# Patient Record
Sex: FEMALE | Race: WHITE | Employment: UNEMPLOYED | URBAN - NONMETROPOLITAN AREA
[De-identification: names, ages, dates, MRNs, and addresses within clinical notes are randomized per-mention and may not be internally consistent; named-entity substitution may affect disease eponyms.]

---

## 2017-03-02 RX ORDER — MULTIVIT-MIN/IRON/FOLIC ACID/K 18-600-40
1 CAPSULE ORAL DAILY
COMMUNITY
End: 2022-01-04

## 2017-03-02 RX ORDER — AMPICILLIN TRIHYDRATE 250 MG
2 CAPSULE ORAL DAILY
COMMUNITY
End: 2021-11-08

## 2017-03-02 RX ORDER — M-VIT,TX,IRON,MINS/CALC/FOLIC 27MG-0.4MG
1 TABLET ORAL DAILY
COMMUNITY
End: 2022-01-04

## 2017-03-02 RX ORDER — CHLORAL HYDRATE 500 MG
1000 CAPSULE ORAL DAILY
COMMUNITY
End: 2021-11-08

## 2017-03-02 RX ORDER — TRAZODONE HYDROCHLORIDE 100 MG/1
100 TABLET ORAL NIGHTLY
COMMUNITY

## 2017-03-02 RX ORDER — FLUTICASONE PROPIONATE 50 MCG
2 SPRAY, SUSPENSION (ML) NASAL DAILY
COMMUNITY

## 2017-03-02 RX ORDER — ASPIRIN 81 MG/1
81 TABLET ORAL DAILY
COMMUNITY

## 2017-03-02 RX ORDER — VENLAFAXINE 37.5 MG/1
37.5 TABLET ORAL 2 TIMES DAILY
COMMUNITY
End: 2021-11-08 | Stop reason: ALTCHOICE

## 2017-03-06 ENCOUNTER — INITIAL CONSULT (OUTPATIENT)
Dept: SURGERY | Age: 72
End: 2017-03-06

## 2017-03-06 VITALS
DIASTOLIC BLOOD PRESSURE: 82 MMHG | SYSTOLIC BLOOD PRESSURE: 138 MMHG | WEIGHT: 254 LBS | HEART RATE: 80 BPM | TEMPERATURE: 97.6 F | HEIGHT: 62 IN | BODY MASS INDEX: 46.74 KG/M2

## 2017-03-06 DIAGNOSIS — Z12.11 ENCOUNTER FOR COLONOSCOPY DUE TO HISTORY OF COLON CANCER: Primary | ICD-10-CM

## 2017-03-06 DIAGNOSIS — Z85.038 ENCOUNTER FOR COLONOSCOPY DUE TO HISTORY OF COLON CANCER: Primary | ICD-10-CM

## 2017-03-06 PROCEDURE — 99999 PR OFFICE/OUTPT VISIT,PROCEDURE ONLY: CPT | Performed by: SURGERY

## 2017-03-20 ENCOUNTER — OFFICE VISIT (OUTPATIENT)
Dept: SURGERY | Age: 72
End: 2017-03-20
Payer: MEDICARE

## 2017-03-20 VITALS
SYSTOLIC BLOOD PRESSURE: 130 MMHG | TEMPERATURE: 98.3 F | BODY MASS INDEX: 46.74 KG/M2 | DIASTOLIC BLOOD PRESSURE: 84 MMHG | HEIGHT: 62 IN | HEART RATE: 80 BPM | WEIGHT: 254 LBS

## 2017-03-20 DIAGNOSIS — K57.30 SIGMOID DIVERTICULOSIS: ICD-10-CM

## 2017-03-20 DIAGNOSIS — K63.5 COLON POLYPS: Primary | ICD-10-CM

## 2017-03-20 PROCEDURE — 99213 OFFICE O/P EST LOW 20 MIN: CPT | Performed by: SURGERY

## 2017-07-03 ENCOUNTER — OFFICE VISIT (OUTPATIENT)
Dept: SURGERY | Age: 72
End: 2017-07-03
Payer: MEDICARE

## 2017-07-03 VITALS
SYSTOLIC BLOOD PRESSURE: 138 MMHG | HEIGHT: 62 IN | TEMPERATURE: 97.6 F | DIASTOLIC BLOOD PRESSURE: 84 MMHG | RESPIRATION RATE: 12 BRPM | HEART RATE: 72 BPM | BODY MASS INDEX: 43.79 KG/M2 | WEIGHT: 238 LBS

## 2017-07-03 DIAGNOSIS — K56.60 INTESTINAL OBSTRUCTION, UNSPECIFIED TYPE: Primary | ICD-10-CM

## 2017-07-03 DIAGNOSIS — K59.01 SLOW TRANSIT CONSTIPATION: ICD-10-CM

## 2017-07-03 PROCEDURE — 99214 OFFICE O/P EST MOD 30 MIN: CPT | Performed by: SURGERY

## 2017-07-03 RX ORDER — LACTULOSE 10 G/15ML
SOLUTION ORAL
Qty: 946 BOTTLE | Refills: 6 | Status: ON HOLD | OUTPATIENT
Start: 2017-07-03 | End: 2019-07-16 | Stop reason: HOSPADM

## 2017-08-31 ENCOUNTER — APPOINTMENT (OUTPATIENT)
Dept: GENERAL RADIOLOGY | Age: 72
DRG: 389 | End: 2017-08-31
Attending: INTERNAL MEDICINE
Payer: MEDICARE

## 2017-08-31 ENCOUNTER — HOSPITAL ENCOUNTER (INPATIENT)
Age: 72
LOS: 2 days | Discharge: HOME | DRG: 389 | End: 2017-09-02
Attending: INTERNAL MEDICINE | Admitting: INTERNAL MEDICINE
Payer: MEDICARE

## 2017-08-31 LAB
ABSOLUTE EOS #: 0 K/UL (ref 0–0.4)
ABSOLUTE LYMPH #: 2.6 K/UL (ref 1–4.8)
ABSOLUTE MONO #: 0.6 K/UL (ref 0.1–1.2)
ALBUMIN SERPL-MCNC: 3.7 G/DL (ref 3.5–5.2)
ALBUMIN/GLOBULIN RATIO: 1.5 (ref 1–2.5)
ALP BLD-CCNC: 68 U/L (ref 35–104)
ALT SERPL-CCNC: 8 U/L (ref 5–33)
ANION GAP SERPL CALCULATED.3IONS-SCNC: 13 MMOL/L (ref 9–17)
AST SERPL-CCNC: 11 U/L
BASOPHILS # BLD: 1 %
BASOPHILS ABSOLUTE: 0.1 K/UL (ref 0–0.2)
BILIRUB SERPL-MCNC: 0.52 MG/DL (ref 0.3–1.2)
BUN BLDV-MCNC: 12 MG/DL (ref 8–23)
BUN/CREAT BLD: 16 (ref 9–20)
CALCIUM SERPL-MCNC: 8.7 MG/DL (ref 8.6–10.4)
CHLORIDE BLD-SCNC: 103 MMOL/L (ref 98–107)
CO2: 28 MMOL/L (ref 20–31)
CREAT SERPL-MCNC: 0.75 MG/DL (ref 0.5–0.9)
DIFFERENTIAL TYPE: NORMAL
EOSINOPHILS RELATIVE PERCENT: 1 %
GFR AFRICAN AMERICAN: >60 ML/MIN
GFR NON-AFRICAN AMERICAN: >60 ML/MIN
GFR SERPL CREATININE-BSD FRML MDRD: ABNORMAL ML/MIN/{1.73_M2}
GFR SERPL CREATININE-BSD FRML MDRD: ABNORMAL ML/MIN/{1.73_M2}
GLUCOSE BLD-MCNC: 114 MG/DL (ref 70–99)
HCT VFR BLD CALC: 41.7 % (ref 36–46)
HEMOGLOBIN: 13.4 G/DL (ref 12–16)
INR BLD: 1
LYMPHOCYTES # BLD: 24 %
MCH RBC QN AUTO: 28.5 PG (ref 26–34)
MCHC RBC AUTO-ENTMCNC: 32.1 G/DL (ref 31–37)
MCV RBC AUTO: 88.8 FL (ref 80–100)
MONOCYTES # BLD: 6 %
PARTIAL THROMBOPLASTIN TIME: 29.5 SEC (ref 27–35)
PDW BLD-RTO: 14.3 % (ref 11–14.5)
PLATELET # BLD: 249 K/UL (ref 140–450)
PLATELET ESTIMATE: NORMAL
PMV BLD AUTO: 7.6 FL (ref 6–12)
POTASSIUM SERPL-SCNC: 3.9 MMOL/L (ref 3.7–5.3)
PROTHROMBIN TIME: 10.8 SEC (ref 9.4–11.3)
RBC # BLD: 4.7 M/UL (ref 4–5.2)
RBC # BLD: NORMAL 10*6/UL
SEG NEUTROPHILS: 68 %
SEGMENTED NEUTROPHILS ABSOLUTE COUNT: 7.3 K/UL (ref 1.8–7.7)
SODIUM BLD-SCNC: 144 MMOL/L (ref 135–144)
THYROXINE, FREE: 0.97 NG/DL (ref 0.93–1.7)
TOTAL PROTEIN: 6.1 G/DL (ref 6.4–8.3)
TSH SERPL DL<=0.05 MIU/L-ACNC: 2.98 MIU/L (ref 0.3–5)
WBC # BLD: 10.6 K/UL (ref 3.5–11)
WBC # BLD: NORMAL 10*3/UL

## 2017-08-31 PROCEDURE — 2580000003 HC RX 258: Performed by: INTERNAL MEDICINE

## 2017-08-31 PROCEDURE — 71010 XR CHEST PORTABLE: CPT

## 2017-08-31 PROCEDURE — 6360000002 HC RX W HCPCS: Performed by: INTERNAL MEDICINE

## 2017-08-31 PROCEDURE — 85610 PROTHROMBIN TIME: CPT

## 2017-08-31 PROCEDURE — 84439 ASSAY OF FREE THYROXINE: CPT

## 2017-08-31 PROCEDURE — 80053 COMPREHEN METABOLIC PANEL: CPT

## 2017-08-31 PROCEDURE — 85730 THROMBOPLASTIN TIME PARTIAL: CPT

## 2017-08-31 PROCEDURE — 2060000000 HC ICU INTERMEDIATE R&B

## 2017-08-31 PROCEDURE — 99223 1ST HOSP IP/OBS HIGH 75: CPT | Performed by: INTERNAL MEDICINE

## 2017-08-31 PROCEDURE — 85025 COMPLETE CBC W/AUTO DIFF WBC: CPT

## 2017-08-31 PROCEDURE — 36415 COLL VENOUS BLD VENIPUNCTURE: CPT

## 2017-08-31 PROCEDURE — 2500000003 HC RX 250 WO HCPCS: Performed by: INTERNAL MEDICINE

## 2017-08-31 PROCEDURE — 84443 ASSAY THYROID STIM HORMONE: CPT

## 2017-08-31 RX ORDER — ONDANSETRON 2 MG/ML
8 INJECTION INTRAMUSCULAR; INTRAVENOUS EVERY 4 HOURS PRN
Status: DISCONTINUED | OUTPATIENT
Start: 2017-08-31 | End: 2017-09-02 | Stop reason: HOSPADM

## 2017-08-31 RX ORDER — FLUTICASONE PROPIONATE 50 MCG
2 SPRAY, SUSPENSION (ML) NASAL DAILY
Status: DISCONTINUED | OUTPATIENT
Start: 2017-08-31 | End: 2017-09-02 | Stop reason: HOSPADM

## 2017-08-31 RX ORDER — SODIUM CHLORIDE 9 MG/ML
INJECTION, SOLUTION INTRAVENOUS CONTINUOUS
Status: DISCONTINUED | OUTPATIENT
Start: 2017-08-31 | End: 2017-09-02 | Stop reason: HOSPADM

## 2017-08-31 RX ORDER — CIPROFLOXACIN 2 MG/ML
400 INJECTION, SOLUTION INTRAVENOUS EVERY 12 HOURS
Status: DISCONTINUED | OUTPATIENT
Start: 2017-08-31 | End: 2017-09-01

## 2017-08-31 RX ADMIN — HYDROMORPHONE HYDROCHLORIDE 1 MG: 1 INJECTION, SOLUTION INTRAMUSCULAR; INTRAVENOUS; SUBCUTANEOUS at 19:58

## 2017-08-31 RX ADMIN — ONDANSETRON 8 MG: 2 INJECTION INTRAMUSCULAR; INTRAVENOUS at 19:46

## 2017-08-31 RX ADMIN — METRONIDAZOLE 500 MG: 500 INJECTION, SOLUTION INTRAVENOUS at 23:42

## 2017-08-31 RX ADMIN — SODIUM CHLORIDE: 9 INJECTION, SOLUTION INTRAVENOUS at 23:40

## 2017-08-31 RX ADMIN — METRONIDAZOLE 500 MG: 500 INJECTION, SOLUTION INTRAVENOUS at 18:02

## 2017-08-31 RX ADMIN — SODIUM CHLORIDE: 9 INJECTION, SOLUTION INTRAVENOUS at 14:26

## 2017-08-31 RX ADMIN — CIPROFLOXACIN 400 MG: 2 INJECTION, SOLUTION INTRAVENOUS at 16:46

## 2017-08-31 ASSESSMENT — PAIN DESCRIPTION - DESCRIPTORS: DESCRIPTORS: CRAMPING

## 2017-08-31 ASSESSMENT — PAIN DESCRIPTION - LOCATION: LOCATION: ABDOMEN

## 2017-08-31 ASSESSMENT — PAIN DESCRIPTION - PAIN TYPE: TYPE: ACUTE PAIN

## 2017-08-31 ASSESSMENT — PAIN SCALES - GENERAL
PAINLEVEL_OUTOF10: 2
PAINLEVEL_OUTOF10: 6

## 2017-08-31 ASSESSMENT — PAIN DESCRIPTION - PROGRESSION: CLINICAL_PROGRESSION: NOT CHANGED

## 2017-08-31 ASSESSMENT — PAIN DESCRIPTION - FREQUENCY: FREQUENCY: CONTINUOUS

## 2017-08-31 ASSESSMENT — PAIN DESCRIPTION - ONSET: ONSET: ON-GOING

## 2017-09-01 ENCOUNTER — APPOINTMENT (OUTPATIENT)
Dept: ULTRASOUND IMAGING | Age: 72
DRG: 389 | End: 2017-09-01
Attending: INTERNAL MEDICINE
Payer: MEDICARE

## 2017-09-01 ENCOUNTER — APPOINTMENT (OUTPATIENT)
Dept: GENERAL RADIOLOGY | Age: 72
DRG: 389 | End: 2017-09-01
Attending: INTERNAL MEDICINE
Payer: MEDICARE

## 2017-09-01 LAB
ABSOLUTE EOS #: 0 K/UL (ref 0–0.4)
ABSOLUTE LYMPH #: 2.3 K/UL (ref 1–4.8)
ABSOLUTE MONO #: 0.7 K/UL (ref 0.1–1.2)
ANION GAP SERPL CALCULATED.3IONS-SCNC: 11 MMOL/L (ref 9–17)
BASOPHILS # BLD: 0 %
BASOPHILS ABSOLUTE: 0 K/UL (ref 0–0.2)
BUN BLDV-MCNC: 12 MG/DL (ref 8–23)
BUN/CREAT BLD: 17 (ref 9–20)
CALCIUM SERPL-MCNC: 8.3 MG/DL (ref 8.6–10.4)
CARCINOEMBRYONIC ANTIGEN: 1 NG/ML
CHLORIDE BLD-SCNC: 105 MMOL/L (ref 98–107)
CO2: 29 MMOL/L (ref 20–31)
CREAT SERPL-MCNC: 0.72 MG/DL (ref 0.5–0.9)
DIFFERENTIAL TYPE: NORMAL
EOSINOPHILS RELATIVE PERCENT: 0 %
GFR AFRICAN AMERICAN: >60 ML/MIN
GFR NON-AFRICAN AMERICAN: >60 ML/MIN
GFR SERPL CREATININE-BSD FRML MDRD: ABNORMAL ML/MIN/{1.73_M2}
GFR SERPL CREATININE-BSD FRML MDRD: ABNORMAL ML/MIN/{1.73_M2}
GLUCOSE BLD-MCNC: 119 MG/DL (ref 70–99)
HCT VFR BLD CALC: 40.5 % (ref 36–46)
HEMOGLOBIN: 13 G/DL (ref 12–16)
LYMPHOCYTES # BLD: 24 %
MCH RBC QN AUTO: 28.6 PG (ref 26–34)
MCHC RBC AUTO-ENTMCNC: 32.1 G/DL (ref 31–37)
MCV RBC AUTO: 89.2 FL (ref 80–100)
MONOCYTES # BLD: 7 %
PDW BLD-RTO: 14.5 % (ref 11–14.5)
PLATELET # BLD: 235 K/UL (ref 140–450)
PLATELET ESTIMATE: NORMAL
PMV BLD AUTO: 7.8 FL (ref 6–12)
POTASSIUM SERPL-SCNC: 3.7 MMOL/L (ref 3.7–5.3)
RBC # BLD: 4.54 M/UL (ref 4–5.2)
RBC # BLD: NORMAL 10*6/UL
SEG NEUTROPHILS: 69 %
SEGMENTED NEUTROPHILS ABSOLUTE COUNT: 6.8 K/UL (ref 1.8–7.7)
SODIUM BLD-SCNC: 145 MMOL/L (ref 135–144)
WBC # BLD: 9.9 K/UL (ref 3.5–11)
WBC # BLD: NORMAL 10*3/UL

## 2017-09-01 PROCEDURE — 6360000002 HC RX W HCPCS

## 2017-09-01 PROCEDURE — 36415 COLL VENOUS BLD VENIPUNCTURE: CPT

## 2017-09-01 PROCEDURE — 80048 BASIC METABOLIC PNL TOTAL CA: CPT

## 2017-09-01 PROCEDURE — 6360000002 HC RX W HCPCS: Performed by: INTERNAL MEDICINE

## 2017-09-01 PROCEDURE — 99233 SBSQ HOSP IP/OBS HIGH 50: CPT | Performed by: SURGERY

## 2017-09-01 PROCEDURE — 74022 RADEX COMPL AQT ABD SERIES: CPT

## 2017-09-01 PROCEDURE — 82378 CARCINOEMBRYONIC ANTIGEN: CPT

## 2017-09-01 PROCEDURE — 2060000000 HC ICU INTERMEDIATE R&B

## 2017-09-01 PROCEDURE — 6370000000 HC RX 637 (ALT 250 FOR IP): Performed by: SURGERY

## 2017-09-01 PROCEDURE — 6370000000 HC RX 637 (ALT 250 FOR IP): Performed by: INTERNAL MEDICINE

## 2017-09-01 PROCEDURE — 99232 SBSQ HOSP IP/OBS MODERATE 35: CPT | Performed by: INTERNAL MEDICINE

## 2017-09-01 PROCEDURE — 85025 COMPLETE CBC W/AUTO DIFF WBC: CPT

## 2017-09-01 PROCEDURE — 2580000003 HC RX 258: Performed by: INTERNAL MEDICINE

## 2017-09-01 PROCEDURE — 76705 ECHO EXAM OF ABDOMEN: CPT

## 2017-09-01 PROCEDURE — 2500000003 HC RX 250 WO HCPCS: Performed by: INTERNAL MEDICINE

## 2017-09-01 RX ORDER — SODIUM CHLORIDE 0.9 % (FLUSH) 0.9 %
10 SYRINGE (ML) INJECTION EVERY 12 HOURS SCHEDULED
Status: DISCONTINUED | OUTPATIENT
Start: 2017-09-01 | End: 2017-09-02 | Stop reason: HOSPADM

## 2017-09-01 RX ORDER — CIPROFLOXACIN 2 MG/ML
INJECTION, SOLUTION INTRAVENOUS
Status: COMPLETED
Start: 2017-09-01 | End: 2017-09-01

## 2017-09-01 RX ORDER — LACTULOSE 10 G/15ML
10 SOLUTION ORAL DAILY
Status: DISCONTINUED | OUTPATIENT
Start: 2017-09-01 | End: 2017-09-02 | Stop reason: HOSPADM

## 2017-09-01 RX ORDER — SODIUM CHLORIDE 0.9 % (FLUSH) 0.9 %
10 SYRINGE (ML) INJECTION PRN
Status: DISCONTINUED | OUTPATIENT
Start: 2017-09-01 | End: 2017-09-02 | Stop reason: HOSPADM

## 2017-09-01 RX ADMIN — SODIUM CHLORIDE: 9 INJECTION, SOLUTION INTRAVENOUS at 11:34

## 2017-09-01 RX ADMIN — CIPROFLOXACIN 400 MG: 2 INJECTION, SOLUTION INTRAVENOUS at 06:12

## 2017-09-01 RX ADMIN — Medication 10 ML: at 13:39

## 2017-09-01 RX ADMIN — LACTULOSE 10 G: 20 SOLUTION ORAL at 12:01

## 2017-09-01 RX ADMIN — ONDANSETRON 8 MG: 2 INJECTION INTRAMUSCULAR; INTRAVENOUS at 07:11

## 2017-09-01 RX ADMIN — ONDANSETRON 8 MG: 2 INJECTION INTRAMUSCULAR; INTRAVENOUS at 18:11

## 2017-09-01 RX ADMIN — SODIUM CHLORIDE, PRESERVATIVE FREE 10 ML: 5 INJECTION INTRAVENOUS at 21:37

## 2017-09-01 RX ADMIN — HYDROMORPHONE HYDROCHLORIDE 1 MG: 1 INJECTION, SOLUTION INTRAMUSCULAR; INTRAVENOUS; SUBCUTANEOUS at 18:37

## 2017-09-01 RX ADMIN — SODIUM CHLORIDE: 9 INJECTION, SOLUTION INTRAVENOUS at 19:59

## 2017-09-01 RX ADMIN — METRONIDAZOLE 500 MG: 500 INJECTION, SOLUTION INTRAVENOUS at 07:05

## 2017-09-01 ASSESSMENT — PAIN SCALES - GENERAL
PAINLEVEL_OUTOF10: 0
PAINLEVEL_OUTOF10: 3
PAINLEVEL_OUTOF10: 0
PAINLEVEL_OUTOF10: 8

## 2017-09-01 ASSESSMENT — PAIN DESCRIPTION - PAIN TYPE
TYPE: ACUTE PAIN
TYPE: ACUTE PAIN

## 2017-09-01 ASSESSMENT — PAIN DESCRIPTION - PROGRESSION: CLINICAL_PROGRESSION: GRADUALLY WORSENING

## 2017-09-01 ASSESSMENT — PAIN DESCRIPTION - ORIENTATION: ORIENTATION: UPPER

## 2017-09-01 ASSESSMENT — PAIN DESCRIPTION - DIRECTION: RADIATING_TOWARDS: 0

## 2017-09-01 ASSESSMENT — PAIN DESCRIPTION - LOCATION
LOCATION: ABDOMEN
LOCATION: ABDOMEN

## 2017-09-01 ASSESSMENT — PAIN DESCRIPTION - FREQUENCY: FREQUENCY: INTERMITTENT

## 2017-09-01 ASSESSMENT — PAIN DESCRIPTION - ONSET: ONSET: ON-GOING

## 2017-09-01 ASSESSMENT — PAIN DESCRIPTION - DESCRIPTORS
DESCRIPTORS: CRAMPING
DESCRIPTORS: CRAMPING

## 2017-09-02 VITALS
WEIGHT: 240.3 LBS | BODY MASS INDEX: 42.58 KG/M2 | HEIGHT: 63 IN | DIASTOLIC BLOOD PRESSURE: 78 MMHG | SYSTOLIC BLOOD PRESSURE: 113 MMHG | RESPIRATION RATE: 16 BRPM | OXYGEN SATURATION: 94 % | HEART RATE: 84 BPM | TEMPERATURE: 97.9 F

## 2017-09-02 LAB
ABSOLUTE EOS #: 0.1 K/UL (ref 0–0.4)
ABSOLUTE LYMPH #: 2.1 K/UL (ref 1–4.8)
ABSOLUTE MONO #: 0.5 K/UL (ref 0.1–1.2)
ANION GAP SERPL CALCULATED.3IONS-SCNC: 9 MMOL/L (ref 9–17)
BASOPHILS # BLD: 0 %
BASOPHILS ABSOLUTE: 0 K/UL (ref 0–0.2)
BUN BLDV-MCNC: 9 MG/DL (ref 8–23)
BUN/CREAT BLD: 12 (ref 9–20)
CALCIUM SERPL-MCNC: 8 MG/DL (ref 8.6–10.4)
CHLORIDE BLD-SCNC: 108 MMOL/L (ref 98–107)
CO2: 29 MMOL/L (ref 20–31)
CREAT SERPL-MCNC: 0.76 MG/DL (ref 0.5–0.9)
DIFFERENTIAL TYPE: ABNORMAL
EOSINOPHILS RELATIVE PERCENT: 2 %
GFR AFRICAN AMERICAN: >60 ML/MIN
GFR NON-AFRICAN AMERICAN: >60 ML/MIN
GFR SERPL CREATININE-BSD FRML MDRD: ABNORMAL ML/MIN/{1.73_M2}
GFR SERPL CREATININE-BSD FRML MDRD: ABNORMAL ML/MIN/{1.73_M2}
GLUCOSE BLD-MCNC: 110 MG/DL (ref 70–99)
HCT VFR BLD CALC: 36.6 % (ref 36–46)
HEMOGLOBIN: 11.6 G/DL (ref 12–16)
LYMPHOCYTES # BLD: 32 %
MCH RBC QN AUTO: 28.4 PG (ref 26–34)
MCHC RBC AUTO-ENTMCNC: 31.6 G/DL (ref 31–37)
MCV RBC AUTO: 89.7 FL (ref 80–100)
MONOCYTES # BLD: 8 %
PDW BLD-RTO: 14.3 % (ref 11–14.5)
PLATELET # BLD: 211 K/UL (ref 140–450)
PLATELET ESTIMATE: ABNORMAL
PMV BLD AUTO: 7.8 FL (ref 6–12)
POTASSIUM SERPL-SCNC: 4.1 MMOL/L (ref 3.7–5.3)
RBC # BLD: 4.08 M/UL (ref 4–5.2)
RBC # BLD: ABNORMAL 10*6/UL
SEG NEUTROPHILS: 58 %
SEGMENTED NEUTROPHILS ABSOLUTE COUNT: 3.9 K/UL (ref 1.8–7.7)
SODIUM BLD-SCNC: 146 MMOL/L (ref 135–144)
WBC # BLD: 6.7 K/UL (ref 3.5–11)
WBC # BLD: ABNORMAL 10*3/UL

## 2017-09-02 PROCEDURE — 6370000000 HC RX 637 (ALT 250 FOR IP): Performed by: SURGERY

## 2017-09-02 PROCEDURE — 99232 SBSQ HOSP IP/OBS MODERATE 35: CPT | Performed by: SURGERY

## 2017-09-02 PROCEDURE — 80048 BASIC METABOLIC PNL TOTAL CA: CPT

## 2017-09-02 PROCEDURE — 85025 COMPLETE CBC W/AUTO DIFF WBC: CPT

## 2017-09-02 PROCEDURE — 36415 COLL VENOUS BLD VENIPUNCTURE: CPT

## 2017-09-02 PROCEDURE — 99238 HOSP IP/OBS DSCHRG MGMT 30/<: CPT | Performed by: FAMILY MEDICINE

## 2017-09-02 RX ADMIN — LACTULOSE 10 G: 20 SOLUTION ORAL at 09:18

## 2017-09-02 ASSESSMENT — PAIN SCALES - GENERAL: PAINLEVEL_OUTOF10: 0

## 2017-09-25 ENCOUNTER — OFFICE VISIT (OUTPATIENT)
Dept: SURGERY | Age: 72
End: 2017-09-25
Payer: MEDICARE

## 2017-09-25 VITALS
HEART RATE: 71 BPM | BODY MASS INDEX: 43.79 KG/M2 | TEMPERATURE: 97.7 F | SYSTOLIC BLOOD PRESSURE: 130 MMHG | DIASTOLIC BLOOD PRESSURE: 68 MMHG | HEIGHT: 62 IN | WEIGHT: 238 LBS

## 2017-09-25 DIAGNOSIS — K82.8 GALLBLADDER SLUDGE: Primary | ICD-10-CM

## 2017-09-25 DIAGNOSIS — K59.00 CONSTIPATION, UNSPECIFIED CONSTIPATION TYPE: ICD-10-CM

## 2017-09-25 PROCEDURE — 99214 OFFICE O/P EST MOD 30 MIN: CPT | Performed by: SURGERY

## 2017-09-25 RX ORDER — DOCUSATE SODIUM 100 MG/1
100 CAPSULE, LIQUID FILLED ORAL 2 TIMES DAILY
Qty: 60 CAPSULE | Refills: 3 | Status: SHIPPED | OUTPATIENT
Start: 2017-09-25 | End: 2019-07-10

## 2017-11-20 ENCOUNTER — OFFICE VISIT (OUTPATIENT)
Dept: SURGERY | Age: 72
End: 2017-11-20
Payer: MEDICARE

## 2017-11-20 VITALS
DIASTOLIC BLOOD PRESSURE: 64 MMHG | HEIGHT: 62 IN | TEMPERATURE: 97 F | SYSTOLIC BLOOD PRESSURE: 128 MMHG | HEART RATE: 86 BPM | WEIGHT: 241 LBS | BODY MASS INDEX: 44.35 KG/M2

## 2017-11-20 DIAGNOSIS — K56.600 SMALL BOWEL OBSTRUCTION, PARTIAL (HCC): Primary | ICD-10-CM

## 2017-11-20 DIAGNOSIS — K57.30 SIGMOID DIVERTICULOSIS: ICD-10-CM

## 2017-11-20 PROCEDURE — 99215 OFFICE O/P EST HI 40 MIN: CPT | Performed by: SURGERY

## 2017-11-20 NOTE — PROGRESS NOTES
This patient was recently admitted to the hospital for treatment of small bowel partial obstruction. She was treated conservatively and resolved. She has no symptoms at the present time. She is also known to have severe sigmoid diverticulosis and has been on fiber supplement however she has not been taking it consistently. She states that she has been having regular bowel movements with the lactulose and the fiber supplement. She is advised to have Metamucil or Benefiber one tablespoonful twice daily with water at least 12 ounces. She is advised to continue to take lactulose 1 tablespoonful 3 times daily. We will order aSoft, nondistended, normal bowel sounds, nontender, no organomegaly, no mass, no rebound, no guarding and small bowel through x-ray to evaluate the patency of her small intestine. She is to return after 3 weeks for follow-up.

## 2017-11-20 NOTE — PATIENT INSTRUCTIONS
METAMUCIL 1 TABLESPOON IN 8 OUNCE GLASS COLD WATER 2 X DAILY  RETURN COLONOSCOPY 3 YEARS  SMALL BOWEL FOLLOW THRU IN [place]   KEEP APPT 12/11/2017 11:30 @[facility]  LACTULOSE 1 TABLESPOON 3X DAILY  SMALL BOWEL FOLLOW THROUGH @ [address] [facility] 12/28/2017 @ 9:00 AM NOTHING AFTER MIDNIGHT.

## 2017-12-12 ENCOUNTER — HOSPITAL ENCOUNTER (OUTPATIENT)
Dept: GENERAL RADIOLOGY | Age: 72
Discharge: HOME | End: 2017-12-12
Payer: MEDICARE

## 2017-12-12 DIAGNOSIS — K57.30 SIGMOID DIVERTICULOSIS: ICD-10-CM

## 2017-12-12 DIAGNOSIS — K56.600 SMALL BOWEL OBSTRUCTION, PARTIAL (HCC): ICD-10-CM

## 2017-12-12 PROCEDURE — 2500000003 HC RX 250 WO HCPCS: Performed by: SURGERY

## 2017-12-12 PROCEDURE — 74250 X-RAY XM SM INT 1CNTRST STD: CPT

## 2017-12-12 RX ADMIN — BARIUM SULFATE 750 ML: 960 POWDER, FOR SUSPENSION ORAL at 10:48

## 2017-12-15 ENCOUNTER — OFFICE VISIT (OUTPATIENT)
Dept: SURGERY | Age: 72
End: 2017-12-15
Payer: MEDICARE

## 2017-12-15 VITALS
TEMPERATURE: 98.2 F | SYSTOLIC BLOOD PRESSURE: 124 MMHG | DIASTOLIC BLOOD PRESSURE: 64 MMHG | HEART RATE: 69 BPM | WEIGHT: 246 LBS | OXYGEN SATURATION: 97 % | BODY MASS INDEX: 45.27 KG/M2 | HEIGHT: 62 IN

## 2017-12-15 DIAGNOSIS — K56.600 SMALL BOWEL OBSTRUCTION, PARTIAL (HCC): ICD-10-CM

## 2017-12-15 DIAGNOSIS — K59.09 CHRONIC CONSTIPATION: Primary | ICD-10-CM

## 2017-12-15 PROCEDURE — 99214 OFFICE O/P EST MOD 30 MIN: CPT | Performed by: SURGERY

## 2017-12-15 RX ORDER — LACTULOSE 10 G/15ML
10 SOLUTION ORAL 3 TIMES DAILY
Qty: 1350 ML | Refills: 5 | Status: SHIPPED | OUTPATIENT
Start: 2017-12-15 | End: 2018-03-15

## 2019-05-07 ENCOUNTER — HOSPITAL ENCOUNTER (INPATIENT)
Age: 74
LOS: 2 days | Discharge: HOME | DRG: 390 | End: 2019-05-09
Attending: INTERNAL MEDICINE | Admitting: INTERNAL MEDICINE
Payer: MEDICARE

## 2019-05-07 PROBLEM — K56.609 SBO (SMALL BOWEL OBSTRUCTION) (HCC): Status: ACTIVE | Noted: 2019-05-07

## 2019-05-07 LAB
EKG ATRIAL RATE: 93 BPM
EKG P AXIS: 84 DEGREES
EKG P-R INTERVAL: 146 MS
EKG Q-T INTERVAL: 396 MS
EKG QRS DURATION: 82 MS
EKG QTC CALCULATION (BAZETT): 492 MS
EKG R AXIS: -13 DEGREES
EKG T AXIS: 67 DEGREES
EKG VENTRICULAR RATE: 93 BPM

## 2019-05-07 PROCEDURE — 2580000003 HC RX 258: Performed by: INTERNAL MEDICINE

## 2019-05-07 PROCEDURE — 94664 DEMO&/EVAL PT USE INHALER: CPT

## 2019-05-07 PROCEDURE — 96372 THER/PROPH/DIAG INJ SC/IM: CPT

## 2019-05-07 PROCEDURE — 94760 N-INVAS EAR/PLS OXIMETRY 1: CPT

## 2019-05-07 PROCEDURE — 94150 VITAL CAPACITY TEST: CPT

## 2019-05-07 PROCEDURE — 6370000000 HC RX 637 (ALT 250 FOR IP): Performed by: INTERNAL MEDICINE

## 2019-05-07 PROCEDURE — 6360000002 HC RX W HCPCS: Performed by: INTERNAL MEDICINE

## 2019-05-07 PROCEDURE — 93005 ELECTROCARDIOGRAM TRACING: CPT

## 2019-05-07 PROCEDURE — 0D9670Z DRAINAGE OF STOMACH WITH DRAINAGE DEVICE, VIA NATURAL OR ARTIFICIAL OPENING: ICD-10-PCS | Performed by: INTERNAL MEDICINE

## 2019-05-07 PROCEDURE — 99223 1ST HOSP IP/OBS HIGH 75: CPT | Performed by: INTERNAL MEDICINE

## 2019-05-07 PROCEDURE — 2060000000 HC ICU INTERMEDIATE R&B

## 2019-05-07 RX ORDER — ALBUTEROL SULFATE 2.5 MG/3ML
2.5 SOLUTION RESPIRATORY (INHALATION)
Status: DISCONTINUED | OUTPATIENT
Start: 2019-05-07 | End: 2019-05-09 | Stop reason: HOSPADM

## 2019-05-07 RX ORDER — SODIUM CHLORIDE 0.9 % (FLUSH) 0.9 %
10 SYRINGE (ML) INJECTION EVERY 12 HOURS SCHEDULED
Status: DISCONTINUED | OUTPATIENT
Start: 2019-05-07 | End: 2019-05-09 | Stop reason: HOSPADM

## 2019-05-07 RX ORDER — VENLAFAXINE 37.5 MG/1
37.5 TABLET ORAL 2 TIMES DAILY
Status: DISCONTINUED | OUTPATIENT
Start: 2019-05-07 | End: 2019-05-09 | Stop reason: HOSPADM

## 2019-05-07 RX ORDER — ALBUTEROL SULFATE 2.5 MG/3ML
2.5 SOLUTION RESPIRATORY (INHALATION)
Status: DISCONTINUED | OUTPATIENT
Start: 2019-05-07 | End: 2019-05-07

## 2019-05-07 RX ORDER — SODIUM CHLORIDE 0.9 % (FLUSH) 0.9 %
10 SYRINGE (ML) INJECTION PRN
Status: DISCONTINUED | OUTPATIENT
Start: 2019-05-07 | End: 2019-05-09 | Stop reason: HOSPADM

## 2019-05-07 RX ORDER — FLUTICASONE PROPIONATE 50 MCG
2 SPRAY, SUSPENSION (ML) NASAL DAILY PRN
Status: DISCONTINUED | OUTPATIENT
Start: 2019-05-07 | End: 2019-05-09 | Stop reason: HOSPADM

## 2019-05-07 RX ORDER — SODIUM CHLORIDE 9 MG/ML
INJECTION, SOLUTION INTRAVENOUS CONTINUOUS
Status: DISCONTINUED | OUTPATIENT
Start: 2019-05-07 | End: 2019-05-08

## 2019-05-07 RX ORDER — SODIUM CHLORIDE FOR INHALATION 0.9 %
3 VIAL, NEBULIZER (ML) INHALATION
Status: DISCONTINUED | OUTPATIENT
Start: 2019-05-07 | End: 2019-05-07

## 2019-05-07 RX ADMIN — VENLAFAXINE 37.5 MG: 37.5 TABLET ORAL at 21:33

## 2019-05-07 RX ADMIN — ENOXAPARIN SODIUM 40 MG: 40 INJECTION SUBCUTANEOUS at 21:33

## 2019-05-07 RX ADMIN — SODIUM CHLORIDE: 9 INJECTION, SOLUTION INTRAVENOUS at 21:26

## 2019-05-07 ASSESSMENT — PAIN DESCRIPTION - PROGRESSION: CLINICAL_PROGRESSION: GRADUALLY IMPROVING

## 2019-05-07 ASSESSMENT — PAIN SCALES - GENERAL
PAINLEVEL_OUTOF10: 4
PAINLEVEL_OUTOF10: 0
PAINLEVEL_OUTOF10: 4
PAINLEVEL_OUTOF10: 0

## 2019-05-07 ASSESSMENT — PAIN DESCRIPTION - LOCATION: LOCATION: ABDOMEN

## 2019-05-07 ASSESSMENT — PAIN DESCRIPTION - FREQUENCY: FREQUENCY: INTERMITTENT

## 2019-05-07 ASSESSMENT — PAIN DESCRIPTION - PAIN TYPE: TYPE: ACUTE PAIN

## 2019-05-07 NOTE — PROGRESS NOTES
Physical Therapy  Attempted PT eval this date. On hold per nursing, as patient is currently being assessed by general surgery for small bowel obstruction. Will attempt PT eval again tomorrow.     Ashvin Wilson, PT, DPT

## 2019-05-07 NOTE — CONSULTS
Inpatient consult to General Surgery  Consult performed by: Eddy Lesches, MD  Consult ordered by: Shamir Naylor        SURGICAL CONSULT    HISTORY OF PRESENT ILLNESS:    Patient is a 66-year-old femalewho was admitted as a direct admit from the [facility] ER. She was brought in with increased nausea and vomiting and found to have a small bowel obstruction per CT scan. She had no elevated white blood cell count was transferred over to [facility] in [place] with NGT placement, for observation and possible surgery. .    . She came with an NG tube. She states she feels better since that was put in. The pain and symptoms started last night. She had a colon resection with colon surgery 1970s. She has had a similar episode in 2017 of PSBO that resolved with conservative tx. PAST MEDICAL HISTORY:    Past Surgical History:   Procedure Laterality Date    COLON SURGERY  04/2008    COLECTOMY    COLONOSCOPY      COLONOSCOPY  03/13/2017    POLYP,DIVERTICULOSIS    HEMORRHOID SURGERY      BANDING    HERNIA REPAIR  2004    VENTRAL HERNIA    HYSTERECTOMY      JOINT REPLACEMENT      BILATERAL KNEE REPLACEMENT    THYROIDECTOMY, PARTIAL      goiter removed     UPPER GASTROINTESTINAL ENDOSCOPY       Past Medical History:   Diagnosis Date    History of colon cancer     History of iron deficiency anemia     Hyperlipidemia     Stress disorder, acute     Thyroid goiter      No current facility-administered medications on file prior to encounter.       Current Outpatient Medications on File Prior to Encounter   Medication Sig Dispense Refill    docusate sodium (COLACE) 100 MG capsule Take 1 capsule by mouth 2 times daily 60 capsule 3    FIBER PO Take by mouth 1 tablespoon bid      lactulose (CHRONULAC) 10 GM/15ML solution 1 tablespoon three times a day by mouth (Patient taking differently: 1 tablespoon twice times a day by mouth) 946 Bottle 6    aspirin EC 81 MG EC tablet Take 81 mg by mouth daily     

## 2019-05-07 NOTE — H&P
HOSPITALIST ADMISSION H&P      REASON FOR ADMISSION:  Distal small bowel obstruction---recurrent  ESTIMATED LENGTH OF STAY:  > 2 midnights---2-3 days    ATTENDING/ADMITTING PHYSICIAN: Milana Covarrubias MD  PCP: Lavonne Proctor    HISTORY OF PRESENT ILLNESS:      The patient is a 68 y.o. female patient of Lavonne Proctor who presents with 2-3 days of mild abdominal discomfort which became more intense in the last 24 hours----dry heaves--abdominal pain and cramping---last BM 5. 6.2019 and \"normal\" for patient---CT abdomen-pelvis--5. 7.2019 --> distal SBO--patient was a [facility] ER---Dr. Idalia Padilla, , requested that the patient transferred to [address] [place] [address] for additional evaluation and treatment--possible surgical intervention     Prior history of right colon carcinoma--right colectomy--ileocolostomy--2008--possible adhesions           See below for additional PMH.     Patient wnvc-cufvcfoqrp-iwnbergp-available records reviewed, including, but not limited to, [place] ER reports--labs---imaging--office records---personal notes       Past Medical History:   Diagnosis Date    History of colon cancer     History of iron deficiency anemia     Hyperlipidemia     Stress disorder, acute     Thyroid goiter            Past Surgical History:   Procedure Laterality Date    COLON SURGERY  04/2008    COLECTOMY    COLONOSCOPY      COLONOSCOPY  03/13/2017    POLYP,DIVERTICULOSIS    HEMORRHOID SURGERY      BANDING    HERNIA REPAIR  2004    VENTRAL HERNIA    HYSTERECTOMY      JOINT REPLACEMENT      BILATERAL KNEE REPLACEMENT    THYROIDECTOMY, PARTIAL      goiter removed     UPPER GASTROINTESTINAL ENDOSCOPY         Medications Prior to Admission:    Medications Prior to Admission: docusate sodium (COLACE) 100 MG capsule, Take 1 capsule by mouth 2 times daily  FIBER PO, Take by mouth 1 tablespoon bid  lactulose (CHRONULAC) 10 GM/15ML solution, 1 tablespoon three times a day by mouth (Patient taking differently: 1 tablespoon twice times a day by mouth)  aspirin EC 81 MG EC tablet, Take 81 mg by mouth daily  Calcium Carb-Cholecalciferol (CALCIUM-VITAMIN D3) 600-400 MG-UNIT CAPS, Take 1 tablet by mouth daily  Omega-3 Fatty Acids (FISH OIL) 1000 MG CAPS, Take 1,000 mg by mouth daily 1-2 TABLETS DAILY  fluticasone (FLONASE) 50 MCG/ACT nasal spray, 2 sprays by Nasal route daily  Multiple Vitamins-Minerals (THERAPEUTIC MULTIVITAMIN-MINERALS) tablet, Take 1 tablet by mouth daily  Red Yeast Rice 600 MG CAPS, Take 2 capsules by mouth daily  traZODone (DESYREL) 100 MG tablet, Take 100 mg by mouth nightly TAKE 1/4 OF A TABLET NIGHTLY PRN FOR SLEEP  venlafaxine (EFFEXOR) 37.5 MG tablet, Take 37.5 mg by mouth 2 times daily    Allergies:    Erythromycin; Fenofibrate; Lipitor [atorvastatin]; Lopid [gemfibrozil]; Other; and Propoxyphene    Social History:    reports that she has never smoked. She has never used smokeless tobacco. She reports that she does not drink alcohol or use drugs. Family History:   family history is not on file. REVIEW OF SYSTEMS:  See HPI and problem list; otherwise no other new complaints with respect to HEENT, neck, pulmonary, coronary, GI, , endocrine, musculoskeletal, immune system/connective tissue disease, hematologic, neuropsych, skin, lymphatics, or malignancies. PHYSICAL EXAM:  Vitals:  BP (!) 152/91   Pulse 98   Temp 98.9 °F (37.2 °C) (Tympanic)   Resp 18   Ht 5' 2\" (1.575 m)   Wt 233 lb 8 oz (105.9 kg)   SpO2 99%   BMI 42.71 kg/m²     HEENT: Normocephalic and Atraumatic  Neck: Supple, No Masses, Tenderness, Nodularity and No Lymphadenopathy  Chest/Lungs: Clear to Auscultation without Rales, Rhonchi, or Wheezes  Cardiac: Regular Rate and Rhythm without Rubs, Clicks, Gallops, or Murmurs  GI/Abdomen:  Bowel Sounds Present and rounded abdomen---RLQ tenderness to direct palpation--- without Guarding or Rebound Tenderness  : Not examined  EXT/Skin: No Cyanosis, No Clubbing and Edema Present  Neuro: generalzied weakness----, Alert and Oriented and No Localizing Signs/Symptoms        LABS:    CBC with Differential:    Lab Results   Component Value Date    WBC 6.7 09/02/2017    RBC 4.08 09/02/2017    HGB 11.6 09/02/2017    HCT 36.6 09/02/2017     09/02/2017    MCV 89.7 09/02/2017    MCH 28.4 09/02/2017    MCHC 31.6 09/02/2017    RDW 14.3 09/02/2017    LYMPHOPCT 32 09/02/2017    MONOPCT 8 09/02/2017    BASOPCT 0 09/02/2017    MONOSABS 0.50 09/02/2017    LYMPHSABS 2.10 09/02/2017    EOSABS 0.10 09/02/2017    BASOSABS 0.00 09/02/2017    DIFFTYPE NOT REPORTED 09/02/2017     BMP:    Lab Results   Component Value Date     09/02/2017    K 4.1 09/02/2017     09/02/2017    CO2 29 09/02/2017    BUN 9 09/02/2017    LABALBU 3.7 08/31/2017    CREATININE 0.76 09/02/2017    CALCIUM 8.0 09/02/2017    GFRAA >60 09/02/2017    LABGLOM >60 09/02/2017    GLUCOSE 110 09/02/2017       ASSESSMENT:      Patient Active Problem List   Diagnosis    Intestinal obstruction (HCC)    Non-intractable vomiting with nausea    SBO (small bowel obstruction) (HCC)       CAITIE HE         [facility]  world call--Carol  68  WF  [shukri Medina, LESTER--Cheri;  Alison Salas, BEA]  FULL CODE     SBO----5. 7.2019        CT abdomen---pelvis---5. 7.2019--cheri--recurrent SBO                 distal ileum--multiple loops of small bowel present---terminal ileum                  decompressed--small bowel wall thickening and tethering                  of small bowel loops RLQ--scarring?--trace fluid in pelvis            PSBO---history----multiple          DUS---GB----9. 1. 2017--diffuse hepatocellular disease---vs--                       diffuse fatty infiltration---normal CBD 5 mm--gallbladder                       sludge---prior hemangioma not seen          Diverticulitis---8.30.2017  Right colon carcinoma---history  CKD---Stage 2  Hyperlipidemia   Stress disorder  Obesity--morbid--not a factor in this hospitalization    PMH:  iron deficiency anemia thyroid goiter  PSH:   colonoscopy, right colectomy--ileocolostomy--2008, thyroidectomy--goiter,              EGD, bilateral TKA, ventral hernia repair---2004, hemorrhoid banding,              colonoscopy---3.13.2017--diverticulosis---polyp, hysterectomy--cervix status             unknown, appendectomy    Allergies:        NKDA   Intolerances:  erythromycin--nausea, Lopid--fenofibrate--nausea--fluid retention,                                                 Lipitor---muscle aches--fluid retention , Darvocet--propoxyphene--nausea                              Code Status:  FULL CODE                            OARRS---5. 7.2019--[-]    PLAN:    1. SBO---NG tube---General Surgery----NPO  2. Home medications reviewed  3.   See orders     Note that over 50 minutes was spent in evaluation of the patient, review of the chart and pertinent records, discussion with family/staff, etc    Nia Carrillo MD  11:52 AM  5/7/2019

## 2019-05-07 NOTE — PROGRESS NOTES
Incentive Spirometry education and demonstration given by Respiratory Therapy. Pt achieving 2000 mL at time of instruction. Incentive Spirometer left at bedside and   Patient instructed to do a minimum of 10 breaths every hour.       Keturah Coreas  12:44 PM

## 2019-05-07 NOTE — PROGRESS NOTES
Tiarra Jordan, PPatient Assessment complete. SBO (small bowel obstruction) ([facility] [K56.609] . Vitals:    05/07/19 1244   BP:    Pulse:    Resp: 18   Temp:    SpO2: 98%   . Patients home meds are   Prior to Admission medications    Medication Sig Start Date End Date Taking?  Authorizing Provider   docusate sodium (COLACE) 100 MG capsule Take 1 capsule by mouth 2 times daily 9/25/17   Kanchan Oglesby MD   FIBER PO Take by mouth 1 tablespoon bid    Historical Provider, MD   lactulose (CHRONULAC) 10 GM/15ML solution 1 tablespoon three times a day by mouth  Patient taking differently: 1 tablespoon twice times a day by mouth 7/3/17   Kanchan Oglesby MD   aspirin EC 81 MG EC tablet Take 81 mg by mouth daily    Historical Provider, MD   Calcium Carb-Cholecalciferol (CALCIUM-VITAMIN D3) 600-400 MG-UNIT CAPS Take 1 tablet by mouth daily    Historical Provider, MD   Omega-3 Fatty Acids (FISH OIL) 1000 MG CAPS Take 1,000 mg by mouth daily 1-2 TABLETS DAILY    Historical Provider, MD   fluticasone (FLONASE) 50 MCG/ACT nasal spray 2 sprays by Nasal route daily    Historical Provider, MD   Multiple Vitamins-Minerals (THERAPEUTIC MULTIVITAMIN-MINERALS) tablet Take 1 tablet by mouth daily    Historical Provider, MD   Red Yeast Rice 600 MG CAPS Take 2 capsules by mouth daily    Historical Provider, MD   traZODone (DESYREL) 100 MG tablet Take 100 mg by mouth nightly TAKE 1/4 OF A TABLET NIGHTLY PRN FOR SLEEP    Historical Provider, MD   venlafaxine (EFFEXOR) 37.5 MG tablet Take 37.5 mg by mouth 2 times daily    Historical Provider, MD   .  Recent Surgical History: None = 0     Assessment     Peak Flow (asthma only)    Predicted: 310  Personal Best: 246    % Predicted 80%  Peak Flow : 80- 100% = 0    FEV1/FVC    FEV1 Predicted 2      FEV1 1.4    FEV1 % Predicted 70%  FVC 1.6  IS volume 2000      RR 18  Breath Sounds: clear/ diminished      · Bronchodilator assessment at level  1  · Hyperinflation assessment at level 1  · Secretion Management assessment at level 1   ·   · []    Bronchodilator Assessment  BRONCHODILATOR ASSESSMENT SCORE  Score 0 1 2 3 4 5   Breath Sounds   []  Patient Baseline [x]  No Wheeze good aeration []  Faint, scattered wheezing, good aeration []  Expiratory Wheezing and or moderately diminished []  Insp/Exp wheeze and/or very diminished []  Insp/Exp and/ or marked distress   Respiratory Rate   []  Patient Baseline [x]  Less than 20 []  Less than 20 []  20-25 []  Greater than 25 []  Greater than 25   Peak flow % of Pred or PB []  NA   [x]  Greater than 90%  []  81-90% []  71-80% []  Less than or equal to 70%  or unable to perform []  Unable due to Respiratory Distress   Dyspnea re []  Patient Baseline []  No SOB []  No SOB [x]  SOB on exertion []  SOB min activity []  At rest/acute   e FEV% Predicted       []  NA [x]  Above 69%  []  Unable []  Above 60-69%  []  Unable []  Above 50-59%  []  Unable []  Above 35-49%  []  Unable []  Less than 35%  []  Unable                 []  Hyperinflation Assessment  Score 1 2 3   CXR and Breath Sounds   [x]  Clear []  No atelectasis  Basilar aeration []  Atelectasis or absent basilar breath sounds   Incentive Spirometry Volume  (Per IBW)   [x]  Greater than or equal to 15ml/Kg []  less than 15ml/Kg []  less than 15ml/Kg   Surgery within last 2 weeks [x]  None or general   []  Abdominal or thoracic surgery  []  Abdominal or thoracic   Chronic Pulmonary Historyre [x]  No []  Yes []  Yes     []  Secretion Management Assessment  Score 1 2 3   Bilateral Breath Sounds   [x]  Occasional Rhonchi []  Scattered Rhonchi []  Course Rhonchi and/or poor aeration   Sputum    [x]  Small amount of thin secretions []  Moderate amount of viscous secretions []  Copius, Viscious Yellow/ Secretions   CXR as reported by physician []  clear  [x]  Unavailable []  Infiltrates and/or consolidation  []  Unavailable []  Mucus Plugging and or lobar consolidation  []  Unavailable   Cough [x]  Strong, 25 176 053 806 115 512 625 789 049 23 361 783 849 061 566 [phone] 459   65 907 727 043 490 559 684 067 452 34 666 523 796 662 114 142 626 233 523   33 797 463 124 559 507 629 916 906 63 928 606 752 836 322 154 240 403 940   29 151 045 750 559 533 074 319 180 81 029 588 773 805 859 156 366 966 471   64 269 284 299 314 329 344 359 374 70 353 382 410 439 468 496 525 554 583   75 261 274 289 305 319 334 348 364 75 344 372 400 429 458 487 515 544 573   80 253 266 282 296 312 327 342 356 80 335 362 390 419 448 476 [phone]

## 2019-05-08 ENCOUNTER — APPOINTMENT (OUTPATIENT)
Dept: GENERAL RADIOLOGY | Age: 74
DRG: 390 | End: 2019-05-08
Attending: INTERNAL MEDICINE
Payer: MEDICARE

## 2019-05-08 LAB
ABSOLUTE EOS #: 0.1 K/UL (ref 0–0.4)
ABSOLUTE IMMATURE GRANULOCYTE: NORMAL K/UL (ref 0–0.3)
ABSOLUTE LYMPH #: 2.1 K/UL (ref 1–4.8)
ABSOLUTE MONO #: 0.5 K/UL (ref 0.1–1.2)
ALBUMIN SERPL-MCNC: 3.3 G/DL (ref 3.5–5.2)
ANION GAP SERPL CALCULATED.3IONS-SCNC: 8 MMOL/L (ref 9–17)
BASOPHILS # BLD: 0 % (ref 0–1)
BASOPHILS ABSOLUTE: 0 K/UL (ref 0–0.2)
BUN BLDV-MCNC: 19 MG/DL (ref 8–23)
BUN/CREAT BLD: 23 (ref 9–20)
CALCIUM SERPL-MCNC: 8 MG/DL (ref 8.6–10.4)
CHLORIDE BLD-SCNC: 109 MMOL/L (ref 98–107)
CO2: 32 MMOL/L (ref 20–31)
CREAT SERPL-MCNC: 0.81 MG/DL (ref 0.5–0.9)
DIFFERENTIAL TYPE: NORMAL
DIRECT EXAM: POSITIVE
EOSINOPHILS RELATIVE PERCENT: 1 % (ref 1–7)
GFR AFRICAN AMERICAN: >60 ML/MIN
GFR NON-AFRICAN AMERICAN: >60 ML/MIN
GFR SERPL CREATININE-BSD FRML MDRD: ABNORMAL ML/MIN/{1.73_M2}
GFR SERPL CREATININE-BSD FRML MDRD: ABNORMAL ML/MIN/{1.73_M2}
GLUCOSE BLD-MCNC: 120 MG/DL (ref 70–99)
HCT VFR BLD CALC: 39.3 % (ref 36–46)
HEMOGLOBIN: 12.4 G/DL (ref 12–16)
IMMATURE GRANULOCYTES: NORMAL %
LYMPHOCYTES # BLD: 25 % (ref 16–46)
Lab: ABNORMAL
MCH RBC QN AUTO: 29.1 PG (ref 26–34)
MCHC RBC AUTO-ENTMCNC: 31.7 G/DL (ref 31–37)
MCV RBC AUTO: 92 FL (ref 80–100)
MONOCYTES # BLD: 6 % (ref 4–11)
NRBC AUTOMATED: NORMAL PER 100 WBC
PDW BLD-RTO: 14.4 % (ref 11–14.5)
PLATELET # BLD: 254 K/UL (ref 140–450)
PLATELET ESTIMATE: NORMAL
PMV BLD AUTO: 8 FL (ref 6–12)
POTASSIUM SERPL-SCNC: 3.9 MMOL/L (ref 3.7–5.3)
RBC # BLD: 4.27 M/UL (ref 4–5.2)
RBC # BLD: NORMAL 10*6/UL
SEG NEUTROPHILS: 68 % (ref 43–77)
SEGMENTED NEUTROPHILS ABSOLUTE COUNT: 5.6 K/UL (ref 1.8–7.7)
SODIUM BLD-SCNC: 149 MMOL/L (ref 135–144)
SPECIMEN DESCRIPTION: ABNORMAL
WBC # BLD: 8.4 K/UL (ref 3.5–11)
WBC # BLD: NORMAL 10*3/UL

## 2019-05-08 PROCEDURE — 97161 PT EVAL LOW COMPLEX 20 MIN: CPT

## 2019-05-08 PROCEDURE — 99232 SBSQ HOSP IP/OBS MODERATE 35: CPT | Performed by: SURGERY

## 2019-05-08 PROCEDURE — 94760 N-INVAS EAR/PLS OXIMETRY 1: CPT

## 2019-05-08 PROCEDURE — 36415 COLL VENOUS BLD VENIPUNCTURE: CPT

## 2019-05-08 PROCEDURE — 82040 ASSAY OF SERUM ALBUMIN: CPT

## 2019-05-08 PROCEDURE — 80048 BASIC METABOLIC PNL TOTAL CA: CPT

## 2019-05-08 PROCEDURE — 99232 SBSQ HOSP IP/OBS MODERATE 35: CPT | Performed by: INTERNAL MEDICINE

## 2019-05-08 PROCEDURE — 6370000000 HC RX 637 (ALT 250 FOR IP): Performed by: INTERNAL MEDICINE

## 2019-05-08 PROCEDURE — 6360000004 HC RX CONTRAST MEDICATION: Performed by: INTERNAL MEDICINE

## 2019-05-08 PROCEDURE — 96372 THER/PROPH/DIAG INJ SC/IM: CPT

## 2019-05-08 PROCEDURE — 85025 COMPLETE CBC W/AUTO DIFF WBC: CPT

## 2019-05-08 PROCEDURE — 2060000000 HC ICU INTERMEDIATE R&B

## 2019-05-08 PROCEDURE — 6360000002 HC RX W HCPCS: Performed by: INTERNAL MEDICINE

## 2019-05-08 PROCEDURE — 2580000003 HC RX 258: Performed by: INTERNAL MEDICINE

## 2019-05-08 PROCEDURE — 82271 OCCULT BLOOD OTHER SOURCES: CPT

## 2019-05-08 PROCEDURE — 74250 X-RAY XM SM INT 1CNTRST STD: CPT

## 2019-05-08 RX ORDER — SODIUM CHLORIDE 450 MG/100ML
INJECTION, SOLUTION INTRAVENOUS CONTINUOUS
Status: DISCONTINUED | OUTPATIENT
Start: 2019-05-08 | End: 2019-05-09 | Stop reason: HOSPADM

## 2019-05-08 RX ADMIN — SODIUM CHLORIDE: 4.5 INJECTION, SOLUTION INTRAVENOUS at 08:27

## 2019-05-08 RX ADMIN — VENLAFAXINE 37.5 MG: 37.5 TABLET ORAL at 21:01

## 2019-05-08 RX ADMIN — ENOXAPARIN SODIUM 40 MG: 40 INJECTION SUBCUTANEOUS at 10:55

## 2019-05-08 RX ADMIN — SODIUM CHLORIDE: 4.5 INJECTION, SOLUTION INTRAVENOUS at 17:44

## 2019-05-08 RX ADMIN — DIATRIZOATE MEGLUMINE AND DIATRIZOATE SODIUM 240 ML: 660; 100 LIQUID ORAL; RECTAL at 10:39

## 2019-05-08 ASSESSMENT — PAIN SCALES - GENERAL: PAINLEVEL_OUTOF10: 0

## 2019-05-08 NOTE — PROGRESS NOTES
Patient is feeling much better today. She had a small bowel follow-through which showed no sign of any obstruction. She is now having bowel movements. .    Physical exam her abdomen is completely benign.     We will go ahead and remove the NG tube today and advance diet as tolerated and I will see on a when necessary basis

## 2019-05-08 NOTE — PLAN OF CARE
Problem: Pain:  Goal: Pain level will decrease  Description  Pain level will decrease  Outcome: Ongoing  Goal: Control of acute pain  Description  Control of acute pain  Outcome: Ongoing  Goal: Control of chronic pain  Description  Control of chronic pain  Outcome: Ongoing     Problem:  Bowel/Gastric:  Goal: Control of bowel function will improve  Description  Control of bowel function will improve  Outcome: Ongoing  Goal: Ability to achieve a regular elimination pattern will improve  Description  Ability to achieve a regular elimination pattern will improve  Outcome: Ongoing     Problem: Nutritional:  Goal: Ability to follow a diet with enough fiber (20 to 30 grams) for normal bowel function will improve  Description  Ability to follow a diet with enough fiber (20 to 30 grams) for normal bowel function will improve  Outcome: Ongoing     Problem: Skin Integrity:  Goal: Risk for impaired skin integrity will decrease  Description  Risk for impaired skin integrity will decrease  Outcome: Ongoing

## 2019-05-08 NOTE — PROGRESS NOTES
Occupational Therapy      Patient declined OT services, stating they are at their baseline level of function. Patient notes they are independent with functional mobility, toileting and simple ADLs. Patient discharged per their request. Patient was advised to inform staff if there is a decline in function or if they have any questions, and therapy can return at that time. Patient verbalized understanding.

## 2019-05-08 NOTE — PROGRESS NOTES
WFL        Bed mobility  Supine to Sit: Independent  Sit to Supine: Independent  Transfers  Sit to Stand: Independent  Stand to sit:  Independent  Comment: Luis Transfer Indep   Ambulation  Ambulation?: Yes  Ambulation 1  Surface: level tile  Device: No Device  Assistance: Independent  Quality of Gait: 250' normal gait pattern      Balance  Posture: Good  Sitting - Static: Good  Sitting - Dynamic: Good  Standing - Static: Good  Standing - Dynamic: Fair;+        Plan   Safety Devices  Type of devices: Left in chair, Call light within reach    G-Code       OutComes Score                                                  AM-PAC Score             Goals          Therapy Time   Individual Concurrent Group Co-treatment   Time In 0953         Time Out 1008         Minutes [id number] [address] [place] [address]

## 2019-05-08 NOTE — PROGRESS NOTES
Hospitalist Progress Note    Patient:  Emely Grande     YOB: 1945    MRN: [id number]   Admit date: 5/7/2019     Acct: [de-identified]     PCP: Jo Delatorre    CC--Interval History: SBO---resolved---SBFT--no obstruction---NGT out-----diet advanced to clear liquids    CKD--Stage 2    See note below     All other ROS negative except noted in HPI    Diet:  Diet NPO Effective Now Exceptions are: Ice Chips, Sips with Meds    Medications:  Scheduled Meds:   sodium chloride flush  10 mL Intravenous 2 times per day    enoxaparin  40 mg Subcutaneous Daily    venlafaxine  37.5 mg Oral BID     Continuous Infusions:   sodium chloride 125 mL/hr at 05/08/19 0827     PRN Meds:phenol, diatrizoate meglumine-sodium, sodium chloride flush, albuterol, fluticasone    Objective:  Labs:  CBC with Differential:    Lab Results   Component Value Date    WBC 8.4 05/08/2019    RBC 4.27 05/08/2019    HGB 12.4 05/08/2019    HCT 39.3 05/08/2019     05/08/2019    MCV 92.0 05/08/2019    MCH 29.1 05/08/2019    MCHC 31.7 05/08/2019    RDW 14.4 05/08/2019    LYMPHOPCT 25 05/08/2019    MONOPCT 6 05/08/2019    BASOPCT 0 05/08/2019    MONOSABS 0.50 05/08/2019    LYMPHSABS 2.10 05/08/2019    EOSABS 0.10 05/08/2019    BASOSABS 0.00 05/08/2019    DIFFTYPE NOT REPORTED 05/08/2019     BMP:    Lab Results   Component Value Date     05/08/2019    K 3.9 05/08/2019     05/08/2019    CO2 32 05/08/2019    BUN 19 05/08/2019    LABALBU 3.3 05/08/2019    CREATININE 0.81 05/08/2019    CALCIUM 8.0 05/08/2019    GFRAA >60 05/08/2019    LABGLOM >60 05/08/2019    GLUCOSE 120 05/08/2019           Physical Exam:  Vitals: BP (!) 148/65   Pulse 88   Temp 98.2 °F (36.8 °C) (Oral)   Resp 20   Ht 5' 2\" (1.575 m)   Wt 234 lb 9.6 oz (106.4 kg)   SpO2 92%   BMI 42.91 kg/m²   24 hour intake/output:    Intake/Output Summary (Last 24 hours) at 5/8/2019 1201  Last data filed at 5/8/2019 4688  Gross per 24 hour   Intake 1792.43 ml   Output colonoscopy---3.13.2017--diverticulosis---polyp, hysterectomy--cervix status             unknown, appendectomy    Allergies:        NKDA   Intolerances:  erythromycin--nausea, Lopid--fenofibrate--nausea--fluid retention,                         Lipitor---muscle aches--fluid retention , Darvocet--propoxyphene--nausea      Plan:  1. NGT out  2. Diet--clear liquids  3.    See orders    Electronically signed by Radha Falcon on 5/8/2019 at 12:01 PM    Hospitalist

## 2019-05-09 VITALS
TEMPERATURE: 98 F | RESPIRATION RATE: 16 BRPM | OXYGEN SATURATION: 93 % | WEIGHT: 237 LBS | HEART RATE: 74 BPM | DIASTOLIC BLOOD PRESSURE: 70 MMHG | HEIGHT: 62 IN | BODY MASS INDEX: 43.61 KG/M2 | SYSTOLIC BLOOD PRESSURE: 142 MMHG

## 2019-05-09 LAB
-: NORMAL
ABSOLUTE EOS #: 0.3 K/UL (ref 0–0.4)
ABSOLUTE IMMATURE GRANULOCYTE: ABNORMAL K/UL (ref 0–0.3)
ABSOLUTE LYMPH #: 2.4 K/UL (ref 1–4.8)
ABSOLUTE MONO #: 0.5 K/UL (ref 0.1–1.2)
ANION GAP SERPL CALCULATED.3IONS-SCNC: 9 MMOL/L (ref 9–17)
BASOPHILS # BLD: 0 % (ref 0–1)
BASOPHILS ABSOLUTE: 0 K/UL (ref 0–0.2)
BUN BLDV-MCNC: 10 MG/DL (ref 8–23)
BUN/CREAT BLD: 13 (ref 9–20)
CALCIUM SERPL-MCNC: 8.4 MG/DL (ref 8.6–10.4)
CHLORIDE BLD-SCNC: 103 MMOL/L (ref 98–107)
CO2: 32 MMOL/L (ref 20–31)
CREAT SERPL-MCNC: 0.75 MG/DL (ref 0.5–0.9)
DIFFERENTIAL TYPE: ABNORMAL
EOSINOPHILS RELATIVE PERCENT: 3 % (ref 1–7)
GFR AFRICAN AMERICAN: >60 ML/MIN
GFR NON-AFRICAN AMERICAN: >60 ML/MIN
GFR SERPL CREATININE-BSD FRML MDRD: ABNORMAL ML/MIN/{1.73_M2}
GFR SERPL CREATININE-BSD FRML MDRD: ABNORMAL ML/MIN/{1.73_M2}
GLUCOSE BLD-MCNC: 118 MG/DL (ref 70–99)
HCT VFR BLD CALC: 37.3 % (ref 36–46)
HEMOGLOBIN: 11.8 G/DL (ref 12–16)
IMMATURE GRANULOCYTES: ABNORMAL %
LYMPHOCYTES # BLD: 31 % (ref 16–46)
MCH RBC QN AUTO: 29.2 PG (ref 26–34)
MCHC RBC AUTO-ENTMCNC: 31.7 G/DL (ref 31–37)
MCV RBC AUTO: 92.2 FL (ref 80–100)
MONOCYTES # BLD: 6 % (ref 4–11)
NRBC AUTOMATED: ABNORMAL PER 100 WBC
PDW BLD-RTO: 14.2 % (ref 11–14.5)
PLATELET # BLD: 233 K/UL (ref 140–450)
PLATELET ESTIMATE: ABNORMAL
PMV BLD AUTO: 7.9 FL (ref 6–12)
POTASSIUM SERPL-SCNC: 4.1 MMOL/L (ref 3.7–5.3)
RBC # BLD: 4.05 M/UL (ref 4–5.2)
RBC # BLD: ABNORMAL 10*6/UL
REASON FOR REJECTION: NORMAL
SEG NEUTROPHILS: 60 % (ref 43–77)
SEGMENTED NEUTROPHILS ABSOLUTE COUNT: 4.5 K/UL (ref 1.8–7.7)
SODIUM BLD-SCNC: 144 MMOL/L (ref 135–144)
WBC # BLD: 7.6 K/UL (ref 3.5–11)
WBC # BLD: ABNORMAL 10*3/UL
ZZ NTE CLEAN UP: ORDERED TEST: NORMAL
ZZ NTE WITH NAME CLEAN UP: SPECIMEN SOURCE: NORMAL

## 2019-05-09 PROCEDURE — 99238 HOSP IP/OBS DSCHRG MGMT 30/<: CPT | Performed by: FAMILY MEDICINE

## 2019-05-09 PROCEDURE — 2580000003 HC RX 258: Performed by: INTERNAL MEDICINE

## 2019-05-09 PROCEDURE — 85025 COMPLETE CBC W/AUTO DIFF WBC: CPT

## 2019-05-09 PROCEDURE — 96372 THER/PROPH/DIAG INJ SC/IM: CPT

## 2019-05-09 PROCEDURE — 80048 BASIC METABOLIC PNL TOTAL CA: CPT

## 2019-05-09 PROCEDURE — 6370000000 HC RX 637 (ALT 250 FOR IP): Performed by: INTERNAL MEDICINE

## 2019-05-09 PROCEDURE — 94760 N-INVAS EAR/PLS OXIMETRY 1: CPT

## 2019-05-09 PROCEDURE — 6360000002 HC RX W HCPCS: Performed by: INTERNAL MEDICINE

## 2019-05-09 PROCEDURE — 36415 COLL VENOUS BLD VENIPUNCTURE: CPT

## 2019-05-09 RX ADMIN — VENLAFAXINE 37.5 MG: 37.5 TABLET ORAL at 08:16

## 2019-05-09 RX ADMIN — ENOXAPARIN SODIUM 40 MG: 40 INJECTION SUBCUTANEOUS at 08:16

## 2019-05-09 RX ADMIN — SODIUM CHLORIDE: 4.5 INJECTION, SOLUTION INTRAVENOUS at 01:55

## 2019-05-09 ASSESSMENT — PAIN SCALES - GENERAL: PAINLEVEL_OUTOF10: 0

## 2019-05-09 NOTE — DISCHARGE SUMMARY
Hospitalist Discharge Summary    Tulio Lei  :  1945  MRN:  [id number]    Admit date:  2019  Discharge date: 2019     Admitting Physician:  Kristina Cortez    Discharge Diagnoses:   Patient Active Problem List   Diagnosis    Intestinal obstruction ([facility]    Non-intractable vomiting with nausea    SBO (small bowel obstruction) ([facility]        Admission Condition:  fair      Discharged Condition:  good    Hospital Course/Treatments   Admitted with sbo, pt has been having small bowel obstruction, pt was kept npo and iv fluids were started,pt was seen in by general surgery and pt doing better, no more pain and will be d/c    Discharge Medications:       Sen Ball   Home Medication Instructions NNP:[id number]    Printed on:19 6384   Medication Information                      aspirin EC 81 MG EC tablet  Take 81 mg by mouth daily             Calcium Carb-Cholecalciferol (CALCIUM-VITAMIN D3) 600-400 MG-UNIT CAPS  Take 1 tablet by mouth daily             docusate sodium (COLACE) 100 MG capsule  Take 1 capsule by mouth 2 times daily             FIBER PO  Take by mouth 1 tablespoon bid             fluticasone (FLONASE) 50 MCG/ACT nasal spray  2 sprays by Nasal route daily             lactulose (CHRONULAC) 10 GM/15ML solution  1 tablespoon three times a day by mouth             Multiple Vitamins-Minerals (THERAPEUTIC MULTIVITAMIN-MINERALS) tablet  Take 1 tablet by mouth daily             Omega-3 Fatty Acids (FISH OIL) 1000 MG CAPS  Take 1,000 mg by mouth daily 1-2 TABLETS DAILY             Red Yeast Rice 600 MG CAPS  Take 2 capsules by mouth daily             traZODone (DESYREL) 100 MG tablet  Take 100 mg by mouth nightly TAKE 1/4 OF A TABLET NIGHTLY PRN FOR SLEEP             venlafaxine (EFFEXOR) 37.5 MG tablet  Take 37.5 mg by mouth 2 times daily                 Consults:  IP CONSULT TO GENERAL SURGERY    Significant Diagnostic Studies:  Fl Small Bowel Follow Through Only    Result Date:

## 2019-05-09 NOTE — DISCHARGE INSTR - DIET

## 2019-07-10 ENCOUNTER — HOSPITAL ENCOUNTER (INPATIENT)
Age: 74
LOS: 6 days | Discharge: HOME | DRG: 330 | End: 2019-07-16
Attending: EMERGENCY MEDICINE | Admitting: FAMILY MEDICINE
Payer: MEDICARE

## 2019-07-10 ENCOUNTER — APPOINTMENT (OUTPATIENT)
Dept: GENERAL RADIOLOGY | Age: 74
DRG: 330 | End: 2019-07-10
Payer: MEDICARE

## 2019-07-10 ENCOUNTER — APPOINTMENT (OUTPATIENT)
Dept: CT IMAGING | Age: 74
DRG: 330 | End: 2019-07-10
Payer: MEDICARE

## 2019-07-10 DIAGNOSIS — G89.18 POSTOPERATIVE PAIN: ICD-10-CM

## 2019-07-10 DIAGNOSIS — K56.609 INTESTINAL OBSTRUCTION, UNSPECIFIED CAUSE, UNSPECIFIED WHETHER PARTIAL OR COMPLETE (HCC): Primary | ICD-10-CM

## 2019-07-10 DIAGNOSIS — K56.609 SBO (SMALL BOWEL OBSTRUCTION) (HCC): ICD-10-CM

## 2019-07-10 LAB
-: ABNORMAL
-: NORMAL
ABSOLUTE EOS #: 0.2 K/UL (ref 0–0.4)
ABSOLUTE IMMATURE GRANULOCYTE: ABNORMAL K/UL (ref 0–0.3)
ABSOLUTE LYMPH #: 2.6 K/UL (ref 1–4.8)
ABSOLUTE MONO #: 0.6 K/UL (ref 0.1–1.2)
ALBUMIN SERPL-MCNC: 4.2 G/DL (ref 3.5–5.2)
ALBUMIN/GLOBULIN RATIO: 1.4 (ref 1–2.5)
ALP BLD-CCNC: 81 U/L (ref 35–104)
ALT SERPL-CCNC: 15 U/L (ref 5–33)
AMORPHOUS: ABNORMAL
ANION GAP SERPL CALCULATED.3IONS-SCNC: 13 MMOL/L (ref 9–17)
AST SERPL-CCNC: 18 U/L
BACTERIA: ABNORMAL
BASOPHILS # BLD: 0 % (ref 0–1)
BASOPHILS ABSOLUTE: 0 K/UL (ref 0–0.2)
BILIRUB SERPL-MCNC: 0.57 MG/DL (ref 0.3–1.2)
BILIRUBIN URINE: NEGATIVE
BUN BLDV-MCNC: 27 MG/DL (ref 8–23)
BUN/CREAT BLD: 31 (ref 9–20)
CALCIUM SERPL-MCNC: 9.7 MG/DL (ref 8.6–10.4)
CASTS UA: ABNORMAL /LPF (ref 0–2)
CHLORIDE BLD-SCNC: 102 MMOL/L (ref 98–107)
CO2: 28 MMOL/L (ref 20–31)
COLOR: ABNORMAL
COMMENT UA: ABNORMAL
CREAT SERPL-MCNC: 0.88 MG/DL (ref 0.5–0.9)
CRYSTALS, UA: ABNORMAL /HPF
DIFFERENTIAL TYPE: ABNORMAL
EOSINOPHILS RELATIVE PERCENT: 1 % (ref 1–7)
EPITHELIAL CELLS UA: ABNORMAL /HPF (ref 0–5)
GFR AFRICAN AMERICAN: >60 ML/MIN
GFR NON-AFRICAN AMERICAN: >60 ML/MIN
GFR SERPL CREATININE-BSD FRML MDRD: ABNORMAL ML/MIN/{1.73_M2}
GFR SERPL CREATININE-BSD FRML MDRD: ABNORMAL ML/MIN/{1.73_M2}
GLUCOSE BLD-MCNC: 158 MG/DL (ref 70–99)
GLUCOSE URINE: NEGATIVE
HCT VFR BLD CALC: 46.1 % (ref 36–46)
HEMOGLOBIN: 14.9 G/DL (ref 12–16)
IMMATURE GRANULOCYTES: ABNORMAL %
KETONES, URINE: NEGATIVE
LEUKOCYTE ESTERASE, URINE: NEGATIVE
LIPASE: 59 U/L (ref 13–60)
LYMPHOCYTES # BLD: 16 % (ref 16–46)
MCH RBC QN AUTO: 29.7 PG (ref 26–34)
MCHC RBC AUTO-ENTMCNC: 32.4 G/DL (ref 31–37)
MCV RBC AUTO: 91.8 FL (ref 80–100)
MONOCYTES # BLD: 4 % (ref 4–11)
MUCUS: ABNORMAL
NITRITE, URINE: NEGATIVE
NRBC AUTOMATED: ABNORMAL PER 100 WBC
OTHER OBSERVATIONS UA: ABNORMAL
PDW BLD-RTO: 14.6 % (ref 11–14.5)
PH UA: 5.5 (ref 5–6)
PLATELET # BLD: 288 K/UL (ref 140–450)
PLATELET ESTIMATE: ABNORMAL
PMV BLD AUTO: 7.7 FL (ref 6–12)
POTASSIUM SERPL-SCNC: 4.5 MMOL/L (ref 3.7–5.3)
PROTEIN UA: ABNORMAL
RBC # BLD: 5.02 M/UL (ref 4–5.2)
RBC # BLD: ABNORMAL 10*6/UL
RBC UA: ABNORMAL /HPF (ref 0–4)
REASON FOR REJECTION: NORMAL
RENAL EPITHELIAL, UA: ABNORMAL /HPF
SEG NEUTROPHILS: 79 % (ref 43–77)
SEGMENTED NEUTROPHILS ABSOLUTE COUNT: 13.5 K/UL (ref 1.8–7.7)
SODIUM BLD-SCNC: 143 MMOL/L (ref 135–144)
SPECIFIC GRAVITY UA: 1.02 (ref 1.01–1.02)
TOTAL PROTEIN: 7.3 G/DL (ref 6.4–8.3)
TRICHOMONAS: ABNORMAL
TURBIDITY: ABNORMAL
URINE HGB: NEGATIVE
UROBILINOGEN, URINE: NORMAL
WBC # BLD: 17 K/UL (ref 3.5–11)
WBC # BLD: ABNORMAL 10*3/UL
WBC UA: ABNORMAL /HPF (ref 0–4)
YEAST: ABNORMAL
ZZ NTE CLEAN UP: ORDERED TEST: NORMAL
ZZ NTE WITH NAME CLEAN UP: SPECIMEN SOURCE: NORMAL

## 2019-07-10 PROCEDURE — 2060000000 HC ICU INTERMEDIATE R&B

## 2019-07-10 PROCEDURE — 96375 TX/PRO/DX INJ NEW DRUG ADDON: CPT

## 2019-07-10 PROCEDURE — 36415 COLL VENOUS BLD VENIPUNCTURE: CPT

## 2019-07-10 PROCEDURE — 97165 OT EVAL LOW COMPLEX 30 MIN: CPT | Performed by: OCCUPATIONAL THERAPIST

## 2019-07-10 PROCEDURE — 99285 EMERGENCY DEPT VISIT HI MDM: CPT

## 2019-07-10 PROCEDURE — 81001 URINALYSIS AUTO W/SCOPE: CPT

## 2019-07-10 PROCEDURE — 99232 SBSQ HOSP IP/OBS MODERATE 35: CPT | Performed by: INTERNAL MEDICINE

## 2019-07-10 PROCEDURE — 2580000003 HC RX 258: Performed by: INTERNAL MEDICINE

## 2019-07-10 PROCEDURE — 6360000002 HC RX W HCPCS

## 2019-07-10 PROCEDURE — 85025 COMPLETE CBC W/AUTO DIFF WBC: CPT

## 2019-07-10 PROCEDURE — 80053 COMPREHEN METABOLIC PANEL: CPT

## 2019-07-10 PROCEDURE — 71045 X-RAY EXAM CHEST 1 VIEW: CPT

## 2019-07-10 PROCEDURE — APPSS45 APP SPLIT SHARED TIME 31-45 MINUTES: Performed by: NURSE PRACTITIONER

## 2019-07-10 PROCEDURE — 6360000004 HC RX CONTRAST MEDICATION: Performed by: EMERGENCY MEDICINE

## 2019-07-10 PROCEDURE — 74019 RADEX ABDOMEN 2 VIEWS: CPT

## 2019-07-10 PROCEDURE — 6360000002 HC RX W HCPCS: Performed by: NURSE PRACTITIONER

## 2019-07-10 PROCEDURE — 2580000003 HC RX 258: Performed by: EMERGENCY MEDICINE

## 2019-07-10 PROCEDURE — 2709999900 CT ABDOMEN PELVIS W IV CONTRAST

## 2019-07-10 PROCEDURE — 96374 THER/PROPH/DIAG INJ IV PUSH: CPT

## 2019-07-10 PROCEDURE — 94760 N-INVAS EAR/PLS OXIMETRY 1: CPT

## 2019-07-10 PROCEDURE — 2580000003 HC RX 258: Performed by: NURSE PRACTITIONER

## 2019-07-10 PROCEDURE — 83690 ASSAY OF LIPASE: CPT

## 2019-07-10 PROCEDURE — 1200000000 HC SEMI PRIVATE

## 2019-07-10 PROCEDURE — 96376 TX/PRO/DX INJ SAME DRUG ADON: CPT

## 2019-07-10 PROCEDURE — 6360000002 HC RX W HCPCS: Performed by: EMERGENCY MEDICINE

## 2019-07-10 RX ORDER — MORPHINE SULFATE 2 MG/ML
2 INJECTION, SOLUTION INTRAMUSCULAR; INTRAVENOUS EVERY 4 HOURS PRN
Status: DISCONTINUED | OUTPATIENT
Start: 2019-07-10 | End: 2019-07-14

## 2019-07-10 RX ORDER — ONDANSETRON 2 MG/ML
4 INJECTION INTRAMUSCULAR; INTRAVENOUS ONCE
Status: COMPLETED | OUTPATIENT
Start: 2019-07-10 | End: 2019-07-10

## 2019-07-10 RX ORDER — SODIUM CHLORIDE 0.9 % (FLUSH) 0.9 %
10 SYRINGE (ML) INJECTION PRN
Status: DISCONTINUED | OUTPATIENT
Start: 2019-07-10 | End: 2019-07-16 | Stop reason: HOSPADM

## 2019-07-10 RX ORDER — POTASSIUM CHLORIDE 20 MEQ/1
40 TABLET, EXTENDED RELEASE ORAL PRN
Status: DISCONTINUED | OUTPATIENT
Start: 2019-07-10 | End: 2019-07-16 | Stop reason: HOSPADM

## 2019-07-10 RX ORDER — MAGNESIUM SULFATE 1 G/100ML
1 INJECTION INTRAVENOUS PRN
Status: DISCONTINUED | OUTPATIENT
Start: 2019-07-10 | End: 2019-07-16 | Stop reason: HOSPADM

## 2019-07-10 RX ORDER — SODIUM CHLORIDE 0.9 % (FLUSH) 0.9 %
10 SYRINGE (ML) INJECTION EVERY 12 HOURS SCHEDULED
Status: DISCONTINUED | OUTPATIENT
Start: 2019-07-10 | End: 2019-07-16 | Stop reason: HOSPADM

## 2019-07-10 RX ORDER — POTASSIUM CHLORIDE 7.45 MG/ML
10 INJECTION INTRAVENOUS PRN
Status: DISCONTINUED | OUTPATIENT
Start: 2019-07-10 | End: 2019-07-16 | Stop reason: HOSPADM

## 2019-07-10 RX ORDER — FLUTICASONE PROPIONATE 50 MCG
2 SPRAY, SUSPENSION (ML) NASAL DAILY PRN
Status: DISCONTINUED | OUTPATIENT
Start: 2019-07-10 | End: 2019-07-16 | Stop reason: HOSPADM

## 2019-07-10 RX ORDER — ACETAMINOPHEN 325 MG/1
650 TABLET ORAL EVERY 4 HOURS PRN
Status: DISCONTINUED | OUTPATIENT
Start: 2019-07-10 | End: 2019-07-16 | Stop reason: HOSPADM

## 2019-07-10 RX ORDER — MORPHINE SULFATE 4 MG/ML
4 INJECTION, SOLUTION INTRAMUSCULAR; INTRAVENOUS ONCE
Status: COMPLETED | OUTPATIENT
Start: 2019-07-10 | End: 2019-07-10

## 2019-07-10 RX ORDER — ONDANSETRON 2 MG/ML
4 INJECTION INTRAMUSCULAR; INTRAVENOUS EVERY 6 HOURS PRN
Status: DISCONTINUED | OUTPATIENT
Start: 2019-07-10 | End: 2019-07-11

## 2019-07-10 RX ORDER — SODIUM CHLORIDE, SODIUM LACTATE, POTASSIUM CHLORIDE, CALCIUM CHLORIDE 600; 310; 30; 20 MG/100ML; MG/100ML; MG/100ML; MG/100ML
INJECTION, SOLUTION INTRAVENOUS CONTINUOUS
Status: DISCONTINUED | OUTPATIENT
Start: 2019-07-10 | End: 2019-07-11 | Stop reason: ALTCHOICE

## 2019-07-10 RX ORDER — NICOTINE 21 MG/24HR
1 PATCH, TRANSDERMAL 24 HOURS TRANSDERMAL DAILY PRN
Status: DISCONTINUED | OUTPATIENT
Start: 2019-07-10 | End: 2019-07-10

## 2019-07-10 RX ORDER — ONDANSETRON 2 MG/ML
INJECTION INTRAMUSCULAR; INTRAVENOUS
Status: COMPLETED
Start: 2019-07-10 | End: 2019-07-10

## 2019-07-10 RX ADMIN — MORPHINE SULFATE 4 MG: 4 INJECTION INTRAVENOUS at 02:53

## 2019-07-10 RX ADMIN — SODIUM CHLORIDE, POTASSIUM CHLORIDE, SODIUM LACTATE AND CALCIUM CHLORIDE: 600; 310; 30; 20 INJECTION, SOLUTION INTRAVENOUS at 19:02

## 2019-07-10 RX ADMIN — MORPHINE SULFATE 4 MG: 4 INJECTION INTRAVENOUS at 03:43

## 2019-07-10 RX ADMIN — SODIUM CHLORIDE, PRESERVATIVE FREE 10 ML: 5 INJECTION INTRAVENOUS at 09:10

## 2019-07-10 RX ADMIN — ONDANSETRON 4 MG: 2 INJECTION INTRAMUSCULAR; INTRAVENOUS at 04:00

## 2019-07-10 RX ADMIN — IOPAMIDOL 100 ML: 755 INJECTION, SOLUTION INTRAVENOUS at 03:34

## 2019-07-10 RX ADMIN — ENOXAPARIN SODIUM 40 MG: 40 INJECTION SUBCUTANEOUS at 09:09

## 2019-07-10 RX ADMIN — Medication 4 MG: at 04:00

## 2019-07-10 RX ADMIN — PIPERACILLIN SODIUM,TAZOBACTAM SODIUM 3.38 G: 3; .375 INJECTION, POWDER, FOR SOLUTION INTRAVENOUS at 22:21

## 2019-07-10 RX ADMIN — ONDANSETRON 4 MG: 2 INJECTION INTRAMUSCULAR; INTRAVENOUS at 02:37

## 2019-07-10 RX ADMIN — PIPERACILLIN SODIUM,TAZOBACTAM SODIUM 3.38 G: 3; .375 INJECTION, POWDER, FOR SOLUTION INTRAVENOUS at 05:10

## 2019-07-10 RX ADMIN — PIPERACILLIN SODIUM,TAZOBACTAM SODIUM 3.38 G: 3; .375 INJECTION, POWDER, FOR SOLUTION INTRAVENOUS at 14:07

## 2019-07-10 RX ADMIN — SODIUM CHLORIDE, POTASSIUM CHLORIDE, SODIUM LACTATE AND CALCIUM CHLORIDE: 600; 310; 30; 20 INJECTION, SOLUTION INTRAVENOUS at 10:28

## 2019-07-10 ASSESSMENT — PAIN SCALES - GENERAL
PAINLEVEL_OUTOF10: 0
PAINLEVEL_OUTOF10: 7
PAINLEVEL_OUTOF10: 0
PAINLEVEL_OUTOF10: 7
PAINLEVEL_OUTOF10: 5
PAINLEVEL_OUTOF10: 0

## 2019-07-10 ASSESSMENT — PAIN DESCRIPTION - PAIN TYPE
TYPE: ACUTE PAIN
TYPE: ACUTE PAIN

## 2019-07-10 ASSESSMENT — ENCOUNTER SYMPTOMS
COLOR CHANGE: 0
WHEEZING: 0
EYE DISCHARGE: 0
ABDOMINAL PAIN: 1
CONSTIPATION: 0
SHORTNESS OF BREATH: 0
VOMITING: 1
DIARRHEA: 1
STRIDOR: 0
COUGH: 0
EYE PAIN: 0
NAUSEA: 1
SORE THROAT: 0
EYE REDNESS: 0

## 2019-07-10 ASSESSMENT — PAIN DESCRIPTION - LOCATION
LOCATION: ABDOMEN
LOCATION: ABDOMEN

## 2019-07-10 NOTE — PROGRESS NOTES
adjacent to the cecum--reactive changes in the adjacent small                        bowel appears inflamed and tethered to soft tissue nodule                         with upstream obstruction---multifocal small focal extraluminal                         gas adjacent to ascending and transverse colon--hepatic                        lesions x 2 smaller hemangioma--larger indeterminate but                         smaller than prior studies  Prior:        SBFT----5.8.2019--no obstruction        CT abdomen---pelvis---5. 7.2019--paulding--recurrent SBO                 distal ileum--multiple loops of small bowel present---terminal ileum                  decompressed--small bowel wall thickening and tethering 2019---[+]                 of small bowel loops RLQ--scarring?--trace fluid in pelvis          Gastrocult---5. 8.2019--[+]. PSBO---history----multiple          DUS---GB----9. 1. 2017--diffuse hepatocellular disease---vs--                       diffuse fatty infiltration---normal CBD 5 mm--gallbladder                       sludge---prior hemangioma not seen          Diverticulitis---8.30.2017  Right colon carcinoma---history  CKD---Stage 2  Hyperlipidemia   Stress disorder  Obesity--morbid      PMH:  iron deficiency anemia thyroid goiter, hypernatremia    PSH:   colonoscopy, right colectomy--ileocolostomy--2008, thyroidectomy--goiter,              EGD, bilateral TKA, ventral hernia repair---2004, hemorrhoid banding,              colonoscopy---3.13.2017--diverticulosis---polyp, hysterectomy--cervix status             unknown, appendectomy    Allergies:        NKDA   Intolerances:  erythromycin--nausea, Lopid--fenofibrate--nausea--fluid retention,                         Lipitor---muscle aches--fluid retention , Darvocet--propoxyphene--nausea    Attending Supervising Physician's Attestation Statement  I performed a history and physical examination on the patient and discussed the management with the nurse

## 2019-07-10 NOTE — PROGRESS NOTES
Occupational Therapy   Occupational Therapy Initial Assessment  Date: 7/10/2019   Patient Name: Jolene Post  MRN: [id number]     : 1945    Date of Service: 7/10/2019    Discharge Recommendations:  Home independently       Assessment   Decision Making: Low Complexity  No Skilled OT: Independent with ADL's  REQUIRES OT FOLLOW UP: No  Safety Devices  Safety Devices in place: Yes  Type of devices: Left in bed;Call light within reach           Patient Diagnosis(es): The encounter diagnosis was Intestinal obstruction, unspecified cause, unspecified whether partial or complete ([facility] [address] has a past medical history of History of colon cancer, History of iron deficiency anemia, Hyperlipidemia, Stress disorder, acute, and Thyroid goiter. has a past surgical history that includes Colonoscopy; Colon surgery (2008); Thyroidectomy, partial; Upper gastrointestinal endoscopy; joint replacement; hernia repair (); Hemorrhoid surgery; Colonoscopy (2017); and Hysterectomy.     Subjective   General  Chart Reviewed: Yes  Patient assessed for rehabilitation services?: Yes  Family / Caregiver Present: No  Referring Practitioner: BECKIE COLEY  Diagnosis: SBO  Subjective  Subjective: Patient rec'd in bed, pleasant and cooperative 68 yr old female with emesis  Patient Currently in Pain: Denies  Vital Signs  Patient Currently in Pain: Denies  Social/Functional History  Social/Functional History  Lives With: Spouse  Type of Home: House  Home Layout: One level  Home Access: Level entry  Bathroom Shower/Tub: Tub/Shower unit  Bathroom Toilet: Handicap height  Bathroom Equipment: Grab bars around toilet  Receives Help From: Family  ADL Assistance: Independent  Homemaking Assistance: Independent  Homemaking Responsibilities: Yes  Meal Prep Responsibility: Primary  Laundry Responsibility: Primary  Cleaning Responsibility: Primary  Shopping Responsibility: Primary  Other (Comment): yard work  Ambulation Assistance:

## 2019-07-11 ENCOUNTER — ANESTHESIA EVENT (OUTPATIENT)
Dept: OPERATING ROOM | Age: 74
DRG: 330 | End: 2019-07-11
Payer: MEDICARE

## 2019-07-11 ENCOUNTER — APPOINTMENT (OUTPATIENT)
Dept: GENERAL RADIOLOGY | Age: 74
DRG: 330 | End: 2019-07-11
Payer: MEDICARE

## 2019-07-11 ENCOUNTER — ANESTHESIA (OUTPATIENT)
Dept: OPERATING ROOM | Age: 74
DRG: 330 | End: 2019-07-11
Payer: MEDICARE

## 2019-07-11 VITALS
DIASTOLIC BLOOD PRESSURE: 74 MMHG | TEMPERATURE: 99 F | SYSTOLIC BLOOD PRESSURE: 171 MMHG | RESPIRATION RATE: 15 BRPM | OXYGEN SATURATION: 99 %

## 2019-07-11 LAB
ABSOLUTE EOS #: 0.2 K/UL (ref 0–0.4)
ABSOLUTE IMMATURE GRANULOCYTE: ABNORMAL K/UL (ref 0–0.3)
ABSOLUTE LYMPH #: 2.8 K/UL (ref 1–4.8)
ABSOLUTE MONO #: 0.6 K/UL (ref 0.1–1.2)
ANION GAP SERPL CALCULATED.3IONS-SCNC: 9 MMOL/L (ref 9–17)
BASOPHILS # BLD: 0 % (ref 0–1)
BASOPHILS ABSOLUTE: 0 K/UL (ref 0–0.2)
BUN BLDV-MCNC: 17 MG/DL (ref 8–23)
BUN/CREAT BLD: 20 (ref 9–20)
CALCIUM SERPL-MCNC: 8.3 MG/DL (ref 8.6–10.4)
CHLORIDE BLD-SCNC: 103 MMOL/L (ref 98–107)
CO2: 33 MMOL/L (ref 20–31)
CREAT SERPL-MCNC: 0.85 MG/DL (ref 0.5–0.9)
DIFFERENTIAL TYPE: ABNORMAL
EKG ATRIAL RATE: 84 BPM
EKG P AXIS: 78 DEGREES
EKG P-R INTERVAL: 150 MS
EKG Q-T INTERVAL: 384 MS
EKG QRS DURATION: 82 MS
EKG QTC CALCULATION (BAZETT): 453 MS
EKG R AXIS: 3 DEGREES
EKG T AXIS: 81 DEGREES
EKG VENTRICULAR RATE: 84 BPM
EOSINOPHILS RELATIVE PERCENT: 2 % (ref 1–7)
GFR AFRICAN AMERICAN: >60 ML/MIN
GFR NON-AFRICAN AMERICAN: >60 ML/MIN
GFR SERPL CREATININE-BSD FRML MDRD: ABNORMAL ML/MIN/{1.73_M2}
GFR SERPL CREATININE-BSD FRML MDRD: ABNORMAL ML/MIN/{1.73_M2}
GLUCOSE BLD-MCNC: 105 MG/DL (ref 70–99)
HCT VFR BLD CALC: 40.7 % (ref 36–46)
HEMOGLOBIN: 13.1 G/DL (ref 12–16)
IMMATURE GRANULOCYTES: ABNORMAL %
INR BLD: 1
LV EF: 55 %
LVEF MODALITY: NORMAL
LYMPHOCYTES # BLD: 31 % (ref 16–46)
MCH RBC QN AUTO: 29.5 PG (ref 26–34)
MCHC RBC AUTO-ENTMCNC: 32.3 G/DL (ref 31–37)
MCV RBC AUTO: 91.3 FL (ref 80–100)
MONOCYTES # BLD: 7 % (ref 4–11)
NRBC AUTOMATED: ABNORMAL PER 100 WBC
PDW BLD-RTO: 14.8 % (ref 11–14.5)
PLATELET # BLD: 258 K/UL (ref 140–450)
PLATELET ESTIMATE: ABNORMAL
PMV BLD AUTO: 7.6 FL (ref 6–12)
POTASSIUM SERPL-SCNC: 3.9 MMOL/L (ref 3.7–5.3)
PROTHROMBIN TIME: 10 SEC (ref 9.4–11.3)
RBC # BLD: 4.45 M/UL (ref 4–5.2)
RBC # BLD: ABNORMAL 10*6/UL
SEG NEUTROPHILS: 60 % (ref 43–77)
SEGMENTED NEUTROPHILS ABSOLUTE COUNT: 5.4 K/UL (ref 1.8–7.7)
SODIUM BLD-SCNC: 145 MMOL/L (ref 135–144)
WBC # BLD: 9 K/UL (ref 3.5–11)
WBC # BLD: ABNORMAL 10*3/UL

## 2019-07-11 PROCEDURE — 3600000014 HC SURGERY LEVEL 4 ADDTL 15MIN: Performed by: SURGERY

## 2019-07-11 PROCEDURE — 88342 IMHCHEM/IMCYTCHM 1ST ANTB: CPT

## 2019-07-11 PROCEDURE — 85025 COMPLETE CBC W/AUTO DIFF WBC: CPT

## 2019-07-11 PROCEDURE — 3700000000 HC ANESTHESIA ATTENDED CARE: Performed by: SURGERY

## 2019-07-11 PROCEDURE — 49560 PR REPAIR INCISIONAL HERNIA,REDUCIBLE: CPT | Performed by: SURGERY

## 2019-07-11 PROCEDURE — 7100000000 HC PACU RECOVERY - FIRST 15 MIN: Performed by: SURGERY

## 2019-07-11 PROCEDURE — 99232 SBSQ HOSP IP/OBS MODERATE 35: CPT | Performed by: INTERNAL MEDICINE

## 2019-07-11 PROCEDURE — 2720000010 HC SURG SUPPLY STERILE: Performed by: SURGERY

## 2019-07-11 PROCEDURE — 2709999900 HC NON-CHARGEABLE SUPPLY: Performed by: SURGERY

## 2019-07-11 PROCEDURE — 88360 TUMOR IMMUNOHISTOCHEM/MANUAL: CPT

## 2019-07-11 PROCEDURE — 2500000003 HC RX 250 WO HCPCS: Performed by: SURGERY

## 2019-07-11 PROCEDURE — 3600000004 HC SURGERY LEVEL 4 BASE: Performed by: SURGERY

## 2019-07-11 PROCEDURE — 85610 PROTHROMBIN TIME: CPT

## 2019-07-11 PROCEDURE — 36415 COLL VENOUS BLD VENIPUNCTURE: CPT

## 2019-07-11 PROCEDURE — 99221 1ST HOSP IP/OBS SF/LOW 40: CPT | Performed by: SURGERY

## 2019-07-11 PROCEDURE — 2580000003 HC RX 258: Performed by: INTERNAL MEDICINE

## 2019-07-11 PROCEDURE — 6360000002 HC RX W HCPCS: Performed by: NURSE ANESTHETIST, CERTIFIED REGISTERED

## 2019-07-11 PROCEDURE — 88309 TISSUE EXAM BY PATHOLOGIST: CPT

## 2019-07-11 PROCEDURE — 88341 IMHCHEM/IMCYTCHM EA ADD ANTB: CPT

## 2019-07-11 PROCEDURE — 93306 TTE W/DOPPLER COMPLETE: CPT

## 2019-07-11 PROCEDURE — 2060000000 HC ICU INTERMEDIATE R&B

## 2019-07-11 PROCEDURE — P9047 ALBUMIN (HUMAN), 25%, 50ML: HCPCS | Performed by: NURSE ANESTHETIST, CERTIFIED REGISTERED

## 2019-07-11 PROCEDURE — 2580000003 HC RX 258: Performed by: NURSE ANESTHETIST, CERTIFIED REGISTERED

## 2019-07-11 PROCEDURE — 6360000002 HC RX W HCPCS: Performed by: NURSE PRACTITIONER

## 2019-07-11 PROCEDURE — 2500000003 HC RX 250 WO HCPCS: Performed by: NURSE ANESTHETIST, CERTIFIED REGISTERED

## 2019-07-11 PROCEDURE — 94760 N-INVAS EAR/PLS OXIMETRY 1: CPT

## 2019-07-11 PROCEDURE — 2580000003 HC RX 258: Performed by: NURSE PRACTITIONER

## 2019-07-11 PROCEDURE — 44140 PARTIAL REMOVAL OF COLON: CPT | Performed by: SURGERY

## 2019-07-11 PROCEDURE — 93005 ELECTROCARDIOGRAM TRACING: CPT | Performed by: INTERNAL MEDICINE

## 2019-07-11 PROCEDURE — 74022 RADEX COMPL AQT ABD SERIES: CPT

## 2019-07-11 PROCEDURE — 6360000002 HC RX W HCPCS: Performed by: SURGERY

## 2019-07-11 PROCEDURE — 3700000001 HC ADD 15 MINUTES (ANESTHESIA): Performed by: SURGERY

## 2019-07-11 PROCEDURE — 99222 1ST HOSP IP/OBS MODERATE 55: CPT | Performed by: INTERNAL MEDICINE

## 2019-07-11 PROCEDURE — 7100000001 HC PACU RECOVERY - ADDTL 15 MIN: Performed by: SURGERY

## 2019-07-11 PROCEDURE — 80048 BASIC METABOLIC PNL TOTAL CA: CPT

## 2019-07-11 RX ORDER — FENTANYL CITRATE 50 UG/ML
25 INJECTION, SOLUTION INTRAMUSCULAR; INTRAVENOUS EVERY 5 MIN PRN
Status: DISCONTINUED | OUTPATIENT
Start: 2019-07-11 | End: 2019-07-11 | Stop reason: HOSPADM

## 2019-07-11 RX ORDER — PANTOPRAZOLE SODIUM 40 MG/10ML
40 INJECTION, POWDER, LYOPHILIZED, FOR SOLUTION INTRAVENOUS DAILY
Status: DISCONTINUED | OUTPATIENT
Start: 2019-07-12 | End: 2019-07-14

## 2019-07-11 RX ORDER — DIPHENHYDRAMINE HYDROCHLORIDE 50 MG/ML
12.5 INJECTION INTRAMUSCULAR; INTRAVENOUS
Status: DISCONTINUED | OUTPATIENT
Start: 2019-07-11 | End: 2019-07-11 | Stop reason: HOSPADM

## 2019-07-11 RX ORDER — LIDOCAINE HYDROCHLORIDE 20 MG/ML
INJECTION, SOLUTION EPIDURAL; INFILTRATION; INTRACAUDAL; PERINEURAL PRN
Status: DISCONTINUED | OUTPATIENT
Start: 2019-07-11 | End: 2019-07-11 | Stop reason: SDUPTHER

## 2019-07-11 RX ORDER — SODIUM CHLORIDE, SODIUM LACTATE, POTASSIUM CHLORIDE, CALCIUM CHLORIDE 600; 310; 30; 20 MG/100ML; MG/100ML; MG/100ML; MG/100ML
INJECTION, SOLUTION INTRAVENOUS CONTINUOUS PRN
Status: DISCONTINUED | OUTPATIENT
Start: 2019-07-11 | End: 2019-07-11 | Stop reason: SDUPTHER

## 2019-07-11 RX ORDER — MORPHINE SULFATE 2 MG/ML
2 INJECTION, SOLUTION INTRAMUSCULAR; INTRAVENOUS EVERY 5 MIN PRN
Status: DISCONTINUED | OUTPATIENT
Start: 2019-07-11 | End: 2019-07-11 | Stop reason: HOSPADM

## 2019-07-11 RX ORDER — MIDAZOLAM HYDROCHLORIDE 1 MG/ML
INJECTION INTRAMUSCULAR; INTRAVENOUS PRN
Status: DISCONTINUED | OUTPATIENT
Start: 2019-07-11 | End: 2019-07-11 | Stop reason: SDUPTHER

## 2019-07-11 RX ORDER — ALBUMIN (HUMAN) 12.5 G/50ML
SOLUTION INTRAVENOUS PRN
Status: DISCONTINUED | OUTPATIENT
Start: 2019-07-11 | End: 2019-07-11 | Stop reason: SDUPTHER

## 2019-07-11 RX ORDER — HYDROCODONE BITARTRATE AND ACETAMINOPHEN 5; 325 MG/1; MG/1
1 TABLET ORAL PRN
Status: DISCONTINUED | OUTPATIENT
Start: 2019-07-11 | End: 2019-07-11 | Stop reason: HOSPADM

## 2019-07-11 RX ORDER — ONDANSETRON 2 MG/ML
4 INJECTION INTRAMUSCULAR; INTRAVENOUS EVERY 6 HOURS PRN
Status: DISCONTINUED | OUTPATIENT
Start: 2019-07-11 | End: 2019-07-12

## 2019-07-11 RX ORDER — HYDROCODONE BITARTRATE AND ACETAMINOPHEN 5; 325 MG/1; MG/1
2 TABLET ORAL PRN
Status: DISCONTINUED | OUTPATIENT
Start: 2019-07-11 | End: 2019-07-11 | Stop reason: HOSPADM

## 2019-07-11 RX ORDER — 0.9 % SODIUM CHLORIDE 0.9 %
10 VIAL (ML) INJECTION DAILY
Status: DISCONTINUED | OUTPATIENT
Start: 2019-07-12 | End: 2019-07-14

## 2019-07-11 RX ORDER — MEPERIDINE HYDROCHLORIDE 50 MG/ML
12.5 INJECTION INTRAMUSCULAR; INTRAVENOUS; SUBCUTANEOUS EVERY 5 MIN PRN
Status: DISCONTINUED | OUTPATIENT
Start: 2019-07-11 | End: 2019-07-11 | Stop reason: HOSPADM

## 2019-07-11 RX ORDER — FENTANYL CITRATE 50 UG/ML
50 INJECTION, SOLUTION INTRAMUSCULAR; INTRAVENOUS EVERY 5 MIN PRN
Status: DISCONTINUED | OUTPATIENT
Start: 2019-07-11 | End: 2019-07-11 | Stop reason: HOSPADM

## 2019-07-11 RX ORDER — PROPOFOL 10 MG/ML
INJECTION, EMULSION INTRAVENOUS PRN
Status: DISCONTINUED | OUTPATIENT
Start: 2019-07-11 | End: 2019-07-11 | Stop reason: SDUPTHER

## 2019-07-11 RX ORDER — ROCURONIUM BROMIDE 10 MG/ML
INJECTION, SOLUTION INTRAVENOUS PRN
Status: DISCONTINUED | OUTPATIENT
Start: 2019-07-11 | End: 2019-07-11 | Stop reason: SDUPTHER

## 2019-07-11 RX ORDER — FENTANYL CITRATE 50 UG/ML
INJECTION, SOLUTION INTRAMUSCULAR; INTRAVENOUS PRN
Status: DISCONTINUED | OUTPATIENT
Start: 2019-07-11 | End: 2019-07-11 | Stop reason: SDUPTHER

## 2019-07-11 RX ORDER — ONDANSETRON 2 MG/ML
4 INJECTION INTRAMUSCULAR; INTRAVENOUS
Status: DISCONTINUED | OUTPATIENT
Start: 2019-07-11 | End: 2019-07-11 | Stop reason: HOSPADM

## 2019-07-11 RX ORDER — SODIUM CHLORIDE 0.9 % (FLUSH) 0.9 %
10 SYRINGE (ML) INJECTION PRN
Status: DISCONTINUED | OUTPATIENT
Start: 2019-07-11 | End: 2019-07-16 | Stop reason: HOSPADM

## 2019-07-11 RX ORDER — ONDANSETRON 2 MG/ML
INJECTION INTRAMUSCULAR; INTRAVENOUS PRN
Status: DISCONTINUED | OUTPATIENT
Start: 2019-07-11 | End: 2019-07-11 | Stop reason: SDUPTHER

## 2019-07-11 RX ORDER — MAGNESIUM SULFATE 1 G/100ML
1 INJECTION INTRAVENOUS PRN
Status: CANCELLED | OUTPATIENT
Start: 2019-07-11

## 2019-07-11 RX ORDER — ONDANSETRON 2 MG/ML
4 INJECTION INTRAMUSCULAR; INTRAVENOUS EVERY 6 HOURS PRN
Status: CANCELLED | OUTPATIENT
Start: 2019-07-11

## 2019-07-11 RX ORDER — MORPHINE SULFATE 2 MG/ML
1 INJECTION, SOLUTION INTRAMUSCULAR; INTRAVENOUS EVERY 5 MIN PRN
Status: DISCONTINUED | OUTPATIENT
Start: 2019-07-11 | End: 2019-07-11 | Stop reason: HOSPADM

## 2019-07-11 RX ORDER — DEXTROSE, SODIUM CHLORIDE, AND POTASSIUM CHLORIDE 5; .45; .15 G/100ML; G/100ML; G/100ML
INJECTION INTRAVENOUS CONTINUOUS
Status: CANCELLED | OUTPATIENT
Start: 2019-07-11

## 2019-07-11 RX ORDER — SODIUM CHLORIDE 0.9 % (FLUSH) 0.9 %
10 SYRINGE (ML) INJECTION EVERY 12 HOURS SCHEDULED
Status: CANCELLED | OUTPATIENT
Start: 2019-07-12

## 2019-07-11 RX ORDER — DEXAMETHASONE SODIUM PHOSPHATE 10 MG/ML
INJECTION INTRAMUSCULAR; INTRAVENOUS PRN
Status: DISCONTINUED | OUTPATIENT
Start: 2019-07-11 | End: 2019-07-11 | Stop reason: SDUPTHER

## 2019-07-11 RX ORDER — ALBUMIN (HUMAN) 12.5 G/50ML
SOLUTION INTRAVENOUS
Status: COMPLETED
Start: 2019-07-11 | End: 2019-07-11

## 2019-07-11 RX ORDER — GLYCOPYRROLATE 1 MG/5 ML
SYRINGE (ML) INTRAVENOUS PRN
Status: DISCONTINUED | OUTPATIENT
Start: 2019-07-11 | End: 2019-07-11 | Stop reason: SDUPTHER

## 2019-07-11 RX ORDER — DEXTROSE, SODIUM CHLORIDE, AND POTASSIUM CHLORIDE 5; .45; .15 G/100ML; G/100ML; G/100ML
INJECTION INTRAVENOUS CONTINUOUS
Status: DISCONTINUED | OUTPATIENT
Start: 2019-07-11 | End: 2019-07-12 | Stop reason: SDUPTHER

## 2019-07-11 RX ORDER — EPHEDRINE SULFATE 50 MG/ML
INJECTION, SOLUTION INTRAVENOUS PRN
Status: DISCONTINUED | OUTPATIENT
Start: 2019-07-11 | End: 2019-07-11 | Stop reason: SDUPTHER

## 2019-07-11 RX ORDER — ACETAMINOPHEN 10 MG/ML
INJECTION, SOLUTION INTRAVENOUS PRN
Status: DISCONTINUED | OUTPATIENT
Start: 2019-07-11 | End: 2019-07-11 | Stop reason: SDUPTHER

## 2019-07-11 RX ORDER — FLUTICASONE PROPIONATE 50 MCG
2 SPRAY, SUSPENSION (ML) NASAL DAILY PRN
Status: CANCELLED | OUTPATIENT
Start: 2019-07-11

## 2019-07-11 RX ORDER — SODIUM CHLORIDE 0.9 % (FLUSH) 0.9 %
10 SYRINGE (ML) INJECTION EVERY 12 HOURS SCHEDULED
Status: DISCONTINUED | OUTPATIENT
Start: 2019-07-11 | End: 2019-07-14 | Stop reason: SDUPTHER

## 2019-07-11 RX ORDER — DEXTROSE, SODIUM CHLORIDE, AND POTASSIUM CHLORIDE 5; .45; .15 G/100ML; G/100ML; G/100ML
INJECTION INTRAVENOUS CONTINUOUS
Status: DISCONTINUED | OUTPATIENT
Start: 2019-07-11 | End: 2019-07-14

## 2019-07-11 RX ADMIN — PHENYLEPHRINE HYDROCHLORIDE 200 MCG: 10 INJECTION INTRAVENOUS at 17:34

## 2019-07-11 RX ADMIN — FENTANYL CITRATE 50 MCG: 50 INJECTION, SOLUTION INTRAMUSCULAR; INTRAVENOUS at 17:16

## 2019-07-11 RX ADMIN — HYDROMORPHONE HYDROCHLORIDE 0.5 MG: 1 INJECTION, SOLUTION INTRAMUSCULAR; INTRAVENOUS; SUBCUTANEOUS at 19:10

## 2019-07-11 RX ADMIN — ACETAMINOPHEN 1000 MG: 10 INJECTION, SOLUTION INTRAVENOUS at 20:05

## 2019-07-11 RX ADMIN — EPHEDRINE SULFATE 10 MG: 50 INJECTION, SOLUTION INTRAVENOUS at 17:37

## 2019-07-11 RX ADMIN — PHENYLEPHRINE HYDROCHLORIDE 100 MCG: 10 INJECTION INTRAVENOUS at 18:49

## 2019-07-11 RX ADMIN — HYDROMORPHONE HYDROCHLORIDE 0.5 MG: 1 INJECTION, SOLUTION INTRAMUSCULAR; INTRAVENOUS; SUBCUTANEOUS at 19:25

## 2019-07-11 RX ADMIN — LIDOCAINE HYDROCHLORIDE 50 MG: 20 INJECTION, SOLUTION EPIDURAL; INFILTRATION; INTRACAUDAL; PERINEURAL at 17:16

## 2019-07-11 RX ADMIN — ALBUMIN (HUMAN) 50 ML: 0.25 INJECTION, SOLUTION INTRAVENOUS at 19:23

## 2019-07-11 RX ADMIN — PIPERACILLIN SODIUM,TAZOBACTAM SODIUM 3.38 G: 3; .375 INJECTION, POWDER, FOR SOLUTION INTRAVENOUS at 05:24

## 2019-07-11 RX ADMIN — PHENYLEPHRINE HYDROCHLORIDE 200 MCG: 10 INJECTION INTRAVENOUS at 17:39

## 2019-07-11 RX ADMIN — DEXTROSE MONOHYDRATE, SODIUM CHLORIDE, AND POTASSIUM CHLORIDE: 50; 4.5; 1.49 INJECTION, SOLUTION INTRAVENOUS at 23:06

## 2019-07-11 RX ADMIN — SODIUM CHLORIDE, POTASSIUM CHLORIDE, SODIUM LACTATE AND CALCIUM CHLORIDE: 600; 310; 30; 20 INJECTION, SOLUTION INTRAVENOUS at 03:32

## 2019-07-11 RX ADMIN — ONDANSETRON 4 MG: 2 INJECTION INTRAMUSCULAR; INTRAVENOUS at 23:07

## 2019-07-11 RX ADMIN — FENTANYL CITRATE 50 MCG: 50 INJECTION, SOLUTION INTRAMUSCULAR; INTRAVENOUS at 17:55

## 2019-07-11 RX ADMIN — EPHEDRINE SULFATE 10 MG: 50 INJECTION, SOLUTION INTRAVENOUS at 18:49

## 2019-07-11 RX ADMIN — MIDAZOLAM HYDROCHLORIDE 2 MG: 1 INJECTION, SOLUTION INTRAMUSCULAR; INTRAVENOUS at 17:13

## 2019-07-11 RX ADMIN — FENTANYL CITRATE 50 MCG: 50 INJECTION INTRAMUSCULAR; INTRAVENOUS at 22:20

## 2019-07-11 RX ADMIN — PHENYLEPHRINE HYDROCHLORIDE 200 MCG: 10 INJECTION INTRAVENOUS at 18:10

## 2019-07-11 RX ADMIN — Medication 0.4 MG: at 17:33

## 2019-07-11 RX ADMIN — ROCURONIUM BROMIDE 20 MG: 10 INJECTION, SOLUTION INTRAVENOUS at 17:55

## 2019-07-11 RX ADMIN — SODIUM CHLORIDE, POTASSIUM CHLORIDE, SODIUM LACTATE AND CALCIUM CHLORIDE: 600; 310; 30; 20 INJECTION, SOLUTION INTRAVENOUS at 19:00

## 2019-07-11 RX ADMIN — FENTANYL CITRATE 50 MCG: 50 INJECTION INTRAMUSCULAR; INTRAVENOUS at 22:04

## 2019-07-11 RX ADMIN — PHENYLEPHRINE HYDROCHLORIDE 200 MCG: 10 INJECTION INTRAVENOUS at 17:32

## 2019-07-11 RX ADMIN — PIPERACILLIN SODIUM,TAZOBACTAM SODIUM 3.38 G: 3; .375 INJECTION, POWDER, FOR SOLUTION INTRAVENOUS at 14:38

## 2019-07-11 RX ADMIN — PHENYLEPHRINE HYDROCHLORIDE 200 MCG: 10 INJECTION INTRAVENOUS at 17:43

## 2019-07-11 RX ADMIN — EPHEDRINE SULFATE 10 MG: 50 INJECTION, SOLUTION INTRAVENOUS at 17:43

## 2019-07-11 RX ADMIN — ROCURONIUM BROMIDE 25 MG: 10 INJECTION, SOLUTION INTRAVENOUS at 18:49

## 2019-07-11 RX ADMIN — FENTANYL CITRATE 50 MCG: 50 INJECTION, SOLUTION INTRAMUSCULAR; INTRAVENOUS at 19:01

## 2019-07-11 RX ADMIN — ROCURONIUM BROMIDE 30 MG: 10 INJECTION, SOLUTION INTRAVENOUS at 17:16

## 2019-07-11 RX ADMIN — PHENYLEPHRINE HYDROCHLORIDE 100 MCG: 10 INJECTION INTRAVENOUS at 18:32

## 2019-07-11 RX ADMIN — ONDANSETRON 4 MG: 2 INJECTION INTRAMUSCULAR; INTRAVENOUS at 21:01

## 2019-07-11 RX ADMIN — PHENYLEPHRINE HYDROCHLORIDE 100 MCG: 10 INJECTION INTRAVENOUS at 18:19

## 2019-07-11 RX ADMIN — PROPOFOL 100 MG: 10 INJECTION, EMULSION INTRAVENOUS at 17:16

## 2019-07-11 RX ADMIN — EPHEDRINE SULFATE 10 MG: 50 INJECTION, SOLUTION INTRAVENOUS at 18:39

## 2019-07-11 RX ADMIN — SUGAMMADEX 200 MG: 100 INJECTION, SOLUTION INTRAVENOUS at 21:08

## 2019-07-11 RX ADMIN — EPHEDRINE SULFATE 10 MG: 50 INJECTION, SOLUTION INTRAVENOUS at 18:10

## 2019-07-11 RX ADMIN — ALBUMIN (HUMAN) 50 ML: 0.25 INJECTION, SOLUTION INTRAVENOUS at 19:12

## 2019-07-11 RX ADMIN — ROCURONIUM BROMIDE 25 MG: 10 INJECTION, SOLUTION INTRAVENOUS at 19:38

## 2019-07-11 RX ADMIN — DEXAMETHASONE SODIUM PHOSPHATE 10 MG: 10 INJECTION INTRAMUSCULAR; INTRAVENOUS at 17:17

## 2019-07-11 RX ADMIN — FENTANYL CITRATE 50 MCG: 50 INJECTION, SOLUTION INTRAMUSCULAR; INTRAVENOUS at 19:07

## 2019-07-11 ASSESSMENT — PULMONARY FUNCTION TESTS
PIF_VALUE: 34
PIF_VALUE: 34
PIF_VALUE: 43
PIF_VALUE: 0
PIF_VALUE: 42
PIF_VALUE: 34
PIF_VALUE: 33
PIF_VALUE: 44
PIF_VALUE: 43
PIF_VALUE: 32
PIF_VALUE: 40
PIF_VALUE: 26
PIF_VALUE: 41
PIF_VALUE: 45
PIF_VALUE: 42
PIF_VALUE: 33
PIF_VALUE: 32
PIF_VALUE: 36
PIF_VALUE: 33
PIF_VALUE: 45
PIF_VALUE: 34
PIF_VALUE: 33
PIF_VALUE: 33
PIF_VALUE: 32
PIF_VALUE: 44
PIF_VALUE: 36
PIF_VALUE: 33
PIF_VALUE: 45
PIF_VALUE: 33
PIF_VALUE: 33
PIF_VALUE: 4
PIF_VALUE: 25
PIF_VALUE: 33
PIF_VALUE: 33
PIF_VALUE: 3
PIF_VALUE: 34
PIF_VALUE: 45
PIF_VALUE: 32
PIF_VALUE: 34
PIF_VALUE: 32
PIF_VALUE: 43
PIF_VALUE: 32
PIF_VALUE: 32
PIF_VALUE: 34
PIF_VALUE: 32
PIF_VALUE: 33
PIF_VALUE: 35
PIF_VALUE: 45
PIF_VALUE: 37
PIF_VALUE: 39
PIF_VALUE: 42
PIF_VALUE: 32
PIF_VALUE: 15
PIF_VALUE: 42
PIF_VALUE: 26
PIF_VALUE: 45
PIF_VALUE: 45
PIF_VALUE: 34
PIF_VALUE: 46
PIF_VALUE: 3
PIF_VALUE: 33
PIF_VALUE: 33
PIF_VALUE: 45
PIF_VALUE: 34
PIF_VALUE: 33
PIF_VALUE: 35
PIF_VALUE: 32
PIF_VALUE: 23
PIF_VALUE: 3
PIF_VALUE: 26
PIF_VALUE: 35
PIF_VALUE: 32
PIF_VALUE: 35
PIF_VALUE: 36
PIF_VALUE: 34
PIF_VALUE: 36
PIF_VALUE: 45
PIF_VALUE: 27
PIF_VALUE: 0
PIF_VALUE: 34
PIF_VALUE: 32
PIF_VALUE: 34
PIF_VALUE: 43
PIF_VALUE: 33
PIF_VALUE: 36
PIF_VALUE: 44
PIF_VALUE: 0
PIF_VALUE: 0
PIF_VALUE: 44
PIF_VALUE: 32
PIF_VALUE: 32
PIF_VALUE: 35
PIF_VALUE: 42
PIF_VALUE: 33
PIF_VALUE: 32
PIF_VALUE: 44
PIF_VALUE: 32
PIF_VALUE: 41
PIF_VALUE: 33
PIF_VALUE: 33
PIF_VALUE: 34
PIF_VALUE: 34
PIF_VALUE: 36
PIF_VALUE: 35
PIF_VALUE: 33
PIF_VALUE: 32
PIF_VALUE: 13
PIF_VALUE: 45
PIF_VALUE: 44
PIF_VALUE: 33
PIF_VALUE: 36
PIF_VALUE: 44
PIF_VALUE: 45
PIF_VALUE: 33
PIF_VALUE: 32
PIF_VALUE: 46
PIF_VALUE: 44
PIF_VALUE: 34
PIF_VALUE: 33
PIF_VALUE: 32
PIF_VALUE: 33
PIF_VALUE: 39
PIF_VALUE: 32
PIF_VALUE: 34
PIF_VALUE: 44
PIF_VALUE: 37
PIF_VALUE: 39
PIF_VALUE: 26
PIF_VALUE: 33
PIF_VALUE: 26
PIF_VALUE: 33
PIF_VALUE: 34
PIF_VALUE: 32
PIF_VALUE: 32
PIF_VALUE: 35
PIF_VALUE: 0
PIF_VALUE: 33
PIF_VALUE: 26
PIF_VALUE: 35
PIF_VALUE: 14
PIF_VALUE: 32
PIF_VALUE: 14
PIF_VALUE: 33
PIF_VALUE: 44
PIF_VALUE: 45
PIF_VALUE: 32
PIF_VALUE: 45
PIF_VALUE: 33
PIF_VALUE: 32
PIF_VALUE: 33
PIF_VALUE: 36
PIF_VALUE: 34
PIF_VALUE: 33
PIF_VALUE: 34
PIF_VALUE: 35
PIF_VALUE: 45
PIF_VALUE: 33
PIF_VALUE: 3
PIF_VALUE: 32
PIF_VALUE: 35
PIF_VALUE: 35
PIF_VALUE: 36
PIF_VALUE: 42
PIF_VALUE: 42
PIF_VALUE: 31
PIF_VALUE: 43
PIF_VALUE: 42
PIF_VALUE: 34
PIF_VALUE: 33
PIF_VALUE: 21
PIF_VALUE: 34
PIF_VALUE: 32
PIF_VALUE: 34
PIF_VALUE: 33
PIF_VALUE: 4
PIF_VALUE: 36
PIF_VALUE: 35
PIF_VALUE: 45
PIF_VALUE: 33
PIF_VALUE: 45
PIF_VALUE: 45
PIF_VALUE: 32
PIF_VALUE: 27
PIF_VALUE: 34
PIF_VALUE: 44
PIF_VALUE: 26
PIF_VALUE: 45
PIF_VALUE: 34
PIF_VALUE: 34
PIF_VALUE: 33
PIF_VALUE: 34
PIF_VALUE: 33
PIF_VALUE: 26
PIF_VALUE: 3
PIF_VALUE: 33
PIF_VALUE: 45
PIF_VALUE: 33
PIF_VALUE: 12
PIF_VALUE: 45
PIF_VALUE: 42
PIF_VALUE: 36
PIF_VALUE: 32
PIF_VALUE: 3
PIF_VALUE: 34
PIF_VALUE: 33
PIF_VALUE: 33
PIF_VALUE: 44
PIF_VALUE: 33
PIF_VALUE: 37
PIF_VALUE: 33
PIF_VALUE: 45
PIF_VALUE: 34
PIF_VALUE: 45
PIF_VALUE: 32
PIF_VALUE: 33
PIF_VALUE: 34
PIF_VALUE: 44
PIF_VALUE: 33
PIF_VALUE: 34
PIF_VALUE: 35
PIF_VALUE: 32
PIF_VALUE: 33
PIF_VALUE: 35
PIF_VALUE: 33
PIF_VALUE: 34
PIF_VALUE: 32
PIF_VALUE: 36
PIF_VALUE: 33
PIF_VALUE: 45
PIF_VALUE: 34
PIF_VALUE: 30
PIF_VALUE: 37
PIF_VALUE: 42
PIF_VALUE: 33
PIF_VALUE: 44
PIF_VALUE: 33
PIF_VALUE: 33
PIF_VALUE: 34
PIF_VALUE: 34
PIF_VALUE: 33
PIF_VALUE: 33
PIF_VALUE: 13
PIF_VALUE: 33
PIF_VALUE: 35
PIF_VALUE: 44
PIF_VALUE: 45
PIF_VALUE: 32
PIF_VALUE: 33

## 2019-07-11 ASSESSMENT — PAIN SCALES - GENERAL
PAINLEVEL_OUTOF10: 0
PAINLEVEL_OUTOF10: 0
PAINLEVEL_OUTOF10: 7
PAINLEVEL_OUTOF10: 7
PAINLEVEL_OUTOF10: 0

## 2019-07-11 NOTE — ANESTHESIA PRE PROCEDURE
day Taylor , APRN - CNP   10 mL at 07/10/19 0910    [MAR Hold] sodium chloride flush 0.9 % injection 10 mL  10 mL Intravenous PRN Taylor Servlucas, APRN - CNP        [facility] Hold] potassium chloride (KLOR-CON M) extended release tablet 40 mEq  40 mEq Oral PRN Taylor Servant, APRN - CNP        Or    [facility] Hold] potassium bicarb-citric acid (EFFER-K) effervescent tablet 40 mEq  40 mEq Oral PRN Taylor Servant, APRN - CNP        Or    [facility] Hold] potassium chloride 10 mEq/100 mL IVPB (Peripheral Line)  10 mEq Intravenous PRN Taylor Servant, APRN - CNP        [facility] Hold] magnesium sulfate 1 g in dextrose 5% 100 mL IVPB  1 g Intravenous PRN Taylor Servant, APRN - CNP        [MAR Hold] magnesium hydroxide (MILK OF MAGNESIA) 400 MG/5ML suspension 30 mL  30 mL Oral Daily PRN Taylor Servant, APRN - CNP        [MAR Hold] ondansetron [facility]) injection 4 mg  4 mg Intravenous Q6H PRN Taylor Servant, APRN - CNP        [MAR Hold] acetaminophen (TYLENOL) tablet 650 mg  650 mg Oral Q4H PRN Taylor Servant, APRN - CNP        [MAR Hold] enoxaparin (LOVENOX) injection 40 mg  40 mg Subcutaneous Daily Taylor Servant, APRN - CNP   Stopped at 07/11/19 1134    [MAR Hold] piperacillin-tazobactam (ZOSYN) 3.375 g in dextrose 5 % 50 mL IVPB extended infusion (mini-bag)  3.375 g Intravenous Q8H Taylor Servlucas, APRN - CNP 12.5 mL/hr at 07/11/19 1438 3.375 g at 07/11/19 1438    [MAR Hold] lactated ringers infusion   Intravenous Continuous Priscella Bence Acharya 125 mL/hr at 07/11/19 0332      [MAR Hold] morphine (PF) injection 2 mg  2 mg Intravenous Q4H PRN Christiano Parrish APRN - CNP           Allergies:     Allergies   Allergen Reactions    Erythromycin     Fenofibrate     Lipitor [Atorvastatin]     Lopid [Gemfibrozil]     Other      DARVOCET      Propoxyphene Other (See Comments)     GI ISSUE-NAUSEA       Problem List:    Patient Active Problem List   Diagnosis Code    Intestinal intravenous. MIPS: Postoperative opioids intended and Prophylactic antiemetics administered. Anesthetic plan and risks discussed with patient.       Plan discussed with attending and surgical team.                  JANNETH Hair - CRNA   7/11/2019

## 2019-07-11 NOTE — PROGRESS NOTES
Hospitalist Progress Note    Patient:  Maryjane Burris     YOB: 1945    MRN: [id number]   Admit date: 7/10/2019     Acct: [de-identified]     PCP: Ronni Sharp    CC--Interval History: cont'd SBO--seen by General Surgery--plan exploration        All other ROS negative except noted in HPI    Diet:  Diet NPO Effective Now    Medications:  Scheduled Meds:   sodium chloride flush  10 mL Intravenous 2 times per day    enoxaparin  40 mg Subcutaneous Daily    piperacillin-tazobactam  3.375 g Intravenous Q8H     Continuous Infusions:   lactated ringers 125 mL/hr at 07/11/19 0332     PRN Meds:fluticasone, sodium chloride flush, potassium chloride **OR** potassium alternative oral replacement **OR** potassium chloride, magnesium sulfate, magnesium hydroxide, ondansetron, acetaminophen, morphine    Objective:  Labs:  CBC with Differential:    Lab Results   Component Value Date    WBC 9.0 07/11/2019    RBC 4.45 07/11/2019    HGB 13.1 07/11/2019    HCT 40.7 07/11/2019     07/11/2019    MCV 91.3 07/11/2019    MCH 29.5 07/11/2019    MCHC 32.3 07/11/2019    RDW 14.8 07/11/2019    LYMPHOPCT 31 07/11/2019    MONOPCT 7 07/11/2019    BASOPCT 0 07/11/2019    MONOSABS 0.60 07/11/2019    LYMPHSABS 2.80 07/11/2019    EOSABS 0.20 07/11/2019    BASOSABS 0.00 07/11/2019    DIFFTYPE NOT REPORTED 07/11/2019     BMP:    Lab Results   Component Value Date     07/11/2019    K 3.9 07/11/2019     07/11/2019    CO2 33 07/11/2019    BUN 17 07/11/2019    LABALBU 4.2 07/10/2019    CREATININE 0.85 07/11/2019    CALCIUM 8.3 07/11/2019    GFRAA >60 07/11/2019    LABGLOM >60 07/11/2019    GLUCOSE 105 07/11/2019           Physical Exam:  Vitals: BP (!) 119/52   Pulse 81   Temp 98.9 °F (37.2 °C) (Tympanic)   Resp 16   Ht 5' 2\" (1.575 m)   Wt 233 lb 14.4 oz (106.1 kg)   SpO2 (!) 88%   BMI 42.78 kg/m²   24 hour intake/output:    Intake/Output Summary (Last 24 hours) at 7/11/2019 1201  Last data filed at 7/11/2019

## 2019-07-11 NOTE — CONSULTS
CAPS Take 2 capsules by mouth daily      traZODone (DESYREL) 100 MG tablet Take 100 mg by mouth nightly TAKE 1/4 OF A TABLET NIGHTLY PRN FOR SLEEP      venlafaxine (EFFEXOR) 37.5 MG tablet Take 37.5 mg by mouth 2 times daily       Allergies as of 07/10/2019 - Review Complete 07/10/2019   Allergen Reaction Noted    Erythromycin  03/02/2017    Fenofibrate  03/02/2017    Lipitor [atorvastatin]  03/02/2017    Lopid [gemfibrozil]  03/02/2017    Other  03/02/2017    Propoxyphene Other (See Comments) 07/19/2014         PHYSICAL EXAM:    Blood pressure (!) 119/52, pulse 81, temperature 98.9 °F (37.2 °C), temperature source Tympanic, resp. rate 16, height 5' 2\" (1.575 m), weight 233 lb 14.4 oz (106.1 kg), SpO2 (!) 88 %. Gen:  A and O x 3, NAD, well nourished  Eyes:  Sclera non icterus, PERRL  Head:  Normocephalic, non-tender  Neck:  Supple, no adenopathy, thyroid non tender and no masses,no carotid bruits  Lungs:  CTA, symmetrical  Chest:  RRR, no murmurs  Abd:  Soft, NT, ND, no HSM, no hernias, no bruits  Ext:  No edema, no cyanosis  Psych: reveals appropriate mood, memory and judgment,  Neuro:  Reveals no gross motor or sensory deficits,   Msk:  5/5 strength all 4 extremities, no joint tenderness      LABS AND TEST RESULTS:    Wbc normal    IMPRESSION:    1. SBO secondary to mass ileolcolic area, rule out cancer, inflammatory or adhesions. PLAN:    1.   To the or FOR EXPLORATION
last 72 hours. CARDIAC ENZYMES:No results for input(s): TROPHS in the last 72 hours. FASTING LIPID PANEL:No results found for: HDL, LDLDIRECT, LDLCALC, TRIG  LIVER PROFILE:  Recent Labs     07/10/19  0237   AST 18   ALT 15   LABALBU 4.2         IMPRESSION:    Patient Active Problem List   Diagnosis    Intestinal obstruction (HCC)    Non-intractable vomiting with nausea    SBO (small bowel obstruction) ([facility] [address]     - Pre op risk stratification for abdominal surgery  - SBO  - h/o Colon CA    RECOMMENDATIONS:  - will plan for 2d echo to eval LVEF, if LVEF preserved, can proceed at low risk. - follow up as needed. Discussed with patient and nursing. Thank you for allowing me to participate in the care of this patient, please do not hesitate to call if you have any questions. Gonzalo Tejeda DO, [facility] [place], [facility]  [email or web address]  [phone]

## 2019-07-11 NOTE — PLAN OF CARE
Problem: Falls - Risk of:  Goal: Will remain free from falls  Description  Will remain free from falls  7/11/2019 0752 by Yoana Weiss RN  Outcome: Ongoing  7/11/2019 0412 by Tressa Vásquez RN  Outcome: Ongoing  Goal: Absence of physical injury  Description  Absence of physical injury  7/11/2019 0752 by Yoana Weiss RN  Outcome: Ongoing  7/11/2019 0412 by Tressa Vásquez RN  Outcome: Ongoing     Problem: Infection:  Goal: Will remain free from infection  Description  Will remain free from infection  7/11/2019 0752 by Yoana Weiss RN  Outcome: Ongoing  7/11/2019 0412 by Tressa Vásquez RN  Outcome: Ongoing     Problem: Safety:  Goal: Free from accidental physical injury  Description  Free from accidental physical injury  7/11/2019 0752 by Yoana Weiss RN  Outcome: Ongoing  7/11/2019 0412 by Tressa Vásquez RN  Outcome: Ongoing  Goal: Free from intentional harm  Description  Free from intentional harm  7/11/2019 0752 by Yoana Weiss RN  Outcome: Ongoing  7/11/2019 0412 by Tressa Vásquez RN  Outcome: Ongoing     Problem: Daily Care:  Goal: Daily care needs are met  Description  Daily care needs are met  7/11/2019 0752 by Yoana Weiss RN  Outcome: Ongoing  7/11/2019 0412 by Tressa Vásquez RN  Outcome: Ongoing     Problem: Pain:  Goal: Patient's pain/discomfort is manageable  Description  Patient's pain/discomfort is manageable  7/11/2019 0752 by Yoana Weiss RN  Outcome: Ongoing  7/11/2019 0412 by Tressa Vásquez RN  Outcome: Ongoing     Problem: Skin Integrity:  Goal: Skin integrity will stabilize  Description  Skin integrity will stabilize  7/11/2019 0752 by Yoana Weiss RN  Outcome: Ongoing  7/11/2019 0412 by Tressa Vásquez RN  Outcome: Ongoing     Problem: Discharge Planning:  Goal: Patients continuum of care needs are met  Description  Patients continuum of care needs are met  7/11/2019 0752 by Yoana Weiss RN  Outcome: Ongoing  7/11/2019 0412 by

## 2019-07-12 ENCOUNTER — APPOINTMENT (OUTPATIENT)
Dept: GENERAL RADIOLOGY | Age: 74
DRG: 330 | End: 2019-07-12
Payer: MEDICARE

## 2019-07-12 LAB
ABSOLUTE EOS #: 0 K/UL (ref 0–0.4)
ABSOLUTE IMMATURE GRANULOCYTE: ABNORMAL K/UL (ref 0–0.3)
ABSOLUTE LYMPH #: 1.1 K/UL (ref 1–4.8)
ABSOLUTE MONO #: 0.7 K/UL (ref 0.1–1.2)
ALBUMIN SERPL-MCNC: 3.4 G/DL (ref 3.5–5.2)
ALBUMIN/GLOBULIN RATIO: 1.4 (ref 1–2.5)
ALP BLD-CCNC: 53 U/L (ref 35–104)
ALT SERPL-CCNC: 13 U/L (ref 5–33)
AMYLASE: 98 U/L (ref 28–100)
ANION GAP SERPL CALCULATED.3IONS-SCNC: 12 MMOL/L (ref 9–17)
AST SERPL-CCNC: 17 U/L
BASOPHILS # BLD: 0 % (ref 0–1)
BASOPHILS ABSOLUTE: 0 K/UL (ref 0–0.2)
BILIRUB SERPL-MCNC: 0.64 MG/DL (ref 0.3–1.2)
BUN BLDV-MCNC: 15 MG/DL (ref 8–23)
BUN/CREAT BLD: 14 (ref 9–20)
CALCIUM SERPL-MCNC: 8.1 MG/DL (ref 8.6–10.4)
CHLORIDE BLD-SCNC: 102 MMOL/L (ref 98–107)
CO2: 29 MMOL/L (ref 20–31)
CREAT SERPL-MCNC: 1.06 MG/DL (ref 0.5–0.9)
DIFFERENTIAL TYPE: ABNORMAL
EOSINOPHILS RELATIVE PERCENT: 0 % (ref 1–7)
GFR AFRICAN AMERICAN: >60 ML/MIN
GFR NON-AFRICAN AMERICAN: 51 ML/MIN
GFR SERPL CREATININE-BSD FRML MDRD: ABNORMAL ML/MIN/{1.73_M2}
GFR SERPL CREATININE-BSD FRML MDRD: ABNORMAL ML/MIN/{1.73_M2}
GLUCOSE BLD-MCNC: 205 MG/DL (ref 70–99)
HCT VFR BLD CALC: 35.2 % (ref 36–46)
HEMOGLOBIN: 11.3 G/DL (ref 12–16)
IMMATURE GRANULOCYTES: ABNORMAL %
LIPASE: 11 U/L (ref 13–60)
LYMPHOCYTES # BLD: 8 % (ref 16–46)
MCH RBC QN AUTO: 29.6 PG (ref 26–34)
MCHC RBC AUTO-ENTMCNC: 32.1 G/DL (ref 31–37)
MCV RBC AUTO: 92.1 FL (ref 80–100)
MONOCYTES # BLD: 6 % (ref 4–11)
NRBC AUTOMATED: ABNORMAL PER 100 WBC
PDW BLD-RTO: 14.5 % (ref 11–14.5)
PLATELET # BLD: 206 K/UL (ref 140–450)
PLATELET ESTIMATE: ABNORMAL
PMV BLD AUTO: 7.8 FL (ref 6–12)
POTASSIUM SERPL-SCNC: 4.2 MMOL/L (ref 3.7–5.3)
RBC # BLD: 3.82 M/UL (ref 4–5.2)
RBC # BLD: ABNORMAL 10*6/UL
SEG NEUTROPHILS: 86 % (ref 43–77)
SEGMENTED NEUTROPHILS ABSOLUTE COUNT: 11.4 K/UL (ref 1.8–7.7)
SODIUM BLD-SCNC: 143 MMOL/L (ref 135–144)
TOTAL PROTEIN: 5.8 G/DL (ref 6.4–8.3)
WBC # BLD: 13.2 K/UL (ref 3.5–11)
WBC # BLD: ABNORMAL 10*3/UL

## 2019-07-12 PROCEDURE — 2580000003 HC RX 258: Performed by: SURGERY

## 2019-07-12 PROCEDURE — 6370000000 HC RX 637 (ALT 250 FOR IP): Performed by: SURGERY

## 2019-07-12 PROCEDURE — 2500000003 HC RX 250 WO HCPCS: Performed by: SURGERY

## 2019-07-12 PROCEDURE — 2060000000 HC ICU INTERMEDIATE R&B

## 2019-07-12 PROCEDURE — 6360000002 HC RX W HCPCS: Performed by: SURGERY

## 2019-07-12 PROCEDURE — 99232 SBSQ HOSP IP/OBS MODERATE 35: CPT | Performed by: INTERNAL MEDICINE

## 2019-07-12 PROCEDURE — 36415 COLL VENOUS BLD VENIPUNCTURE: CPT

## 2019-07-12 PROCEDURE — 0DNF0ZZ RELEASE RIGHT LARGE INTESTINE, OPEN APPROACH: ICD-10-PCS | Performed by: SURGERY

## 2019-07-12 PROCEDURE — 85025 COMPLETE CBC W/AUTO DIFF WBC: CPT

## 2019-07-12 PROCEDURE — 80053 COMPREHEN METABOLIC PANEL: CPT

## 2019-07-12 PROCEDURE — 2700000000 HC OXYGEN THERAPY PER DAY

## 2019-07-12 PROCEDURE — 0DNB0ZZ RELEASE ILEUM, OPEN APPROACH: ICD-10-PCS | Performed by: SURGERY

## 2019-07-12 PROCEDURE — 82150 ASSAY OF AMYLASE: CPT

## 2019-07-12 PROCEDURE — 74018 RADEX ABDOMEN 1 VIEW: CPT

## 2019-07-12 PROCEDURE — C9113 INJ PANTOPRAZOLE SODIUM, VIA: HCPCS | Performed by: SURGERY

## 2019-07-12 PROCEDURE — 6360000002 HC RX W HCPCS

## 2019-07-12 PROCEDURE — 0WUF0JZ SUPPLEMENT ABDOMINAL WALL WITH SYNTHETIC SUBSTITUTE, OPEN APPROACH: ICD-10-PCS | Performed by: SURGERY

## 2019-07-12 PROCEDURE — 6360000002 HC RX W HCPCS: Performed by: INTERNAL MEDICINE

## 2019-07-12 PROCEDURE — 83690 ASSAY OF LIPASE: CPT

## 2019-07-12 PROCEDURE — 94760 N-INVAS EAR/PLS OXIMETRY 1: CPT

## 2019-07-12 PROCEDURE — 0DNW4ZZ RELEASE PERITONEUM, PERCUTANEOUS ENDOSCOPIC APPROACH: ICD-10-PCS | Performed by: SURGERY

## 2019-07-12 PROCEDURE — 0DBF0ZZ EXCISION OF RIGHT LARGE INTESTINE, OPEN APPROACH: ICD-10-PCS | Performed by: SURGERY

## 2019-07-12 PROCEDURE — 94761 N-INVAS EAR/PLS OXIMETRY MLT: CPT

## 2019-07-12 RX ORDER — HYDROCODONE BITARTRATE AND ACETAMINOPHEN 5; 325 MG/1; MG/1
2 TABLET ORAL EVERY 6 HOURS PRN
Status: DISCONTINUED | OUTPATIENT
Start: 2019-07-12 | End: 2019-07-16 | Stop reason: HOSPADM

## 2019-07-12 RX ORDER — PROMETHAZINE HYDROCHLORIDE 25 MG/ML
25 INJECTION, SOLUTION INTRAMUSCULAR; INTRAVENOUS EVERY 6 HOURS PRN
Status: DISCONTINUED | OUTPATIENT
Start: 2019-07-12 | End: 2019-07-16 | Stop reason: HOSPADM

## 2019-07-12 RX ORDER — PROMETHAZINE HYDROCHLORIDE 25 MG/ML
INJECTION, SOLUTION INTRAMUSCULAR; INTRAVENOUS
Status: COMPLETED
Start: 2019-07-12 | End: 2019-07-12

## 2019-07-12 RX ADMIN — HYDROCODONE BITARTRATE AND ACETAMINOPHEN 2 TABLET: 5; 325 TABLET ORAL at 12:45

## 2019-07-12 RX ADMIN — MORPHINE SULFATE 2 MG: 2 INJECTION, SOLUTION INTRAMUSCULAR; INTRAVENOUS at 02:47

## 2019-07-12 RX ADMIN — Medication 10 ML: at 08:25

## 2019-07-12 RX ADMIN — PIPERACILLIN SODIUM,TAZOBACTAM SODIUM 3.38 G: 3; .375 INJECTION, POWDER, FOR SOLUTION INTRAVENOUS at 23:30

## 2019-07-12 RX ADMIN — PIPERACILLIN SODIUM,TAZOBACTAM SODIUM 3.38 G: 3; .375 INJECTION, POWDER, FOR SOLUTION INTRAVENOUS at 08:26

## 2019-07-12 RX ADMIN — DEXTROSE MONOHYDRATE, SODIUM CHLORIDE, AND POTASSIUM CHLORIDE: 50; 4.5; 1.49 INJECTION, SOLUTION INTRAVENOUS at 07:27

## 2019-07-12 RX ADMIN — PIPERACILLIN SODIUM,TAZOBACTAM SODIUM 3.38 G: 3; .375 INJECTION, POWDER, FOR SOLUTION INTRAVENOUS at 16:00

## 2019-07-12 RX ADMIN — HYDROMORPHONE HYDROCHLORIDE 0.5 MG: 1 INJECTION, SOLUTION INTRAMUSCULAR; INTRAVENOUS; SUBCUTANEOUS at 06:20

## 2019-07-12 RX ADMIN — PANTOPRAZOLE SODIUM 40 MG: 40 INJECTION, POWDER, FOR SOLUTION INTRAVENOUS at 08:25

## 2019-07-12 RX ADMIN — ENOXAPARIN SODIUM 30 MG: 30 INJECTION SUBCUTANEOUS at 20:54

## 2019-07-12 RX ADMIN — ENOXAPARIN SODIUM 40 MG: 40 INJECTION SUBCUTANEOUS at 08:26

## 2019-07-12 RX ADMIN — DEXTROSE MONOHYDRATE, SODIUM CHLORIDE, AND POTASSIUM CHLORIDE: 50; 4.5; 1.49 INJECTION, SOLUTION INTRAVENOUS at 14:52

## 2019-07-12 RX ADMIN — HYDROCODONE BITARTRATE AND ACETAMINOPHEN 2 TABLET: 5; 325 TABLET ORAL at 20:54

## 2019-07-12 RX ADMIN — DEXTROSE MONOHYDRATE, SODIUM CHLORIDE, AND POTASSIUM CHLORIDE: 50; 4.5; 1.49 INJECTION, SOLUTION INTRAVENOUS at 23:30

## 2019-07-12 RX ADMIN — PROMETHAZINE HYDROCHLORIDE 25 MG: 25 INJECTION INTRAMUSCULAR; INTRAVENOUS at 03:15

## 2019-07-12 RX ADMIN — PIPERACILLIN SODIUM,TAZOBACTAM SODIUM 3.38 G: 3; .375 INJECTION, POWDER, FOR SOLUTION INTRAVENOUS at 00:33

## 2019-07-12 RX ADMIN — HYDROMORPHONE HYDROCHLORIDE 0.25 MG: 1 INJECTION, SOLUTION INTRAMUSCULAR; INTRAVENOUS; SUBCUTANEOUS at 10:26

## 2019-07-12 RX ADMIN — HYDROMORPHONE HYDROCHLORIDE 0.5 MG: 1 INJECTION, SOLUTION INTRAMUSCULAR; INTRAVENOUS; SUBCUTANEOUS at 00:50

## 2019-07-12 ASSESSMENT — PAIN SCALES - GENERAL
PAINLEVEL_OUTOF10: 3
PAINLEVEL_OUTOF10: 0
PAINLEVEL_OUTOF10: 0
PAINLEVEL_OUTOF10: 5
PAINLEVEL_OUTOF10: 8
PAINLEVEL_OUTOF10: 6
PAINLEVEL_OUTOF10: 6
PAINLEVEL_OUTOF10: 8
PAINLEVEL_OUTOF10: 7
PAINLEVEL_OUTOF10: 0
PAINLEVEL_OUTOF10: 0

## 2019-07-12 ASSESSMENT — PAIN DESCRIPTION - PAIN TYPE: TYPE: SURGICAL PAIN

## 2019-07-12 ASSESSMENT — PAIN DESCRIPTION - LOCATION: LOCATION: ABDOMEN

## 2019-07-12 NOTE — ANESTHESIA POSTPROCEDURE EVALUATION
Department of Anesthesiology  Postprocedure Note    Patient: Hailee Cast  MRN: [id number]  [place]: 1945  Date of evaluation: 7/11/2019  Time:  9:37 PM     Procedure Summary     Date:  07/11/19 Room / Location:  [address] [place] [address]    Anesthesia Start:  9720 Anesthesia Stop:  2137    Procedure:  EXPLORATORY LAPAROSCOPY CONVERTED TO OPEN LYSIS OF ADHESIONS PARTIAL COLECTOMY (N/A ) Diagnosis:  (bowel obstruction)    Surgeon:  Radhika Nath MD Responsible Provider:  JANNETH Abreu CRNA    Anesthesia Type:  general ASA Status:  2 - Emergent          Anesthesia Type: general    Marija Phase I: Marija Score: 10    Marija Phase II:      Last vitals: Reviewed and per EMR flowsheets.        Anesthesia Post Evaluation    Patient location during evaluation: PACU  Patient participation: waiting for patient participation  Level of consciousness: responsive to light touch, sleepy but conscious and responsive to verbal stimuli  Pain score: 0  Airway patency: patent  Nausea & Vomiting: no nausea and no vomiting  Complications: no  Cardiovascular status: blood pressure returned to baseline and hemodynamically stable  Respiratory status: acceptable, airway suctioned, face mask, oral airway, nonlabored ventilation and spontaneous ventilation  Hydration status: euvolemic

## 2019-07-12 NOTE — PROGRESS NOTES
Incentive Spirometry education and demonstration given by Respiratory Therapy. Pt achieving 1000mL x 10 at time of instruction. Incentive Spirometer left at bedside and   Patient instructed to do a minimum of 10 breaths every hour.       Michelle Restrepo  8:19 AM

## 2019-07-13 LAB
ABSOLUTE EOS #: 0.1 K/UL (ref 0–0.4)
ABSOLUTE IMMATURE GRANULOCYTE: ABNORMAL K/UL (ref 0–0.3)
ABSOLUTE LYMPH #: 1.8 K/UL (ref 1–4.8)
ABSOLUTE MONO #: 0.6 K/UL (ref 0.1–1.2)
ALBUMIN SERPL-MCNC: 3.2 G/DL (ref 3.5–5.2)
ALBUMIN/GLOBULIN RATIO: 1.3 (ref 1–2.5)
ALP BLD-CCNC: 51 U/L (ref 35–104)
ALT SERPL-CCNC: 18 U/L (ref 5–33)
ANION GAP SERPL CALCULATED.3IONS-SCNC: 7 MMOL/L (ref 9–17)
AST SERPL-CCNC: 19 U/L
BASOPHILS # BLD: 0 % (ref 0–1)
BASOPHILS ABSOLUTE: 0 K/UL (ref 0–0.2)
BILIRUB SERPL-MCNC: 0.9 MG/DL (ref 0.3–1.2)
BUN BLDV-MCNC: 14 MG/DL (ref 8–23)
BUN/CREAT BLD: 14 (ref 9–20)
CALCIUM SERPL-MCNC: 7.8 MG/DL (ref 8.6–10.4)
CHLORIDE BLD-SCNC: 103 MMOL/L (ref 98–107)
CO2: 31 MMOL/L (ref 20–31)
CREAT SERPL-MCNC: 1.03 MG/DL (ref 0.5–0.9)
DIFFERENTIAL TYPE: ABNORMAL
EOSINOPHILS RELATIVE PERCENT: 1 % (ref 1–7)
GFR AFRICAN AMERICAN: >60 ML/MIN
GFR NON-AFRICAN AMERICAN: 53 ML/MIN
GFR SERPL CREATININE-BSD FRML MDRD: ABNORMAL ML/MIN/{1.73_M2}
GFR SERPL CREATININE-BSD FRML MDRD: ABNORMAL ML/MIN/{1.73_M2}
GLUCOSE BLD-MCNC: 123 MG/DL (ref 70–99)
HCT VFR BLD CALC: 30.9 % (ref 36–46)
HEMOGLOBIN: 10 G/DL (ref 12–16)
IMMATURE GRANULOCYTES: ABNORMAL %
LYMPHOCYTES # BLD: 20 % (ref 16–46)
MCH RBC QN AUTO: 29.8 PG (ref 26–34)
MCHC RBC AUTO-ENTMCNC: 32.3 G/DL (ref 31–37)
MCV RBC AUTO: 92 FL (ref 80–100)
MONOCYTES # BLD: 6 % (ref 4–11)
NRBC AUTOMATED: ABNORMAL PER 100 WBC
PDW BLD-RTO: 15 % (ref 11–14.5)
PLATELET # BLD: 189 K/UL (ref 140–450)
PLATELET ESTIMATE: ABNORMAL
PMV BLD AUTO: 7.8 FL (ref 6–12)
POTASSIUM SERPL-SCNC: 4.1 MMOL/L (ref 3.7–5.3)
RBC # BLD: 3.36 M/UL (ref 4–5.2)
RBC # BLD: ABNORMAL 10*6/UL
SEG NEUTROPHILS: 73 % (ref 43–77)
SEGMENTED NEUTROPHILS ABSOLUTE COUNT: 6.7 K/UL (ref 1.8–7.7)
SODIUM BLD-SCNC: 141 MMOL/L (ref 135–144)
TOTAL PROTEIN: 5.6 G/DL (ref 6.4–8.3)
WBC # BLD: 9.2 K/UL (ref 3.5–11)
WBC # BLD: ABNORMAL 10*3/UL

## 2019-07-13 PROCEDURE — 36415 COLL VENOUS BLD VENIPUNCTURE: CPT

## 2019-07-13 PROCEDURE — 2580000003 HC RX 258: Performed by: SURGERY

## 2019-07-13 PROCEDURE — 6370000000 HC RX 637 (ALT 250 FOR IP): Performed by: SURGERY

## 2019-07-13 PROCEDURE — 2500000003 HC RX 250 WO HCPCS: Performed by: SURGERY

## 2019-07-13 PROCEDURE — C9113 INJ PANTOPRAZOLE SODIUM, VIA: HCPCS | Performed by: SURGERY

## 2019-07-13 PROCEDURE — 6360000002 HC RX W HCPCS: Performed by: INTERNAL MEDICINE

## 2019-07-13 PROCEDURE — 2700000000 HC OXYGEN THERAPY PER DAY

## 2019-07-13 PROCEDURE — 94760 N-INVAS EAR/PLS OXIMETRY 1: CPT

## 2019-07-13 PROCEDURE — 6360000002 HC RX W HCPCS: Performed by: SURGERY

## 2019-07-13 PROCEDURE — 2060000000 HC ICU INTERMEDIATE R&B

## 2019-07-13 PROCEDURE — 99232 SBSQ HOSP IP/OBS MODERATE 35: CPT | Performed by: FAMILY MEDICINE

## 2019-07-13 PROCEDURE — 94761 N-INVAS EAR/PLS OXIMETRY MLT: CPT

## 2019-07-13 PROCEDURE — 85025 COMPLETE CBC W/AUTO DIFF WBC: CPT

## 2019-07-13 PROCEDURE — 80053 COMPREHEN METABOLIC PANEL: CPT

## 2019-07-13 PROCEDURE — 99024 POSTOP FOLLOW-UP VISIT: CPT | Performed by: SURGERY

## 2019-07-13 RX ADMIN — MORPHINE SULFATE 2 MG: 2 INJECTION, SOLUTION INTRAMUSCULAR; INTRAVENOUS at 15:20

## 2019-07-13 RX ADMIN — Medication 10 ML: at 07:42

## 2019-07-13 RX ADMIN — DEXTROSE MONOHYDRATE, SODIUM CHLORIDE, AND POTASSIUM CHLORIDE: 50; 4.5; 1.49 INJECTION, SOLUTION INTRAVENOUS at 23:52

## 2019-07-13 RX ADMIN — DEXTROSE MONOHYDRATE, SODIUM CHLORIDE, AND POTASSIUM CHLORIDE: 50; 4.5; 1.49 INJECTION, SOLUTION INTRAVENOUS at 07:34

## 2019-07-13 RX ADMIN — HYDROMORPHONE HYDROCHLORIDE 0.5 MG: 1 INJECTION, SOLUTION INTRAMUSCULAR; INTRAVENOUS; SUBCUTANEOUS at 22:50

## 2019-07-13 RX ADMIN — PIPERACILLIN SODIUM,TAZOBACTAM SODIUM 3.38 G: 3; .375 INJECTION, POWDER, FOR SOLUTION INTRAVENOUS at 07:34

## 2019-07-13 RX ADMIN — HYDROCODONE BITARTRATE AND ACETAMINOPHEN 2 TABLET: 5; 325 TABLET ORAL at 18:18

## 2019-07-13 RX ADMIN — ENOXAPARIN SODIUM 30 MG: 30 INJECTION SUBCUTANEOUS at 20:46

## 2019-07-13 RX ADMIN — DEXTROSE MONOHYDRATE, SODIUM CHLORIDE, AND POTASSIUM CHLORIDE: 50; 4.5; 1.49 INJECTION, SOLUTION INTRAVENOUS at 15:20

## 2019-07-13 RX ADMIN — PANTOPRAZOLE SODIUM 40 MG: 40 INJECTION, POWDER, FOR SOLUTION INTRAVENOUS at 07:34

## 2019-07-13 RX ADMIN — ENOXAPARIN SODIUM 30 MG: 30 INJECTION SUBCUTANEOUS at 07:34

## 2019-07-13 RX ADMIN — PIPERACILLIN SODIUM,TAZOBACTAM SODIUM 3.38 G: 3; .375 INJECTION, POWDER, FOR SOLUTION INTRAVENOUS at 23:54

## 2019-07-13 RX ADMIN — PIPERACILLIN SODIUM,TAZOBACTAM SODIUM 3.38 G: 3; .375 INJECTION, POWDER, FOR SOLUTION INTRAVENOUS at 15:20

## 2019-07-13 ASSESSMENT — PAIN SCALES - GENERAL
PAINLEVEL_OUTOF10: 5
PAINLEVEL_OUTOF10: 0
PAINLEVEL_OUTOF10: 2
PAINLEVEL_OUTOF10: 5
PAINLEVEL_OUTOF10: 0
PAINLEVEL_OUTOF10: 0
PAINLEVEL_OUTOF10: 2
PAINLEVEL_OUTOF10: 7

## 2019-07-13 ASSESSMENT — PAIN DESCRIPTION - ONSET: ONSET: GRADUAL

## 2019-07-13 ASSESSMENT — PAIN DESCRIPTION - LOCATION
LOCATION: ABDOMEN
LOCATION: ABDOMEN

## 2019-07-13 ASSESSMENT — PAIN DESCRIPTION - DESCRIPTORS
DESCRIPTORS: DISCOMFORT
DESCRIPTORS: ACHING

## 2019-07-13 ASSESSMENT — PAIN DESCRIPTION - FREQUENCY: FREQUENCY: INTERMITTENT

## 2019-07-13 ASSESSMENT — PAIN DESCRIPTION - PAIN TYPE
TYPE: SURGICAL PAIN
TYPE: SURGICAL PAIN

## 2019-07-13 NOTE — PLAN OF CARE
Problem: Falls - Risk of:  Goal: Will remain free from falls  Description  Will remain free from falls  7/13/2019 1200 by Martita Meehan RN  Outcome: Ongoing  7/13/2019 0332 by Jorden Anguiano RN  Outcome: Ongoing  Goal: Absence of physical injury  Description  Absence of physical injury  7/13/2019 1200 by Martita Meehan RN  Outcome: Ongoing  7/13/2019 0332 by Jorden Anguiano RN  Outcome: Ongoing     Problem: Infection:  Goal: Will remain free from infection  Description  Will remain free from infection  7/13/2019 1200 by Martita Meehan RN  Outcome: Ongoing  7/13/2019 0332 by Jorden Anguiano RN  Outcome: Ongoing     Problem: Safety:  Goal: Free from accidental physical injury  Description  Free from accidental physical injury  7/13/2019 1200 by Martita Meehan RN  Outcome: Ongoing  7/13/2019 0332 by Jorden Anguiano RN  Outcome: Ongoing  Goal: Free from intentional harm  Description  Free from intentional harm  7/13/2019 1200 by Martita Meehan RN  Outcome: Ongoing  7/13/2019 0332 by Jorden Anguiano RN  Outcome: Ongoing     Problem: Daily Care:  Goal: Daily care needs are met  Description  Daily care needs are met  7/13/2019 1200 by Martita Meehan RN  Outcome: Ongoing  7/13/2019 0332 by Jorden Anguiano RN  Outcome: Ongoing     Problem: Pain:  Goal: Patient's pain/discomfort is manageable  Description  Patient's pain/discomfort is manageable  7/13/2019 1200 by Martita Meehan RN  Outcome: Ongoing  7/13/2019 0332 by Jorden Anguiano RN  Outcome: Ongoing     Problem: Skin Integrity:  Goal: Skin integrity will stabilize  Description  Skin integrity will stabilize  7/13/2019 1200 by Martita Meehan RN  Outcome: Ongoing  7/13/2019 0332 by Jorden Anguiano RN  Outcome: Ongoing     Problem: Discharge Planning:  Goal: Patients continuum of care needs are met  Description  Patients continuum of care needs are met  7/13/2019 1200 by Martita Meehan RN  Outcome: Ongoing  7/13/2019 0332 by Jorden Anguiano

## 2019-07-13 NOTE — PROGRESS NOTES
[facility] General Surgery Progress Note            PATIENT NAME: Leonardo Giang     TODAY'S DATE: 7/13/2019, 10:15 AM    Chief Complaint   Patient presents with    Emesis     started at 0130, stomach ache started after supper ~1730. SUBJECTIVE:    Pt seen and examined. No acute events overnight, no flatus/BM. No new issues. OBJECTIVE:   Vitals:  BP (!) 110/48   Pulse 89   Temp 99.3 °F (37.4 °C) (Tympanic)   Resp 16   Ht 5' 2\" (1.575 m)   Wt 241 lb 1.6 oz (109.4 kg)   SpO2 93%   BMI 44.10 kg/m²      INTAKE/OUTPUT:      Intake/Output Summary (Last 24 hours) at 7/13/2019 1015  Last data filed at 7/13/2019 0545  Gross per 24 hour   Intake 3574.33 ml   Output 1285 ml   Net 2289.33 ml                 General: AOx3, NAD  Lungs: Symmetrical chest rise bilaterally, no audible wheezes or rhonchi  Heart: S1S2  Abdomen: Soft, appropriately tender, incisions C/D/I with staples intact, drain present lower abdomen with SS drainage  Extremity: moves all extremities x4, No edema      Data Review:  CBC:   Recent Labs     07/11/19 0449 07/12/19 0533 07/13/19  0544   WBC 9.0 13.2* 9.2   HGB 13.1 11.3* 10.0*    206 189     BMP:    Recent Labs     07/11/19 0449 07/12/19 0533 07/13/19  0544   * 143 141   K 3.9 4.2 4.1    102 103   CO2 33* 29 31   BUN 17 15 14   CREATININE 0.85 1.06* 1.03*   GLUCOSE 105* 205* 123*     Hepatic:   Recent Labs     07/12/19 0533 07/13/19  0544   AST 17 19   ALT 13 18   ALKPHOS 53 51   BILITOT 0.64 0.90     Coagulation:   Recent Labs     07/11/19 0449 07/12/19 0533 07/13/19  0544   PROT  --  5.8* 5.6*   INR 1.0  --   --              ASSESSMENT     Patient Active Problem List   Diagnosis    Intestinal obstruction (HCC)    Non-intractable vomiting with nausea    SBO (small bowel obstruction) (HCC)   POD1 ex lap ileocecectomy, ventral hernia repair  Anemia of acute blood loss and hemodilution    PLAN    1. Continue ice chips, monitor for bowel function  2.  PT eval and treat  3. Continue abx for now  4. Trend cbc  5.  Supportive care    Electronically signed by Janel Marrufo DO on 7/13/2019 at 10:19 AM

## 2019-07-13 NOTE — PROGRESS NOTES
.  Hospitalist Progress Note  7/13/2019 8:34 AM  Subjective:   Admit Date: 7/10/2019  PCP: Di Crowell    Interval History: Patient is S/P Exploratory laparotomy with L.O. A. Post op day # 2 doing ok on Ice chips ,no BM yet ,no chest pain or SOB. Diet: Diet NPO Effective Now Exceptions are: Ice Chips, Sips with Meds  Medications:   Scheduled Meds:   enoxaparin  30 mg Subcutaneous BID    sodium chloride flush  10 mL Intravenous 2 times per day    pantoprazole  40 mg Intravenous Daily    And    sodium chloride (PF)  10 mL Intravenous Daily    sodium chloride flush  10 mL Intravenous 2 times per day    piperacillin-tazobactam  3.375 g Intravenous Q8H     Continuous Infusions:   dextrose 5% and 0.45% NaCl with KCl 20 mEq 125 mL/hr at 07/13/19 0734     CBC:   Recent Labs     07/11/19 0449 07/12/19  0533 07/13/19  0544   WBC 9.0 13.2* 9.2   HGB 13.1 11.3* 10.0*    206 189     BMP:    Recent Labs     07/11/19 0449 07/12/19  0533 07/13/19  0544   * 143 141   K 3.9 4.2 4.1    102 103   CO2 33* 29 31   BUN 17 15 14   CREATININE 0.85 1.06* 1.03*   GLUCOSE 105* 205* 123*     Hepatic:   Recent Labs     07/12/19  0533 07/13/19  0544   AST 17 19   ALT 13 18   BILITOT 0.64 0.90   ALKPHOS 53 51     Troponin: No results for input(s): TROPONINI in the last 72 hours. BNP: No results for input(s): BNP in the last 72 hours. Lipids: No results for input(s): CHOL, HDL in the last 72 hours. Invalid input(s): LDLCALCU  INR:   Recent Labs     07/11/19 0449   INR 1.0         Objective:   Vitals: BP (!) 110/48   Pulse 89   Temp 99.3 °F (37.4 °C) (Tympanic)   Resp 16   Ht 5' 2\" (1.575 m)   Wt 241 lb 1.6 oz (109.4 kg)   SpO2 93%   BMI 44.10 kg/m²   General appearance: alert and cooperative with exam  HEENT: Eye: Normal external eye, conjunctiva, lids cornea, ARGELIA.   Neck: no adenopathy, no carotid bruit, no JVD, supple, symmetrical, trachea midline and thyroid not enlarged, symmetric, no

## 2019-07-14 LAB
ABSOLUTE EOS #: 0.2 K/UL (ref 0–0.4)
ABSOLUTE IMMATURE GRANULOCYTE: ABNORMAL K/UL (ref 0–0.3)
ABSOLUTE LYMPH #: 1.5 K/UL (ref 1–4.8)
ABSOLUTE MONO #: 0.5 K/UL (ref 0.1–1.2)
ANION GAP SERPL CALCULATED.3IONS-SCNC: 7 MMOL/L (ref 9–17)
BASOPHILS # BLD: 0 % (ref 0–1)
BASOPHILS ABSOLUTE: 0 K/UL (ref 0–0.2)
BUN BLDV-MCNC: 9 MG/DL (ref 8–23)
BUN/CREAT BLD: 10 (ref 9–20)
CALCIUM SERPL-MCNC: 8 MG/DL (ref 8.6–10.4)
CHLORIDE BLD-SCNC: 105 MMOL/L (ref 98–107)
CO2: 29 MMOL/L (ref 20–31)
CREAT SERPL-MCNC: 0.94 MG/DL (ref 0.5–0.9)
DIFFERENTIAL TYPE: ABNORMAL
EOSINOPHILS RELATIVE PERCENT: 2 % (ref 1–7)
GFR AFRICAN AMERICAN: >60 ML/MIN
GFR NON-AFRICAN AMERICAN: 58 ML/MIN
GFR SERPL CREATININE-BSD FRML MDRD: ABNORMAL ML/MIN/{1.73_M2}
GFR SERPL CREATININE-BSD FRML MDRD: ABNORMAL ML/MIN/{1.73_M2}
GLUCOSE BLD-MCNC: 125 MG/DL (ref 70–99)
HCT VFR BLD CALC: 30.6 % (ref 36–46)
HEMOGLOBIN: 9.7 G/DL (ref 12–16)
IMMATURE GRANULOCYTES: ABNORMAL %
LYMPHOCYTES # BLD: 18 % (ref 16–46)
MCH RBC QN AUTO: 29.8 PG (ref 26–34)
MCHC RBC AUTO-ENTMCNC: 31.8 G/DL (ref 31–37)
MCV RBC AUTO: 93.8 FL (ref 80–100)
MONOCYTES # BLD: 6 % (ref 4–11)
NRBC AUTOMATED: ABNORMAL PER 100 WBC
PDW BLD-RTO: 15.1 % (ref 11–14.5)
PLATELET # BLD: 196 K/UL (ref 140–450)
PLATELET ESTIMATE: ABNORMAL
PMV BLD AUTO: 8.3 FL (ref 6–12)
POTASSIUM SERPL-SCNC: 4 MMOL/L (ref 3.7–5.3)
RBC # BLD: 3.26 M/UL (ref 4–5.2)
RBC # BLD: ABNORMAL 10*6/UL
SEG NEUTROPHILS: 74 % (ref 43–77)
SEGMENTED NEUTROPHILS ABSOLUTE COUNT: 6.3 K/UL (ref 1.8–7.7)
SODIUM BLD-SCNC: 141 MMOL/L (ref 135–144)
WBC # BLD: 8.5 K/UL (ref 3.5–11)
WBC # BLD: ABNORMAL 10*3/UL

## 2019-07-14 PROCEDURE — 80048 BASIC METABOLIC PNL TOTAL CA: CPT

## 2019-07-14 PROCEDURE — 99232 SBSQ HOSP IP/OBS MODERATE 35: CPT | Performed by: FAMILY MEDICINE

## 2019-07-14 PROCEDURE — 6360000002 HC RX W HCPCS: Performed by: SURGERY

## 2019-07-14 PROCEDURE — 85025 COMPLETE CBC W/AUTO DIFF WBC: CPT

## 2019-07-14 PROCEDURE — 94760 N-INVAS EAR/PLS OXIMETRY 1: CPT

## 2019-07-14 PROCEDURE — 6370000000 HC RX 637 (ALT 250 FOR IP): Performed by: SURGERY

## 2019-07-14 PROCEDURE — 2060000000 HC ICU INTERMEDIATE R&B

## 2019-07-14 PROCEDURE — 2580000003 HC RX 258: Performed by: SURGERY

## 2019-07-14 PROCEDURE — 94761 N-INVAS EAR/PLS OXIMETRY MLT: CPT

## 2019-07-14 PROCEDURE — 6360000002 HC RX W HCPCS: Performed by: INTERNAL MEDICINE

## 2019-07-14 PROCEDURE — 36415 COLL VENOUS BLD VENIPUNCTURE: CPT

## 2019-07-14 PROCEDURE — 2500000003 HC RX 250 WO HCPCS: Performed by: SURGERY

## 2019-07-14 RX ORDER — VENLAFAXINE 37.5 MG/1
37.5 TABLET ORAL 2 TIMES DAILY
Status: DISCONTINUED | OUTPATIENT
Start: 2019-07-14 | End: 2019-07-16 | Stop reason: HOSPADM

## 2019-07-14 RX ORDER — PANTOPRAZOLE SODIUM 40 MG/1
40 TABLET, DELAYED RELEASE ORAL
Status: DISCONTINUED | OUTPATIENT
Start: 2019-07-14 | End: 2019-07-16 | Stop reason: HOSPADM

## 2019-07-14 RX ORDER — MORPHINE SULFATE 2 MG/ML
2 INJECTION, SOLUTION INTRAMUSCULAR; INTRAVENOUS
Status: DISCONTINUED | OUTPATIENT
Start: 2019-07-14 | End: 2019-07-16 | Stop reason: HOSPADM

## 2019-07-14 RX ORDER — DEXTROSE, SODIUM CHLORIDE, AND POTASSIUM CHLORIDE 5; .45; .15 G/100ML; G/100ML; G/100ML
INJECTION INTRAVENOUS CONTINUOUS
Status: DISCONTINUED | OUTPATIENT
Start: 2019-07-14 | End: 2019-07-15

## 2019-07-14 RX ORDER — MORPHINE SULFATE 4 MG/ML
4 INJECTION, SOLUTION INTRAMUSCULAR; INTRAVENOUS
Status: DISCONTINUED | OUTPATIENT
Start: 2019-07-14 | End: 2019-07-16 | Stop reason: HOSPADM

## 2019-07-14 RX ADMIN — VENLAFAXINE 37.5 MG: 37.5 TABLET ORAL at 20:38

## 2019-07-14 RX ADMIN — DEXTROSE MONOHYDRATE, SODIUM CHLORIDE, AND POTASSIUM CHLORIDE: 50; 4.5; 1.49 INJECTION, SOLUTION INTRAVENOUS at 06:34

## 2019-07-14 RX ADMIN — HYDROMORPHONE HYDROCHLORIDE 0.5 MG: 1 INJECTION, SOLUTION INTRAMUSCULAR; INTRAVENOUS; SUBCUTANEOUS at 03:58

## 2019-07-14 RX ADMIN — PIPERACILLIN SODIUM,TAZOBACTAM SODIUM 3.38 G: 3; .375 INJECTION, POWDER, FOR SOLUTION INTRAVENOUS at 08:41

## 2019-07-14 RX ADMIN — HYDROCODONE BITARTRATE AND ACETAMINOPHEN 2 TABLET: 5; 325 TABLET ORAL at 11:52

## 2019-07-14 RX ADMIN — VENLAFAXINE 37.5 MG: 37.5 TABLET ORAL at 08:41

## 2019-07-14 RX ADMIN — PANTOPRAZOLE SODIUM 40 MG: 40 TABLET, DELAYED RELEASE ORAL at 08:41

## 2019-07-14 RX ADMIN — PIPERACILLIN SODIUM,TAZOBACTAM SODIUM 3.38 G: 3; .375 INJECTION, POWDER, FOR SOLUTION INTRAVENOUS at 16:34

## 2019-07-14 RX ADMIN — ENOXAPARIN SODIUM 30 MG: 30 INJECTION SUBCUTANEOUS at 20:38

## 2019-07-14 RX ADMIN — HYDROCODONE BITARTRATE AND ACETAMINOPHEN 2 TABLET: 5; 325 TABLET ORAL at 20:39

## 2019-07-14 RX ADMIN — ENOXAPARIN SODIUM 30 MG: 30 INJECTION SUBCUTANEOUS at 08:41

## 2019-07-14 ASSESSMENT — PAIN SCALES - GENERAL
PAINLEVEL_OUTOF10: 0
PAINLEVEL_OUTOF10: 2
PAINLEVEL_OUTOF10: 6
PAINLEVEL_OUTOF10: 7
PAINLEVEL_OUTOF10: 7
PAINLEVEL_OUTOF10: 0

## 2019-07-14 ASSESSMENT — PAIN DESCRIPTION - DESCRIPTORS
DESCRIPTORS: DISCOMFORT

## 2019-07-14 ASSESSMENT — PAIN DESCRIPTION - LOCATION
LOCATION: ABDOMEN

## 2019-07-14 ASSESSMENT — PAIN DESCRIPTION - PAIN TYPE
TYPE: SURGICAL PAIN
TYPE: ACUTE PAIN;SURGICAL PAIN
TYPE: SURGICAL PAIN

## 2019-07-14 NOTE — PROGRESS NOTES
rhythm, S1, S2 normal, no murmur, click, rub or gallop  Abdomen: soft, non-tender; bowel sounds normal; no masses,  no organomegaly and soft ,LORRAINE drain in place,bowel sounds faint  Extremities: extremities normal, atraumatic, no cyanosis or edema  Neurologic: Mental status: Alert, oriented, thought content appropriate    Assessment and Plan:   1. S/p SBO with exp lap and L. O. Apost op day #2    Patient continues to require in patient admission for post op care,encourage ambulation,continue plan as per surgery.     Patient Active Problem List:     Intestinal obstruction (HCC)     Non-intractable vomiting with nausea     SBO (small bowel obstruction) (HCC)      Donnell Velasco MD  [facility]

## 2019-07-15 LAB
ABSOLUTE EOS #: 0.3 K/UL (ref 0–0.4)
ABSOLUTE IMMATURE GRANULOCYTE: ABNORMAL K/UL (ref 0–0.3)
ABSOLUTE LYMPH #: 1.5 K/UL (ref 1–4.8)
ABSOLUTE MONO #: 0.4 K/UL (ref 0.1–1.2)
ANION GAP SERPL CALCULATED.3IONS-SCNC: 8 MMOL/L (ref 9–17)
BASOPHILS # BLD: 0 % (ref 0–1)
BASOPHILS ABSOLUTE: 0 K/UL (ref 0–0.2)
BUN BLDV-MCNC: 8 MG/DL (ref 8–23)
BUN/CREAT BLD: 10 (ref 9–20)
CALCIUM SERPL-MCNC: 8.7 MG/DL (ref 8.6–10.4)
CHLORIDE BLD-SCNC: 105 MMOL/L (ref 98–107)
CO2: 30 MMOL/L (ref 20–31)
CREAT SERPL-MCNC: 0.82 MG/DL (ref 0.5–0.9)
DIFFERENTIAL TYPE: ABNORMAL
EOSINOPHILS RELATIVE PERCENT: 4 % (ref 1–7)
GFR AFRICAN AMERICAN: >60 ML/MIN
GFR NON-AFRICAN AMERICAN: >60 ML/MIN
GFR SERPL CREATININE-BSD FRML MDRD: ABNORMAL ML/MIN/{1.73_M2}
GFR SERPL CREATININE-BSD FRML MDRD: ABNORMAL ML/MIN/{1.73_M2}
GLUCOSE BLD-MCNC: 124 MG/DL (ref 70–99)
HCT VFR BLD CALC: 31.7 % (ref 36–46)
HEMOGLOBIN: 10.3 G/DL (ref 12–16)
IMMATURE GRANULOCYTES: ABNORMAL %
LYMPHOCYTES # BLD: 20 % (ref 16–46)
MCH RBC QN AUTO: 30.2 PG (ref 26–34)
MCHC RBC AUTO-ENTMCNC: 32.6 G/DL (ref 31–37)
MCV RBC AUTO: 92.8 FL (ref 80–100)
MONOCYTES # BLD: 5 % (ref 4–11)
NRBC AUTOMATED: ABNORMAL PER 100 WBC
PDW BLD-RTO: 14.4 % (ref 11–14.5)
PLATELET # BLD: 237 K/UL (ref 140–450)
PLATELET ESTIMATE: ABNORMAL
PMV BLD AUTO: 8.3 FL (ref 6–12)
POTASSIUM SERPL-SCNC: 4.2 MMOL/L (ref 3.7–5.3)
RBC # BLD: 3.41 M/UL (ref 4–5.2)
RBC # BLD: ABNORMAL 10*6/UL
SEG NEUTROPHILS: 71 % (ref 43–77)
SEGMENTED NEUTROPHILS ABSOLUTE COUNT: 5.3 K/UL (ref 1.8–7.7)
SODIUM BLD-SCNC: 143 MMOL/L (ref 135–144)
WBC # BLD: 7.5 K/UL (ref 3.5–11)
WBC # BLD: ABNORMAL 10*3/UL

## 2019-07-15 PROCEDURE — 99024 POSTOP FOLLOW-UP VISIT: CPT | Performed by: SURGERY

## 2019-07-15 PROCEDURE — 85025 COMPLETE CBC W/AUTO DIFF WBC: CPT

## 2019-07-15 PROCEDURE — 6360000002 HC RX W HCPCS: Performed by: SURGERY

## 2019-07-15 PROCEDURE — 36415 COLL VENOUS BLD VENIPUNCTURE: CPT

## 2019-07-15 PROCEDURE — 2500000003 HC RX 250 WO HCPCS: Performed by: SURGERY

## 2019-07-15 PROCEDURE — 2580000003 HC RX 258: Performed by: SURGERY

## 2019-07-15 PROCEDURE — 6360000002 HC RX W HCPCS: Performed by: INTERNAL MEDICINE

## 2019-07-15 PROCEDURE — 2060000000 HC ICU INTERMEDIATE R&B

## 2019-07-15 PROCEDURE — 94760 N-INVAS EAR/PLS OXIMETRY 1: CPT

## 2019-07-15 PROCEDURE — 6370000000 HC RX 637 (ALT 250 FOR IP): Performed by: SURGERY

## 2019-07-15 PROCEDURE — 80048 BASIC METABOLIC PNL TOTAL CA: CPT

## 2019-07-15 PROCEDURE — 99232 SBSQ HOSP IP/OBS MODERATE 35: CPT | Performed by: INTERNAL MEDICINE

## 2019-07-15 RX ORDER — FUROSEMIDE 10 MG/ML
20 INJECTION INTRAMUSCULAR; INTRAVENOUS ONCE
Status: COMPLETED | OUTPATIENT
Start: 2019-07-15 | End: 2019-07-15

## 2019-07-15 RX ADMIN — HYDROCODONE BITARTRATE AND ACETAMINOPHEN 2 TABLET: 5; 325 TABLET ORAL at 05:19

## 2019-07-15 RX ADMIN — FUROSEMIDE 20 MG: 10 INJECTION, SOLUTION INTRAMUSCULAR; INTRAVENOUS at 17:06

## 2019-07-15 RX ADMIN — PIPERACILLIN SODIUM,TAZOBACTAM SODIUM 3.38 G: 3; .375 INJECTION, POWDER, FOR SOLUTION INTRAVENOUS at 00:14

## 2019-07-15 RX ADMIN — SODIUM CHLORIDE, PRESERVATIVE FREE 10 ML: 5 INJECTION INTRAVENOUS at 20:34

## 2019-07-15 RX ADMIN — HYDROCODONE BITARTRATE AND ACETAMINOPHEN 2 TABLET: 5; 325 TABLET ORAL at 12:35

## 2019-07-15 RX ADMIN — PIPERACILLIN SODIUM,TAZOBACTAM SODIUM 3.38 G: 3; .375 INJECTION, POWDER, FOR SOLUTION INTRAVENOUS at 08:16

## 2019-07-15 RX ADMIN — VENLAFAXINE 37.5 MG: 37.5 TABLET ORAL at 08:15

## 2019-07-15 RX ADMIN — VENLAFAXINE 37.5 MG: 37.5 TABLET ORAL at 20:30

## 2019-07-15 RX ADMIN — HYDROCODONE BITARTRATE AND ACETAMINOPHEN 2 TABLET: 5; 325 TABLET ORAL at 20:30

## 2019-07-15 RX ADMIN — ENOXAPARIN SODIUM 30 MG: 30 INJECTION SUBCUTANEOUS at 20:31

## 2019-07-15 RX ADMIN — POTASSIUM CHLORIDE, DEXTROSE MONOHYDRATE AND SODIUM CHLORIDE: 150; 5; 450 INJECTION, SOLUTION INTRAVENOUS at 00:15

## 2019-07-15 RX ADMIN — ENOXAPARIN SODIUM 30 MG: 30 INJECTION SUBCUTANEOUS at 08:15

## 2019-07-15 RX ADMIN — PANTOPRAZOLE SODIUM 40 MG: 40 TABLET, DELAYED RELEASE ORAL at 05:19

## 2019-07-15 ASSESSMENT — PAIN DESCRIPTION - ONSET: ONSET: GRADUAL

## 2019-07-15 ASSESSMENT — PAIN SCALES - GENERAL
PAINLEVEL_OUTOF10: 0
PAINLEVEL_OUTOF10: 0
PAINLEVEL_OUTOF10: 6
PAINLEVEL_OUTOF10: 0
PAINLEVEL_OUTOF10: 2
PAINLEVEL_OUTOF10: 6
PAINLEVEL_OUTOF10: 8
PAINLEVEL_OUTOF10: 0

## 2019-07-15 ASSESSMENT — PAIN DESCRIPTION - FREQUENCY: FREQUENCY: INTERMITTENT

## 2019-07-15 ASSESSMENT — PAIN DESCRIPTION - DESCRIPTORS
DESCRIPTORS: ACHING
DESCRIPTORS: ACHING

## 2019-07-15 ASSESSMENT — PAIN DESCRIPTION - PAIN TYPE
TYPE: ACUTE PAIN;SURGICAL PAIN
TYPE: ACUTE PAIN;SURGICAL PAIN

## 2019-07-15 ASSESSMENT — PAIN DESCRIPTION - LOCATION: LOCATION: ABDOMEN

## 2019-07-15 NOTE — PLAN OF CARE
Problem: Falls - Risk of:  Goal: Will remain free from falls  Description  Will remain free from falls  7/15/2019 1027 by Sharon Rain RN  Outcome: Ongoing  7/15/2019 0056 by Saul Canada RN  Outcome: Ongoing  Goal: Absence of physical injury  Description  Absence of physical injury  7/15/2019 1027 by Sharon Rain RN  Outcome: Ongoing  7/15/2019 0056 by Saul Canada RN  Outcome: Ongoing     Problem: Infection:  Goal: Will remain free from infection  Description  Will remain free from infection  7/15/2019 1027 by Sharon Rain RN  Outcome: Ongoing  7/15/2019 0056 by Saul Canada RN  Outcome: Ongoing     Problem: Safety:  Goal: Free from accidental physical injury  Description  Free from accidental physical injury  7/15/2019 1027 by Sharon Rain RN  Outcome: Ongoing  7/15/2019 0056 by Saul Canada RN  Outcome: Ongoing  Goal: Free from intentional harm  Description  Free from intentional harm  7/15/2019 1027 by Sharon Rain RN  Outcome: Ongoing  7/15/2019 0056 by Saul Canada RN  Outcome: Ongoing     Problem: Daily Care:  Goal: Daily care needs are met  Description  Daily care needs are met  7/15/2019 1027 by Sharon Rain RN  Outcome: Ongoing  7/15/2019 0056 by Saul Canada RN  Outcome: Ongoing     Problem: Pain:  Goal: Patient's pain/discomfort is manageable  Description  Patient's pain/discomfort is manageable  7/15/2019 1027 by Sharon Rain RN  Outcome: Ongoing  7/15/2019 0056 by Saul Canada RN  Outcome: Ongoing     Problem: Skin Integrity:  Goal: Skin integrity will stabilize  Description  Skin integrity will stabilize  7/15/2019 1027 by Sharon Rain RN  Outcome: Ongoing  7/15/2019 0056 by Saul Canada RN  Outcome: Ongoing     Problem: Discharge Planning:  Goal: Patients continuum of care needs are met  Description  Patients continuum of care needs are met  7/15/2019 1027 by Sharon Rain RN  Outcome: Ongoing  7/15/2019 0056 by Nano Thompson

## 2019-07-16 VITALS
HEIGHT: 62 IN | OXYGEN SATURATION: 95 % | RESPIRATION RATE: 18 BRPM | TEMPERATURE: 98.4 F | SYSTOLIC BLOOD PRESSURE: 148 MMHG | HEART RATE: 88 BPM | BODY MASS INDEX: 44.94 KG/M2 | WEIGHT: 244.2 LBS | DIASTOLIC BLOOD PRESSURE: 70 MMHG

## 2019-07-16 LAB
ABSOLUTE EOS #: 0.4 K/UL (ref 0–0.4)
ABSOLUTE IMMATURE GRANULOCYTE: ABNORMAL K/UL (ref 0–0.3)
ABSOLUTE LYMPH #: 1.8 K/UL (ref 1–4.8)
ABSOLUTE MONO #: 0.5 K/UL (ref 0.1–1.2)
ANION GAP SERPL CALCULATED.3IONS-SCNC: 10 MMOL/L (ref 9–17)
BASOPHILS # BLD: 0 % (ref 0–1)
BASOPHILS ABSOLUTE: 0 K/UL (ref 0–0.2)
BUN BLDV-MCNC: 8 MG/DL (ref 8–23)
BUN/CREAT BLD: 11 (ref 9–20)
CALCIUM SERPL-MCNC: 8.7 MG/DL (ref 8.6–10.4)
CHLORIDE BLD-SCNC: 101 MMOL/L (ref 98–107)
CO2: 31 MMOL/L (ref 20–31)
CREAT SERPL-MCNC: 0.74 MG/DL (ref 0.5–0.9)
DIFFERENTIAL TYPE: ABNORMAL
EOSINOPHILS RELATIVE PERCENT: 4 % (ref 1–7)
GFR AFRICAN AMERICAN: >60 ML/MIN
GFR NON-AFRICAN AMERICAN: >60 ML/MIN
GFR SERPL CREATININE-BSD FRML MDRD: ABNORMAL ML/MIN/{1.73_M2}
GFR SERPL CREATININE-BSD FRML MDRD: ABNORMAL ML/MIN/{1.73_M2}
GLUCOSE BLD-MCNC: 119 MG/DL (ref 70–99)
HCT VFR BLD CALC: 31.9 % (ref 36–46)
HEMOGLOBIN: 10.5 G/DL (ref 12–16)
IMMATURE GRANULOCYTES: ABNORMAL %
LYMPHOCYTES # BLD: 22 % (ref 16–46)
MCH RBC QN AUTO: 30.3 PG (ref 26–34)
MCHC RBC AUTO-ENTMCNC: 32.9 G/DL (ref 31–37)
MCV RBC AUTO: 92.2 FL (ref 80–100)
MONOCYTES # BLD: 6 % (ref 4–11)
NRBC AUTOMATED: ABNORMAL PER 100 WBC
PDW BLD-RTO: 14.6 % (ref 11–14.5)
PLATELET # BLD: 266 K/UL (ref 140–450)
PLATELET ESTIMATE: ABNORMAL
PMV BLD AUTO: 8.1 FL (ref 6–12)
POTASSIUM SERPL-SCNC: 3.8 MMOL/L (ref 3.7–5.3)
RBC # BLD: 3.46 M/UL (ref 4–5.2)
RBC # BLD: ABNORMAL 10*6/UL
SEG NEUTROPHILS: 68 % (ref 43–77)
SEGMENTED NEUTROPHILS ABSOLUTE COUNT: 5.6 K/UL (ref 1.8–7.7)
SODIUM BLD-SCNC: 142 MMOL/L (ref 135–144)
WBC # BLD: 8.3 K/UL (ref 3.5–11)
WBC # BLD: ABNORMAL 10*3/UL

## 2019-07-16 PROCEDURE — 99238 HOSP IP/OBS DSCHRG MGMT 30/<: CPT | Performed by: INTERNAL MEDICINE

## 2019-07-16 PROCEDURE — 6360000002 HC RX W HCPCS: Performed by: INTERNAL MEDICINE

## 2019-07-16 PROCEDURE — 6370000000 HC RX 637 (ALT 250 FOR IP): Performed by: SURGERY

## 2019-07-16 PROCEDURE — 6370000000 HC RX 637 (ALT 250 FOR IP): Performed by: INTERNAL MEDICINE

## 2019-07-16 PROCEDURE — 85025 COMPLETE CBC W/AUTO DIFF WBC: CPT

## 2019-07-16 PROCEDURE — 2580000003 HC RX 258: Performed by: SURGERY

## 2019-07-16 PROCEDURE — 36415 COLL VENOUS BLD VENIPUNCTURE: CPT

## 2019-07-16 PROCEDURE — 80048 BASIC METABOLIC PNL TOTAL CA: CPT

## 2019-07-16 PROCEDURE — 94761 N-INVAS EAR/PLS OXIMETRY MLT: CPT

## 2019-07-16 RX ORDER — FUROSEMIDE 10 MG/ML
20 INJECTION INTRAMUSCULAR; INTRAVENOUS ONCE
Status: DISCONTINUED | OUTPATIENT
Start: 2019-07-16 | End: 2019-07-16

## 2019-07-16 RX ORDER — PANTOPRAZOLE SODIUM 40 MG/1
40 TABLET, DELAYED RELEASE ORAL
Qty: 30 TABLET | Refills: 0 | Status: CANCELLED | OUTPATIENT
Start: 2019-07-17

## 2019-07-16 RX ORDER — FUROSEMIDE 20 MG/1
20 TABLET ORAL ONCE
Status: COMPLETED | OUTPATIENT
Start: 2019-07-16 | End: 2019-07-16

## 2019-07-16 RX ORDER — HYDROCODONE BITARTRATE AND ACETAMINOPHEN 5; 325 MG/1; MG/1
1 TABLET ORAL EVERY 6 HOURS PRN
Qty: 28 TABLET | Refills: 0 | Status: SHIPPED | OUTPATIENT
Start: 2019-07-16 | End: 2019-07-23

## 2019-07-16 RX ADMIN — VENLAFAXINE 37.5 MG: 37.5 TABLET ORAL at 07:36

## 2019-07-16 RX ADMIN — SODIUM CHLORIDE, PRESERVATIVE FREE 10 ML: 5 INJECTION INTRAVENOUS at 07:37

## 2019-07-16 RX ADMIN — FUROSEMIDE 20 MG: 20 TABLET ORAL at 10:53

## 2019-07-16 RX ADMIN — HYDROCODONE BITARTRATE AND ACETAMINOPHEN 2 TABLET: 5; 325 TABLET ORAL at 08:54

## 2019-07-16 RX ADMIN — ENOXAPARIN SODIUM 30 MG: 30 INJECTION SUBCUTANEOUS at 07:37

## 2019-07-16 RX ADMIN — PANTOPRAZOLE SODIUM 40 MG: 40 TABLET, DELAYED RELEASE ORAL at 06:32

## 2019-07-16 ASSESSMENT — PAIN SCALES - GENERAL
PAINLEVEL_OUTOF10: 8
PAINLEVEL_OUTOF10: 0

## 2019-07-16 NOTE — DISCHARGE INSTR - COC
Continuity of Care Form    Patient Name: Sudhakar Carlton   :  1945  MRN:  [id number]    Admit date:  7/10/2019  Discharge date:  ***    Code Status Order: Full Code   Advance Directives:   Advance Care Flowsheet Documentation     Date/Time Healthcare Directive Type of Healthcare Directive Copy in [address] Agent's Name Healthcare Agent's Phone Number    [phone]  No, patient does not have an advance directive for healthcare treatment -- -- -- -- --    07/10/19 0627  No, patient does not have an advance directive for healthcare treatment -- -- -- -- --          Admitting Physician:  Saba Thomson MD  PCP: Ki Nunez    Discharging Nurse: [facility] Unit/Room#: 0202/0202-01  Discharging Unit Phone Number: ***    Emergency Contact:   Extended Emergency Contact Information  Primary Emergency Contact: Kamaljit Loyd  Address: [address] [place] [address] Phone: [phone]  Relation: Spouse    Past Surgical History:  Past Surgical History:   Procedure Laterality Date    COLON SURGERY  2008    COLECTOMY    COLONOSCOPY      COLONOSCOPY  2017    POLYP,DIVERTICULOSIS    HEMORRHOID SURGERY      BANDING    HERNIA REPAIR      VENTRAL HERNIA    HYSTERECTOMY      JOINT REPLACEMENT      BILATERAL KNEE REPLACEMENT    LAPAROSCOPY N/A 2019    EXPLORATORY LAPAROSCOPY CONVERTED TO OPEN LYSIS OF ADHESIONS PARTIAL COLECTOMY performed by Tess Mane MD at [address], PARTIAL      goiter removed     UPPER GASTROINTESTINAL ENDOSCOPY         Immunization History:   Immunization History   Administered Date(s) Administered    Influenza Vaccine, unspecified formulation 2013, 2014, 10/07/2015, 10/12/2016, 2017    Influenza Virus Vaccine 2018    Pneumococcal Conjugate 13-valent (Tuvpser46) 10/01/2015    Pneumococcal Polysaccharide (Nzgbentcp33) 2012, {[facility] DME Yes amt example:490696158}  Last Modified Barium Swallow with Video (Video Swallowing Test): {Done Not Done IWOV:102142083}    Treatments at the Time of Hospital Discharge:   Respiratory Treatments: ***  Oxygen Therapy:  {Therapy; copd oxygen:33877}  Ventilator:    {[facility] Vent NGYA:533672525}    Rehab Therapies: {THERAPEUTIC INTERVENTION:6020324553}  Weight Bearing Status/Restrictions: {[facility] Weight Bearin}  Other Medical Equipment (for information only, NOT a DME order):  {EQUIPMENT:767315522}  Other Treatments: ***    Patient's personal belongings (please select all that are sent with patient):  {[facility] DME Belongings:749461029}    RN SIGNATURE:  {Esignature:828874706}    CASE MANAGEMENT/SOCIAL WORK SECTION    Inpatient Status Date: ***    Readmission Risk Assessment Score:  Readmission Risk              Risk of Unplanned Readmission:        12           Discharging to Facility/ Agency   · Name:   · Address:  · Phone:  · Fax:    Dialysis Facility (if applicable)   · Name:  · Address:  · Dialysis Schedule:  · Phone:  · Fax:    / signature: {Esignature:413265516}    PHYSICIAN SECTION    Prognosis: {Prognosis:9812116281}    Condition at Discharge: [address] [place] [address] Patient Condition:016042658}    Rehab Potential (if transferring to Rehab): {Prognosis:5907766415}    Recommended Labs or Other Treatments After Discharge: ***    Physician Certification: I certify the above information and transfer of Karthik Lomas  is necessary for the continuing treatment of the diagnosis listed and that she requires {Admit to Appropriate Level of Care:91582} for {GREATER/LESS:665124429} 30 days.      Update Admission H&P: {CHP DME Changes in CGMZK:424256144}    PHYSICIAN SIGNATURE:  {Esignature:712333578}

## 2019-07-17 LAB — SURGICAL PATHOLOGY REPORT: NORMAL

## 2019-07-19 ENCOUNTER — OFFICE VISIT (OUTPATIENT)
Dept: SURGERY | Age: 74
End: 2019-07-19

## 2019-07-19 ENCOUNTER — NURSE ONLY (OUTPATIENT)
Dept: SURGERY | Age: 74
End: 2019-07-19

## 2019-07-19 ENCOUNTER — HOSPITAL ENCOUNTER (OUTPATIENT)
Age: 74
Setting detail: SPECIMEN
Discharge: HOME | End: 2019-07-19
Payer: MEDICARE

## 2019-07-19 VITALS
DIASTOLIC BLOOD PRESSURE: 70 MMHG | TEMPERATURE: 99.6 F | HEIGHT: 62 IN | BODY MASS INDEX: 41.59 KG/M2 | SYSTOLIC BLOOD PRESSURE: 122 MMHG | WEIGHT: 226 LBS | HEART RATE: 80 BPM

## 2019-07-19 DIAGNOSIS — Z48.89 ENCOUNTER FOR POSTOPERATIVE CARE: Primary | ICD-10-CM

## 2019-07-19 DIAGNOSIS — T81.49XA SUPERFICIAL POSTOPERATIVE WOUND INFECTION: Primary | ICD-10-CM

## 2019-07-19 DIAGNOSIS — T81.49XA SUPERFICIAL POSTOPERATIVE WOUND INFECTION: ICD-10-CM

## 2019-07-19 PROCEDURE — 99024 POSTOP FOLLOW-UP VISIT: CPT | Performed by: SURGERY

## 2019-07-19 PROCEDURE — 87077 CULTURE AEROBIC IDENTIFY: CPT

## 2019-07-19 PROCEDURE — 87070 CULTURE OTHR SPECIMN AEROBIC: CPT

## 2019-07-19 PROCEDURE — 87205 SMEAR GRAM STAIN: CPT

## 2019-07-19 PROCEDURE — 87186 SC STD MICRODIL/AGAR DIL: CPT

## 2019-07-19 RX ORDER — LORAZEPAM 0.5 MG/1
0.5 TABLET ORAL
COMMUNITY
Start: 2017-04-05 | End: 2021-11-08

## 2019-07-19 RX ORDER — ALBUTEROL SULFATE 2.5 MG/3ML
2.5 SOLUTION RESPIRATORY (INHALATION)
COMMUNITY
Start: 2018-01-26 | End: 2022-10-25

## 2019-07-19 RX ORDER — FLUTICASONE PROPIONATE 44 UG/1
AEROSOL, METERED RESPIRATORY (INHALATION)
COMMUNITY
Start: 2019-04-22 | End: 2020-02-05 | Stop reason: ALTCHOICE

## 2019-07-19 RX ORDER — CIPROFLOXACIN 500 MG/1
500 TABLET, FILM COATED ORAL 2 TIMES DAILY
Qty: 14 TABLET | Refills: 0 | Status: SHIPPED | OUTPATIENT
Start: 2019-07-19 | End: 2019-07-25 | Stop reason: ALTCHOICE

## 2019-07-19 NOTE — PATIENT INSTRUCTIONS
Leave dressing on as long as it does not get to saturated. If dressing becomes soaked, remove top dressing and pad and cleanse area. Leave wick gauze incision.  Apply new dressings as directed

## 2019-07-21 LAB
CULTURE: ABNORMAL
DIRECT EXAM: ABNORMAL
DIRECT EXAM: ABNORMAL
Lab: ABNORMAL
SPECIMEN DESCRIPTION: ABNORMAL

## 2019-07-22 ENCOUNTER — OFFICE VISIT (OUTPATIENT)
Dept: SURGERY | Age: 74
End: 2019-07-22

## 2019-07-22 VITALS
HEIGHT: 62 IN | TEMPERATURE: 98.8 F | DIASTOLIC BLOOD PRESSURE: 74 MMHG | SYSTOLIC BLOOD PRESSURE: 136 MMHG | WEIGHT: 226 LBS | BODY MASS INDEX: 41.59 KG/M2 | HEART RATE: 76 BPM

## 2019-07-22 DIAGNOSIS — T81.49XA SUPERFICIAL POSTOPERATIVE WOUND INFECTION: Primary | ICD-10-CM

## 2019-07-22 PROCEDURE — 99024 POSTOP FOLLOW-UP VISIT: CPT | Performed by: SURGERY

## 2019-07-25 ENCOUNTER — OFFICE VISIT (OUTPATIENT)
Dept: SURGERY | Age: 74
End: 2019-07-25

## 2019-07-25 VITALS
HEIGHT: 62 IN | HEART RATE: 80 BPM | TEMPERATURE: 99 F | DIASTOLIC BLOOD PRESSURE: 80 MMHG | SYSTOLIC BLOOD PRESSURE: 120 MMHG | BODY MASS INDEX: 41.41 KG/M2 | WEIGHT: 225 LBS

## 2019-07-25 DIAGNOSIS — Z09 POSTOP CHECK: Primary | ICD-10-CM

## 2019-07-25 PROCEDURE — 99024 POSTOP FOLLOW-UP VISIT: CPT | Performed by: SURGERY

## 2019-07-31 ENCOUNTER — OFFICE VISIT (OUTPATIENT)
Dept: ONCOLOGY | Age: 74
End: 2019-07-31
Payer: MEDICARE

## 2019-07-31 VITALS
HEART RATE: 60 BPM | WEIGHT: 225 LBS | TEMPERATURE: 98.4 F | HEIGHT: 62 IN | SYSTOLIC BLOOD PRESSURE: 100 MMHG | DIASTOLIC BLOOD PRESSURE: 74 MMHG | BODY MASS INDEX: 41.41 KG/M2 | OXYGEN SATURATION: 96 %

## 2019-07-31 DIAGNOSIS — C7A.8 PRIMARY MALIGNANT NEUROENDOCRINE TUMOR OF SMALL INTESTINE (HCC): Primary | ICD-10-CM

## 2019-07-31 DIAGNOSIS — Z85.038 HISTORY OF COLON CANCER, STAGE II: ICD-10-CM

## 2019-07-31 PROCEDURE — 99215 OFFICE O/P EST HI 40 MIN: CPT | Performed by: INTERNAL MEDICINE

## 2019-07-31 NOTE — PROGRESS NOTES
Lipitor [atorvastatin]; Lopid [gemfibrozil]; Other; and Propoxyphene    Review of Systems:    Constitutional: No fever or chills. No night sweats, no weight loss   Eyes: No eye discharge, double vision, or eye pain   HEENT: negative for sore mouth, sore throat, hoarseness and voice change   Respiratory: negative for cough , sputum, dyspnea, wheezing, hemoptysis, chest pain   Cardiovascular: negative for chest pain, dyspnea, palpitations, orthopnea, PND   Gastrointestinal: negative for nausea, vomiting, diarrhea, constipation, abdominal pain, Dysphagia, hematemesis and hematochezia   Genitourinary: negative for frequency, dysuria, nocturia, urinary incontinence, and hematuria   Integument: negative for rash, skin lesions, bruises. Hematologic/Lymphatic: negative for easy bruising, bleeding, lymphadenopathy, or petechiae   Endocrine: negative for heat or cold intolerance,weight changes, change in bowel habits and hair loss   Musculoskeletal: negative for myalgias, arthralgias, pain, joint swelling,and bone pain   Neurological: negative for headaches, dizziness, seizures, weakness, numbness        Physical Exam:    Vitals: /74 (Site: Right Upper Arm, Position: Sitting, Cuff Size: Large Adult)   Pulse 60   Temp 98.4 °F (36.9 °C)   Ht 5' 2.01\" (1.575 m)   Wt 225 lb (102.1 kg)   SpO2 96%   BMI 41.14 kg/m²   General appearance - well appearing, no in pain or distress  Mental status - AAO X3  Eyes - pupils equal and reactive, extraocular eye movements intact  Mouth - mucous membranes moist, pharynx normal without lesions  Neck - supple, no significant adenopathy  Lymphatics - no palpable lymphadenopathy, no hepatosplenomegaly  Chest - clear to auscultation, no wheezes, rales or rhonchi, symmetric air entry  Heart - normal rate, regular rhythm, normal S1, S2, no murmurs  Abdomen - soft, nontender, nondistended, no masses or organomegaly. Surgical incision site covered with dressing.   Neurological - alert, tube placement TECHNOLOGIST PROVIDED HISTORY: ng tube placement Reason for Exam: NG tube placement Acuity: Acute Type of Exam: Initial FINDINGS: Tip of the nasogastric tube is within proximal stomach. Bowel gas pattern is nonspecific and nonobstructive. Surgical drain is noted within mid lower abdomen. Multiple skin staples are noted within the abdomen and pelvis. Small pulmonary opacity at the left lung base. 1. Tip of the nasogastric tube is within proximal stomach. 2. Surgical drain is noted within the mid lower abdomen. Bowel gas pattern is nonspecific and nonobstructive. 3. Small nonspecific left basilar pulmonary opacity. Xr Acute Abd Series Chest 1 Vw    Result Date: 7/11/2019  EXAMINATION: TWO XRAY VIEWS OF THE ABDOMEN AND SINGLE  XRAY VIEW OF THE CHEST 7/11/2019 10:10 am COMPARISON: 10 July 2019 HISTORY: ORDERING SYSTEM PROVIDED HISTORY: sbo TECHNOLOGIST PROVIDED HISTORY: sbo Reason for Exam: Follow up for SBO Acuity: Acute Type of Exam: Subsequent/Follow-up FINDINGS: Increased colonic stool is noted. No bowel obstruction or free air is demonstrated. Surgical clips are present in the gallbladder fossa and scattered along the right lateral hemiabdomen. Intestinal tube terminates in the distal stomach. No organomegaly or free air is noted. Examination the chest demonstrates no acute abnormality. Heart size is borderline. No acute infiltrate or failure is noted. No evidence of small-bowel obstruction. Bowel gas pattern is within normal range. Intestinal tube terminates in the distal stomach. Ct Abdomen Pelvis W Iv Contrast    Result Date: 7/10/2019  EXAMINATION: CT OF THE ABDOMEN AND PELVIS WITH CONTRAST 7/10/2019 3:30 am TECHNIQUE: CT of the abdomen and pelvis was performed with the administration of intravenous contrast. Multiplanar reformatted images are provided for review.  Dose modulation, iterative reconstruction, and/or weight based adjustment of the mA/kV was utilized to reduce 2 has imaging characteristics of a hemangioma. The larger of the 2 is indeterminate although smaller since the previous study. If previous outside imaging has not establish that these are both hemangiomas would recommend follow-up with MRI of the liver with contrast. Findings were discussed with MIKE CARRILLO at 4:15 am on 7/10/2019. Xr Chest Portable    Result Date: 7/10/2019  EXAMINATION: ONE XRAY VIEW OF THE CHEST 7/10/2019 6:00 am COMPARISON: August 31, 2017. HISTORY: ORDERING SYSTEM PROVIDED HISTORY: NG placement TECHNOLOGIST PROVIDED HISTORY: NG placement Reason for Exam: NG tube placement Acuity: Acute Type of Exam: Initial FINDINGS: Enteric tube tip overlies the left upper quadrant and is likely in the body of the stomach. Low lung volume. No focal consolidation. No pleural effusion or pneumothorax. Prominent cardiomediastinal silhouette is likely accentuated by low lung volume and portable technique. No acute osseous abnormality. Enteric tube tip likely in the body of the stomach. Xr Abdomen (2 Views)    Result Date: 7/10/2019  EXAMINATION: TWO XRAY VIEWS OF THE ABDOMEN 7/10/2019 9:49 am COMPARISON: 07/10/2019 HISTORY: ORDERING SYSTEM PROVIDED HISTORY: sbo TECHNOLOGIST PROVIDED HISTORY: sbo Reason for Exam: Small bowel obstruction, ng tube placed this morning. Acuity: Acute Type of Exam: Initial FINDINGS: Enteric tube has been placed. The side port is just beyond the GE junction by about 5 cm. Consider advancing the tube an additional 5-10 cm for improved positioning. Persistent gaseous distention of loops of bowel up to 3.7 cm. Paucity of distal gas. No pneumatosis. No portal venous gas. Note is made that there is contrast within the renal collecting systems bilaterally from previous contrast administration. No gross abnormality. Arthritic changes of bilateral hips.      NG tube with the tip beyond the GE junction; side port near the GE junction; consider advancing the tube an additional 5-10 cm for improved positioning. Persistent evidence of bowel obstruction. 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 History of cecal carcinoma stage II, T3 N0 M0 status post hemicolectomy in 2004. Anemia  History of iron deficiency anemia  Thyroid goiter status post subtotal thyroidectomy    Plan:  Personally reviewed results of lab work-up and other relevant clinical data. Reviewed results of imaging studies and pathology report. Discussed natural history of well-differentiated neuroendocrine tumor of small bowel. CT scan did not show any evidence of disease. We will obtain octreotide scan once patient has recovered from the surgery. I will also obtain chromogranin level. If patient does not have any residual disease patient per NCCN guidelines surveillance. However the patient is noted to have persistent disease we will consider treating patient with Sandostatin injections at that point. Currently patient does not have any signs or symptoms of carcinoid syndrome. Continue to monitor hemoglobin. NCCN guidelines were reviewed and discussed with the patient. The diagnosis and care plan were discussed with the patient in detail. I discussed the natural history of the disease, prognosis, risks and goals of therapy and answered all the patients questions to the best of my ability. Patient expressed understanding and was in agreement. Diana Pimentel      I spent more than 40 minutes examining, evaluating, reviewing data, counseling the patient and coordinating care.   Greater than 50% of time was spent face-to-face with the patient  This note is created with the assistance of a speech recognition program.  While intending to generate a document that actually reflects the content of the visit, the document can still have some errors including those of syntax and sound a like substitutions which may escape proof

## 2019-08-01 ENCOUNTER — TELEPHONE (OUTPATIENT)
Dept: ONCOLOGY | Age: 74
End: 2019-08-01

## 2019-08-06 ENCOUNTER — OFFICE VISIT (OUTPATIENT)
Dept: SURGERY | Age: 74
End: 2019-08-06

## 2019-08-06 VITALS
DIASTOLIC BLOOD PRESSURE: 74 MMHG | BODY MASS INDEX: 41.38 KG/M2 | WEIGHT: 224.87 LBS | HEART RATE: 90 BPM | HEIGHT: 62 IN | SYSTOLIC BLOOD PRESSURE: 130 MMHG | TEMPERATURE: 98.9 F

## 2019-08-06 DIAGNOSIS — Z09 POSTOP CHECK: Primary | ICD-10-CM

## 2019-08-06 PROCEDURE — 99024 POSTOP FOLLOW-UP VISIT: CPT | Performed by: SURGERY

## 2019-08-06 NOTE — PROGRESS NOTES
Patient doing very well postop partial colectomy and ileal ectomy due to a obstruction and neuroendocrine tumor. Patient had previously had a right hemicolectomy for colon cancer in 2008. She did have a bowel obstruction type picture with desmoplastic type reaction with loops of small bowel and the anastomosis of the ileocolic area. This turned out to be a neuroendocrine tumor which was a T3 N0 M0. Patient is seen oncology and is scheduled to do a Sandostatin CT scan. Postop she had a wound infection that was opened up in the subcutaneous tissue in the middle and the inferior aspect of the wounds. It is healing nicely there is good granulation tissue and she has no complaints. At this point we will continue to wet-to-dry dressing changes and I will see her in 2 weeks.

## 2019-08-20 ENCOUNTER — OFFICE VISIT (OUTPATIENT)
Dept: SURGERY | Age: 74
End: 2019-08-20

## 2019-08-20 ENCOUNTER — HOSPITAL ENCOUNTER (OUTPATIENT)
Age: 74
Setting detail: SPECIMEN
Discharge: HOME | End: 2019-08-20
Payer: MEDICARE

## 2019-08-20 VITALS
DIASTOLIC BLOOD PRESSURE: 70 MMHG | WEIGHT: 227 LBS | HEART RATE: 82 BPM | TEMPERATURE: 99.6 F | HEIGHT: 62 IN | SYSTOLIC BLOOD PRESSURE: 132 MMHG | BODY MASS INDEX: 41.77 KG/M2

## 2019-08-20 DIAGNOSIS — L08.9 WOUND INFECTION: Primary | ICD-10-CM

## 2019-08-20 DIAGNOSIS — L08.9 WOUND INFECTION: ICD-10-CM

## 2019-08-20 DIAGNOSIS — T14.8XXA WOUND INFECTION: ICD-10-CM

## 2019-08-20 DIAGNOSIS — T14.8XXA WOUND INFECTION: Primary | ICD-10-CM

## 2019-08-20 DIAGNOSIS — Z09 POSTOP CHECK: ICD-10-CM

## 2019-08-20 PROCEDURE — 99024 POSTOP FOLLOW-UP VISIT: CPT | Performed by: SURGERY

## 2019-08-20 PROCEDURE — 87205 SMEAR GRAM STAIN: CPT

## 2019-08-20 PROCEDURE — 87070 CULTURE OTHR SPECIMN AEROBIC: CPT

## 2019-08-20 RX ORDER — CIPROFLOXACIN 500 MG/1
500 TABLET, FILM COATED ORAL 2 TIMES DAILY
Qty: 10 TABLET | Refills: 0 | Status: SHIPPED | OUTPATIENT
Start: 2019-08-20 | End: 2019-08-27 | Stop reason: ALTCHOICE

## 2019-08-22 DIAGNOSIS — C7A.8 PRIMARY MALIGNANT NEUROENDOCRINE TUMOR OF SMALL INTESTINE (HCC): Primary | ICD-10-CM

## 2019-08-27 ENCOUNTER — OFFICE VISIT (OUTPATIENT)
Dept: SURGERY | Age: 74
End: 2019-08-27

## 2019-08-27 VITALS
WEIGHT: 227 LBS | HEART RATE: 92 BPM | DIASTOLIC BLOOD PRESSURE: 60 MMHG | TEMPERATURE: 99.6 F | BODY MASS INDEX: 41.77 KG/M2 | HEIGHT: 62 IN | SYSTOLIC BLOOD PRESSURE: 110 MMHG

## 2019-08-27 DIAGNOSIS — Z09 POSTOP CHECK: Primary | ICD-10-CM

## 2019-08-27 PROCEDURE — 99024 POSTOP FOLLOW-UP VISIT: CPT | Performed by: SURGERY

## 2019-08-27 RX ORDER — PREDNISONE 10 MG/1
30 TABLET ORAL
COMMUNITY
Start: 2019-08-27 | End: 2019-09-01

## 2019-09-03 ENCOUNTER — HOSPITAL ENCOUNTER (OUTPATIENT)
Dept: NUCLEAR MEDICINE | Age: 74
Discharge: HOME | End: 2019-09-03
Payer: MEDICARE

## 2019-09-03 DIAGNOSIS — C7A.8 NEURO-ENDOCRINE CARCINOMA (HCC): ICD-10-CM

## 2019-09-03 DIAGNOSIS — C7A.8 PRIMARY MALIGNANT NEUROENDOCRINE TUMOR OF SMALL INTESTINE (HCC): ICD-10-CM

## 2019-09-03 PROCEDURE — 78802 RP LOCLZJ TUM WHBDY 1 D IMG: CPT

## 2019-09-03 PROCEDURE — A9572 INDIUM IN-111 PENTETREOTIDE: HCPCS | Performed by: INTERNAL MEDICINE

## 2019-09-03 PROCEDURE — 3430000000 HC RX DIAGNOSTIC RADIOPHARMACEUTICAL: Performed by: INTERNAL MEDICINE

## 2019-09-03 PROCEDURE — 78803 RP LOCLZJ TUM SPECT 1 AREA: CPT

## 2019-09-03 PROCEDURE — 2709999900 HC NON-CHARGEABLE SUPPLY

## 2019-09-03 RX ADMIN — INDIUM IN -111 PENTETREOTIDE 5.3 MILLICURIE: KIT at 08:15

## 2019-09-04 ENCOUNTER — HOSPITAL ENCOUNTER (OUTPATIENT)
Dept: NUCLEAR MEDICINE | Age: 74
Discharge: HOME | End: 2019-09-04
Payer: MEDICARE

## 2019-09-05 ENCOUNTER — HOSPITAL ENCOUNTER (OUTPATIENT)
Dept: NUCLEAR MEDICINE | Age: 74
Discharge: HOME | End: 2019-09-05
Payer: MEDICARE

## 2019-09-11 ENCOUNTER — OFFICE VISIT (OUTPATIENT)
Dept: ONCOLOGY | Age: 74
End: 2019-09-11
Payer: MEDICARE

## 2019-09-11 VITALS
TEMPERATURE: 97.5 F | HEIGHT: 62 IN | WEIGHT: 225 LBS | BODY MASS INDEX: 41.41 KG/M2 | OXYGEN SATURATION: 94 % | DIASTOLIC BLOOD PRESSURE: 84 MMHG | SYSTOLIC BLOOD PRESSURE: 136 MMHG | HEART RATE: 94 BPM

## 2019-09-11 DIAGNOSIS — C7A.8 PRIMARY MALIGNANT NEUROENDOCRINE TUMOR OF SMALL INTESTINE (HCC): Primary | ICD-10-CM

## 2019-09-11 PROCEDURE — 99214 OFFICE O/P EST MOD 30 MIN: CPT | Performed by: INTERNAL MEDICINE

## 2019-09-11 NOTE — PROGRESS NOTES
Joseline Kaye                                                                                                                  9/11/2019  MRN:   [id number]  YOB: 1945  PCP:                           Kenyatta Amezquita  Referring Physician: No ref. provider found  Treating Physician Name: Edwina Domínguez MD      Reason for visit:  Discuss results of lab work-up and imaging studies    Current problems:  Cecal carcinoma, T3, N0, M0, stage II. Diagnosed 2008  Well-differentiated neuroendocrine tumor of small intestine, T3, N0, M0. Diagnosed July 2019    Active and recent treatments:  Right hemicolectomy April 2008  Small bowel and colon segmental resection July 2019    Summary of Case/History:    Joseline Kaye a 68 y. o.female is a patient with carcinoma of cecum stage II diagnosed 2004 and well-differentiated neuroendocrine tumor small bowel diagnosed in July 2019    Patient was diagnosed with stage II cecal carcinoma for which she underwent hemicolectomy in April 2004 and did not receive any adjuvant chemotherapy. Patient was admitted to the hospital with complaints of abdominal discomfort in July 2019. CT scan showed findings concerning for neuroendocrine tumor versus recurrent colon cancer. Patient was subsequently taken to the OR and underwent colon and small bowel segmental resection which revealed well-differentiated neuroendocrine tumor, 3.5 cm, grade 1 originating from small bowel and extending into subserosal fat laterally free of visceral peritoneal surface. There was extensive perineural invasion present. However margins were negative for malignancy. 3 lymph bones were examined which were negative for malignancy. Pathological stage was T3N0. Octreotide scan came back negative. Interim History:    Patient presents to the clinic for a follow-up visit and to discuss results of her octreotide scan.  patient's abdominal wound is healing up well. Denies abdominal pain. hoarseness and voice change   Respiratory: negative for cough , sputum, dyspnea, wheezing, hemoptysis, chest pain   Cardiovascular: negative for chest pain, dyspnea, palpitations, orthopnea, PND   Gastrointestinal: negative for nausea, vomiting, diarrhea, constipation, abdominal pain, Dysphagia, hematemesis and hematochezia   Genitourinary: negative for frequency, dysuria, nocturia, urinary incontinence, and hematuria   Integument: negative for rash, skin lesions, bruises. Hematologic/Lymphatic: negative for easy bruising, bleeding, lymphadenopathy, or petechiae   Endocrine: negative for heat or cold intolerance,weight changes, change in bowel habits and hair loss   Musculoskeletal: negative for myalgias, arthralgias, pain, joint swelling,and bone pain   Neurological: negative for headaches, dizziness, seizures, weakness, numbness        Physical Exam:    Vitals: /84 (Site: Right Upper Arm, Position: Sitting, Cuff Size: Medium Adult)   Pulse 94   Temp 97.5 °F (36.4 °C) (Tympanic)   Ht 5' 1.81\" (1.57 m)   Wt 225 lb (102.1 kg)   SpO2 94%   BMI 41.41 kg/m²   General appearance - well appearing, no in pain or distress  Mental status - AAO X3  Eyes - pupils equal and reactive, extraocular eye movements intact  Mouth - mucous membranes moist, pharynx normal without lesions  Neck - supple, no significant adenopathy  Lymphatics - no palpable lymphadenopathy, no hepatosplenomegaly  Chest - clear to auscultation, no wheezes, rales or rhonchi, symmetric air entry  Heart - normal rate, regular rhythm, normal S1, S2, no murmurs  Abdomen - soft, nontender, nondistended, no masses or organomegaly.   Well-healing surgical wound  Neurological - alert, oriented, normal speech, no focal findings or movement disorder noted  Extremities - peripheral pulses normal, no pedal edema, no clubbing or cyanosis  Skin - normal coloration and turgor, no rashes, no suspicious skin lesions noted       DATA:    Results for orders placed for attenuation correction and localization. COMPARISON: None FINDINGS: There are no foci of abnormal radiotracer accumulation to suggest residual or metastatic malignancy. Physiologic activity is present in the liver, spleen, kidneys and gastrointestinal tract. No scintigraphic evidence of residual or metastatic neuroendocrine malignancy. Final report electronically signed by Dr. 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 History of cecal carcinoma stage II, T3 N0 M0 status post hemicolectomy in 2004. Anemia  History of iron deficiency anemia  Thyroid goiter status post subtotal thyroidectomy    Plan:  Personally reviewed results of lab work-up and other relevant clinical data. Reviewed results of octreotide scan which came back negative  Patient case was also discussed in the general tumor board. Based on the negative octreotide scan no further treatment is recommended. Recommend continued surveillance per NCCN guidelines. We will see patient back in office in 6 months with repeat labs in for physical examination. Continue surveillance for cecal carcinoma  Continue surveillance colonoscopies  Continue to monitor for signs or symptoms of carcinoid syndrome  Continue to monitor hemoglobin to document return to baseline. NCCN guidelines were reviewed and discussed with the patient. The diagnosis and care plan were discussed with the patient in detail. I discussed the natural history of the disease, prognosis, risks and goals of therapy and answered all the patients questions to the best of my ability. Patient expressed understanding and was in agreement.       Edwina Domínguez      This note is created with the assistance of a speech recognition program.  While intending to generate a document that actually reflects the content of the visit, the document can still have some errors including

## 2019-09-19 ENCOUNTER — OFFICE VISIT (OUTPATIENT)
Dept: SURGERY | Age: 74
End: 2019-09-19

## 2019-09-19 VITALS
HEIGHT: 61 IN | BODY MASS INDEX: 42.48 KG/M2 | TEMPERATURE: 98.7 F | DIASTOLIC BLOOD PRESSURE: 70 MMHG | HEART RATE: 80 BPM | SYSTOLIC BLOOD PRESSURE: 132 MMHG | WEIGHT: 225 LBS

## 2019-09-19 DIAGNOSIS — Z09 POSTOP CHECK: Primary | ICD-10-CM

## 2019-09-19 PROCEDURE — 99024 POSTOP FOLLOW-UP VISIT: CPT | Performed by: SURGERY

## 2019-09-19 NOTE — PROGRESS NOTES
Patient doing very well at this point her wounds are finally healed. At this point we will see her on a as needed basis.

## 2020-02-05 ENCOUNTER — HOSPITAL ENCOUNTER (OUTPATIENT)
Dept: GENERAL RADIOLOGY | Age: 75
Discharge: HOME | End: 2020-02-07
Payer: MEDICARE

## 2020-02-05 ENCOUNTER — HOSPITAL ENCOUNTER (OUTPATIENT)
Dept: LAB | Age: 75
Discharge: HOME | End: 2020-02-05
Payer: MEDICARE

## 2020-02-05 ENCOUNTER — HOSPITAL ENCOUNTER (OUTPATIENT)
Dept: PULMONOLOGY | Age: 75
Discharge: HOME | End: 2020-02-05
Payer: MEDICARE

## 2020-02-05 ENCOUNTER — OFFICE VISIT (OUTPATIENT)
Dept: PULMONOLOGY | Age: 75
End: 2020-02-05
Payer: MEDICARE

## 2020-02-05 VITALS
WEIGHT: 232 LBS | SYSTOLIC BLOOD PRESSURE: 120 MMHG | HEART RATE: 88 BPM | DIASTOLIC BLOOD PRESSURE: 74 MMHG | OXYGEN SATURATION: 95 % | BODY MASS INDEX: 43.8 KG/M2 | HEIGHT: 61 IN | TEMPERATURE: 98.1 F

## 2020-02-05 LAB — IGE: 14 IU/ML

## 2020-02-05 PROCEDURE — 94726 PLETHYSMOGRAPHY LUNG VOLUMES: CPT

## 2020-02-05 PROCEDURE — 82785 ASSAY OF IGE: CPT

## 2020-02-05 PROCEDURE — 36415 COLL VENOUS BLD VENIPUNCTURE: CPT

## 2020-02-05 PROCEDURE — 99213 OFFICE O/P EST LOW 20 MIN: CPT | Performed by: INTERNAL MEDICINE

## 2020-02-05 PROCEDURE — 94640 AIRWAY INHALATION TREATMENT: CPT

## 2020-02-05 PROCEDURE — 94060 EVALUATION OF WHEEZING: CPT

## 2020-02-05 PROCEDURE — 94729 DIFFUSING CAPACITY: CPT

## 2020-02-05 PROCEDURE — 71046 X-RAY EXAM CHEST 2 VIEWS: CPT

## 2020-02-05 PROCEDURE — 99204 OFFICE O/P NEW MOD 45 MIN: CPT | Performed by: INTERNAL MEDICINE

## 2020-02-05 PROCEDURE — 85048 AUTOMATED LEUKOCYTE COUNT: CPT

## 2020-02-05 PROCEDURE — 6360000002 HC RX W HCPCS: Performed by: INTERNAL MEDICINE

## 2020-02-05 RX ORDER — ALBUTEROL SULFATE 2.5 MG/3ML
2.5 SOLUTION RESPIRATORY (INHALATION) ONCE
Status: COMPLETED | OUTPATIENT
Start: 2020-02-05 | End: 2020-02-05

## 2020-02-05 RX ORDER — MONTELUKAST SODIUM 10 MG/1
10 TABLET ORAL NIGHTLY
COMMUNITY
End: 2020-02-05 | Stop reason: SDUPTHER

## 2020-02-05 RX ORDER — MONTELUKAST SODIUM 10 MG/1
10 TABLET ORAL NIGHTLY
Qty: 90 TABLET | Refills: 3 | Status: SHIPPED | OUTPATIENT
Start: 2020-02-05 | End: 2021-01-12

## 2020-02-05 RX ORDER — BUDESONIDE AND FORMOTEROL FUMARATE DIHYDRATE 160; 4.5 UG/1; UG/1
2 AEROSOL RESPIRATORY (INHALATION) 2 TIMES DAILY
Qty: 3 INHALER | Refills: 3 | Status: SHIPPED | OUTPATIENT
Start: 2020-02-05 | End: 2021-01-12

## 2020-02-05 RX ADMIN — ALBUTEROL SULFATE 2.5 MG: 2.5 SOLUTION RESPIRATORY (INHALATION) at 10:22

## 2020-02-06 LAB
EOSINOPHILS ABSOLUTE: 446 /UL (ref 200–400)
EOSINOPHILS RELATIVE PERCENT: ABNORMAL %
WBC # BLD: ABNORMAL K/UL

## 2020-02-08 ASSESSMENT — ENCOUNTER SYMPTOMS
ALLERGIC/IMMUNOLOGIC NEGATIVE: 1
SHORTNESS OF BREATH: 1
GASTROINTESTINAL NEGATIVE: 1
EYES NEGATIVE: 1
WHEEZING: 1
COUGH: 1

## 2020-02-08 NOTE — PROGRESS NOTES
Subjective:      Patient ID: Aubree Nicole is a 76 y.o. female. HPI  New patient visit, referred by Dr. Ton Parker, for evaluation of shortness of breath. Patient is a lifelong non-smoker. She reports increasing shortness of breath, worse over the last 2 years. Current weight is lifetime maximum. Over the last year, she has had at least 3 exacerbations requiring Depo-Medrol and an antibiotic. Reports wheezing, cough, and shortness of breath. Short of breath with mild to moderate exertion. Denies nocturnal symptoms, paroxysmal nocturnal dyspnea, or pedal edema. Pulmonary function studies done today demonstrate a moderate restrictive ventilatory defect of the extrapulmonic type. FVC 1.14 L (55%), FEV1 1.16 L (59%), FEV1/FVC 82%. After bronchodilator there is a 13% increase in FEV1 to 1.32. Static lung volumes could not be performed. Diffusing capacity was mildly decreased at 14.8 (68%) but when corrected for alveolar volume and hemoglobin was normal.  Chest x-ray done today reviewed. Specifically no infiltrates, hilar, or mediastinal abnormalities. Lung volumes are small. Echocardiogram done 7/10/2019 demonstrated mild LVH with normal systolic function. Small pericardial effusion, not likely significant. Patient denies loud snoring, disturbed sleep, or excessive daytime sleepiness. Awakens feeling refreshed and restored. Specifically denies symptoms of sleep apnea. Denies nocturnal awakenings because of choking or shortness of breath. Patient states that on thyroids her symptoms of shortness of breath improved. Uses pro-air inhaler as needed. Childhood asthma or allergies. Denies environmental allergies. No occupational exposures. No family history of asthma.     Current Outpatient Medications   Medication Sig Dispense Refill    budesonide-formoterol (SYMBICORT) 160-4.5 MCG/ACT AERO Inhale 2 puffs into the lungs 2 times daily 3 Inhaler 3    montelukast (SINGULAIR) 10 MG tablet Take nursing note reviewed. Constitutional:       Appearance: She is well-developed. She is obese. HENT:      Head: Normocephalic. Right Ear: Tympanic membrane, ear canal and external ear normal.      Left Ear: Tympanic membrane, ear canal and external ear normal.      Mouth/Throat:      Mouth: Mucous membranes are moist.      Pharynx: Oropharynx is clear. Eyes:      General: No scleral icterus. Conjunctiva/sclera: Conjunctivae normal.   Neck:      Musculoskeletal: Neck supple. Thyroid: No thyromegaly. Vascular: No JVD. Trachea: No tracheal deviation. Cardiovascular:      Rate and Rhythm: Normal rate and regular rhythm. Heart sounds: Normal heart sounds. No murmur. No gallop. Pulmonary:      Effort: Pulmonary effort is normal. No respiratory distress. Breath sounds: No wheezing or rales. Chest:      Chest wall: No tenderness. Abdominal:      Palpations: Abdomen is soft. Tenderness: There is no abdominal tenderness. Musculoskeletal:      Right lower leg: No edema. Left lower leg: No edema. Lymphadenopathy:      Cervical: No cervical adenopathy. Skin:     General: Skin is warm and dry. Neurological:      Mental Status: She is alert and oriented to person, place, and time. Wt Readings from Last 3 Encounters:   02/05/20 232 lb (105.2 kg)   09/19/19 225 lb (102.1 kg)   09/11/19 225 lb (102.1 kg)       Results for orders placed or performed during the hospital encounter of 08/20/19   Aerobic Culture   Result Value Ref Range    Specimen Description . OTHER     Special Requests NOT REPORTED     Direct Exam MODERATE NEUTROPHILS (A)     Direct Exam NO BACTERIA SEEN     Culture NORMAL SKIN DUNG (A)        Assessment:      1. 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 Asthma, severe persistent, poorly-controlled, uncomplicated    3.  Class 3 severe obesity due to excess calories with serious comorbidity and body mass index (BMI) of 40.0 to 44.9 in

## 2020-04-26 NOTE — FLOWSHEET NOTE
Shirin Patricia is waiting for discharge. No spiritual concerns expressed.      05/09/19 1029   Encounter Summary   Services provided to: Patient   Referral/Consult From: [facility] Family members   Continue Visiting   (5/9/19)   Complexity of Encounter Low   Length of Encounter 15 minutes   Spiritual Assessment Completed Yes   Routine   Type Initial   Assessment Approachable   Intervention Active listening   Outcome Engaged in conversation TRANSFER - OUT REPORT:    Verbal report given to Maritza Guaman RN (name) on Sunita Mars  being transferred to [facility] [phone] (unit) for routine progression of care       Report consisted of patients Situation, Background, Assessment and   Recommendations(SBAR). Information from the following report(s) Kardex was reviewed with the receiving nurse. Lines:   Peripheral IV 04/26/20 Right Antecubital (Active)   Site Assessment Clean, dry, & intact 4/26/2020  3:02 PM   Phlebitis Assessment 0 4/26/2020  3:02 PM   Infiltration Assessment 0 4/26/2020  3:02 PM   Dressing Status Clean, dry, & intact 4/26/2020  3:02 PM        Opportunity for questions and clarification was provided.       Patient transported with:   [employer]

## 2021-01-12 DIAGNOSIS — J45.50 ASTHMA, SEVERE PERSISTENT, POORLY-CONTROLLED, UNCOMPLICATED: ICD-10-CM

## 2021-01-12 RX ORDER — BUDESONIDE AND FORMOTEROL FUMARATE DIHYDRATE 160; 4.5 UG/1; UG/1
AEROSOL RESPIRATORY (INHALATION)
Qty: 30.6 G | Refills: 3 | Status: SHIPPED | OUTPATIENT
Start: 2021-01-12 | End: 2022-01-07

## 2021-01-12 RX ORDER — MONTELUKAST SODIUM 10 MG/1
TABLET ORAL
Qty: 90 TABLET | Refills: 3 | Status: SHIPPED | OUTPATIENT
Start: 2021-01-12 | End: 2022-01-07

## 2021-10-30 ENCOUNTER — HOSPITAL ENCOUNTER (EMERGENCY)
Age: 76
Discharge: HOME | End: 2021-10-30
Attending: EMERGENCY MEDICINE
Payer: MEDICARE

## 2021-10-30 ENCOUNTER — APPOINTMENT (OUTPATIENT)
Dept: CT IMAGING | Age: 76
End: 2021-10-30
Payer: MEDICARE

## 2021-10-30 VITALS
WEIGHT: 220 LBS | DIASTOLIC BLOOD PRESSURE: 60 MMHG | OXYGEN SATURATION: 94 % | TEMPERATURE: 98.2 F | BODY MASS INDEX: 41.54 KG/M2 | SYSTOLIC BLOOD PRESSURE: 133 MMHG | HEART RATE: 103 BPM | RESPIRATION RATE: 15 BRPM | HEIGHT: 61 IN

## 2021-10-30 DIAGNOSIS — I48.91 ATRIAL FIBRILLATION, UNSPECIFIED TYPE (HCC): ICD-10-CM

## 2021-10-30 DIAGNOSIS — K57.32 DIVERTICULITIS OF COLON: Primary | ICD-10-CM

## 2021-10-30 LAB
-: ABNORMAL
ABSOLUTE EOS #: 0.13 K/UL (ref 0–0.44)
ABSOLUTE IMMATURE GRANULOCYTE: 0.03 K/UL (ref 0–0.3)
ABSOLUTE LYMPH #: 2.13 K/UL (ref 1.1–3.7)
ABSOLUTE MONO #: 0.38 K/UL (ref 0.1–1.2)
ALBUMIN SERPL-MCNC: 3.9 G/DL (ref 3.5–5.2)
ALBUMIN/GLOBULIN RATIO: 1.6 (ref 1–2.5)
ALP BLD-CCNC: 79 U/L (ref 35–104)
ALT SERPL-CCNC: 10 U/L (ref 5–33)
AMORPHOUS: ABNORMAL
ANION GAP SERPL CALCULATED.3IONS-SCNC: 8 MMOL/L (ref 9–17)
AST SERPL-CCNC: 12 U/L
BACTERIA: ABNORMAL
BASOPHILS # BLD: 0 % (ref 0–2)
BASOPHILS ABSOLUTE: 0.03 K/UL (ref 0–0.2)
BILIRUB SERPL-MCNC: 0.6 MG/DL (ref 0.3–1.2)
BILIRUBIN DIRECT: 0.13 MG/DL
BILIRUBIN URINE: NEGATIVE
BILIRUBIN, INDIRECT: 0.47 MG/DL (ref 0–1)
BUN BLDV-MCNC: 14 MG/DL (ref 8–23)
BUN/CREAT BLD: 17 (ref 9–20)
CALCIUM SERPL-MCNC: 9.3 MG/DL (ref 8.6–10.4)
CASTS UA: ABNORMAL /LPF (ref 0–2)
CHLORIDE BLD-SCNC: 104 MMOL/L (ref 98–107)
CO2: 30 MMOL/L (ref 20–31)
COLOR: ABNORMAL
COMMENT UA: ABNORMAL
CREAT SERPL-MCNC: 0.82 MG/DL (ref 0.5–0.9)
CRYSTALS, UA: ABNORMAL /HPF
DIFFERENTIAL TYPE: ABNORMAL
EOSINOPHILS RELATIVE PERCENT: 2 % (ref 1–4)
EPITHELIAL CELLS UA: ABNORMAL /HPF (ref 0–5)
GFR AFRICAN AMERICAN: >60 ML/MIN
GFR NON-AFRICAN AMERICAN: >60 ML/MIN
GFR SERPL CREATININE-BSD FRML MDRD: ABNORMAL ML/MIN/{1.73_M2}
GFR SERPL CREATININE-BSD FRML MDRD: ABNORMAL ML/MIN/{1.73_M2}
GLOBULIN: 2.5 G/DL (ref 1.5–3.8)
GLUCOSE BLD-MCNC: 114 MG/DL (ref 70–99)
GLUCOSE URINE: NEGATIVE
HCT VFR BLD CALC: 44.5 % (ref 36.3–47.1)
HEMOGLOBIN: 13.9 G/DL (ref 11.9–15.1)
IMMATURE GRANULOCYTES: 0 %
KETONES, URINE: NEGATIVE
LACTIC ACID: 1.2 MMOL/L (ref 0.5–2.2)
LEUKOCYTE ESTERASE, URINE: ABNORMAL
LIPASE: 27 U/L (ref 13–60)
LYMPHOCYTES # BLD: 24 % (ref 24–43)
MCH RBC QN AUTO: 29.6 PG (ref 25.2–33.5)
MCHC RBC AUTO-ENTMCNC: 31.2 G/DL (ref 25.2–33.5)
MCV RBC AUTO: 94.7 FL (ref 82.6–102.9)
MONOCYTES # BLD: 4 % (ref 3–12)
MUCUS: ABNORMAL
NITRITE, URINE: NEGATIVE
NRBC AUTOMATED: 0 PER 100 WBC
OTHER OBSERVATIONS UA: ABNORMAL
PDW BLD-RTO: 13.2 % (ref 11.8–14.4)
PH UA: 6.5 (ref 5–6)
PLATELET # BLD: 254 K/UL (ref 138–453)
PLATELET ESTIMATE: ABNORMAL
PMV BLD AUTO: 9.4 FL (ref 8.1–13.5)
POTASSIUM SERPL-SCNC: 3.9 MMOL/L (ref 3.7–5.3)
PROTEIN UA: NEGATIVE
RBC # BLD: 4.7 M/UL (ref 3.95–5.11)
RBC # BLD: ABNORMAL 10*6/UL
RBC UA: ABNORMAL /HPF (ref 0–4)
RENAL EPITHELIAL, UA: ABNORMAL /HPF
SEG NEUTROPHILS: 70 % (ref 36–65)
SEGMENTED NEUTROPHILS ABSOLUTE COUNT: 6.05 K/UL (ref 1.5–8.1)
SODIUM BLD-SCNC: 142 MMOL/L (ref 135–144)
SPECIFIC GRAVITY UA: 1.01 (ref 1.01–1.02)
THYROXINE, FREE: 1.19 NG/DL (ref 0.93–1.7)
TOTAL PROTEIN: 6.4 G/DL (ref 6.4–8.3)
TRICHOMONAS: ABNORMAL
TROPONIN INTERP: NORMAL
TROPONIN T: NORMAL NG/ML
TROPONIN, HIGH SENSITIVITY: 14 NG/L (ref 0–14)
TSH SERPL DL<=0.05 MIU/L-ACNC: 2.67 MIU/L (ref 0.3–5)
TURBIDITY: ABNORMAL
URINE HGB: NEGATIVE
UROBILINOGEN, URINE: NORMAL
WBC # BLD: 8.8 K/UL (ref 3.5–11.3)
WBC # BLD: ABNORMAL 10*3/UL
WBC UA: ABNORMAL /HPF (ref 0–4)
YEAST: ABNORMAL

## 2021-10-30 PROCEDURE — 84439 ASSAY OF FREE THYROXINE: CPT

## 2021-10-30 PROCEDURE — 81001 URINALYSIS AUTO W/SCOPE: CPT

## 2021-10-30 PROCEDURE — 80076 HEPATIC FUNCTION PANEL: CPT

## 2021-10-30 PROCEDURE — 83690 ASSAY OF LIPASE: CPT

## 2021-10-30 PROCEDURE — 85025 COMPLETE CBC W/AUTO DIFF WBC: CPT

## 2021-10-30 PROCEDURE — 6360000004 HC RX CONTRAST MEDICATION: Performed by: EMERGENCY MEDICINE

## 2021-10-30 PROCEDURE — 2709999900 CT ABDOMEN PELVIS W IV CONTRAST

## 2021-10-30 PROCEDURE — 99284 EMERGENCY DEPT VISIT MOD MDM: CPT

## 2021-10-30 PROCEDURE — 83605 ASSAY OF LACTIC ACID: CPT

## 2021-10-30 PROCEDURE — 93005 ELECTROCARDIOGRAM TRACING: CPT | Performed by: EMERGENCY MEDICINE

## 2021-10-30 PROCEDURE — 84484 ASSAY OF TROPONIN QUANT: CPT

## 2021-10-30 PROCEDURE — 2580000003 HC RX 258: Performed by: EMERGENCY MEDICINE

## 2021-10-30 PROCEDURE — 84443 ASSAY THYROID STIM HORMONE: CPT

## 2021-10-30 PROCEDURE — 80048 BASIC METABOLIC PNL TOTAL CA: CPT

## 2021-10-30 RX ORDER — 0.9 % SODIUM CHLORIDE 0.9 %
1000 INTRAVENOUS SOLUTION INTRAVENOUS ONCE
Status: COMPLETED | OUTPATIENT
Start: 2021-10-30 | End: 2021-10-30

## 2021-10-30 RX ORDER — AMOXICILLIN AND CLAVULANATE POTASSIUM 875; 125 MG/1; MG/1
1 TABLET, FILM COATED ORAL 2 TIMES DAILY
Qty: 20 TABLET | Refills: 0 | Status: SHIPPED | OUTPATIENT
Start: 2021-10-30 | End: 2021-11-09

## 2021-10-30 RX ADMIN — SODIUM CHLORIDE 1000 ML: 9 INJECTION, SOLUTION INTRAVENOUS at 09:40

## 2021-10-30 RX ADMIN — IOPAMIDOL 100 ML: 755 INJECTION, SOLUTION INTRAVENOUS at 10:23

## 2021-10-30 ASSESSMENT — PAIN DESCRIPTION - PAIN TYPE: TYPE: ACUTE PAIN

## 2021-10-30 ASSESSMENT — PAIN SCALES - GENERAL: PAINLEVEL_OUTOF10: 5

## 2021-10-30 ASSESSMENT — ENCOUNTER SYMPTOMS
DIARRHEA: 1
ABDOMINAL PAIN: 1

## 2021-10-30 ASSESSMENT — PAIN DESCRIPTION - ORIENTATION: ORIENTATION: RIGHT;MID;LOWER

## 2021-10-30 ASSESSMENT — PAIN DESCRIPTION - LOCATION: LOCATION: ABDOMEN

## 2021-10-30 ASSESSMENT — PAIN DESCRIPTION - DESCRIPTORS: DESCRIPTORS: CONSTANT;ACHING

## 2021-10-30 NOTE — ED PROVIDER NOTES
[address] [facility] ED  [address]  Phone: [phone]        Pt Name: Sameer Rg  MRN: [id number]  [place] 1945  Date of evaluation: 10/30/21      CHIEF COMPLAINT     Chief Complaint   Patient presents with    Abdominal Pain     nausea, diarrhea, RLQ pain worse following eating         HISTORY OF PRESENT ILLNESS  (Location/Symptom, Timing/Onset, Context/Setting, Quality, Duration, Modifying Factors, Severity.)    Sameer Rg is a 76 y.o. female who presents with abdominal pain diarrhea. The patient states for 3 weeks she has had loose stools abdominal pain saw her family doctor who checked lab work as well as stool studies the patient denies any bad food exposure although she does state that food does make her abdominal pain worse then she will have some loose stools and that will make it a little better no blood in her stool some nausea but no vomiting no chest pain no shortness of breath no fever no chills she has already had her Covid vaccinations nothing else changes her symptoms other than food      REVIEW OF SYSTEMS    (2-9 systems for level 4, 10 or more for level 5)     Review of Systems   Gastrointestinal: Positive for abdominal pain and diarrhea. All other systems reviewed and are negative. PAST MEDICAL HISTORY    has a past medical history of History of colon cancer, History of iron deficiency anemia, Hyperlipidemia, Stress disorder, acute, and Thyroid goiter. SURGICAL HISTORY      has a past surgical history that includes Colonoscopy; Colon surgery (04/2008); Thyroidectomy, partial; Upper gastrointestinal endoscopy; joint replacement; hernia repair (2004); Hemorrhoid surgery; Colonoscopy (03/13/2017); Hysterectomy; and laparoscopy (N/A, 7/11/2019).     CURRENTMEDICATIONS       Previous Medications    ALBUTEROL (PROVENTIL) (2.5 MG/3ML) 0.083% NEBULIZER SOLUTION    Inhale 2.5 mg into the lungs    ASPIRIN EC 81 MG EC TABLET    Take 81 mg by mouth daily    CALCIUM CARB-CHOLECALCIFEROL (CALCIUM-VITAMIN D3) 600-400 MG-UNIT CAPS    Take 1 tablet by mouth daily    CYANOCOBALAMIN 1000 MCG TABLET    Take 1,000 mcg by mouth daily Take 2 daily    FLUTICASONE (FLONASE) 50 MCG/ACT NASAL SPRAY    2 sprays by Nasal route daily    LORAZEPAM (ATIVAN) 0.5 MG TABLET    Take 0.5 mg by mouth. MONTELUKAST (SINGULAIR) 10 MG TABLET    TAKE 1 TABLET NIGHTLY    MULTIPLE VITAMINS-MINERALS (THERAPEUTIC MULTIVITAMIN-MINERALS) TABLET    Take 1 tablet by mouth daily    OMEGA-3 FATTY ACIDS (FISH OIL) 1000 MG CAPS    Take 1,000 mg by mouth daily 1-2 TABLETS DAILY    PROAIR  (90 BASE) MCG/ACT INHALER    Inhale 2 puffs into the lungs every 6 hours as needed for Wheezing or Shortness of Breath    RED YEAST RICE 600 MG CAPS    Take 2 capsules by mouth daily    SYMBICORT 160-4.5 MCG/ACT AERO    USE 2 INHALATIONS TWICE A DAY    TRAZODONE (DESYREL) 100 MG TABLET    Take 100 mg by mouth nightly TAKE 1/4 OF A TABLET NIGHTLY PRN FOR SLEEP    VENLAFAXINE (EFFEXOR) 37.5 MG TABLET    Take 37.5 mg by mouth 2 times daily       ALLERGIES     is allergic to erythromycin, fenofibrate, lipitor [atorvastatin], lopid [gemfibrozil], other, and propoxyphene. FAMILY HISTORY     has no family status information on file. family history is not on file. SOCIAL HISTORY      reports that she has never smoked. She has never used smokeless tobacco. She reports that she does not drink alcohol and does not use drugs. PHYSICAL EXAM    (up to 7 for level 4, 8 or more for level 5)   INITIAL VITALS:  height is 5' 1\" (1.549 m) and weight is 220 lb (99.8 kg). Her tympanic temperature is 98.2 °F (36.8 °C). Her blood pressure is 123/85 and her pulse is 115. Her respiration is 14 and oxygen saturation is 95%. Physical Exam  Vitals and nursing note reviewed. Constitutional:       Appearance: Normal appearance. HENT:      Head: Normocephalic and atraumatic.    Eyes:      Conjunctiva/sclera: Conjunctivae normal.   Cardiovascular:      Rate and Rhythm: Normal rate. Rhythm irregular. Pulmonary:      Effort: Pulmonary effort is normal. No respiratory distress. Breath sounds: Normal breath sounds. No stridor. No wheezing, rhonchi or rales. Abdominal:      General: Bowel sounds are normal. There is no distension. Palpations: Abdomen is soft. There is no mass. Tenderness: There is abdominal tenderness. There is no right CVA tenderness, left CVA tenderness, guarding or rebound. Comments: Mild tenderness right lateral abdomen no other abdominal tenderness appreciated no overt peritoneal findings   Musculoskeletal:         General: Normal range of motion. Cervical back: Normal range of motion and neck supple. No rigidity or tenderness. Lymphadenopathy:      Cervical: No cervical adenopathy. Skin:     General: Skin is warm and dry. Findings: No rash. Neurological:      General: No focal deficit present. Mental Status: She is alert. DIFFERENTIAL DIAGNOSIS/ MDM:     I will establish an IV give the patient IV fluids check labs UA CT of the abdomen and pelvis and an EKG the patient states she believes she has been told that she has an irregular heartbeat in the past but does not receive any treatment for this    DIAGNOSTIC RESULTS     EKG: All EKG's are interpreted by the Emergency Department Physician who either signs or Co-signs this chart in the absence of a cardiologist.      Interpreted by Avis Hammond MD     Rhythm: Atrial fibrillation  Rate: 99  Axis: 71  Ectopy: none  Conduction: normal  ST Segments: no acute change  T Waves: no acute change  Q Waves: none    Clinical Impression: Atrial fibrillation with no acute changes/normal EKG. No acute infarction/ischemia noted.       RADIOLOGY:        Interpretation per the Radiologist below, if available at the time of this note:    CT ABDOMEN PELVIS W IV CONTRAST Additional Contrast? None (Preliminary result)  Result time 10/30/21 10:47:07  Preliminary result by Roula Almendarez MD (10/30/21 10:47:07)                Impression:    1.  Severe sigmoid diverticulosis with findings suggestive of low-grade   diverticulitis without abscess formation. 1. Status post right hemicolectomy without abnormality at the anastomosis. However, a loop of small proximal to the anastomosis is closely adherent to   the anterior abdominal wall suggesting some scarring. 2. Liver lesion likely hemangioma with full description above. 3. The patient has small amount of pericardial fluid. 4. Patient has a large right-sided abdominal lipoma with interval increased   from prior study. 6.  No gallstones, urinary obstruction or small bowel obstruction.                    LABS:  Results for orders placed or performed during the hospital encounter of 08/68/88   Basic Metabolic Panel   Result Value Ref Range    Glucose 114 (H) 70 - 99 mg/dL    BUN 14 8 - 23 mg/dL    CREATININE 0.82 0.50 - 0.90 mg/dL    Bun/Cre Ratio 17 9 - 20    Calcium 9.3 8.6 - 10.4 mg/dL    Sodium 142 135 - 144 mmol/L    Potassium 3.9 3.7 - 5.3 mmol/L    Chloride 104 98 - 107 mmol/L    CO2 30 20 - 31 mmol/L    Anion Gap 8 (L) 9 - 17 mmol/L    GFR Non-African American >60 >60 mL/min    GFR African American >60 >60 mL/min    GFR Comment          GFR Staging NOT REPORTED    CBC Auto Differential   Result Value Ref Range    WBC 8.8 3.5 - 11.3 k/uL    RBC 4.70 3.95 - 5.11 m/uL    Hemoglobin 13.9 11.9 - 15.1 g/dL    Hematocrit 44.5 36.3 - 47.1 %    MCV 94.7 82.6 - 102.9 fL    MCH 29.6 25.2 - 33.5 pg    MCHC 31.2 25.2 - 33.5 g/dL    RDW 13.2 11.8 - 14.4 %    Platelets 864 114 - 334 k/uL    MPV 9.4 8.1 - 13.5 fL    NRBC Automated 0.0 0.0 per 100 WBC    Differential Type NOT REPORTED     Seg Neutrophils 70 (H) 36 - 65 %    Lymphocytes 24 24 - 43 %    Monocytes 4 3 - 12 %    Eosinophils % 2 1 - 4 %    Basophils 0 0 - 2 %    Immature Granulocytes 0 0 %    Segs Absolute 6.05 1.50 Mucus, UA 1+ (A) None    Trichomonas, UA NOT REPORTED None    Amorphous, UA NOT REPORTED None    Other Observations UA NOT REPORTED NOT REQ. Yeast, UA NOT REPORTED None   EKG 12 Lead   Result Value Ref Range    Ventricular Rate 99 BPM    Atrial Rate 79 BPM    QRS Duration 84 ms    Q-T Interval 350 ms    QTc Calculation (Bazett) 449 ms    R Axis 71 degrees    T Axis 85 degrees           EMERGENCY DEPARTMENT COURSE:   Vitals:    Vitals:    10/30/21 0915 10/30/21 0945 10/30/21 1000 10/30/21 1130   BP: (!) 161/110 (!) 152/84 94/78 123/85   Pulse: 103 107 103 115   Resp: 13 17 20 14   Temp:       TempSrc:       SpO2: 96%   95%   Weight:       Height:         -------------------------  BP: 123/85, Temp: 98.2 °F (36.8 °C), Pulse: 115, Resp: 14      RE-EVALUATION:  Patient presents with 3 weeks of GI issues CT shows the patient to have a low-grade diverticulitis without any perforation or abscess formation the patient is tolerating by mouth intake is afebrile no vomiting given this she would like to work this up as an outpatient which I feel is reasonable I will write a prescription for Augmentin I'm recommending a clear liquid diet until symptom-free   Re-evaluation of the abdomen is benign. No guarding, peritoneal signs or significant tenderness noted. Also, there is no evidence or peritonitis, sepsis, or toxicity. The patient and/or family and I have discussed the diagnosis and risks, and we agree with discharging home to follow-up with their primary doctor. The patient presents with abdominal pain without signs of peritonitis or other life-threatening or serious etiology. The patient appears stable for discharge and has been instructed to return immediately if the symptoms worsen in any way, or in 8-12 hr if not improved for re-evaluation. We also discussed returning to the Emergency Department immediately if new or worsening symptoms occur.  We have discussed the symptoms which are most concerning (e.g., bloody stool, fever, changing or worsening pain, persistent vomiting, chest pain shortness of breath, numbness or weakness to the arms or legs, coolness or color change to the arms or legs) that necessitate immediate return. The patient understands that at this time there is no evidence for a more malignant underlying process, but the patient also understands that early in the process of an illness or injury, an emergency department workup can be falsely reassuring. Routine discharge counseling was given, and the patient understands that worsening, changing or persistent symptoms should prompt an immediate call or follow up with their primary physician or return to the emergency department. The importance of appropriate follow up was also discussed. I have reviewed the disposition diagnosis with the patient and or their family/guardian. I have answered their questions and given discharge instructions. They voiced understanding of these instructions and did not have any further questions or complaints. PROCEDURES:  None    FINAL IMPRESSION      1. Diverticulitis of colon    2. Atrial fibrillation, unspecified type ([facility] [address]          DISPOSITION/PLAN   DISPOSITION        CONDITION ON DISPOSITION:   Stable    PATIENT REFERRED TO:  Alonzo Cheng  [address] [place] [address]  [phone]    Call in 1 day        DISCHARGE MEDICATIONS:  New Prescriptions    AMOXICILLIN-CLAVULANATE (AUGMENTIN) 875-125 MG PER TABLET    Take 1 tablet by mouth 2 times daily for 10 days       (Please note that portions of this note were completed with a voicerecognition program.  Efforts were made to edit the dictations but occasionally words are mis-transcribed.)    Tavo Bowens MD,, MD, F.A.C.E.P.   Attending Emergency Medicine Physician       Tavo Bowens MD  10/30/21 9218    I did discuss the patient with cardiology and they are recommending that the patient take aspirin daily and follow up with her family doctor they are not recommending that we start any anticoagulants or other medications until the patient's diverticulitis has resolved I relayed this information the patient the patient is agreeable to this plan she has no chest pain no shortness of breath no lightheadedness no dizziness her only complaint was of GI issues which she has been having for the last 3 weeks she was instructed to return to the ER for any chest pain shortness of breath lightheaded dizziness or other concerns     Alem Muse MD  10/30/21 1909

## 2021-10-31 LAB
EKG ATRIAL RATE: 79 BPM
EKG Q-T INTERVAL: 350 MS
EKG QRS DURATION: 84 MS
EKG QTC CALCULATION (BAZETT): 449 MS
EKG R AXIS: 71 DEGREES
EKG T AXIS: 85 DEGREES
EKG VENTRICULAR RATE: 99 BPM

## 2021-11-04 PROBLEM — F32.A DEPRESSION: Status: ACTIVE | Noted: 2021-11-04

## 2021-11-04 PROBLEM — R94.31 PROLONGED Q-T INTERVAL ON ECG: Status: ACTIVE | Noted: 2019-05-01

## 2021-11-04 PROBLEM — F41.9 ANXIETY: Status: ACTIVE | Noted: 2021-11-04

## 2021-11-04 PROBLEM — J45.41 ASTHMATIC BRONCHITIS, MODERATE PERSISTENT, WITH ACUTE EXACERBATION: Status: ACTIVE | Noted: 2018-03-28

## 2021-11-04 PROBLEM — J20.9 COPD (CHRONIC OBSTRUCTIVE PULMONARY DISEASE) WITH ACUTE BRONCHITIS (HCC): Status: ACTIVE | Noted: 2018-05-04

## 2021-11-04 PROBLEM — J44.0 COPD (CHRONIC OBSTRUCTIVE PULMONARY DISEASE) WITH ACUTE BRONCHITIS (HCC): Status: ACTIVE | Noted: 2018-05-04

## 2021-11-04 PROBLEM — D3A.8 NEUROENDOCRINE TUMOR: Status: ACTIVE | Noted: 2019-01-01

## 2021-11-04 PROBLEM — K56.600 PARTIAL SMALL BOWEL OBSTRUCTION (HCC): Status: ACTIVE | Noted: 2017-06-12

## 2021-11-04 RX ORDER — B-COMPLEX WITH VITAMIN C
1 TABLET ORAL 2 TIMES DAILY WITH MEALS
COMMUNITY
End: 2021-11-08

## 2021-11-04 RX ORDER — WHEAT DEXTRIN 3 G/3.8 G
1 POWDER (GRAM) ORAL DAILY
COMMUNITY
End: 2021-11-08

## 2021-11-04 RX ORDER — VENLAFAXINE HYDROCHLORIDE 75 MG/1
75 CAPSULE, EXTENDED RELEASE ORAL DAILY
Status: ON HOLD | COMMUNITY
Start: 2021-07-12 | End: 2022-06-10 | Stop reason: HOSPADM

## 2021-11-04 RX ORDER — AMOXICILLIN 500 MG/1
CAPSULE ORAL
COMMUNITY
Start: 2021-09-10 | End: 2021-11-08

## 2021-11-04 RX ORDER — TOLTERODINE 4 MG/1
4 CAPSULE, EXTENDED RELEASE ORAL DAILY
COMMUNITY
Start: 2021-05-24 | End: 2021-11-08

## 2021-11-08 ENCOUNTER — INITIAL CONSULT (OUTPATIENT)
Dept: SURGERY | Age: 76
End: 2021-11-08
Payer: MEDICARE

## 2021-11-08 VITALS
WEIGHT: 219 LBS | DIASTOLIC BLOOD PRESSURE: 80 MMHG | BODY MASS INDEX: 41.35 KG/M2 | HEIGHT: 61 IN | TEMPERATURE: 97.3 F | HEART RATE: 79 BPM | OXYGEN SATURATION: 97 % | SYSTOLIC BLOOD PRESSURE: 142 MMHG

## 2021-11-08 DIAGNOSIS — R59.0 LYMPHADENOPATHY, AXILLARY: ICD-10-CM

## 2021-11-08 DIAGNOSIS — R10.31 ABDOMINAL PAIN, RLQ: Primary | ICD-10-CM

## 2021-11-08 DIAGNOSIS — D17.1 LIPOMA OF ABDOMINAL WALL: ICD-10-CM

## 2021-11-08 DIAGNOSIS — Z85.038 H/O COLON CANCER, STAGE II: ICD-10-CM

## 2021-11-08 DIAGNOSIS — D3A.8 NEUROENDOCRINE TUMOR: ICD-10-CM

## 2021-11-08 DIAGNOSIS — I31.39 PERICARDIAL EFFUSION: ICD-10-CM

## 2021-11-08 DIAGNOSIS — E66.01 MORBID OBESITY WITH BMI OF 40.0-44.9, ADULT (HCC): ICD-10-CM

## 2021-11-08 PROBLEM — K56.609 SBO (SMALL BOWEL OBSTRUCTION) (HCC): Status: RESOLVED | Noted: 2019-05-07 | Resolved: 2021-11-08

## 2021-11-08 PROBLEM — K56.600 PARTIAL SMALL BOWEL OBSTRUCTION (HCC): Status: RESOLVED | Noted: 2017-06-12 | Resolved: 2021-11-08

## 2021-11-08 PROCEDURE — 99204 OFFICE O/P NEW MOD 45 MIN: CPT | Performed by: SURGERY

## 2021-11-08 ASSESSMENT — ENCOUNTER SYMPTOMS
TROUBLE SWALLOWING: 0
CONSTIPATION: 0
CHOKING: 0
VOICE CHANGE: 0
NAUSEA: 0
COUGH: 0
BACK PAIN: 1
ANAL BLEEDING: 0
ABDOMINAL DISTENTION: 1
DIARRHEA: 1
WHEEZING: 0
VOMITING: 0
EYES NEGATIVE: 1
CHEST TIGHTNESS: 0
BLOOD IN STOOL: 0
SINUS PRESSURE: 0
ABDOMINAL PAIN: 1
SHORTNESS OF BREATH: 0
HEMATOCHEZIA: 0
SORE THROAT: 0

## 2021-11-08 NOTE — PROGRESS NOTES
Abdominal Pain  This is a new problem. The current episode started 1 to 4 weeks ago (Pain started about 2 weeks ago). The problem occurs constantly. The problem has been resolved. The pain is located in the RLQ. The quality of the pain is aching. The abdominal pain does not radiate. Associated symptoms include anorexia, diarrhea, headaches and weight loss ( 10 lbs roughly). Pertinent negatives include no constipation, dysuria, fever, frequency, hematochezia, hematuria, melena, nausea or vomiting. The pain is aggravated by eating. The pain is relieved by bowel movements. She has tried antibiotics (change diet) for the symptoms. The treatment provided moderate relief. Prior diagnostic workup includes CT scan. Her past medical history is significant for abdominal surgery, colon cancer and PUD. 3 -4 soft soft to runny stools a day.     Past Medical History:   Diagnosis Date    History of colon cancer     History of iron deficiency anemia     Hyperlipidemia     Stress disorder, acute     Thyroid goiter      Past Surgical History:   Procedure Laterality Date    COLON SURGERY  04/2008    COLECTOMY    COLONOSCOPY      COLONOSCOPY  03/13/2017    POLYP,DIVERTICULOSIS    HEMORRHOID SURGERY      BANDING    HERNIA REPAIR  2004    VENTRAL HERNIA    HYSTERECTOMY      JOINT REPLACEMENT      BILATERAL KNEE REPLACEMENT    LAPAROSCOPY N/A 7/11/2019    EXPLORATORY LAPAROSCOPY CONVERTED TO OPEN LYSIS OF ADHESIONS PARTIAL COLECTOMY performed by mAy Dexter MD at [address], PARTIAL      goiter removed     UPPER GASTROINTESTINAL ENDOSCOPY       Current Outpatient Medications   Medication Sig Dispense Refill    venlafaxine (EFFEXOR XR) 75 MG extended release capsule Take 75 mg by mouth daily      amoxicillin-clavulanate (AUGMENTIN) 875-125 MG per tablet Take 1 tablet by mouth 2 times daily for 10 days 20 tablet 0    montelukast (SINGULAIR) 10 MG tablet TAKE 1 TABLET NIGHTLY 90 tablet 3    SYMBICORT 160-4.5 MCG/ACT AERO USE 2 INHALATIONS TWICE A DAY 30.6 g 3    PROAIR  (90 Base) MCG/ACT inhaler Inhale 2 puffs into the lungs every 6 hours as needed for Wheezing or Shortness of Breath 3 Inhaler 3    cyanocobalamin 1000 MCG tablet Take 1,000 mcg by mouth daily Take 2 daily      aspirin EC 81 MG EC tablet Take 81 mg by mouth daily      Calcium Carb-Cholecalciferol (CALCIUM-VITAMIN D3) 600-400 MG-UNIT CAPS Take 1 tablet by mouth daily      fluticasone (FLONASE) 50 MCG/ACT nasal spray 2 sprays by Nasal route daily      Multiple Vitamins-Minerals (THERAPEUTIC MULTIVITAMIN-MINERALS) tablet Take 1 tablet by mouth daily      traZODone (DESYREL) 100 MG tablet Take 100 mg by mouth nightly TAKE 1/4 OF A TABLET NIGHTLY PRN FOR SLEEP      vitamin D (CHOLECALCIFEROL) 125 MCG (5000 UT) CAPS capsule Take 5,000 Units by mouth daily      albuterol (PROVENTIL) (2.5 MG/3ML) 0.083% nebulizer solution Inhale 2.5 mg into the lungs       No current facility-administered medications for this visit. Allergies   Allergen Reactions    Erythromycin     Fenofibrate     Lipitor [Atorvastatin]     Lopid [Gemfibrozil]     Other      DARVOCET      Propoxyphene Other (See Comments)     GI ISSUE-NAUSEA     Social History     Tobacco Use    Smoking status: Never Smoker    Smokeless tobacco: Never Used    Tobacco comment: KENNA Lorenzana RRT 7/10/19   Vaping Use    Vaping Use: Never used   Substance Use Topics    Alcohol use: No    Drug use: No     No family history on file. Review of Systems   Constitutional: Positive for appetite change, unexpected weight change and weight loss ( 10 lbs roughly). Negative for activity change, diaphoresis, fatigue and fever. HENT: Positive for postnasal drip. Negative for dental problem, drooling, sinus pressure, sore throat, trouble swallowing and voice change. Eyes: Negative. Respiratory: Negative for cough, choking, chest tightness, shortness of breath and wheezing. Has not used rescue inhaler in over 2 months   Cardiovascular: Positive for palpitations. Negative for chest pain. Gastrointestinal: Positive for abdominal distention, abdominal pain ( also has epigastric burning and water brash), anorexia and diarrhea. Negative for anal bleeding, blood in stool, constipation, hematochezia, melena, nausea and vomiting. Endocrine: Negative for polydipsia, polyphagia and polyuria. Genitourinary: Negative for difficulty urinating, dysuria, flank pain, frequency, hematuria, pelvic pain, urgency, vaginal bleeding and vaginal discharge. Musculoskeletal: Positive for back pain. Neurological: Positive for headaches. Negative for dizziness, seizures, syncope, speech difficulty, weakness, light-headedness and numbness. Hematological: Bruises/bleeds easily. Psychiatric/Behavioral: Negative for agitation, behavioral problems, confusion, decreased concentration and dysphoric mood. The patient is nervous/anxious. BP (!) 158/90   Pulse 79   Temp 97.3 °F (36.3 °C)   Ht 5' 1\" (1.549 m)   Wt 219 lb (99.3 kg)   SpO2 97%   BMI 41.38 kg/m²     Physical Exam  Constitutional:       General: She is not in acute distress. Appearance: She is obese. She is diaphoretic. She is not ill-appearing or toxic-appearing. HENT:      Head: Normocephalic and atraumatic. Mouth/Throat:      Mouth: Mucous membranes are moist.      Pharynx: Oropharynx is clear. Eyes:      General: No scleral icterus. Extraocular Movements: Extraocular movements intact. Conjunctiva/sclera: Conjunctivae normal.      Pupils: Pupils are equal, round, and reactive to light. Cardiovascular:      Rate and Rhythm: Normal rate and regular rhythm. Heart sounds: Normal heart sounds. Pulmonary:      Effort: Pulmonary effort is normal. No respiratory distress. Breath sounds: Normal breath sounds. No stridor. No wheezing or rales. Chest:   Breasts:      Right: Axillary adenopathy present.  No supraclavicular adenopathy. Left: No axillary adenopathy or supraclavicular adenopathy. Abdominal:      General: Abdomen is protuberant. A surgical scar is present. Bowel sounds are increased. Palpations: Abdomen is soft. There is no hepatomegaly or splenomegaly. Tenderness: There is no abdominal tenderness. There is no guarding or rebound. Hernia: No hernia is present. There is no hernia in the umbilical area, ventral area, left inguinal area or right inguinal area. Lymphadenopathy:      Head:      Right side of head: No submental, submandibular, tonsillar, preauricular, posterior auricular or occipital adenopathy. Left side of head: No submandibular, tonsillar, preauricular, posterior auricular or occipital adenopathy. Cervical: No cervical adenopathy. Right cervical: No superficial, deep or posterior cervical adenopathy. Left cervical: No superficial, deep or posterior cervical adenopathy. Upper Body:      Right upper body: Axillary adenopathy present. No supraclavicular or pectoral adenopathy. Left upper body: No supraclavicular, axillary or pectoral adenopathy. Lower Body: No right inguinal adenopathy. No left inguinal adenopathy. Skin:     General: Skin is warm. Coloration: Skin is not jaundiced or pale. Neurological:      General: No focal deficit present. Mental Status: She is alert and oriented to person, place, and time. Psychiatric:         Mood and Affect: Mood normal.         Behavior: Behavior normal.       Recent CT scan reviewed    IMP/PLAN    1. Abdominal pain, RLQ  Assessment & Plan:   Now resolved  Could be diverticulitis, but I would expect left side pain. Could be from her lipoma. 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 Her last colonoscopy was in 2017. None since her anastamotic recurrence.     She should have colonoscopy done for a variety of reason, but she should have the pericardial effusion evaluated first.    I am not convince she has had diverticulitis. 2. Neuroendocrine tumor  3. H/O colon cancer, stage II  Assessment & Plan: With her history she should probably have colonoscopies every 3 years. 4. Morbid obesity with BMI of 40.0-44.9, adult ([facility] [address]  5. Lipoma of abdominal wall  Assessment & Plan:   Present for a long time an getting large. Could be the source of her pain. Once her work up is done, she may want to consider removal.  6. Pericardial effusion  Assessment & Plan:   Mentioned on her CT scan. No work up as of yet. Cardiology referral made.   7. Lymphadenopathy, axillary  Assessment & Plan:   Mammogram in August was okay   Observe for now but she may need a biopsy

## 2021-11-08 NOTE — ASSESSMENT & PLAN NOTE
Present for a long time an getting large. Could be the source of her pain.   Once her work up is done, she may want to consider removal.

## 2021-11-08 NOTE — ASSESSMENT & PLAN NOTE
Now resolved  Could be diverticulitis, but I would expect left side pain. Could be from her lipoma. 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 Her last colonoscopy was in 2017. None since her anastamotic recurrence. She should have colonoscopy done for a variety of reason, but she should have the pericardial effusion evaluated first.    I am not convince she has had diverticulitis.

## 2021-11-23 ENCOUNTER — OFFICE VISIT (OUTPATIENT)
Dept: CARDIOLOGY | Age: 76
End: 2021-11-23
Payer: MEDICARE

## 2021-11-23 VITALS
SYSTOLIC BLOOD PRESSURE: 147 MMHG | HEIGHT: 61 IN | DIASTOLIC BLOOD PRESSURE: 81 MMHG | HEART RATE: 117 BPM | BODY MASS INDEX: 41.35 KG/M2 | WEIGHT: 219 LBS

## 2021-11-23 DIAGNOSIS — R00.2 PALPITATIONS: Primary | ICD-10-CM

## 2021-11-23 DIAGNOSIS — R94.31 ABNORMAL ECG: ICD-10-CM

## 2021-11-23 DIAGNOSIS — J44.9 COPD, MILD (HCC): ICD-10-CM

## 2021-11-23 DIAGNOSIS — I48.0 PAROXYSMAL A-FIB (HCC): ICD-10-CM

## 2021-11-23 DIAGNOSIS — I48.91 ATRIAL FIBRILLATION WITH RAPID VENTRICULAR RESPONSE (HCC): ICD-10-CM

## 2021-11-23 PROCEDURE — 99204 OFFICE O/P NEW MOD 45 MIN: CPT | Performed by: INTERNAL MEDICINE

## 2021-11-23 PROCEDURE — 99214 OFFICE O/P EST MOD 30 MIN: CPT | Performed by: INTERNAL MEDICINE

## 2021-11-23 PROCEDURE — 93005 ELECTROCARDIOGRAM TRACING: CPT | Performed by: INTERNAL MEDICINE

## 2021-11-23 PROCEDURE — 93010 ELECTROCARDIOGRAM REPORT: CPT | Performed by: INTERNAL MEDICINE

## 2021-11-23 RX ORDER — AMIODARONE HYDROCHLORIDE 200 MG/1
200 TABLET ORAL 2 TIMES DAILY
Qty: 180 TABLET | Refills: 3 | Status: SHIPPED | OUTPATIENT
Start: 2021-11-23 | End: 2022-01-27 | Stop reason: SDUPTHER

## 2021-11-23 NOTE — PROGRESS NOTES
on file    [address] in the Last Year: Not on file   Transportation Needs:     Lack of Transportation (Medical): Not on file    Lack of Transportation (Non-Medical):  Not on file   Physical Activity:     Days of Exercise per Week: Not on file    Minutes of Exercise per Session: Not on file   Stress:     Feeling of Stress : Not on file   Social Connections:     Frequency of Communication with Friends and Family: Not on file    Frequency of Social Gatherings with Friends and Family: Not on file    Attends Anabaptism Services: Not on file    Active Member of [address] [place] [address] SPO or Organizations: Not on file    Attends Club or Organization Meetings: Not on file    Marital Status: Not on file   Intimate Partner Violence:     Fear of Current or Ex-Partner: Not on file    Emotionally Abused: Not on file    Physically Abused: Not on file    Sexually Abused: Not on file   Housing Stability:     Unable to Pay for Housing in the Last Year: Not on file    Number of [place] in the Last Year: Not on file    Unstable Housing in the Last Year: Not on file        Allergies   Allergen Reactions    Erythromycin     Fenofibrate     Lipitor [Atorvastatin]     Lopid [Gemfibrozil]     Other      DARVOCET      Propoxyphene Other (See Comments)     GI ISSUE-NAUSEA       Current Outpatient Medications   Medication Sig Dispense Refill    vitamin D (CHOLECALCIFEROL) 125 MCG (5000 UT) CAPS capsule Take 5,000 Units by mouth daily      venlafaxine (EFFEXOR XR) 75 MG extended release capsule Take 75 mg by mouth daily      montelukast (SINGULAIR) 10 MG tablet TAKE 1 TABLET NIGHTLY 90 tablet 3    SYMBICORT 160-4.5 MCG/ACT AERO USE 2 INHALATIONS TWICE A DAY 30.6 g 3    PROAIR  (90 Base) MCG/ACT inhaler Inhale 2 puffs into the lungs every 6 hours as needed for Wheezing or Shortness of Breath 3 Inhaler 3    cyanocobalamin 1000 MCG tablet Take 1,000 mcg by mouth daily Take 2 daily      albuterol (PROVENTIL) (2.5 MG/3ML) 0.083% nebulizer solution Inhale 2.5 mg into the lungs      aspirin EC 81 MG EC tablet Take 81 mg by mouth daily      Calcium Carb-Cholecalciferol (CALCIUM-VITAMIN D3) 600-400 MG-UNIT CAPS Take 1 tablet by mouth daily      fluticasone (FLONASE) 50 MCG/ACT nasal spray 2 sprays by Nasal route daily      Multiple Vitamins-Minerals (THERAPEUTIC MULTIVITAMIN-MINERALS) tablet Take 1 tablet by mouth daily      traZODone (DESYREL) 100 MG tablet Take 100 mg by mouth nightly TAKE 1/4 OF A TABLET NIGHTLY PRN FOR SLEEP       No current facility-administered medications for this visit. Patient Active Problem List   Diagnosis    Non-intractable vomiting with nausea    Anxiety    APC (atrial premature contractions)    Asthmatic bronchitis, moderate persistent, with acute exacerbation    COPD (chronic obstructive pulmonary disease) with acute bronchitis ([address]    Depression    H/O colon cancer, stage II    High risk medication use    Hx of abnormal mammogram    Hyperlipidemia    Morbid obesity with BMI of 40.0-44.9, adult ([address]    Neuroendocrine tumor    OA (osteoarthritis)    Prolonged Q-T interval on ECG    Status post subtotal thyroidectomy    Thyroid goiter    Abdominal pain, RLQ    Lipoma of abdominal wall    Pericardial effusion    Lymphadenopathy, axillary           REVIEW OF SYSTEMS:    · Constitutional: there has been no unanticipated weight loss. There's been No change in energy level, No change in activity level. · Eyes: No visual changes or diplopia. No scleral icterus. · ENT: No Headaches, hearing loss or vertigo. No mouth sores or sore throat. · Cardiovascular: per hpi  · Respiratory: per hpi  · Gastrointestinal: No abdominal pain, appetite loss, blood in stools. No change in bowel or bladder habits. · Genitourinary: No dysuria, trouble voiding, or hematuria. · Musculoskeletal:  No gait disturbance, No weakness or joint complaints.   · Integumentary: No rash or pruritis. · Neurological: No headache, diplopia, change in muscle strength, numbness or tingling. No change in gait, balance, coordination, mood, affect, memory, mentation, behavior. · Psychiatric: No new anxiety or depression. · Endocrine: No temperature intolerance. No excessive thirst, fluid intake, or urination. No tremor. · Hematologic/Lymphatic: No abnormal bruising or bleeding, blood clots or swollen lymph nodes. · Allergic/Immunologic: No nasal congestion or hives. Physical Exam:   Vitals: BP (!) 147/81 (Cuff Size: Large Adult)   Pulse 117   Ht 5' 1\" (1.549 m)   Wt 219 lb (99.3 kg)   BMI 41.38 kg/m²   General appearance: alert and cooperative with exam  HEENT: Head: Normocephalic, no lesions, without obvious abnormality. Eyes: PERRL, EOMI  Ears: Not obvious deformations or lack of hearing  Neck: no carotid bruit, no JVD  Lungs: clear to auscultation bilaterally  Heart: Irregular rate and rhythm, Tachy, S1, S2 normal, no murmur, click, rub or gallop  Abdomen: soft, non-tender; bowel sounds normal; no masses,  no organomegaly  Extremities: extremities normal, atraumatic, no cyanosis or edema  Neurologic: Mental status: Alert, oriented, thought content appropriate  Skin: WNL for age and condition, no obvious rashes or leasions      EKG: Afib c RVR with no ischemic changes. Reviewed today's ECG along serial ECGs    LAST ECHO: 7/11/2019  Normal left ventricular diameter. Mild left ventricular hypertrophy. Left ventricular systolic function is normal. Estimated EF 55%. Small pericardial effusion. LAST STRESS:    LAST CATH:      Past Medical and Surgical History, Problem List, Allergies, Medications, Labs, Imaging, all reviewed extensively in EMR and with the patient. Assessment and Plan:    1. Afib c RVR- start amio and Elquis. Recheck in 1-2 month. Check echo. 2. Pre-op for colonoscopy- can wait till afib resolves and echo completed  3. HTN- Chronic.  [facility] well controlled at this time. Cont to optimize meds. 4. Obesity - Chronic. Encouraged diet, exercise, and discussed weight loss extensively. 5. Hyperlipidemia- Chronic. As above. LDL goal < 70. Optimize therapy. Thank you for allowing me to participate in the care of this patient, please do not hesitate to call if you have any questions. Isrrael Mccullough, [id number] [facility]  [facility]  [phone]

## 2021-11-23 NOTE — PATIENT INSTRUCTIONS
Patient Education        Atrial Fibrillation: Care Instructions  Your Care Instructions     Atrial fibrillation is an irregular and often fast heartbeat. Treating this condition is important for several reasons. It can cause blood clots, which can travel from your heart to your brain and cause a stroke. If you have a fast heartbeat, you may feel lightheaded, dizzy, and weak. An irregular heartbeat can also increase your risk for heart failure. Atrial fibrillation is often the result of another heart condition, such as high blood pressure or coronary artery disease. Making changes to improve your heart condition will help you stay healthy and active. Follow-up care is a key part of your treatment and safety. Be sure to make and go to all appointments, and call your doctor if you are having problems. It's also a good idea to know your test results and keep a list of the medicines you take. How can you care for yourself at home? Medicines    · Take your medicines exactly as prescribed. Call your doctor if you think you are having a problem with your medicine. You will get more details on the specific medicines your doctor prescribes.     · If your doctor has given you a blood thinner to prevent a stroke, be sure you get instructions about how to take your medicine safely. Blood thinners can cause serious bleeding problems.     · Do not take any vitamins, over-the-counter drugs, or herbal products without talking to your doctor first.   Lifestyle changes    · Do not smoke. Smoking can increase your chance of a stroke and heart attack. If you need help quitting, talk to your doctor about stop-smoking programs and medicines. These can increase your chances of quitting for good.     · Eat a heart-healthy diet.     · Stay at a healthy weight. Lose weight if you need to.     · Limit alcohol to 2 drinks a day for men and 1 drink a day for women. Too much alcohol can cause health problems.     · Avoid colds and flu.  Get · You have symptoms of a stroke. These may include:  ? Sudden numbness, tingling, weakness, or loss of movement in your face, arm, or leg, especially on only one side of your body. ? Sudden vision changes. ? Sudden trouble speaking. ? Sudden confusion or trouble understanding simple statements. ? Sudden problems with walking or balance. ? A sudden, severe headache that is different from past headaches.     · You passed out (lost consciousness). Call your doctor now or seek immediate medical care if:    · You have new or increased shortness of breath.     · You feel dizzy or lightheaded, or you feel like you may faint.     · Your heart rate becomes irregular.     · You can feel your heart flutter in your chest or skip heartbeats. Tell your doctor if these symptoms are new or worse. Watch closely for changes in your health, and be sure to contact your doctor if you have any problems. Where can you learn more? Go to [email or web address] org and sign in to your [employer] account. Enter U020 in the [employer]com box to learn more about \"Atrial Fibrillation: Care Instructions. \"     If you do not have an account, please click on the \"Sign Up Now\" link. Current as of: April 29, 2021               Content Version: 13.0  © 2006-2021 Healthwise, Incorporated. Care instructions adapted under license by [facility] ([facility]). If you have questions about a medical condition or this instruction, always ask your healthcare professional. [place] [address] any warranty or liability for your use of this information. Patient Education        apixaban  Pronunciation:  a PIX a ban  Brand:  Eliquis  What is the most important information I should know about apixaban? Apixaban increases your risk of severe or fatal bleeding, especially if you take certain medicines at the same time (including some over-the-counter medicines).  It is very important to tell your doctor about all medicines you have recently used. Call your doctor at once if you have signs of bleeding such as: swelling, pain, feeling very weak or dizzy, bleeding gums, nosebleeds, heavy menstrual periods or abnormal vaginal bleeding, blood in your urine, bloody or tarry stools, coughing up blood or vomit that looks like coffee grounds, or any bleeding that will not stop. Apixaban can cause a very serious blood clot around your spinal cord if you undergo a spinal tap or receive spinal anesthesia (epidural), especially if you have a genetic spinal defect, if you have a spinal catheter in place, if you have a history of spinal surgery or repeated spinal taps, or if you are also using other drugs that can affect blood clotting. This type of blood clot can lead to long-term or permanent paralysis. Get emergency medical help if you have symptoms of a spinal cord blood clot such as back pain, numbness or muscle weakness in your lower body, or loss of bladder or bowel control. Do not stop taking apixaban unless your doctor tells you to. Stopping suddenly can increase your risk of blood clot or stroke. What is apixaban? Apixaban is used to lower the risk of stroke caused by a blood clot in people with a heart rhythm disorder called atrial fibrillation. Apixaban is also used after hip or knee replacement surgery to prevent a type of blood clot called deep vein thrombosis (DVT), which can lead to blood clots in the lungs (pulmonary embolism). Apixaban is also used to treat DVT or pulmonary embolism (PE), and to lower your risk of having a repeat DVT or PE. Apixaban may also be used for purposes not listed in this medication guide. What should I discuss with my healthcare provider before taking apixaban? You should not take apixaban if you are allergic to it, or if you have active bleeding from a surgery, injury, or other cause.   Apixaban may cause you to bleed more easily, especially if you have a bleeding disorder that is inherited or caused by disease. Tell your doctor if you have an artificial heart valve, or if you have ever had:  · liver or kidney disease;  · if you are older than 80; or  · if you weigh less than 132 pounds (60 kilograms). Apixaban can cause a very serious blood clot around your spinal cord if you undergo a spinal tap or receive spinal anesthesia (epidural). This type of blood clot could cause long-term paralysis, and may be more likely to occur if:    · you have a spinal catheter in place or if a catheter has been recently removed;  · you have a history of spinal surgery or repeated spinal taps;  · you have recently had a spinal tap or epidural anesthesia;  · you are taking an NSAID (nonsteroidal anti-inflammatory drug)--aspirin, ibuprofen (Advil, Motrin), naproxen (Aleve), diclofenac, indomethacin, meloxicam, and others; or  · you are using other medicines to treat or prevent blood clots. Taking apixaban may increase the risk of bleeding while you are pregnant or during your delivery. Tell your doctor if you are pregnant or plan to become pregnant. You should not breast-feed while using this medicine. How should I take apixaban? Follow all directions on your prescription label and read all medication guides or instruction sheets. Your doctor may occasionally change your dose. Use the medicine exactly as directed. You may take apixaban with or without food. If you cannot swallow a tablet whole, crush and mix it with water, apple juice, or a spoonful of applesauce. Swallow the mixture right away without chewing. Do not save it for later use. A crushed tablet mixture may also be given through a nasogastric (NG) feeding tube. Read and carefully follow any Instructions for Use provided with your medicine. Ask your doctor or pharmacist if you do not understand these instructions. Apixaban can make it easier for you to bleed, even from a minor injury.   Seek medical attention if you have bleeding that will not stop. If you need surgery or dental work, tell the doctor or dentist ahead of time if you have taken apixaban within the past 24 hours. You may need to stop taking apixaban for a short time. Do not stop taking apixaban unless your doctor tells you to. Stopping suddenly can increase your risk of blood clot or stroke. If you stop taking apixaban for any reason, your doctor may prescribe another medication to prevent blood clots until you start taking apixaban again. Store at room temperature away from moisture and heat. What happens if I miss a dose? Take the missed dose on the same day you remember it. Take your next dose at the regular time and stay on your twice-daily schedule. Do not take two doses at one time. Get your prescription refilled before you run out of medicine completely. What happens if I overdose? Seek emergency medical attention or call the Poison Help line at 1-991.459.1155. What should I avoid while taking apixaban? Avoid activities that may increase your risk of bleeding or injury. Use extra care to prevent bleeding while shaving or brushing your teeth. What are the possible side effects of apixaban? Get emergency medical help if you have signs of an allergic reaction: hives; chest pain, wheezing, difficult breathing; feeling light-headed; swelling of your face, lips, tongue, or throat. Also seek emergency medical attention if you have symptoms of a spinal blood clot: back pain, numbness or muscle weakness in your lower body, or loss of bladder or bowel control. Call your doctor at once if you have:  · easy bruising, unusual bleeding (nose, mouth, vagina, or rectum), bleeding from wounds or needle injections, any bleeding that will not stop;  · heavy menstrual periods;  · headache, dizziness, weakness, feeling like you might pass out;  · urine that looks red, pink, or brown; or  · black or bloody stools, coughing up blood or vomit that looks like coffee grounds.   This is not a complete list of side effects and others may occur. Call your doctor for medical advice about side effects. You may report side effects to FDA at 5-808-NHA-0218. What other drugs will affect apixaban? Sometimes it is not safe to use certain medications at the same time. Some drugs can affect your blood levels of other drugs you take, which may increase side effects or make the medications less effective. Many other drugs (including some over-the-counter medicines) can increase your risk of bleeding or blood clots, or your risk of developing blood clots around the brain or spinal cord during a spinal tap or epidural. It is very important to tell your doctor about all medicines you have recently used, especially:  · any other medicines to treat or prevent blood clots;  · a blood thinner such as heparin or warfarin (Coumadin, Jantoven);  · an antidepressant; or  · an NSAID (nonsteroidal anti-inflammatory drug) used long term. This list is not complete and many other drugs may affect apixaban. This includes prescription and over-the-counter medicines, vitamins, and herbal products. Not all possible drug interactions are listed here. Where can I get more information? Your pharmacist can provide more information about apixaban. Remember, keep this and all other medicines out of the reach of children, never share your medicines with others, and use this medication only for the indication prescribed. Every effort has been made to ensure that the information provided by [address] is accurate, up-to-date, and complete, but no guarantee is made to that effect. Drug information contained herein may be time sensitive. [facility] information has been compiled for use by healthcare practitioners and consumers in the [place] and therefore [facility] does not warrant that uses outside of the [place] are appropriate, unless specifically indicated otherwise.  [facility]'s drug information does not endorse drugs, diagnose patients or recommend therapy. [facility]'s drug information is an informational resource designed to assist licensed healthcare practitioners in caring for their patients and/or to serve consumers viewing this service as a supplement to, and not a substitute for, the expertise, skill, knowledge and judgment of healthcare practitioners. The absence of a warning for a given drug or drug combination in no way should be construed to indicate that the drug or drug combination is safe, effective or appropriate for any given patient. [facility] does not assume any responsibility for any aspect of healthcare administered with the aid of information [facility] provides. The information contained herein is not intended to cover all possible uses, directions, precautions, warnings, drug interactions, allergic reactions, or adverse effects. If you have questions about the drugs you are taking, check with your doctor, nurse or pharmacist.  Copyright 1550-7583 [address]: 4.01. Revision date: 6/21/2019. Care instructions adapted under license by [facility] ([facility]). If you have questions about a medical condition or this instruction, always ask your healthcare professional. [place] [address] any warranty or liability for your use of this information.

## 2021-11-23 NOTE — PROGRESS NOTES
Patient educated on afib stroke and given printed education. Asked pt to go to ER for any symptoms and she agreed.

## 2021-12-01 ENCOUNTER — HOSPITAL ENCOUNTER (OUTPATIENT)
Dept: NON INVASIVE DIAGNOSTICS | Age: 76
Discharge: HOME | End: 2021-12-01
Payer: MEDICARE

## 2021-12-01 ENCOUNTER — TELEPHONE (OUTPATIENT)
Dept: CARDIOLOGY | Age: 76
End: 2021-12-01

## 2021-12-01 DIAGNOSIS — R00.2 PALPITATIONS: ICD-10-CM

## 2021-12-01 DIAGNOSIS — R94.31 ABNORMAL ECG: ICD-10-CM

## 2021-12-01 DIAGNOSIS — I48.91 ATRIAL FIBRILLATION WITH RAPID VENTRICULAR RESPONSE (HCC): ICD-10-CM

## 2021-12-01 DIAGNOSIS — I48.0 PAROXYSMAL A-FIB (HCC): ICD-10-CM

## 2021-12-01 LAB
LV EF: 60 %
LVEF MODALITY: NORMAL

## 2021-12-01 PROCEDURE — 93306 TTE W/DOPPLER COMPLETE: CPT

## 2021-12-20 ENCOUNTER — OFFICE VISIT (OUTPATIENT)
Dept: SURGERY | Age: 76
End: 2021-12-20
Payer: MEDICARE

## 2021-12-20 VITALS
BODY MASS INDEX: 41.54 KG/M2 | HEIGHT: 61 IN | TEMPERATURE: 97.6 F | WEIGHT: 220 LBS | SYSTOLIC BLOOD PRESSURE: 122 MMHG | DIASTOLIC BLOOD PRESSURE: 78 MMHG | HEART RATE: 76 BPM

## 2021-12-20 DIAGNOSIS — R10.31 ABDOMINAL PAIN, RLQ: Primary | ICD-10-CM

## 2021-12-20 DIAGNOSIS — D3A.8 NEUROENDOCRINE TUMOR: ICD-10-CM

## 2021-12-20 PROCEDURE — 99212 OFFICE O/P EST SF 10 MIN: CPT | Performed by: SURGERY

## 2021-12-20 NOTE — PROGRESS NOTES
Right sided abdominal pain, mostly after eating. Achy pain up to a 6. Getting is \"about everyday\". Pain is present most of the time. No nausea, vomiting. Diarrhea is better, 4 - 5 formed soft stools a day. Over all she feels a little better. Cardiology wants to see her back 1/4/22, prior to proceeding with elective colonoscopy. The will be reviewing her echo cardiogram.    I will plan on seeing her back a couple of after that, to potential set up a colonoscopy. Mainly needs it for a follow up on her cancer. I don't think it will help much as far as her abdominal pain.

## 2022-01-04 ENCOUNTER — OFFICE VISIT (OUTPATIENT)
Dept: CARDIOLOGY | Age: 77
End: 2022-01-04
Payer: MEDICARE

## 2022-01-04 VITALS
SYSTOLIC BLOOD PRESSURE: 134 MMHG | BODY MASS INDEX: 43.43 KG/M2 | DIASTOLIC BLOOD PRESSURE: 73 MMHG | HEART RATE: 78 BPM | HEIGHT: 61 IN | WEIGHT: 230 LBS

## 2022-01-04 DIAGNOSIS — I48.0 PAROXYSMAL A-FIB (HCC): Primary | ICD-10-CM

## 2022-01-04 DIAGNOSIS — E78.2 MIXED HYPERLIPIDEMIA: ICD-10-CM

## 2022-01-04 DIAGNOSIS — E66.09 CLASS 1 OBESITY DUE TO EXCESS CALORIES WITH SERIOUS COMORBIDITY IN ADULT, UNSPECIFIED BMI: ICD-10-CM

## 2022-01-04 DIAGNOSIS — I10 HYPERTENSION, ESSENTIAL: ICD-10-CM

## 2022-01-04 PROCEDURE — 99215 OFFICE O/P EST HI 40 MIN: CPT | Performed by: INTERNAL MEDICINE

## 2022-01-04 PROCEDURE — 93010 ELECTROCARDIOGRAM REPORT: CPT | Performed by: INTERNAL MEDICINE

## 2022-01-04 PROCEDURE — 93005 ELECTROCARDIOGRAM TRACING: CPT | Performed by: INTERNAL MEDICINE

## 2022-01-04 PROCEDURE — 99213 OFFICE O/P EST LOW 20 MIN: CPT | Performed by: INTERNAL MEDICINE

## 2022-01-04 RX ORDER — FUROSEMIDE 20 MG/1
20 TABLET ORAL DAILY
Qty: 90 TABLET | Refills: 1 | Status: SHIPPED | OUTPATIENT
Start: 2022-01-04 | End: 2022-01-27 | Stop reason: SDUPTHER

## 2022-01-04 RX ORDER — MULTIVIT WITH MINERALS/LUTEIN
250 TABLET ORAL DAILY
Status: ON HOLD | COMMUNITY
End: 2022-06-09

## 2022-01-04 NOTE — PROGRESS NOTES
Cardiology Consultation  [facility]      01/04/22      CC:  Afib, pre-op colonoscopy    HPI:  Ana Umaña  is doing well from a cardiac standpoint. Good functional capacity with no significant change in functional capacity. No chest pain, no dyspnea, no PND, no syncope or pre-syncope, no orthopnea. No symptoms of CHF or angina/chest pain.     Past Medical History:   Diagnosis Date    History of colon cancer     History of iron deficiency anemia     Hyperlipidemia     Stress disorder, acute     Thyroid goiter        Past Surgical History:   Procedure Laterality Date    COLON SURGERY  04/2008    COLECTOMY    COLONOSCOPY      COLONOSCOPY  03/13/2017    POLYP,DIVERTICULOSIS    HEMORRHOID SURGERY      BANDING    HERNIA REPAIR  2004    VENTRAL HERNIA    HYSTERECTOMY      JOINT REPLACEMENT      BILATERAL KNEE REPLACEMENT    LAPAROSCOPY N/A 7/11/2019    EXPLORATORY LAPAROSCOPY CONVERTED TO OPEN LYSIS OF ADHESIONS PARTIAL COLECTOMY performed by Omar Scott MD at [address], PARTIAL      goiter removed     UPPER GASTROINTESTINAL ENDOSCOPY         Family History   Problem Relation Age of Onset    Colon Cancer Neg Hx     Colon Polyps Neg Hx        Social History     Socioeconomic History    Marital status:      Spouse name: None    Number of children: None    Years of education: None    Highest education level: None   Occupational History    None   Tobacco Use    Smoking status: Never Smoker    Smokeless tobacco: Never Used    Tobacco comment: Harman Sutton RRT 7/10/19   Vaping Use    Vaping Use: Never used   Substance and Sexual Activity    Alcohol use: No    Drug use: No    Sexual activity: None   Other Topics Concern    None   Social History Narrative    None     Social Determinants of Health     Financial Resource Strain:     Difficulty of Paying Living Expenses: Not on file   Food Insecurity:     Worried About Running Out of Food in the Last Year: Not 160-4.5 MCG/ACT AERO USE 2 INHALATIONS TWICE A DAY 30.6 g 3    PROAIR  (90 Base) MCG/ACT inhaler Inhale 2 puffs into the lungs every 6 hours as needed for Wheezing or Shortness of Breath 3 Inhaler 3    aspirin EC 81 MG EC tablet Take 81 mg by mouth daily      fluticasone (FLONASE) 50 MCG/ACT nasal spray 2 sprays by Nasal route daily      traZODone (DESYREL) 100 MG tablet Take 100 mg by mouth nightly TAKE 1/4 OF A TABLET NIGHTLY PRN FOR SLEEP      amiodarone (CORDARONE) 200 MG tablet Take 1 tablet by mouth 2 times daily (Patient not taking: Reported on 1/4/2022) 180 tablet 3    albuterol (PROVENTIL) (2.5 MG/3ML) 0.083% nebulizer solution Inhale 2.5 mg into the lungs       No current facility-administered medications for this visit. Patient Active Problem List   Diagnosis    Non-intractable vomiting with nausea    Anxiety    APC (atrial premature contractions)    Asthmatic bronchitis, moderate persistent, with acute exacerbation    COPD (chronic obstructive pulmonary disease) with acute bronchitis ([address]    Depression    H/O colon cancer, stage II    High risk medication use    Hx of abnormal mammogram    Hyperlipidemia    Morbid obesity with BMI of 40.0-44.9, adult ([address]    Neuroendocrine tumor    OA (osteoarthritis)    Prolonged Q-T interval on ECG    Status post subtotal thyroidectomy    Thyroid goiter    Abdominal pain, RLQ    Lipoma of abdominal wall    Pericardial effusion    Lymphadenopathy, axillary           REVIEW OF SYSTEMS:    · Constitutional: there has been no unanticipated weight loss. There's been No change in energy level, No change in activity level. · Eyes: No visual changes or diplopia. No scleral icterus. · ENT: No Headaches, hearing loss or vertigo. No mouth sores or sore throat. · Cardiovascular: per hpi  · Respiratory: per hpi  · Gastrointestinal: No abdominal pain, appetite loss, blood in stools.  No change in bowel or bladder habits. · Genitourinary: No dysuria, trouble voiding, or hematuria. · Musculoskeletal:  No gait disturbance, No weakness or joint complaints. · Integumentary: No rash or pruritis. · Neurological: No headache, diplopia, change in muscle strength, numbness or tingling. No change in gait, balance, coordination, mood, affect, memory, mentation, behavior. · Psychiatric: No new anxiety or depression. · Endocrine: No temperature intolerance. No excessive thirst, fluid intake, or urination. No tremor. · Hematologic/Lymphatic: No abnormal bruising or bleeding, blood clots or swollen lymph nodes. · Allergic/Immunologic: No nasal congestion or hives. Physical Exam:   Vitals: /73 (Site: Left Lower Arm, Position: Sitting, Cuff Size: Medium Adult)   Pulse 88   Ht 5' 1\" (1.549 m)   Wt 230 lb (104.3 kg)   BMI 43.46 kg/m²   General appearance: alert and cooperative with exam  HEENT: Head: Normocephalic, no lesions, without obvious abnormality. Eyes: PERRL, EOMI  Ears: Not obvious deformations or lack of hearing  Neck: no carotid bruit, no JVD  Lungs: clear to auscultation bilaterally  Heart: Irregular rate and rhythm, Tachy, S1, S2 normal, no murmur, click, rub or gallop  Abdomen: soft, non-tender; bowel sounds normal; no masses,  no organomegaly  Extremities: extremities normal, atraumatic, no cyanosis or edema  Neurologic: Mental status: Alert, oriented, thought content appropriate  Skin: WNL for age and condition, no obvious rashes or leasions      EKG: Afib c RVR with no ischemic changes. Reviewed today's ECG along serial ECGs    LAST ECHO: 7/11/2019  Normal left ventricular diameter. Mild left ventricular hypertrophy. Left ventricular systolic function is normal. Estimated EF 55%. Small pericardial effusion. Echo 12/1/21  Normal left ventricular diameter. Borderline left ventricular hypertrophy. Left ventricular systolic function is normal.  Left ventricular ejection fraction 60 %.   Unable to evaluate diastolic function. Left atrium is severely dilated. Right atrial dilatation. Right ventricular dilatation with reduced systolic function. Normal tricuspid valve leaflets. Trivial tricuspid regurgitation. Estimated right ventricular systolic pressure is 45 mmHg. Mild pulmonary hypertension. Normal function of other valves. Small pericardial effusion. LAST STRESS:    LAST CATH:      Past Medical and Surgical History, Problem List, Allergies, Medications, Labs, Imaging, all reviewed extensively in EMR and with the patient. Assessment and Plan:    1. Afib c RVR- in Afib, rate controlled, Feels better. Now on amio and Elquis. 2. Echo reviewed with her and compared to prior  3. New leg edema, add some lasix for 3-4 days. 4. Pre-op for colonoscopy- can wait till afib resolves and echo completed  5. HTN- Chronic. Marda Memos well controlled at this time. Cont to optimize meds. 6. Obesity - Chronic. Encouraged diet, exercise, and discussed weight loss extensively. 7. Hyperlipidemia- Chronic. As above. LDL goal < 70. Optimize therapy. Thank you for allowing me to participate in the care of this patient, please do not hesitate to call if you have any questions. The[place]is Query, [id number] [facility]  [email or web address] [place]  [phone]

## 2022-01-07 DIAGNOSIS — J45.50 ASTHMA, SEVERE PERSISTENT, POORLY-CONTROLLED, UNCOMPLICATED: ICD-10-CM

## 2022-01-07 RX ORDER — BUDESONIDE AND FORMOTEROL FUMARATE DIHYDRATE 160; 4.5 UG/1; UG/1
AEROSOL RESPIRATORY (INHALATION)
Qty: 30.6 G | Refills: 3 | Status: SHIPPED | OUTPATIENT
Start: 2022-01-07

## 2022-01-07 RX ORDER — MONTELUKAST SODIUM 10 MG/1
TABLET ORAL
Qty: 90 TABLET | Refills: 3 | Status: SHIPPED | OUTPATIENT
Start: 2022-01-07

## 2022-01-24 ENCOUNTER — TELEPHONE (OUTPATIENT)
Dept: SURGERY | Age: 77
End: 2022-01-24

## 2022-01-24 ENCOUNTER — OFFICE VISIT (OUTPATIENT)
Dept: SURGERY | Age: 77
End: 2022-01-24
Payer: MEDICARE

## 2022-01-24 VITALS
DIASTOLIC BLOOD PRESSURE: 88 MMHG | TEMPERATURE: 97.7 F | SYSTOLIC BLOOD PRESSURE: 122 MMHG | OXYGEN SATURATION: 95 % | HEART RATE: 75 BPM | BODY MASS INDEX: 43.23 KG/M2 | HEIGHT: 61 IN | WEIGHT: 229 LBS

## 2022-01-24 DIAGNOSIS — Z85.038 H/O COLON CANCER, STAGE II: ICD-10-CM

## 2022-01-24 DIAGNOSIS — D17.1 LIPOMA OF ABDOMINAL WALL: Primary | ICD-10-CM

## 2022-01-24 PROCEDURE — 99213 OFFICE O/P EST LOW 20 MIN: CPT | Performed by: SURGERY

## 2022-01-24 RX ORDER — BISACODYL 5 MG
5 TABLET, DELAYED RELEASE (ENTERIC COATED) ORAL ONCE
Qty: 2 TABLET | Refills: 0 | Status: SHIPPED | OUTPATIENT
Start: 2022-01-24 | End: 2022-01-24

## 2022-01-24 RX ORDER — POLYETHYLENE GLYCOL 3350, SODIUM SULFATE ANHYDROUS, SODIUM BICARBONATE, SODIUM CHLORIDE, POTASSIUM CHLORIDE 236; 22.74; 6.74; 5.86; 2.97 G/4L; G/4L; G/4L; G/4L; G/4L
4 POWDER, FOR SOLUTION ORAL ONCE
Qty: 4000 ML | Refills: 0 | Status: SHIPPED | OUTPATIENT
Start: 2022-01-24 | End: 2022-01-24

## 2022-01-24 NOTE — PROGRESS NOTES
Name:  Bg Howard  Age:  68 y.o.   :  1945    Physician: Aileen Rutherford MD       Chief Complaint: History of Colon Cancer. HPI:  It has been almost  5 years since her last colonoscopy. She was having 4 - 5 loose stools a day for a few months in the [place], but that has now resolved. She is still having some RLQ pain. Denies any rectal bleeding. MEDICAL HISTORY:    Past Medical History:        Diagnosis Date    History of colon cancer     History of iron deficiency anemia     Hyperlipidemia     Stress disorder, acute     Thyroid goiter        Past Surgical History:        Procedure Laterality Date    COLON SURGERY  2008    COLECTOMY    COLONOSCOPY      COLONOSCOPY  2017    POLYP,DIVERTICULOSIS    HEMORRHOID SURGERY      BANDING    HERNIA REPAIR      VENTRAL HERNIA    HYSTERECTOMY      JOINT REPLACEMENT      BILATERAL KNEE REPLACEMENT    LAPAROSCOPY N/A 2019    EXPLORATORY LAPAROSCOPY CONVERTED TO OPEN LYSIS OF ADHESIONS PARTIAL COLECTOMY performed by Marina Tineo MD at [address], PARTIAL      goiter removed     UPPER GASTROINTESTINAL ENDOSCOPY         Prior to Admission medications    Medication Sig Start Date End Date Taking?  Authorizing Provider   SYMBICORT 160-4.5 MCG/ACT AERO USE 2 INHALATIONS TWICE A DAY 22  Yes Surya Jason DO   montelukast (SINGULAIR) 10 MG tablet TAKE 1 TABLET NIGHTLY 22  Yes Surya Jason DO   Ascorbic Acid (VITAMIN C) 250 MG tablet Take 250 mg by mouth daily   Yes Historical Provider, MD   Zinc Sulfate (ZINC 15 PO) Take 1 tablet by mouth daily   Yes Historical Provider, MD   furosemide (LASIX) 20 MG tablet Take 1 tablet by mouth daily 22  Yes Fidencio Jean Baptiste DO   amiodarone (CORDARONE) 200 MG tablet Take 1 tablet by mouth 2 times daily 21  Yes Fidencio Jean Baptiste DO   metoprolol tartrate (LOPRESSOR) 25 MG tablet Take 1 tablet by mouth 2 times daily 21  Yes Fidencio Jean Baptiste DO   apixaban (ELIQUIS) 5 MG TABS tablet Take 1 tablet by mouth 2 times daily 11/23/21  Yes Fidencio Danielleo, DO   vitamin D (CHOLECALCIFEROL) 125 MCG (5000 UT) CAPS capsule Take 5,000 Units by mouth daily    Yes Historical Provider, MD   PROAIR  (90 Base) MCG/ACT inhaler Inhale 2 puffs into the lungs every 6 hours as needed for Wheezing or Shortness of Breath 2/5/20  Yes Keegan Jason, DO   aspirin EC 81 MG EC tablet Take 81 mg by mouth daily   Yes Historical Provider, MD   fluticasone (FLONASE) 50 MCG/ACT nasal spray 2 sprays by Nasal route daily   Yes Historical Provider, MD   traZODone (DESYREL) 100 MG tablet Take 100 mg by mouth nightly TAKE 1/4 OF A TABLET NIGHTLY PRN FOR SLEEP   Yes Historical Provider, MD   venlafaxine (EFFEXOR XR) 75 MG extended release capsule Take 75 mg by mouth daily 7/12/21 1/8/22  Historical Provider, MD   albuterol (PROVENTIL) (2.5 MG/3ML) 0.083% nebulizer solution Inhale 2.5 mg into the lungs 1/26/18 12/20/21  Historical Provider, MD       Allergies   Allergen Reactions    Erythromycin     Fenofibrate     Lipitor [Atorvastatin]     Lopid [Gemfibrozil]     Other      DARVOCET      Propoxyphene Other (See Comments)     GI ISSUE-NAUSEA        reports that she has never smoked. She has never used smokeless tobacco.  reports no history of alcohol use.     Family History   Problem Relation Age of Onset    Colon Cancer Neg Hx     Colon Polyps Neg Hx             REVIEW OF SYSTEMS:  General:  negative  Eye:  negative   ENT:negative  Allergy/Immunology:  negative  Hematology/Lymphatic: negative  Lungs: negative  Cardiovascular: negative  Gastrointestinal: negative  : negative  Neurological: negative      PHYSICAL EXAM:    /88   Pulse 75   Temp 97.7 °F (36.5 °C)   Ht 5' 1\" (1.549 m)   Wt 229 lb (103.9 kg)   SpO2 95%   BMI 43.27 kg/m²       Gen: Alert and oriented x3, no acute distress, well-appearing, morbidly obese    Eyes: PERRL, Sclera Anicteric    Head: Normocephalic, non tender Neck: Supple, no significant adenopathy. No carotid bruits, thyroid normal size and no masses    Chest: CTA, no wheezes, no rales, no rhonchi, symmetrical    Heart: Irregula, no murmurs    Abdomen: Soft, positive bowel sounds, non tender, non distended, no masses, no hernias, no HSM, no bruits. Right paramedial scar. Sizeable subcutaneous mass in the RLQ which correlated to previous CT. Neuro: Normal speech, motor/sensory grossly normal bilateral    MSK: No joint tenderness, deformity, or swelling           ASSESSMENT:  1) History of Colon Cancer - she was having some diarrhea but has resolved. 2) Large Lipoma RLQ - mildly symptomatic. Consider excision if it continues to bother her. PLAN:  1) Colonoscopy - Risks and benefits of colonoscopy were discussed with Yvonne Kraft. In particular I discussed the possibility of incomplete colonoscopy and failure to make a diagnosis. I also discussed the risks and consequences of reactions to the sedatives, bleeding and perforation. Alternate ways of evaluating the colon including barium enema, CT colonography and sigmoidoscopy were discussed. she was also given the opportunity to have any questions answered, and encouraged to call the office with additional issues.        Electronically signed by Jose David Roberto MD on 1/24/2022 at 1:03 PM

## 2022-01-24 NOTE — TELEPHONE ENCOUNTER
[facility]         Lionel Medley           :1945           Surgical/Procedure Planned: Colonoscopy    Date & Location: [address] [place] [address] 22       outpatient   Planned Length of OR: 30 min    Sedation: Local mac    This is notification of the scheduled procedure only: med hold    Estimated Cardiac Risk for Non-Cardiac Surgery/Procedure     Low__X____ Moderate______ High______    Medication Instructions - Clarification needed by this date:   -Insulin:  -Other medications:    ASA 81 mg Hold _5-7__ Days  eliquis 5 mg    Hold___3__Days    Resume medications:     Lovenox indicated: _____Yes _____NO    Provider:Dr. Carmenza Lozano of Provider Giving Orders for Medication holds:    _______Fidencio Jaen Baptiste DO, DO_______________________________

## 2022-01-27 RX ORDER — AMIODARONE HYDROCHLORIDE 200 MG/1
200 TABLET ORAL 2 TIMES DAILY
Qty: 180 TABLET | Refills: 3 | Status: SHIPPED | OUTPATIENT
Start: 2022-01-27

## 2022-01-27 RX ORDER — FUROSEMIDE 20 MG/1
20 TABLET ORAL DAILY
Qty: 90 TABLET | Refills: 1 | Status: SHIPPED | OUTPATIENT
Start: 2022-01-27 | End: 2022-09-08

## 2022-01-27 NOTE — TELEPHONE ENCOUNTER
Patient called for refill of her cardiac medications sent to [facility].      Last Appt:  1/4/2022  Next Appt:   7/5/2022  Med verified in [facility] [address]

## 2022-05-03 NOTE — PROGRESS NOTES
This patient is seen as a follow-up for recent admission to the hospital with us small bowel obstruction. She is status post right hemicolectomy for cancer. She has a history of recurrent episode of nausea and vomiting which lead her to be admitted to the hospital and treated with IV fluid and temporary NG suction and laxatives. She has been on Metamucil and lactulose and has no symptoms at the present time. And upper GI series with small bowel follow-through was reported as unremarkable for obstruction. The images of the small bowel follow-through were reviewed and shows free passage of the barium through the small bowel into her colon without evidence of obstruction and it passed without delay through the ileocolic anastomosis of her previous surgery. The patient is advised to follow high-fiber diet and take Metamucil 1 tablespoonful 3 times daily and take lactulose 1 tablespoonful 3 times daily. She is advised to return for her follow-up colonoscopy as scheduled for her. She will return when necessary.
17

## 2022-06-07 ENCOUNTER — APPOINTMENT (OUTPATIENT)
Dept: INTERVENTIONAL RADIOLOGY/VASCULAR | Age: 77
DRG: 193 | End: 2022-06-07
Payer: MEDICARE

## 2022-06-07 ENCOUNTER — APPOINTMENT (OUTPATIENT)
Dept: CT IMAGING | Age: 77
DRG: 193 | End: 2022-06-07
Payer: MEDICARE

## 2022-06-07 ENCOUNTER — HOSPITAL ENCOUNTER (INPATIENT)
Age: 77
LOS: 3 days | Discharge: HOME HEALTH | DRG: 193 | End: 2022-06-10
Attending: EMERGENCY MEDICINE | Admitting: FAMILY MEDICINE
Payer: MEDICARE

## 2022-06-07 DIAGNOSIS — J18.9 PNEUMONIA OF BOTH LOWER LOBES DUE TO INFECTIOUS ORGANISM: ICD-10-CM

## 2022-06-07 DIAGNOSIS — J44.1 COPD WITH ACUTE EXACERBATION (HCC): ICD-10-CM

## 2022-06-07 DIAGNOSIS — J18.9 PNEUMONIA OF BOTH LUNGS DUE TO INFECTIOUS ORGANISM, UNSPECIFIED PART OF LUNG: Primary | ICD-10-CM

## 2022-06-07 PROBLEM — I48.91 A-FIB (HCC): Status: ACTIVE | Noted: 2021-12-06

## 2022-06-07 PROBLEM — R10.31 ABDOMINAL PAIN, RLQ: Status: RESOLVED | Noted: 2021-11-08 | Resolved: 2022-06-07

## 2022-06-07 PROBLEM — I27.20 MILD PULMONARY HYPERTENSION (HCC): Status: ACTIVE | Noted: 2022-06-07

## 2022-06-07 PROBLEM — R94.31 PROLONGED Q-T INTERVAL ON ECG: Status: RESOLVED | Noted: 2019-05-01 | Resolved: 2022-06-07

## 2022-06-07 PROBLEM — J96.01 ACUTE RESPIRATORY FAILURE WITH HYPOXIA (HCC): Status: ACTIVE | Noted: 2022-06-07

## 2022-06-07 LAB
ABSOLUTE EOS #: 0.03 K/UL (ref 0–0.44)
ABSOLUTE IMMATURE GRANULOCYTE: 0.05 K/UL (ref 0–0.3)
ABSOLUTE LYMPH #: 1.81 K/UL (ref 1.1–3.7)
ABSOLUTE MONO #: 0.45 K/UL (ref 0.1–1.2)
ALBUMIN SERPL-MCNC: 4 G/DL (ref 3.5–5.2)
ALBUMIN/GLOBULIN RATIO: 1.3 (ref 1–2.5)
ALP BLD-CCNC: 85 U/L (ref 35–104)
ALT SERPL-CCNC: 17 U/L (ref 5–33)
ANION GAP SERPL CALCULATED.3IONS-SCNC: 11 MMOL/L (ref 9–17)
AST SERPL-CCNC: 19 U/L
BASOPHILS # BLD: 0 % (ref 0–2)
BASOPHILS ABSOLUTE: 0.03 K/UL (ref 0–0.2)
BILIRUB SERPL-MCNC: 1 MG/DL (ref 0.3–1.2)
BUN BLDV-MCNC: 22 MG/DL (ref 8–23)
BUN/CREAT BLD: 23 (ref 9–20)
CALCIUM SERPL-MCNC: 9.1 MG/DL (ref 8.6–10.4)
CHLORIDE BLD-SCNC: 99 MMOL/L (ref 98–107)
CO2: 29 MMOL/L (ref 20–31)
CREAT SERPL-MCNC: 0.95 MG/DL (ref 0.5–0.9)
EOSINOPHILS RELATIVE PERCENT: 0 % (ref 1–4)
FLU A ANTIGEN: NEGATIVE
FLU B ANTIGEN: NEGATIVE
GFR AFRICAN AMERICAN: >60 ML/MIN
GFR NON-AFRICAN AMERICAN: 57 ML/MIN
GFR SERPL CREATININE-BSD FRML MDRD: ABNORMAL ML/MIN/{1.73_M2}
GLUCOSE BLD-MCNC: 112 MG/DL (ref 70–99)
HCT VFR BLD CALC: 41 % (ref 36.3–47.1)
HEMOGLOBIN: 12.8 G/DL (ref 11.9–15.1)
IMMATURE GRANULOCYTES: 0 %
INR BLD: 1.2
LACTIC ACID, SEPSIS: 1.8 MMOL/L (ref 0.5–1.9)
LYMPHOCYTES # BLD: 16 % (ref 24–43)
MCH RBC QN AUTO: 29.5 PG (ref 25.2–33.5)
MCHC RBC AUTO-ENTMCNC: 31.2 G/DL (ref 25.2–33.5)
MCV RBC AUTO: 94.5 FL (ref 82.6–102.9)
MONOCYTES # BLD: 4 % (ref 3–12)
NRBC AUTOMATED: 0 PER 100 WBC
PDW BLD-RTO: 15.4 % (ref 11.8–14.4)
PLATELET # BLD: 264 K/UL (ref 138–453)
PMV BLD AUTO: 9.7 FL (ref 8.1–13.5)
POTASSIUM SERPL-SCNC: 4.3 MMOL/L (ref 3.7–5.3)
PRO-BNP: 3075 PG/ML
PROTHROMBIN TIME: 14.9 SEC (ref 11.5–14.2)
RBC # BLD: 4.34 M/UL (ref 3.95–5.11)
RBC # BLD: ABNORMAL 10*6/UL
SARS-COV-2, RAPID: NOT DETECTED
SEG NEUTROPHILS: 80 % (ref 36–65)
SEGMENTED NEUTROPHILS ABSOLUTE COUNT: 8.76 K/UL (ref 1.5–8.1)
SODIUM BLD-SCNC: 139 MMOL/L (ref 135–144)
SPECIMEN DESCRIPTION: NORMAL
TOTAL PROTEIN: 7.2 G/DL (ref 6.4–8.3)
TROPONIN, HIGH SENSITIVITY: 13 NG/L (ref 0–14)
TROPONIN, HIGH SENSITIVITY: 168 NG/L (ref 0–14)
TSH SERPL DL<=0.05 MIU/L-ACNC: 2.84 UIU/ML (ref 0.3–5)
WBC # BLD: 11.1 K/UL (ref 3.5–11.3)

## 2022-06-07 PROCEDURE — 6360000004 HC RX CONTRAST MEDICATION: Performed by: EMERGENCY MEDICINE

## 2022-06-07 PROCEDURE — 99222 1ST HOSP IP/OBS MODERATE 55: CPT | Performed by: NURSE PRACTITIONER

## 2022-06-07 PROCEDURE — 71260 CT THORAX DX C+: CPT

## 2022-06-07 PROCEDURE — 2580000003 HC RX 258: Performed by: EMERGENCY MEDICINE

## 2022-06-07 PROCEDURE — 80053 COMPREHEN METABOLIC PANEL: CPT

## 2022-06-07 PROCEDURE — 6360000002 HC RX W HCPCS: Performed by: EMERGENCY MEDICINE

## 2022-06-07 PROCEDURE — 87804 INFLUENZA ASSAY W/OPTIC: CPT

## 2022-06-07 PROCEDURE — 2580000003 HC RX 258: Performed by: FAMILY MEDICINE

## 2022-06-07 PROCEDURE — 94660 CPAP INITIATION&MGMT: CPT

## 2022-06-07 PROCEDURE — 84443 ASSAY THYROID STIM HORMONE: CPT

## 2022-06-07 PROCEDURE — 87040 BLOOD CULTURE FOR BACTERIA: CPT

## 2022-06-07 PROCEDURE — 87635 SARS-COV-2 COVID-19 AMP PRB: CPT

## 2022-06-07 PROCEDURE — 36415 COLL VENOUS BLD VENIPUNCTURE: CPT

## 2022-06-07 PROCEDURE — 6370000000 HC RX 637 (ALT 250 FOR IP)

## 2022-06-07 PROCEDURE — 96374 THER/PROPH/DIAG INJ IV PUSH: CPT

## 2022-06-07 PROCEDURE — 93005 ELECTROCARDIOGRAM TRACING: CPT | Performed by: EMERGENCY MEDICINE

## 2022-06-07 PROCEDURE — 6370000000 HC RX 637 (ALT 250 FOR IP): Performed by: NURSE PRACTITIONER

## 2022-06-07 PROCEDURE — 2060000000 HC ICU INTERMEDIATE R&B

## 2022-06-07 PROCEDURE — 6370000000 HC RX 637 (ALT 250 FOR IP): Performed by: EMERGENCY MEDICINE

## 2022-06-07 PROCEDURE — 99285 EMERGENCY DEPT VISIT HI MDM: CPT

## 2022-06-07 PROCEDURE — 84484 ASSAY OF TROPONIN QUANT: CPT

## 2022-06-07 PROCEDURE — 83605 ASSAY OF LACTIC ACID: CPT

## 2022-06-07 PROCEDURE — 6360000002 HC RX W HCPCS: Performed by: NURSE PRACTITIONER

## 2022-06-07 PROCEDURE — 85025 COMPLETE CBC W/AUTO DIFF WBC: CPT

## 2022-06-07 PROCEDURE — 83880 ASSAY OF NATRIURETIC PEPTIDE: CPT

## 2022-06-07 PROCEDURE — 94640 AIRWAY INHALATION TREATMENT: CPT

## 2022-06-07 PROCEDURE — 93971 EXTREMITY STUDY: CPT

## 2022-06-07 PROCEDURE — 6370000000 HC RX 637 (ALT 250 FOR IP): Performed by: FAMILY MEDICINE

## 2022-06-07 PROCEDURE — 85610 PROTHROMBIN TIME: CPT

## 2022-06-07 RX ORDER — METHYLPREDNISOLONE SODIUM SUCCINATE 40 MG/ML
INJECTION, POWDER, LYOPHILIZED, FOR SOLUTION INTRAMUSCULAR; INTRAVENOUS
Status: DISPENSED
Start: 2022-06-07 | End: 2022-06-08

## 2022-06-07 RX ORDER — IPRATROPIUM BROMIDE AND ALBUTEROL SULFATE 2.5; .5 MG/3ML; MG/3ML
SOLUTION RESPIRATORY (INHALATION)
Status: COMPLETED
Start: 2022-06-07 | End: 2022-06-07

## 2022-06-07 RX ORDER — TRAZODONE HYDROCHLORIDE 50 MG/1
25 TABLET ORAL NIGHTLY PRN
Status: DISCONTINUED | OUTPATIENT
Start: 2022-06-08 | End: 2022-06-09 | Stop reason: HOSPADM

## 2022-06-07 RX ORDER — ALBUTEROL SULFATE 2.5 MG/3ML
2.5 SOLUTION RESPIRATORY (INHALATION) EVERY 4 HOURS PRN
Status: DISCONTINUED | OUTPATIENT
Start: 2022-06-07 | End: 2022-06-09 | Stop reason: HOSPADM

## 2022-06-07 RX ORDER — IPRATROPIUM BROMIDE AND ALBUTEROL SULFATE 2.5; .5 MG/3ML; MG/3ML
1 SOLUTION RESPIRATORY (INHALATION)
Status: DISCONTINUED | OUTPATIENT
Start: 2022-06-07 | End: 2022-06-07

## 2022-06-07 RX ORDER — SODIUM CHLORIDE FOR INHALATION 0.9 %
3 VIAL, NEBULIZER (ML) INHALATION
Status: DISCONTINUED | OUTPATIENT
Start: 2022-06-07 | End: 2022-06-09 | Stop reason: HOSPADM

## 2022-06-07 RX ORDER — ASPIRIN 81 MG/1
81 TABLET ORAL DAILY
Status: DISCONTINUED | OUTPATIENT
Start: 2022-06-08 | End: 2022-06-09 | Stop reason: HOSPADM

## 2022-06-07 RX ORDER — METHYLPREDNISOLONE SODIUM SUCCINATE 125 MG/2ML
125 INJECTION, POWDER, LYOPHILIZED, FOR SOLUTION INTRAMUSCULAR; INTRAVENOUS ONCE
Status: COMPLETED | OUTPATIENT
Start: 2022-06-07 | End: 2022-06-07

## 2022-06-07 RX ORDER — SODIUM CHLORIDE 9 MG/ML
INJECTION, SOLUTION INTRAVENOUS CONTINUOUS
Status: DISCONTINUED | OUTPATIENT
Start: 2022-06-07 | End: 2022-06-09 | Stop reason: HOSPADM

## 2022-06-07 RX ORDER — FAMOTIDINE 20 MG/1
20 TABLET, FILM COATED ORAL 2 TIMES DAILY
Status: DISCONTINUED | OUTPATIENT
Start: 2022-06-07 | End: 2022-06-09

## 2022-06-07 RX ORDER — ALBUTEROL SULFATE 2.5 MG/3ML
2.5 SOLUTION RESPIRATORY (INHALATION)
Status: DISCONTINUED | OUTPATIENT
Start: 2022-06-08 | End: 2022-06-07

## 2022-06-07 RX ORDER — IPRATROPIUM BROMIDE AND ALBUTEROL SULFATE 2.5; .5 MG/3ML; MG/3ML
1 SOLUTION RESPIRATORY (INHALATION) EVERY 4 HOURS
Status: DISCONTINUED | OUTPATIENT
Start: 2022-06-08 | End: 2022-06-09 | Stop reason: HOSPADM

## 2022-06-07 RX ORDER — FUROSEMIDE 20 MG/1
20 TABLET ORAL DAILY
Status: DISCONTINUED | OUTPATIENT
Start: 2022-06-08 | End: 2022-06-09 | Stop reason: HOSPADM

## 2022-06-07 RX ORDER — ACETAMINOPHEN 325 MG/1
650 TABLET ORAL EVERY 6 HOURS PRN
Status: DISCONTINUED | OUTPATIENT
Start: 2022-06-07 | End: 2022-06-09 | Stop reason: HOSPADM

## 2022-06-07 RX ORDER — GUAIFENESIN 600 MG/1
600 TABLET, EXTENDED RELEASE ORAL 2 TIMES DAILY
Status: DISCONTINUED | OUTPATIENT
Start: 2022-06-07 | End: 2022-06-09 | Stop reason: HOSPADM

## 2022-06-07 RX ORDER — ACETAMINOPHEN 650 MG/1
650 SUPPOSITORY RECTAL EVERY 6 HOURS PRN
Status: DISCONTINUED | OUTPATIENT
Start: 2022-06-07 | End: 2022-06-09 | Stop reason: HOSPADM

## 2022-06-07 RX ORDER — MONTELUKAST SODIUM 10 MG/1
10 TABLET ORAL NIGHTLY
Status: DISCONTINUED | OUTPATIENT
Start: 2022-06-07 | End: 2022-06-09 | Stop reason: HOSPADM

## 2022-06-07 RX ORDER — TRAZODONE HYDROCHLORIDE 50 MG/1
100 TABLET ORAL NIGHTLY
Status: DISCONTINUED | OUTPATIENT
Start: 2022-06-07 | End: 2022-06-07

## 2022-06-07 RX ORDER — IPRATROPIUM BROMIDE AND ALBUTEROL SULFATE 2.5; .5 MG/3ML; MG/3ML
1 SOLUTION RESPIRATORY (INHALATION)
Status: DISCONTINUED | OUTPATIENT
Start: 2022-06-08 | End: 2022-06-07

## 2022-06-07 RX ORDER — ONDANSETRON 4 MG/1
4 TABLET, ORALLY DISINTEGRATING ORAL EVERY 8 HOURS PRN
Status: DISCONTINUED | OUTPATIENT
Start: 2022-06-07 | End: 2022-06-09 | Stop reason: HOSPADM

## 2022-06-07 RX ORDER — POLYETHYLENE GLYCOL 3350 17 G/17G
17 POWDER, FOR SOLUTION ORAL DAILY PRN
Status: DISCONTINUED | OUTPATIENT
Start: 2022-06-07 | End: 2022-06-09 | Stop reason: HOSPADM

## 2022-06-07 RX ORDER — FLUTICASONE PROPIONATE 50 MCG
2 SPRAY, SUSPENSION (ML) NASAL DAILY
Status: DISCONTINUED | OUTPATIENT
Start: 2022-06-08 | End: 2022-06-09 | Stop reason: HOSPADM

## 2022-06-07 RX ORDER — METHYLPREDNISOLONE SODIUM SUCCINATE 125 MG/2ML
80 INJECTION, POWDER, LYOPHILIZED, FOR SOLUTION INTRAMUSCULAR; INTRAVENOUS EVERY 6 HOURS
Status: DISCONTINUED | OUTPATIENT
Start: 2022-06-07 | End: 2022-06-09 | Stop reason: HOSPADM

## 2022-06-07 RX ORDER — ACETAMINOPHEN 500 MG
1000 TABLET ORAL ONCE
Status: COMPLETED | OUTPATIENT
Start: 2022-06-07 | End: 2022-06-07

## 2022-06-07 RX ORDER — METHYLPREDNISOLONE SODIUM SUCCINATE 40 MG/ML
40 INJECTION, POWDER, LYOPHILIZED, FOR SOLUTION INTRAMUSCULAR; INTRAVENOUS EVERY 6 HOURS
Status: DISCONTINUED | OUTPATIENT
Start: 2022-06-07 | End: 2022-06-07

## 2022-06-07 RX ORDER — AMIODARONE HYDROCHLORIDE 200 MG/1
200 TABLET ORAL 2 TIMES DAILY
Status: DISCONTINUED | OUTPATIENT
Start: 2022-06-07 | End: 2022-06-08

## 2022-06-07 RX ORDER — METHYLPREDNISOLONE SODIUM SUCCINATE 125 MG/2ML
80 INJECTION, POWDER, LYOPHILIZED, FOR SOLUTION INTRAMUSCULAR; INTRAVENOUS EVERY 6 HOURS
Status: DISCONTINUED | OUTPATIENT
Start: 2022-06-07 | End: 2022-06-07

## 2022-06-07 RX ORDER — METHYLPREDNISOLONE SODIUM SUCCINATE 125 MG/2ML
80 INJECTION, POWDER, LYOPHILIZED, FOR SOLUTION INTRAMUSCULAR; INTRAVENOUS EVERY 6 HOURS
Status: DISCONTINUED | OUTPATIENT
Start: 2022-06-08 | End: 2022-06-07

## 2022-06-07 RX ORDER — PREDNISONE 20 MG/1
40 TABLET ORAL DAILY
Status: DISCONTINUED | OUTPATIENT
Start: 2022-06-10 | End: 2022-06-07

## 2022-06-07 RX ORDER — ONDANSETRON 2 MG/ML
4 INJECTION INTRAMUSCULAR; INTRAVENOUS EVERY 6 HOURS PRN
Status: DISCONTINUED | OUTPATIENT
Start: 2022-06-07 | End: 2022-06-09 | Stop reason: HOSPADM

## 2022-06-07 RX ADMIN — METHYLPREDNISOLONE SODIUM SUCCINATE 125 MG: 125 INJECTION, POWDER, FOR SOLUTION INTRAMUSCULAR; INTRAVENOUS at 17:49

## 2022-06-07 RX ADMIN — AZITHROMYCIN MONOHYDRATE 500 MG: 500 INJECTION, POWDER, LYOPHILIZED, FOR SOLUTION INTRAVENOUS at 17:58

## 2022-06-07 RX ADMIN — SODIUM CHLORIDE: 9 INJECTION, SOLUTION INTRAVENOUS at 21:09

## 2022-06-07 RX ADMIN — AMIODARONE HYDROCHLORIDE 200 MG: 200 TABLET ORAL at 22:42

## 2022-06-07 RX ADMIN — APIXABAN 5 MG: 5 TABLET, FILM COATED ORAL at 22:42

## 2022-06-07 RX ADMIN — GUAIFENESIN 600 MG: 600 TABLET, EXTENDED RELEASE ORAL at 22:42

## 2022-06-07 RX ADMIN — IPRATROPIUM BROMIDE AND ALBUTEROL SULFATE 1 AMPULE: .5; 3 SOLUTION RESPIRATORY (INHALATION) at 23:30

## 2022-06-07 RX ADMIN — IPRATROPIUM BROMIDE AND ALBUTEROL SULFATE 1 AMPULE: .5; 2.5 SOLUTION RESPIRATORY (INHALATION) at 17:41

## 2022-06-07 RX ADMIN — MONTELUKAST 10 MG: 10 TABLET, FILM COATED ORAL at 22:42

## 2022-06-07 RX ADMIN — IPRATROPIUM BROMIDE AND ALBUTEROL SULFATE 1 AMPULE: 2.5; .5 SOLUTION RESPIRATORY (INHALATION) at 17:41

## 2022-06-07 RX ADMIN — IOPAMIDOL 100 ML: 755 INJECTION, SOLUTION INTRAVENOUS at 16:37

## 2022-06-07 RX ADMIN — METHYLPREDNISOLONE SODIUM SUCCINATE 80 MG: 125 INJECTION, POWDER, FOR SOLUTION INTRAMUSCULAR; INTRAVENOUS at 22:42

## 2022-06-07 RX ADMIN — CEFTRIAXONE 1000 MG: 1 INJECTION, POWDER, FOR SOLUTION INTRAMUSCULAR; INTRAVENOUS at 19:21

## 2022-06-07 RX ADMIN — ACETAMINOPHEN 1000 MG: 500 TABLET, FILM COATED ORAL at 19:21

## 2022-06-07 RX ADMIN — FAMOTIDINE 20 MG: 20 TABLET ORAL at 22:42

## 2022-06-07 ASSESSMENT — PAIN DESCRIPTION - DESCRIPTORS: DESCRIPTORS: ACHING

## 2022-06-07 ASSESSMENT — PAIN DESCRIPTION - PAIN TYPE: TYPE: ACUTE PAIN

## 2022-06-07 ASSESSMENT — ENCOUNTER SYMPTOMS
NAUSEA: 0
CONSTIPATION: 0
DIARRHEA: 0
BACK PAIN: 0
VOMITING: 0
COUGH: 1
SHORTNESS OF BREATH: 1
BLOOD IN STOOL: 0
EYE PAIN: 0
ABDOMINAL PAIN: 0

## 2022-06-07 ASSESSMENT — PAIN DESCRIPTION - ONSET: ONSET: ON-GOING

## 2022-06-07 ASSESSMENT — PAIN DESCRIPTION - LOCATION: LOCATION: BACK

## 2022-06-07 ASSESSMENT — PAIN DESCRIPTION - ORIENTATION: ORIENTATION: MID

## 2022-06-07 ASSESSMENT — PAIN DESCRIPTION - FREQUENCY: FREQUENCY: CONTINUOUS

## 2022-06-07 ASSESSMENT — PAIN SCALES - GENERAL
PAINLEVEL_OUTOF10: 6
PAINLEVEL_OUTOF10: 0
PAINLEVEL_OUTOF10: 0

## 2022-06-07 ASSESSMENT — PAIN - FUNCTIONAL ASSESSMENT: PAIN_FUNCTIONAL_ASSESSMENT: 0-10

## 2022-06-07 NOTE — ED PROVIDER NOTES
[id number] [facility]  eMERGENCY dEPARTMENT eNCOUnter      Pt Name: Dariusz Fish  MRN: [id number]  [place] 1945  Date of evaluation: 6/7/2022      CHIEF COMPLAINT     Shortness of breath      HISTORY OF PRESENT ILLNESS    Dariusz Fish is a 68 y.o. female who presents with increasing shortness of breath that she has had intermittently the past she has a history of asthma she is also had some swelling and pain in the left leg she was started on antibiotics for that she says its worse she does have a history of COPD there is been no nausea no vomiting no chest pain but she is had some pain between her shoulder blades  He has been vaccinated but not had the booster  She is on Eliquis  REVIEW OF SYSTEMS         Review of Systems   Constitutional: Negative for chills and fever. HENT: Negative for congestion and ear pain. Eyes: Negative for pain and visual disturbance. Respiratory: Positive for cough and shortness of breath. Cardiovascular: Positive for leg swelling. Negative for chest pain and palpitations. Gastrointestinal: Negative for abdominal pain, blood in stool, constipation, diarrhea, nausea and vomiting. Endocrine: Negative for polydipsia and polyuria. Genitourinary: Negative for difficulty urinating, dysuria and frequency. Musculoskeletal: Negative for back pain, joint swelling, myalgias, neck pain and neck stiffness. Left leg swelling and redness   Skin: Negative for rash. Neurological: Negative for dizziness, weakness and headaches. Hematological: Negative for adenopathy. Does not bruise/bleed easily. Psychiatric/Behavioral: Negative for confusion, self-injury and suicidal ideas. PAST MEDICAL HISTORY    has a past medical history of History of colon cancer, History of iron deficiency anemia, Hyperlipidemia, Stress disorder, acute, and Thyroid goiter. SURGICAL HISTORY      has a past surgical history that includes Colonoscopy; Colon surgery (04/2008); is 5' 1\" (1.549 m) and weight is 230 lb (104.3 kg). Her tympanic temperature is 100.1 °F (37.8 °C). Her blood pressure is 163/69 (abnormal) and her pulse is 59. Her respiration is 31 (abnormal) and oxygen saturation is 97%. Physical Exam  Constitutional:       Appearance: Normal appearance. She is well-developed. She is obese. Comments: Left lower leg is swollen red it is larger than the right distal pulses are palpable there is no palpable cords   HENT:      Head: Normocephalic and atraumatic. Right Ear: External ear normal.      Left Ear: External ear normal.   Eyes:      Conjunctiva/sclera: Conjunctivae normal.      Pupils: Pupils are equal, round, and reactive to light. Cardiovascular:      Rate and Rhythm: Normal rate and regular rhythm. Pulses: Normal pulses. Heart sounds: Normal heart sounds. Pulmonary:      Effort: Pulmonary effort is normal.      Breath sounds: Normal breath sounds. Abdominal:      General: Bowel sounds are normal.      Palpations: Abdomen is soft. Musculoskeletal:         General: No tenderness. Normal range of motion. Cervical back: Normal range of motion. Left lower leg: Edema present. Skin:     General: Skin is warm and dry. Neurological:      General: No focal deficit present. Mental Status: She is alert and oriented to person, place, and time.    Psychiatric:         Behavior: Behavior normal.           DIFFERENTIAL DIAGNOSIS/ MDM:     Exacerbation of COPD with back pain and left leg pain will CT to rule out a PE we will also do a venous Doppler  O2 saturation was in the 80s on room air here comes up to the mid 90s with O2 she is not normally on oxygen  DIAGNOSTIC RESULTS     EKG: All EKG's are interpreted by the Emergency Department Physician who either signs or Co-signs this chart in the absence of a cardiologist.    Sinus bradycardia rate of 57 bpm MO interval is 176 ms, QRS durations 90 ms, QT corrected 424,, R axis is 61 there is no acute ST or T wave changes seen    RADIOLOGY:   I directly visualized the following  images and reviewed the radiologist interpretations:     CTA OF THE CHEST 6/7/2022 4:35 pm       TECHNIQUE:   CTA of the chest was performed after the administration of intravenous   contrast.  Multiplanar reformatted images are provided for review.  MIP   images are provided for review. Automated exposure control, iterative   reconstruction, and/or weight based adjustment of the mA/kV was utilized to   reduce the radiation dose to as low as reasonably achievable.       COMPARISON:   October 30, 2021       HISTORY:   ORDERING SYSTEM PROVIDED HISTORY: Shortness of breath left leg swelling   TECHNOLOGIST PROVIDED HISTORY:   Shortness of breath left leg swelling   Decision Support Exception - unselect if not a suspected or confirmed   emergency medical condition->Emergency Medical Condition (MA)   Reason for Exam: Shortness of breath; leg swelling, r/o pe       FINDINGS:   Pulmonary Arteries: Pulmonary arteries are adequately opacified for   evaluation.  No evidence of intraluminal filling defect to suggest pulmonary   embolism.  Main pulmonary artery is prominent measuring 3.7 cm.       Mediastinum: Subcarinal nodes measure up to 1.3 cm.  No other areas of   adenopathy. .  The heart and pericardium demonstrate no acute abnormality. There is no acute abnormality of the thoracic aorta.       Lungs/pleura: Pulmonary infiltrates are noted in the right upper lobe, right   lower lobe and lingula.  Atelectatic changes of the left lower lobe.  Small   bilateral pleural effusions worse on the right.       Upper Abdomen: Splenic cyst appears benign.  A linear density seen in the   right lobe of the liver and was previously seen as well on October 30, 2021. This is likely benign.       Soft Tissues/Bones: No acute bone or soft tissue abnormality.           Impression   1. No evidence of acute pulmonary embolism or acute aortic disease. 2. Prominent main pulmonary artery measuring 3.7 cm.   3. Pulmonary infiltrates seen in the right upper and lower lobes and lingula. Atelectatic changes left lower lobe.  Bilateral effusions small slightly   larger on the right. 4. Stable linear density in the pelvis. _____            EXAMINATION:   DUPLEX VENOUS ULTRASOUND OF THE LEFT LOWER EXTREMITY       6/7/2022 3:47 pm       TECHNIQUE:   Duplex ultrasound using B-mode/gray scaled imaging and Doppler spectral   analysis and color flow was obtained of the deep venous structures of the   left extremity.       COMPARISON:   None.       HISTORY:   ORDERING SYSTEM PROVIDED HISTORY: Left leg swelling   TECHNOLOGIST PROVIDED HISTORY:   Left leg swelling   Reason for Exam: left leg swelling       FINDINGS:   The visualized veins of the left lower extremity are patent and free of   echogenic thrombus. The veins demonstrate good compressibility with normal   color flow study and spectral analysis.            Impression   No evidence of DVT in the left lower extremity.                 ED BEDSIDE ULTRASOUND:       LABS:  Labs Reviewed   PROTIME-INR - Abnormal; Notable for the following components:       Result Value    Protime 14.9 (*)     All other components within normal limits   CBC WITH AUTO DIFFERENTIAL - Abnormal; Notable for the following components:    RDW 15.4 (*)     Seg Neutrophils 80 (*)     Lymphocytes 16 (*)     Eosinophils % 0 (*)     Segs Absolute 8.76 (*)     All other components within normal limits   COMPREHENSIVE METABOLIC PANEL - Abnormal; Notable for the following components:    Glucose 112 (*)     CREATININE 0.95 (*)     Bun/Cre Ratio 23 (*)     GFR Non- 57 (*)     All other components within normal limits   COVID-19, RAPID   CULTURE, BLOOD 1   CULTURE, BLOOD 1   TROPONIN   LACTATE, SEPSIS           EMERGENCY DEPARTMENT COURSE:   Vitals:    Vitals:    06/07/22 1542 06/07/22 1545 06/07/22 1654 06/07/22 1750   BP: (!) 163/69 (!) 163/69     Pulse:  59     Resp:  22  (!) 31   Temp:   100.1 °F (37.8 °C)    TempSrc:   Tympanic    SpO2:  97%     Weight:       Height:         -------------------------  BP: (!) 163/69, Temp: 100.1 °F (37.8 °C), Heart Rate: 59, Resp: (!) 31        Re-evaluation Notes    Patient is short of breath with any kind of exertion    CRITICAL CARE:   IP CONSULT TO HOSPITALIST  Due to the immediate potential for life-threatening deterioration due torespiratory failure, I spent 30 minutes providing critical care. This time is excluding time spent performing procedures. Patient was resting comfortably had to urinate was placed on bedpan by nursing became very dyspneic after urinating and she could not recover became diaphoretic no chest pain sats progressively dropped into the 70s breathing treatment was given and she was diffusely wheezy that did not help we placed on BiPAP which immediately helped her sats went up into the mid to high 90s again and she was able to speak tachypnea was also eased    CONSULTS:      PROCEDURES:  None    FINAL IMPRESSION      1. Pneumonia of both lungs due to infectious organism, unspecified part of lung    2. COPD with acute exacerbation ([facility] [address]          DISPOSITION/PLAN   DISPOSITION admitted    Condition on Disposition    Stable    PATIENT REFERRED TO:  No follow-up provider specified. DISCHARGE MEDICATIONS:  New Prescriptions    No medications on file       (Please note that portions of this note were completed with a voice recognition program.  Efforts were made to edit the dictations but occasionally words are mis-transcribed.)    Cassidy Hylton MD,, MD, F.A.A.E.M.   Attending Emergency Physician                          Cassidy Hylton MD  06/07/22 [id number]

## 2022-06-08 LAB
ABSOLUTE EOS #: <0.03 K/UL (ref 0–0.44)
ABSOLUTE IMMATURE GRANULOCYTE: 0.03 K/UL (ref 0–0.3)
ABSOLUTE LYMPH #: 1.18 K/UL (ref 1.1–3.7)
ABSOLUTE MONO #: 0.11 K/UL (ref 0.1–1.2)
ANION GAP SERPL CALCULATED.3IONS-SCNC: 9 MMOL/L (ref 9–17)
BASOPHILS # BLD: 0 % (ref 0–2)
BASOPHILS ABSOLUTE: <0.03 K/UL (ref 0–0.2)
BUN BLDV-MCNC: 24 MG/DL (ref 8–23)
BUN/CREAT BLD: 21 (ref 9–20)
CALCIUM SERPL-MCNC: 8.5 MG/DL (ref 8.6–10.4)
CHLORIDE BLD-SCNC: 102 MMOL/L (ref 98–107)
CO2: 28 MMOL/L (ref 20–31)
CREAT SERPL-MCNC: 1.13 MG/DL (ref 0.5–0.9)
EKG ATRIAL RATE: 57 BPM
EKG ATRIAL RATE: 58 BPM
EKG P AXIS: 58 DEGREES
EKG P AXIS: 73 DEGREES
EKG P-R INTERVAL: 176 MS
EKG P-R INTERVAL: 192 MS
EKG Q-T INTERVAL: 436 MS
EKG Q-T INTERVAL: 530 MS
EKG QRS DURATION: 86 MS
EKG QRS DURATION: 90 MS
EKG QTC CALCULATION (BAZETT): 424 MS
EKG QTC CALCULATION (BAZETT): 520 MS
EKG R AXIS: -12 DEGREES
EKG R AXIS: 61 DEGREES
EKG T AXIS: 37 DEGREES
EKG T AXIS: 72 DEGREES
EKG VENTRICULAR RATE: 57 BPM
EKG VENTRICULAR RATE: 58 BPM
EOSINOPHILS RELATIVE PERCENT: 0 % (ref 1–4)
GFR AFRICAN AMERICAN: 57 ML/MIN
GFR NON-AFRICAN AMERICAN: 47 ML/MIN
GFR SERPL CREATININE-BSD FRML MDRD: ABNORMAL ML/MIN/{1.73_M2}
GLUCOSE BLD-MCNC: 171 MG/DL (ref 70–99)
HCT VFR BLD CALC: 35.9 % (ref 36.3–47.1)
HCT VFR BLD CALC: 36.7 % (ref 36.3–47.1)
HEMOGLOBIN: 11.5 G/DL (ref 11.9–15.1)
HEMOGLOBIN: 11.5 G/DL (ref 11.9–15.1)
IMMATURE GRANULOCYTES: 0 %
LV EF: 45 %
LVEF MODALITY: NORMAL
LYMPHOCYTES # BLD: 12 % (ref 24–43)
MCH RBC QN AUTO: 29.6 PG (ref 25.2–33.5)
MCH RBC QN AUTO: 30.3 PG (ref 25.2–33.5)
MCHC RBC AUTO-ENTMCNC: 31.3 G/DL (ref 25.2–33.5)
MCHC RBC AUTO-ENTMCNC: 32 G/DL (ref 25.2–33.5)
MCV RBC AUTO: 94.5 FL (ref 82.6–102.9)
MCV RBC AUTO: 94.6 FL (ref 82.6–102.9)
MONOCYTES # BLD: 1 % (ref 3–12)
NRBC AUTOMATED: 0 PER 100 WBC
NRBC AUTOMATED: 0 PER 100 WBC
PARTIAL THROMBOPLASTIN TIME: 35 SEC (ref 23.9–33.8)
PARTIAL THROMBOPLASTIN TIME: 38.2 SEC (ref 23.9–33.8)
PDW BLD-RTO: 15.6 % (ref 11.8–14.4)
PDW BLD-RTO: 15.7 % (ref 11.8–14.4)
PLATELET # BLD: 202 K/UL (ref 138–453)
PLATELET # BLD: 233 K/UL (ref 138–453)
PMV BLD AUTO: 10 FL (ref 8.1–13.5)
PMV BLD AUTO: 9.9 FL (ref 8.1–13.5)
POTASSIUM SERPL-SCNC: 4.8 MMOL/L (ref 3.7–5.3)
RBC # BLD: 3.8 M/UL (ref 3.95–5.11)
RBC # BLD: 3.88 M/UL (ref 3.95–5.11)
RBC # BLD: ABNORMAL 10*6/UL
SEG NEUTROPHILS: 87 % (ref 36–65)
SEGMENTED NEUTROPHILS ABSOLUTE COUNT: 8.83 K/UL (ref 1.5–8.1)
SODIUM BLD-SCNC: 139 MMOL/L (ref 135–144)
TROPONIN, HIGH SENSITIVITY: 101 NG/L (ref 0–14)
TROPONIN, HIGH SENSITIVITY: 140 NG/L (ref 0–14)
WBC # BLD: 10.2 K/UL (ref 3.5–11.3)
WBC # BLD: 10.5 K/UL (ref 3.5–11.3)

## 2022-06-08 PROCEDURE — 84484 ASSAY OF TROPONIN QUANT: CPT

## 2022-06-08 PROCEDURE — 85025 COMPLETE CBC W/AUTO DIFF WBC: CPT

## 2022-06-08 PROCEDURE — 2580000003 HC RX 258: Performed by: NURSE PRACTITIONER

## 2022-06-08 PROCEDURE — 85730 THROMBOPLASTIN TIME PARTIAL: CPT

## 2022-06-08 PROCEDURE — 94660 CPAP INITIATION&MGMT: CPT

## 2022-06-08 PROCEDURE — 6370000000 HC RX 637 (ALT 250 FOR IP): Performed by: FAMILY MEDICINE

## 2022-06-08 PROCEDURE — 94640 AIRWAY INHALATION TREATMENT: CPT

## 2022-06-08 PROCEDURE — 2580000003 HC RX 258: Performed by: FAMILY MEDICINE

## 2022-06-08 PROCEDURE — 6370000000 HC RX 637 (ALT 250 FOR IP): Performed by: NURSE PRACTITIONER

## 2022-06-08 PROCEDURE — 93306 TTE W/DOPPLER COMPLETE: CPT

## 2022-06-08 PROCEDURE — 6360000002 HC RX W HCPCS: Performed by: NURSE PRACTITIONER

## 2022-06-08 PROCEDURE — 36415 COLL VENOUS BLD VENIPUNCTURE: CPT

## 2022-06-08 PROCEDURE — 6360000002 HC RX W HCPCS: Performed by: INTERNAL MEDICINE

## 2022-06-08 PROCEDURE — 85027 COMPLETE CBC AUTOMATED: CPT

## 2022-06-08 PROCEDURE — 6360000002 HC RX W HCPCS: Performed by: FAMILY MEDICINE

## 2022-06-08 PROCEDURE — 94761 N-INVAS EAR/PLS OXIMETRY MLT: CPT

## 2022-06-08 PROCEDURE — 99222 1ST HOSP IP/OBS MODERATE 55: CPT | Performed by: INTERNAL MEDICINE

## 2022-06-08 PROCEDURE — 99232 SBSQ HOSP IP/OBS MODERATE 35: CPT | Performed by: FAMILY MEDICINE

## 2022-06-08 PROCEDURE — 2700000000 HC OXYGEN THERAPY PER DAY

## 2022-06-08 PROCEDURE — 93005 ELECTROCARDIOGRAM TRACING: CPT | Performed by: NURSE PRACTITIONER

## 2022-06-08 PROCEDURE — 80048 BASIC METABOLIC PNL TOTAL CA: CPT

## 2022-06-08 PROCEDURE — 2060000000 HC ICU INTERMEDIATE R&B

## 2022-06-08 RX ORDER — HEPARIN SODIUM 1000 [USP'U]/ML
4000 INJECTION, SOLUTION INTRAVENOUS; SUBCUTANEOUS PRN
Status: DISCONTINUED | OUTPATIENT
Start: 2022-06-08 | End: 2022-06-09 | Stop reason: HOSPADM

## 2022-06-08 RX ORDER — AMIODARONE HYDROCHLORIDE 200 MG/1
200 TABLET ORAL DAILY
Status: DISCONTINUED | OUTPATIENT
Start: 2022-06-09 | End: 2022-06-09 | Stop reason: HOSPADM

## 2022-06-08 RX ORDER — HEPARIN SODIUM 1000 [USP'U]/ML
4000 INJECTION, SOLUTION INTRAVENOUS; SUBCUTANEOUS ONCE
Status: COMPLETED | OUTPATIENT
Start: 2022-06-08 | End: 2022-06-08

## 2022-06-08 RX ORDER — HEPARIN SODIUM 10000 [USP'U]/100ML
5-30 INJECTION, SOLUTION INTRAVENOUS CONTINUOUS
Status: DISCONTINUED | OUTPATIENT
Start: 2022-06-08 | End: 2022-06-09 | Stop reason: HOSPADM

## 2022-06-08 RX ORDER — HEPARIN SODIUM 1000 [USP'U]/ML
2000 INJECTION, SOLUTION INTRAVENOUS; SUBCUTANEOUS PRN
Status: DISCONTINUED | OUTPATIENT
Start: 2022-06-08 | End: 2022-06-09 | Stop reason: HOSPADM

## 2022-06-08 RX ADMIN — SODIUM CHLORIDE: 9 INJECTION, SOLUTION INTRAVENOUS at 10:24

## 2022-06-08 RX ADMIN — IPRATROPIUM BROMIDE AND ALBUTEROL SULFATE 1 AMPULE: .5; 3 SOLUTION RESPIRATORY (INHALATION) at 16:06

## 2022-06-08 RX ADMIN — GUAIFENESIN 600 MG: 600 TABLET, EXTENDED RELEASE ORAL at 22:07

## 2022-06-08 RX ADMIN — SODIUM CHLORIDE: 9 INJECTION, SOLUTION INTRAVENOUS at 22:08

## 2022-06-08 RX ADMIN — IPRATROPIUM BROMIDE AND ALBUTEROL SULFATE 1 AMPULE: .5; 3 SOLUTION RESPIRATORY (INHALATION) at 20:00

## 2022-06-08 RX ADMIN — ASPIRIN 81 MG: 81 TABLET, COATED ORAL at 09:37

## 2022-06-08 RX ADMIN — FLUTICASONE PROPIONATE 2 SPRAY: 50 SPRAY, METERED NASAL at 09:37

## 2022-06-08 RX ADMIN — IPRATROPIUM BROMIDE AND ALBUTEROL SULFATE 1 AMPULE: .5; 3 SOLUTION RESPIRATORY (INHALATION) at 11:44

## 2022-06-08 RX ADMIN — METHYLPREDNISOLONE SODIUM SUCCINATE 80 MG: 125 INJECTION, POWDER, FOR SOLUTION INTRAMUSCULAR; INTRAVENOUS at 10:53

## 2022-06-08 RX ADMIN — MOMETASONE FUROATE AND FORMOTEROL FUMARATE DIHYDRATE 2 PUFF: 100; 5 AEROSOL RESPIRATORY (INHALATION) at 20:00

## 2022-06-08 RX ADMIN — IPRATROPIUM BROMIDE AND ALBUTEROL SULFATE 1 AMPULE: .5; 3 SOLUTION RESPIRATORY (INHALATION) at 07:20

## 2022-06-08 RX ADMIN — CEFTRIAXONE 1000 MG: 1 INJECTION, POWDER, FOR SOLUTION INTRAMUSCULAR; INTRAVENOUS at 19:50

## 2022-06-08 RX ADMIN — METHYLPREDNISOLONE SODIUM SUCCINATE 80 MG: 125 INJECTION, POWDER, FOR SOLUTION INTRAMUSCULAR; INTRAVENOUS at 05:21

## 2022-06-08 RX ADMIN — MOMETASONE FUROATE AND FORMOTEROL FUMARATE DIHYDRATE 2 PUFF: 100; 5 AEROSOL RESPIRATORY (INHALATION) at 07:20

## 2022-06-08 RX ADMIN — IPRATROPIUM BROMIDE AND ALBUTEROL SULFATE 1 AMPULE: .5; 3 SOLUTION RESPIRATORY (INHALATION) at 04:11

## 2022-06-08 RX ADMIN — MONTELUKAST 10 MG: 10 TABLET, FILM COATED ORAL at 22:07

## 2022-06-08 RX ADMIN — AZITHROMYCIN MONOHYDRATE 500 MG: 500 INJECTION, POWDER, LYOPHILIZED, FOR SOLUTION INTRAVENOUS at 18:33

## 2022-06-08 RX ADMIN — HEPARIN SODIUM AND DEXTROSE 12 UNITS/KG/HR: 10000; 5 INJECTION INTRAVENOUS at 21:58

## 2022-06-08 RX ADMIN — GUAIFENESIN 600 MG: 600 TABLET, EXTENDED RELEASE ORAL at 09:37

## 2022-06-08 RX ADMIN — METOPROLOL TARTRATE 25 MG: 25 TABLET, FILM COATED ORAL at 09:37

## 2022-06-08 RX ADMIN — FAMOTIDINE 20 MG: 20 TABLET ORAL at 09:37

## 2022-06-08 RX ADMIN — METHYLPREDNISOLONE SODIUM SUCCINATE 80 MG: 125 INJECTION, POWDER, FOR SOLUTION INTRAMUSCULAR; INTRAVENOUS at 17:24

## 2022-06-08 RX ADMIN — APIXABAN 5 MG: 5 TABLET, FILM COATED ORAL at 09:37

## 2022-06-08 RX ADMIN — HEPARIN SODIUM 4000 UNITS: 1000 INJECTION INTRAVENOUS; SUBCUTANEOUS at 22:05

## 2022-06-08 RX ADMIN — METHYLPREDNISOLONE SODIUM SUCCINATE 80 MG: 125 INJECTION, POWDER, FOR SOLUTION INTRAMUSCULAR; INTRAVENOUS at 22:07

## 2022-06-08 RX ADMIN — FUROSEMIDE 20 MG: 20 TABLET ORAL at 09:37

## 2022-06-08 RX ADMIN — FAMOTIDINE 20 MG: 20 TABLET ORAL at 22:07

## 2022-06-08 ASSESSMENT — PAIN SCALES - GENERAL
PAINLEVEL_OUTOF10: 0

## 2022-06-08 NOTE — PLAN OF CARE
Problem: Discharge Planning  Goal: Discharge to home or other facility with appropriate resources  Outcome: Progressing  Flowsheets (Taken 6/8/2022 0546 by Donovan Oliver RN)  Discharge to home or other facility with appropriate resources: Refer to discharge planning if patient needs post-hospital services based on physician order or complex needs related to functional status, cognitive ability or social support system     Problem: Pain  Goal: Verbalizes/displays adequate comfort level or baseline comfort level  Outcome: Progressing     Problem: Skin/Tissue Integrity  Goal: Absence of new skin breakdown  Description: 1. Monitor for areas of redness and/or skin breakdown  2. Assess vascular access sites hourly  3. Every 4-6 hours minimum:  Change oxygen saturation probe site  4. Every 4-6 hours:  If on nasal continuous positive airway pressure, respiratory therapy assess nares and determine need for appliance change or resting period.   Outcome: Progressing     Problem: Safety - Adult  Goal: Free from fall injury  Outcome: Progressing     Problem: ABCDS Injury Assessment  Goal: Absence of physical injury  Outcome: Progressing     Problem: Respiratory - Adult  Goal: Achieves optimal ventilation and oxygenation  Outcome: Progressing     Problem: Cardiovascular - Adult  Goal: Maintains optimal cardiac output and hemodynamic stability  Outcome: Progressing     Problem: Musculoskeletal - Adult  Goal: Return mobility to safest level of function  Outcome: Progressing

## 2022-06-08 NOTE — FLOWSHEET NOTE
Chaplain Shanon munguia on PCU. Assessment:Patient calm and engaged well in conversation. Patient spoke of past and current health issues. Patient stated she is to be transferred for a heart [facility]. Patient has a spouse at home, for whom she cares for. Daughters are helping out with the care while she in in the hospital.  Patient stated Wisam Kraft was with me last night, I couldn't breathe\". When prayer was offered, she agreed. Intervention:   provided a listening and supportive presence. Outcome:  Patient seemed comforted by prayer, and expressed her gratitude for visit. Plan:  Chaplains will remain available to offer spiritual and emotional support as needed.        06/08/22 1515   Encounter Summary   Encounter Overview/Reason  Initial Encounter   Service Provided For: Patient   Referral/Consult From: [facility]   Support System Children;Spouse   Last Encounter  06/08/22   Complexity of Encounter Moderate   Begin Time 1505   End Time  1520   Total Time Calculated 15 min   Encounter    Type Initial Screen/Assessment   Assessment/Intervention/Outcome   Assessment Calm;Fearful;Tearful   Intervention Active listening;Prayer (assurance of)/[place];Sustaining Presence/Ministry of presence   Outcome Comfort;Engaged in conversation;Expressed Gratitude     Electronically signed by Jalen Perez on 6/8/2022 at 3:24 PM

## 2022-06-08 NOTE — PROGRESS NOTES
Received phone call from Dr. Alva Ortega, cardiology-patient's echo shows reduced EF. Patient to have heart cath tomorrow, hold Eliquis, start low intensity heparin tonight at 9, NPO after midnight. Patient notified of above, questions answered, patient is informing her spouse.

## 2022-06-08 NOTE — H&P
HOSPITALIST ADMISSION H&P      REASON FOR ADMISSION:  Right sided pneumonia, acute respiratory failure, COPD exacerbation  ESTIMATED LENGTH OF STAY:>2 midnights, 3-4 days    ATTENDING/ADMITTING PHYSICIAN: Harper Garcia MD  PCP: Kadi Gibbs    HISTORY OF PRESENT ILLNESS:      The patient is a 68 y.o. female patient of Kadi Gibbs who presents from the ER with c/o shortness of breath an cough over the past 1-2 weeks. Her dyspnea became so bad, she started using her 's home supplemental oxygen at 3L NC at times with some relief. She has never been hospitalized for pneumonia before. She has asthma and COPD and takes singulair and albuterol at home. She denies any chest pain, nausea, vomiting, diarrhea, or constipation. She has recently had cellulitis of her left lower leg -- was treated with PO Keflex and increased home lasix dose for 5 days with improvement. She states a board hit her leg prior to the cellulitis developing. She was treated for this problem by her PCP over the past 2 weeks and it (pain, redness, swelling) has been improving. She has PAF -- is on beta blocker, amiodarone, and eliquis    In ER, she was tachypneic with RR 33/min, hypoxic at 77% on 10L simple mask, and required bipap. CTA of the chest showed no PE, but right upper and lower pneumonia. WBC 11.1, temperature max 37.8C, lactic acid 1.8. Ultrasound of the left leg showed no DVT. 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 Patient zkfa-vttpenmiar-qywkttig-available records reviewed, including, but not limited to ER records, imaging results, lab results, office records, personal records, and OARRS -- no signs of abuse or diversion.      Past Medical History:   Diagnosis Date    Abdominal pain, RLQ 11/8/2021  History of colon cancer     History of iron deficiency anemia     Hyperlipidemia     Prolonged Q-T interval on ECG 5/1/2019    Formatting of this note might be different from the original. on [place] report    Stress disorder, acute     Thyroid goiter            Past Surgical History:   Procedure Laterality Date    COLON SURGERY  04/2008    COLECTOMY    COLONOSCOPY      COLONOSCOPY  03/13/2017    POLYP,DIVERTICULOSIS    HEMORRHOID SURGERY      BANDING    HERNIA REPAIR  2004    VENTRAL HERNIA    HYSTERECTOMY (CERVIX STATUS UNKNOWN)      JOINT REPLACEMENT      BILATERAL KNEE REPLACEMENT    LAPAROSCOPY N/A 7/11/2019    EXPLORATORY LAPAROSCOPY CONVERTED TO OPEN LYSIS OF ADHESIONS PARTIAL COLECTOMY performed by Maryan Marie MD at [address], PARTIAL      goiter removed     UPPER GASTROINTESTINAL ENDOSCOPY         Allergies:    Erythromycin, Fenofibrate, Lipitor [atorvastatin], Lopid [gemfibrozil], Other, and Propoxyphene    Social History:    reports that she has never smoked. She has never used smokeless tobacco. She reports that she does not drink alcohol and does not use drugs. Family History:   family history includes Heart Attack in her father; High Blood Pressure in her mother; Lung Cancer in her brother; No Known Problems in her brother, brother, sister, sister, sister, sister, sister, sister, sister, and sister. REVIEW OF SYSTEMS:  See HPI and problem list; otherwise no other new complaints with respect to eyes, ENT, neck, pulmonary, coronary, chest, GI, , endocrine, musculoskeletal, hematologic, lymphatic, allergic/immunologic, neurologic, psychiatric, or skin. Code status: patient/family wishes for Full Code at this time.     PHYSICAL EXAM:  Vitals:  BP 94/77   Pulse 56   Temp 98.5 °F (36.9 °C) (Tympanic)   Resp 18   Ht 5' 1\" (1.549 m)   Wt 239 lb 8 oz (108.6 kg)   SpO2 100%   BMI 45.25 kg/m²     General: pleasant, awake, alert and cooperative -- appears comfortable  HEENT: PERRLA, EMOI, External nose normal, Normocephalic, Atraumatic and Neck with full ROM -- with bipap in place  Neck: Supple, Carotid Pulses Present, No Masses, Tenderness, Nodularity and No Lymphadenopathy  Chest/Lungs: diminished throughout, Prolonged Expiratory Phase, Distant Breath Sounds and Respirations even and unlabored  Cardiac: regular rhythm, bradycardic rate and Pedal Pulses Palpable Bilaterally  GI/Abdomen:  Bowel Sounds Present and Soft, Non-tender, without Guarding or Rebound Tenderness  : Not examined  Extremities/Musculoskeletal: LLE with 1-2+ edema and blistering present and All four extremities with 5/5 strength  Skin: LLE as above (with venous stasis staining as well), No Cyanosis and Skin warm and dry  Neuro: Alert and Oriented, to Person, to Time, to Place, to Situation and No Localizing Signs/Symptoms  Psychiatric: Normal mood and affect      LABS:    CBC with Differential:    Lab Results   Component Value Date    WBC 11.1 06/07/2022    RBC 4.34 06/07/2022    HGB 12.8 06/07/2022    HCT 41.0 06/07/2022     06/07/2022    MCV 94.5 06/07/2022    MCH 29.5 06/07/2022    MCHC 31.2 06/07/2022    RDW 15.4 06/07/2022    LYMPHOPCT 16 06/07/2022    MONOPCT 4 06/07/2022    BASOPCT 0 06/07/2022    MONOSABS 0.45 06/07/2022    LYMPHSABS 1.81 06/07/2022    EOSABS 0.03 06/07/2022    EOSABS 446 02/05/2020    BASOSABS 0.03 06/07/2022    DIFFTYPE NOT REPORTED 10/30/2021     CMP:    Lab Results   Component Value Date     06/07/2022    K 4.3 06/07/2022    CL 99 06/07/2022    CO2 29 06/07/2022    BUN 22 06/07/2022    CREATININE 0.95 06/07/2022    GFRAA >60 06/07/2022    LABGLOM 57 06/07/2022    GLUCOSE 112 06/07/2022    PROT 7.2 06/07/2022    LABALBU 4.0 06/07/2022    CALCIUM 9.1 06/07/2022    BILITOT 1.00 06/07/2022    ALKPHOS 85 06/07/2022    AST 19 06/07/2022    ALT 17 06/07/2022       ASSESSMENT:      Patient Active Problem List   Diagnosis    Anxiety    APC (atrial premature contractions)    Asthmatic bronchitis, moderate persistent, with acute exacerbation    COPD (chronic obstructive pulmonary disease) with acute bronchitis (HCC)    Depression    H/O colon cancer, stage II    High risk medication use    Hx of abnormal mammogram    Hyperlipidemia    Morbid obesity with BMI of 40.0-44.9, adult (HCC)    Neuroendocrine tumor    OA (osteoarthritis)    Status post subtotal thyroidectomy    Thyroid goiter    Lipoma of abdominal wall    Pericardial effusion    Lymphadenopathy, axillary    Acute exacerbation of chronic obstructive pulmonary disease (COPD) (HCC)    Pneumonia of right lung due to infectious organism    A-fib (HCC)    Mild pulmonary hypertension [facility] [address]       PLAN:    1. Acute respiratory failure -- right sided pneumonia -- COPD exacerbation -- telemetry monitoring, IV antibiotics, mednebs, supplemental oxygen prn, bipap prn, RT protocols, IV steroids, second troponin pending, proBNP pending, rapid influenza testing pending, 2D echo pending (due to history of pulmonary hypertension and pericardial effusion)  2. Resolving left lower leg cellulitis -- elevate, monitor  3. PAF -- in sinus bradycardia -- con't home medications, AM EKG  4. Hypothyroidism -- TSH with reflex pending  5. CKD stage 3a -- stable overall, monitor I&O and renal function  6. Home medications reviewed  7. DVT prophylaxis -- home eliquis  8.  See orders     Note that over 50 minutes was spent in evaluation of the patient, review of the chart and pertinent records, discussion with family/staff, etc.    JANNETH Granados - CNP, FNP-BC  11:16 PM  6/7/2022

## 2022-06-08 NOTE — CONSULTS
[facility]    Consult                        Today's Date: 6/8/2022  Patient Name: Abena Velez  Date of admission: 6/7/2022  3:35 PM  Patient's age: 68 y. o., 1945  Admission Dx: [facility] exacerb[facility] pulmonary disease [facility]) [facility] [J44.1]  COPD with acute exacerbation (HCC) [J44.1]  Pneumonia of both lungs due to infectious organism, unspecified part of lung [J18.9]    Reason for Consult:  Cardiac evaluation    Requesting Physician: Brittney Lozada MD    CHIEF COMPLAINT:  dyspnea    History Obtained From:  patient, electronic medical record    HISTORY OF PRESENT ILLNESS:      The patient is a 68 y.o.  female who presented to ER with dyspnea and cough for last 1-2 weeks. Her symptoms progressed and she was using her  oxygen. She has history of PAF, asthma and COPD. Patient denies any chest pain. She was recently treated for cellulitis of left lower leg. HS trop 168-140  Pro BNP 3075  TSH 2.84    Overnight low BP readings noted. CT chest pulmonary infiltrates in RUL and RLL      Patient is on 6L oxygen    Past Medical History:   has a past medical history of Abdominal pain, RLQ, History of colon cancer, History of iron deficiency anemia, Hyperlipidemia, Prolonged Q-T interval on ECG, Stress disorder, acute, and Thyroid goiter. Past Surgical History:   has a past surgical history that includes Colonoscopy; Colon surgery (04/2008); Thyroidectomy, partial; Upper gastrointestinal endoscopy; joint replacement; hernia repair (2004); Hemorrhoid surgery; Colonoscopy (03/13/2017); Hysterectomy; and laparoscopy (N/A, 7/11/2019). Home Medications:    Prior to Admission medications    Medication Sig Start Date End Date Taking?  Authorizing Provider   furosemide (LASIX) 20 MG tablet Take 1 tablet by mouth daily 1/27/22   Dexter Norris MD   amiodarone (CORDARONE) 200 MG tablet Take 1 tablet by mouth 2 times daily 1/27/22   Dexter Norris MD   metoprolol tartrate (LOPRESSOR) 25 MG tablet Take 1 tablet by mouth 2 times daily 1/27/22   Saintclair Kung, MD   apixaban (ELIQUIS) 5 MG TABS tablet Take 1 tablet by mouth 2 times daily 1/27/22   Saintclair Kung, MD   SYMBICORT 160-4.5 MCG/ACT AERO USE 2 INHALATIONS TWICE A DAY 1/7/22   Dmitry Jason,    montelukast (SINGULAIR) 10 MG tablet TAKE 1 TABLET NIGHTLY 1/7/22   Pierre Height, DO   Ascorbic Acid (VITAMIN C) 250 MG tablet Take 250 mg by mouth daily    Historical Provider, MD   Zinc Sulfate (ZINC 15 PO) Take 1 tablet by mouth daily    Historical Provider, MD   vitamin D (CHOLECALCIFEROL) 125 MCG (5000 UT) CAPS capsule Take 5,000 Units by mouth daily     Historical Provider, MD   venlafaxine (EFFEXOR XR) 75 MG extended release capsule Take 75 mg by mouth daily 7/12/21 1/8/22  Historical Provider, MD   PROAIR  (90 Base) MCG/ACT inhaler Inhale 2 puffs into the lungs every 6 hours as needed for Wheezing or Shortness of Breath 2/5/20   Pierre Height, DO   albuterol (PROVENTIL) (2.5 MG/3ML) 0.083% nebulizer solution Inhale 2.5 mg into the lungs 1/26/18 12/20/21  Historical Provider, MD   aspirin EC 81 MG EC tablet Take 81 mg by mouth daily    Historical Provider, MD   fluticasone (FLONASE) 50 MCG/ACT nasal spray 2 sprays by Nasal route daily    Historical Provider, MD   traZODone (DESYREL) 100 MG tablet Take 100 mg by mouth nightly TAKE 1/4 OF A TABLET NIGHTLY PRN FOR SLEEP    Historical Provider, MD       Allergies:  Erythromycin, Fenofibrate, Lipitor [atorvastatin], Lopid [gemfibrozil], Other, and Propoxyphene    Social History:   reports that she has never smoked. She has never used smokeless tobacco. She reports that she does not drink alcohol and does not use drugs.      Family History: family history includes Heart Attack in her father; High Blood Pressure in her mother; Lung Cancer in her brother; No Known Problems in her brother, brother, sister, sister, sister, sister, sister, sister, sister, and sister. No h/o sudden cardiac death. No for premature CAD    REVIEW OF SYSTEMS:    · Constitutional: there has been no unanticipated weight loss. There's been No change in energy level, No change in activity level. · Eyes: No visual changes or diplopia. No scleral icterus. · ENT: No Headaches, hearing loss or vertigo. No mouth sores or sore throat. · Cardiovascular: see above  · Respiratory: see above  · Gastrointestinal: No abdominal pain, appetite loss, blood in stools. · Genitourinary: No dysuria, trouble voiding, or hematuria. · Musculoskeletal:  No gait disturbance, No weakness or joint complaints. · Integumentary: No rash or pruritis. · Neurological: No headache or diplopia. No tingling  · Psychiatric: No anxiety, or depression. · Endocrine: No temperature intolerance. · Hematologic/Lymphatic: No abnormal bruising or bleeding, blood clots or swollen lymph nodes. · Allergic/Immunologic: No nasal congestion or hives. PHYSICAL EXAM:      /78   Pulse 62   Temp 98 °F (36.7 °C) (Tympanic)   Resp 14   Ht 5' 1\" (1.549 m)   Wt 239 lb 6.4 oz (108.6 kg)   SpO2 95%   BMI 45.23 kg/m²    Constitutional and General Appearance: alert, cooperative, no distress and appears stated age  HEENT: PERRL, no cervical lymphadenopathy. No masses palpable. Normal oral mucosa  Respiratory:  · Normal excursion and expansion without use of accessory muscles  · Resp Auscultation: Good respiratory effort. No for increased work of breathing. On auscultation: clear to auscultation bilaterally  Cardiovascular:  · Heart tones are crisp and normal. regular S1 and S2.  · Jugular venous pulsation Normal  · The carotid upstroke is normal in amplitude and contour without delay or bruit   Abdomen:   · soft  · Bowel sounds present  Extremities:  ·  No edema  Neurological:  · Alert and oriented.       DATA:    Diagnostics:    EKG: Sinus bradycardia, low voltage    TTE 12/1/21  Summary  Normal left ventricular diameter. Borderline left ventricular hypertrophy. Left ventricular systolic function is normal.  Left ventricular ejection fraction 60 %. Unable to evaluate diastolic function. Left atrium is severely dilated. Right atrial dilatation. Right ventricular dilatation with reduced systolic function. Normal tricuspid valve leaflets. Trivial tricuspid regurgitation. Estimated right ventricular systolic pressure is 45 mmHg. Mild pulmonary hypertension. Normal function of other valves. Small pericardial effusion. Labs:     CBC:   Recent Labs     06/07/22  1548 06/08/22  0317   WBC 11.1 10.2   HGB 12.8 11.5*   HCT 41.0 36.7    202     BMP:   Recent Labs     06/07/22  1548 06/08/22  0317    139   K 4.3 4.8   CO2 29 28   BUN 22 24*   CREATININE 0.95* 1.13*   LABGLOM 57* 47*   GLUCOSE 112* 171*     BNP: No results for input(s): BNP in the last 72 hours. PT/INR:   Recent Labs     06/07/22  1548   PROTIME 14.9*   INR 1.2     APTT:No results for input(s): APTT in the last 72 hours. CARDIAC ENZYMES:No results for input(s): CKTOTAL, CKMB, CKMBINDEX, TROPONINI in the last 72 hours.   FASTING LIPID PANEL:No results found for: HDL, LDLDIRECT, LDLCALC, TRIG  LIVER PROFILE:  Recent Labs     06/07/22  1548   AST 19   ALT 17   LABALBU 4.0       IMPRESSION:    Patient Active Problem List   Diagnosis    Anxiety    APC (atrial premature contractions)    Asthmatic bronchitis, moderate persistent, with acute exacerbation    COPD (chronic obstructive pulmonary disease) with acute bronchitis ([address]    Depression    H/O colon cancer, stage II    High risk medication use    Hx of abnormal mammogram    Hyperlipidemia    Morbid obesity with BMI of 40.0-44.9, adult ([address]    Neuroendocrine tumor    OA (osteoarthritis)    Status post subtotal thyroidectomy    Thyroid goiter    Lipoma of abdominal wall    Pericardial effusion    Lymphadenopathy, axillary    Acute exacerbation of chronic obstructive pulmonary disease (COPD) ([facility]    Pneumonia of right lung due to infectious organism    A-fib ([facility] [address]    Mild pulmonary hypertension ([facility] [address]    [facility]     Acute respiratory failure  Right lung PNA  HS trop likely demand ischemia  Recent cellulitis  PAF  HTN  HL    RECOMMENDATIONS:  1. Antibiotics per primary team  2. Decrease amiodarone to 200mg po qday  3. Continue eliquis  4. Continue metoprolol  5. Obtain TTE   6. Suspect sleep apnea, recommend OP pulmonary evaluation. Discussed with patient and Nurse. Merry Coyle MD  [facility]           [phone]    Addendum  TTE LVEF 40-45%. WMA. On ASA. Hold Eliquis. Will arrange for cardiac cath tomorrow. NPO MN.     Merry Coyle MD

## 2022-06-09 ENCOUNTER — HOSPITAL ENCOUNTER (OUTPATIENT)
Dept: CARDIAC CATH/INVASIVE PROCEDURES | Age: 77
Discharge: HOME | End: 2022-06-09
Payer: MEDICARE

## 2022-06-09 ENCOUNTER — APPOINTMENT (OUTPATIENT)
Dept: GENERAL RADIOLOGY | Age: 77
DRG: 193 | End: 2022-06-09
Payer: MEDICARE

## 2022-06-09 VITALS
SYSTOLIC BLOOD PRESSURE: 137 MMHG | OXYGEN SATURATION: 100 % | RESPIRATION RATE: 19 BRPM | DIASTOLIC BLOOD PRESSURE: 68 MMHG | HEART RATE: 62 BPM

## 2022-06-09 LAB
ABSOLUTE EOS #: <0.03 K/UL (ref 0–0.44)
ABSOLUTE IMMATURE GRANULOCYTE: 0.06 K/UL (ref 0–0.3)
ABSOLUTE LYMPH #: 0.85 K/UL (ref 1.1–3.7)
ABSOLUTE MONO #: 0.19 K/UL (ref 0.1–1.2)
ANION GAP SERPL CALCULATED.3IONS-SCNC: 11 MMOL/L (ref 9–17)
BASOPHILS # BLD: 0 % (ref 0–2)
BASOPHILS ABSOLUTE: <0.03 K/UL (ref 0–0.2)
BUN BLDV-MCNC: 32 MG/DL (ref 8–23)
BUN/CREAT BLD: 27 (ref 9–20)
CALCIUM SERPL-MCNC: 8.2 MG/DL (ref 8.6–10.4)
CHLORIDE BLD-SCNC: 105 MMOL/L (ref 98–107)
CO2: 26 MMOL/L (ref 20–31)
CREAT SERPL-MCNC: 1.18 MG/DL (ref 0.5–0.9)
EOSINOPHILS RELATIVE PERCENT: 0 % (ref 1–4)
GFR AFRICAN AMERICAN: 54 ML/MIN
GFR NON-AFRICAN AMERICAN: 45 ML/MIN
GFR SERPL CREATININE-BSD FRML MDRD: ABNORMAL ML/MIN/{1.73_M2}
GLUCOSE BLD-MCNC: 172 MG/DL (ref 70–99)
HCT VFR BLD CALC: 34.6 % (ref 36.3–47.1)
HEMOGLOBIN: 10.9 G/DL (ref 11.9–15.1)
IMMATURE GRANULOCYTES: 1 %
LYMPHOCYTES # BLD: 8 % (ref 24–43)
MCH RBC QN AUTO: 30.1 PG (ref 25.2–33.5)
MCHC RBC AUTO-ENTMCNC: 31.5 G/DL (ref 25.2–33.5)
MCV RBC AUTO: 95.6 FL (ref 82.6–102.9)
MONOCYTES # BLD: 2 % (ref 3–12)
NRBC AUTOMATED: 0.2 PER 100 WBC
PARTIAL THROMBOPLASTIN TIME: 106.3 SEC (ref 23.9–33.8)
PARTIAL THROMBOPLASTIN TIME: 58 SEC (ref 23.9–33.8)
PDW BLD-RTO: 15.8 % (ref 11.8–14.4)
PLATELET # BLD: 202 K/UL (ref 138–453)
PMV BLD AUTO: 10.2 FL (ref 8.1–13.5)
POTASSIUM SERPL-SCNC: 4 MMOL/L (ref 3.7–5.3)
RBC # BLD: 3.62 M/UL (ref 3.95–5.11)
RBC # BLD: ABNORMAL 10*6/UL
SEG NEUTROPHILS: 89 % (ref 36–65)
SEGMENTED NEUTROPHILS ABSOLUTE COUNT: 9.92 K/UL (ref 1.5–8.1)
SODIUM BLD-SCNC: 142 MMOL/L (ref 135–144)
WBC # BLD: 11 K/UL (ref 3.5–11.3)

## 2022-06-09 PROCEDURE — 36415 COLL VENOUS BLD VENIPUNCTURE: CPT

## 2022-06-09 PROCEDURE — 6370000000 HC RX 637 (ALT 250 FOR IP): Performed by: FAMILY MEDICINE

## 2022-06-09 PROCEDURE — 2580000003 HC RX 258: Performed by: FAMILY MEDICINE

## 2022-06-09 PROCEDURE — 2580000003 HC RX 258: Performed by: NURSE PRACTITIONER

## 2022-06-09 PROCEDURE — 6370000000 HC RX 637 (ALT 250 FOR IP): Performed by: NURSE PRACTITIONER

## 2022-06-09 PROCEDURE — 7100000000 HC PACU RECOVERY - FIRST 15 MIN

## 2022-06-09 PROCEDURE — 93458 L HRT ARTERY/VENTRICLE ANGIO: CPT

## 2022-06-09 PROCEDURE — 94761 N-INVAS EAR/PLS OXIMETRY MLT: CPT

## 2022-06-09 PROCEDURE — 6360000002 HC RX W HCPCS

## 2022-06-09 PROCEDURE — 2709999900 HC NON-CHARGEABLE SUPPLY

## 2022-06-09 PROCEDURE — 2060000000 HC ICU INTERMEDIATE R&B

## 2022-06-09 PROCEDURE — 6360000002 HC RX W HCPCS: Performed by: NURSE PRACTITIONER

## 2022-06-09 PROCEDURE — 85025 COMPLETE CBC W/AUTO DIFF WBC: CPT

## 2022-06-09 PROCEDURE — 6370000000 HC RX 637 (ALT 250 FOR IP): Performed by: INTERNAL MEDICINE

## 2022-06-09 PROCEDURE — C1894 INTRO/SHEATH, NON-LASER: HCPCS

## 2022-06-09 PROCEDURE — 6360000004 HC RX CONTRAST MEDICATION

## 2022-06-09 PROCEDURE — C1769 GUIDE WIRE: HCPCS

## 2022-06-09 PROCEDURE — 99152 MOD SED SAME PHYS/QHP 5/>YRS: CPT

## 2022-06-09 PROCEDURE — 7100000001 HC PACU RECOVERY - ADDTL 15 MIN

## 2022-06-09 PROCEDURE — 71045 X-RAY EXAM CHEST 1 VIEW: CPT

## 2022-06-09 PROCEDURE — 2700000000 HC OXYGEN THERAPY PER DAY

## 2022-06-09 PROCEDURE — 85730 THROMBOPLASTIN TIME PARTIAL: CPT

## 2022-06-09 PROCEDURE — 80048 BASIC METABOLIC PNL TOTAL CA: CPT

## 2022-06-09 PROCEDURE — 94640 AIRWAY INHALATION TREATMENT: CPT

## 2022-06-09 RX ORDER — SODIUM CHLORIDE 0.9 % (FLUSH) 0.9 %
5-40 SYRINGE (ML) INJECTION EVERY 12 HOURS SCHEDULED
Status: DISCONTINUED | OUTPATIENT
Start: 2022-06-09 | End: 2022-06-10 | Stop reason: HOSPADM

## 2022-06-09 RX ORDER — SODIUM CHLORIDE 9 MG/ML
INJECTION, SOLUTION INTRAVENOUS PRN
Status: DISCONTINUED | OUTPATIENT
Start: 2022-06-09 | End: 2022-06-10 | Stop reason: HOSPADM

## 2022-06-09 RX ORDER — POLYETHYLENE GLYCOL 3350 17 G/17G
17 POWDER, FOR SOLUTION ORAL DAILY PRN
Status: DISCONTINUED | OUTPATIENT
Start: 2022-06-09 | End: 2022-06-10 | Stop reason: HOSPADM

## 2022-06-09 RX ORDER — SODIUM CHLORIDE 0.9 % (FLUSH) 0.9 %
5-40 SYRINGE (ML) INJECTION PRN
Status: DISCONTINUED | OUTPATIENT
Start: 2022-06-09 | End: 2022-06-10 | Stop reason: HOSPADM

## 2022-06-09 RX ORDER — ALBUTEROL SULFATE 2.5 MG/3ML
2.5 SOLUTION RESPIRATORY (INHALATION) EVERY 4 HOURS PRN
Status: DISCONTINUED | OUTPATIENT
Start: 2022-06-09 | End: 2022-06-10 | Stop reason: HOSPADM

## 2022-06-09 RX ORDER — FAMOTIDINE 20 MG/1
20 TABLET, FILM COATED ORAL DAILY
Status: DISCONTINUED | OUTPATIENT
Start: 2022-06-09 | End: 2022-06-09 | Stop reason: HOSPADM

## 2022-06-09 RX ORDER — ONDANSETRON 4 MG/1
4 TABLET, ORALLY DISINTEGRATING ORAL EVERY 8 HOURS PRN
Status: DISCONTINUED | OUTPATIENT
Start: 2022-06-09 | End: 2022-06-10 | Stop reason: HOSPADM

## 2022-06-09 RX ORDER — TRAZODONE HYDROCHLORIDE 50 MG/1
25 TABLET ORAL NIGHTLY PRN
Status: DISCONTINUED | OUTPATIENT
Start: 2022-06-10 | End: 2022-06-10 | Stop reason: HOSPADM

## 2022-06-09 RX ORDER — ASPIRIN 81 MG/1
81 TABLET ORAL DAILY
Status: DISCONTINUED | OUTPATIENT
Start: 2022-06-10 | End: 2022-06-10 | Stop reason: HOSPADM

## 2022-06-09 RX ORDER — FUROSEMIDE 20 MG/1
20 TABLET ORAL DAILY
Status: DISCONTINUED | OUTPATIENT
Start: 2022-06-10 | End: 2022-06-10 | Stop reason: HOSPADM

## 2022-06-09 RX ORDER — AZITHROMYCIN 250 MG/1
500 TABLET, FILM COATED ORAL
Status: DISCONTINUED | OUTPATIENT
Start: 2022-06-09 | End: 2022-06-09 | Stop reason: HOSPADM

## 2022-06-09 RX ORDER — METHYLPREDNISOLONE SODIUM SUCCINATE 125 MG/2ML
60 INJECTION, POWDER, LYOPHILIZED, FOR SOLUTION INTRAMUSCULAR; INTRAVENOUS EVERY 6 HOURS
Status: DISCONTINUED | OUTPATIENT
Start: 2022-06-09 | End: 2022-06-10 | Stop reason: HOSPADM

## 2022-06-09 RX ORDER — SODIUM CHLORIDE 9 MG/ML
INJECTION, SOLUTION INTRAVENOUS CONTINUOUS
Status: DISCONTINUED | OUTPATIENT
Start: 2022-06-09 | End: 2022-06-10 | Stop reason: HOSPADM

## 2022-06-09 RX ORDER — AMIODARONE HYDROCHLORIDE 200 MG/1
200 TABLET ORAL DAILY
Status: DISCONTINUED | OUTPATIENT
Start: 2022-06-10 | End: 2022-06-10 | Stop reason: HOSPADM

## 2022-06-09 RX ORDER — IPRATROPIUM BROMIDE AND ALBUTEROL SULFATE 2.5; .5 MG/3ML; MG/3ML
1 SOLUTION RESPIRATORY (INHALATION) EVERY 4 HOURS
Status: DISCONTINUED | OUTPATIENT
Start: 2022-06-10 | End: 2022-06-10 | Stop reason: HOSPADM

## 2022-06-09 RX ORDER — MONTELUKAST SODIUM 10 MG/1
10 TABLET ORAL NIGHTLY
Status: DISCONTINUED | OUTPATIENT
Start: 2022-06-09 | End: 2022-06-10 | Stop reason: HOSPADM

## 2022-06-09 RX ORDER — ACETAMINOPHEN 325 MG/1
650 TABLET ORAL EVERY 6 HOURS PRN
Status: DISCONTINUED | OUTPATIENT
Start: 2022-06-09 | End: 2022-06-10 | Stop reason: HOSPADM

## 2022-06-09 RX ORDER — SODIUM CHLORIDE 9 MG/ML
25 INJECTION, SOLUTION INTRAVENOUS PRN
Status: DISCONTINUED | OUTPATIENT
Start: 2022-06-09 | End: 2022-06-10 | Stop reason: HOSPADM

## 2022-06-09 RX ORDER — FAMOTIDINE 20 MG/1
20 TABLET, FILM COATED ORAL DAILY
Status: DISCONTINUED | OUTPATIENT
Start: 2022-06-10 | End: 2022-06-10 | Stop reason: HOSPADM

## 2022-06-09 RX ORDER — GUAIFENESIN 600 MG/1
600 TABLET, EXTENDED RELEASE ORAL 2 TIMES DAILY
Status: DISCONTINUED | OUTPATIENT
Start: 2022-06-09 | End: 2022-06-10 | Stop reason: HOSPADM

## 2022-06-09 RX ORDER — ACETAMINOPHEN 325 MG/1
650 TABLET ORAL EVERY 4 HOURS PRN
Status: DISCONTINUED | OUTPATIENT
Start: 2022-06-09 | End: 2022-06-09 | Stop reason: SDUPTHER

## 2022-06-09 RX ORDER — ACETAMINOPHEN 650 MG/1
650 SUPPOSITORY RECTAL EVERY 6 HOURS PRN
Status: DISCONTINUED | OUTPATIENT
Start: 2022-06-09 | End: 2022-06-10 | Stop reason: HOSPADM

## 2022-06-09 RX ORDER — ONDANSETRON 2 MG/ML
4 INJECTION INTRAMUSCULAR; INTRAVENOUS EVERY 6 HOURS PRN
Status: DISCONTINUED | OUTPATIENT
Start: 2022-06-09 | End: 2022-06-10 | Stop reason: HOSPADM

## 2022-06-09 RX ORDER — FLUTICASONE PROPIONATE 50 MCG
2 SPRAY, SUSPENSION (ML) NASAL DAILY
Status: DISCONTINUED | OUTPATIENT
Start: 2022-06-10 | End: 2022-06-10 | Stop reason: HOSPADM

## 2022-06-09 RX ADMIN — IPRATROPIUM BROMIDE AND ALBUTEROL SULFATE 1 AMPULE: .5; 3 SOLUTION RESPIRATORY (INHALATION) at 04:23

## 2022-06-09 RX ADMIN — MOMETASONE FUROATE AND FORMOTEROL FUMARATE DIHYDRATE 2 PUFF: 100; 5 AEROSOL RESPIRATORY (INHALATION) at 22:58

## 2022-06-09 RX ADMIN — SODIUM CHLORIDE: 9 INJECTION, SOLUTION INTRAVENOUS at 05:16

## 2022-06-09 RX ADMIN — CEFTRIAXONE 1000 MG: 1 INJECTION, POWDER, FOR SOLUTION INTRAMUSCULAR; INTRAVENOUS at 22:42

## 2022-06-09 RX ADMIN — SODIUM CHLORIDE, PRESERVATIVE FREE 10 ML: 5 INJECTION INTRAVENOUS at 22:48

## 2022-06-09 RX ADMIN — IPRATROPIUM BROMIDE AND ALBUTEROL SULFATE 1 AMPULE: .5; 3 SOLUTION RESPIRATORY (INHALATION) at 22:58

## 2022-06-09 RX ADMIN — METHYLPREDNISOLONE SODIUM SUCCINATE 60 MG: 125 INJECTION, POWDER, FOR SOLUTION INTRAMUSCULAR; INTRAVENOUS at 22:43

## 2022-06-09 RX ADMIN — AMIODARONE HYDROCHLORIDE 200 MG: 200 TABLET ORAL at 08:25

## 2022-06-09 RX ADMIN — MOMETASONE FUROATE AND FORMOTEROL FUMARATE DIHYDRATE 2 PUFF: 100; 5 AEROSOL RESPIRATORY (INHALATION) at 08:02

## 2022-06-09 RX ADMIN — FLUTICASONE PROPIONATE 2 SPRAY: 50 SPRAY, METERED NASAL at 08:26

## 2022-06-09 RX ADMIN — IPRATROPIUM BROMIDE AND ALBUTEROL SULFATE 1 AMPULE: .5; 3 SOLUTION RESPIRATORY (INHALATION) at 08:01

## 2022-06-09 RX ADMIN — GUAIFENESIN 600 MG: 600 TABLET, EXTENDED RELEASE ORAL at 08:26

## 2022-06-09 RX ADMIN — METOPROLOL TARTRATE 25 MG: 25 TABLET, FILM COATED ORAL at 22:46

## 2022-06-09 RX ADMIN — IPRATROPIUM BROMIDE AND ALBUTEROL SULFATE 1 AMPULE: .5; 3 SOLUTION RESPIRATORY (INHALATION) at 00:18

## 2022-06-09 RX ADMIN — METOPROLOL TARTRATE 25 MG: 25 TABLET, FILM COATED ORAL at 08:25

## 2022-06-09 RX ADMIN — METHYLPREDNISOLONE SODIUM SUCCINATE 80 MG: 125 INJECTION, POWDER, FOR SOLUTION INTRAMUSCULAR; INTRAVENOUS at 05:15

## 2022-06-09 RX ADMIN — FAMOTIDINE 20 MG: 20 TABLET ORAL at 08:25

## 2022-06-09 RX ADMIN — FUROSEMIDE 20 MG: 20 TABLET ORAL at 08:26

## 2022-06-09 RX ADMIN — SODIUM CHLORIDE: 9 INJECTION, SOLUTION INTRAVENOUS at 22:39

## 2022-06-09 RX ADMIN — MONTELUKAST 10 MG: 10 TABLET, FILM COATED ORAL at 22:46

## 2022-06-09 RX ADMIN — GUAIFENESIN 600 MG: 600 TABLET, EXTENDED RELEASE ORAL at 22:46

## 2022-06-09 ASSESSMENT — PAIN SCALES - GENERAL: PAINLEVEL_OUTOF10: 0

## 2022-06-09 NOTE — OP NOTE
[facility]    CARDIAC CATHETERIZATION    Date:   6/9/2022  Patient name:  Yvette Araiza  Date of admission:  6/9/2022 11:00 AM  MRN:   [id number]  YOB: 1945    Operators:  Primary:   Arun Coello MD (Attending Physician)      Procedure performed:       [x] Left Heart Catheterization. [] Graft Angiography. [x] Left Ventriculography. [] Right Heart Catheterization. [] Coronary Angiography. [] Aortic Valve Studies. [] PCI:      [] Other:       Pre Procedure Conscious Sedation Data:  ASA Class:    [] I [] II [x] III [] IV    Mallampati Class:  [x] I [] II [] III [] IV      Indication:  - Acute coronary syndrome   - abnormal ECHO     Procedure:  Access:  [] Femoral  [x] Radial  artery       [x] Right  [] Left    Procedure: After informed consent was obtained with explanation of the risks and benefits, patient was brought to the cath lab. The access area was prepped and draped in sterile fashion. 1% lidocaine was used for local block. The artery was cannulated with 6  Fr sheath with brisk arterial blood return. The side port was frequently flushed and aspirated with normal saline. Cardiac Arteries and Lesion Findings       LMCA: Normal 0% stenosis. LAD: Normal 0% stenosis. LCx: Normal 0% stenosis. RCA: Normal 0% stenosis. Coronary Tree        Dominance: Right       LV Analysis   LV function assessed as:Abnormal.   Ejection Fraction   +----------------------------------------------------------------------+---+   ! Method                                                                ! EF%! +----------------------------------------------------------------------+---+   ! LV gram                                                               !40 !   +----------------------------------------------------------------------+---+      LV Segment Contractility          Procedure Summary        Normal coronaries. Mildly impaired ventricular function. Stress Cardiomyopathy        Recommendations        Medical therapy as needed. Risk factor modification. Estimated Blood Loss: 5 mL      Electronically signed on 6/9/2022 at 2:13 PM by:    Carina Reza MD  Fellow, [id number] [facility]      Physician Statement  I have discussed the case of Godwin Jaimes including pertinent history and exam findings with the resident. I have seen and examined the patient and the key elements of the encounter have been performed by me. I agree with the assessment, plan and orders as documented by the resident With changes made to the note. Procedure performed by me.     Electronically signed by Siena Rea MD on 6/10/2022 at 6:55 PM.    [facility]      [phone]

## 2022-06-09 NOTE — PROGRESS NOTES
2 of air removed from TR band, no bleeding or hematoma noted. Radial pulse palpable. Report given to Savita Acuna per writer.

## 2022-06-09 NOTE — H&P
[facility]  Procedure History and Physical Update          Patient Name: Richie Lopez  MRN:    [id number]  YOB: 1945  Date of evaluation:  6/9/2022    Procedure:    Cardiac cath +/- PCI    Indication for procedure:  Abnormal ECHO       Please refer to the note completed by Dr. Samreen Kat on 06/08/2022 in the medical record and note that:    [x] I have examined the patient and reviewed the H&P/Consult and there are no changes to be made to the assessment or plan.     [] I have examined the patient and reviewed the H&P/Consult and have noted the following changes:    Past Medical History:   Diagnosis Date    Abdominal pain, RLQ 11/8/2021    History of colon cancer     History of iron deficiency anemia     Hyperlipidemia     Prolonged Q-T interval on ECG 5/1/2019    Formatting of this note might be different from the original. on [place] report    Stress disorder, acute     Thyroid goiter        Past Surgical History:   Procedure Laterality Date    COLON SURGERY  04/2008    COLECTOMY    COLONOSCOPY      COLONOSCOPY  03/13/2017    POLYP,DIVERTICULOSIS    HEMORRHOID SURGERY      BANDING    HERNIA REPAIR  2004    VENTRAL HERNIA    HYSTERECTOMY (CERVIX STATUS UNKNOWN)      JOINT REPLACEMENT      BILATERAL KNEE REPLACEMENT    LAPAROSCOPY N/A 7/11/2019    EXPLORATORY LAPAROSCOPY CONVERTED TO OPEN LYSIS OF ADHESIONS PARTIAL COLECTOMY performed by Stewart Miranda MD at [facility], PARTIAL      goiter removed     UPPER GASTROINTESTINAL ENDOSCOPY         Family History   Problem Relation Age of Onset    High Blood Pressure Mother     Heart Attack Father     No Known Problems Sister     No Known Problems Brother     No Known Problems Sister     No Known Problems Sister     No Known Problems Sister     No Known Problems Sister     No Known Problems Sister     No Known Problems Sister     No Known Problems Sister     No Known Problems Brother     Lung Cancer Brother     Colon Cancer Neg Hx Colon Polyps Neg Hx        Allergies   Allergen Reactions    Erythromycin     Fenofibrate     Lipitor [Atorvastatin]     Lopid [Gemfibrozil]     Other      DARVOCET      Propoxyphene Other (See Comments)     GI ISSUE-NAUSEA       Prior to Admission medications    Medication Sig Start Date End Date Taking?  Authorizing Provider   furosemide (LASIX) 20 MG tablet Take 1 tablet by mouth daily 1/27/22   Purvi Anderson MD   amiodarone (CORDARONE) 200 MG tablet Take 1 tablet by mouth 2 times daily 1/27/22   Purvi Anderson MD   metoprolol tartrate (LOPRESSOR) 25 MG tablet Take 1 tablet by mouth 2 times daily 1/27/22   Purvi Anderson MD   apixaban (ELIQUIS) 5 MG TABS tablet Take 1 tablet by mouth 2 times daily 1/27/22   Purvi Anderson MD   SYMBICORT 160-4.5 MCG/ACT AERO USE 2 INHALATIONS TWICE A DAY 1/7/22   Rosie Jason DO   montelukast (SINGULAIR) 10 MG tablet TAKE 1 TABLET NIGHTLY 1/7/22   Jean-Pierre Narayanan DO   Ascorbic Acid (VITAMIN C) 250 MG tablet Take 250 mg by mouth daily    Historical Provider, MD   Zinc Sulfate (ZINC 15 PO) Take 1 tablet by mouth daily    Historical Provider, MD   vitamin D (CHOLECALCIFEROL) 125 MCG (5000 UT) CAPS capsule Take 5,000 Units by mouth daily     Historical Provider, MD   venlafaxine (EFFEXOR XR) 75 MG extended release capsule Take 75 mg by mouth daily 7/12/21 1/8/22  Historical Provider, MD   PROAIR  (90 Base) MCG/ACT inhaler Inhale 2 puffs into the lungs every 6 hours as needed for Wheezing or Shortness of Breath 2/5/20   Jean-Pierre Narayanan DO   albuterol (PROVENTIL) (2.5 MG/3ML) 0.083% nebulizer solution Inhale 2.5 mg into the lungs 1/26/18 12/20/21  Historical Provider, MD   aspirin EC 81 MG EC tablet Take 81 mg by mouth daily    Historical Provider, MD   fluticasone (FLONASE) 50 MCG/ACT nasal spray 2 sprays by Nasal route daily    Historical Provider, MD   traZODone (DESYREL) 100 MG tablet Take 100 mg by mouth nightly TAKE 1/4 OF A TABLET NIGHTLY PRN FOR SLEEP Historical Provider, MD         There were no vitals filed for this visit. Constitutional and General Appearance:   alert, cooperative, no distress and appears stated age  [de-identified]:  PERRL, EOMI  Respiratory:  Normal excursion and expansion without use of accessory muscles  Resp Auscultation:  Good respiratory effort. No for increased work of breathing. On auscultation: clear to auscultation bilaterally  Cardiovascular:  Regular rate and rhythm. S1/S2  No murmurs. The apical impulse is not displaced  Abdomen:  Soft  Bowel sounds present  Non-tender to palpation  Extremities:  No cyanosis or clubbing  Lower extremity edema: No.  Skin:  Warm and dry  Neurological:  Alert and oriented. Moves all extremities well      Normal left ventricular diameter. Mild left ventricular hypertrophy. Proximal half of the left ventricle is hyperdynamic and distal half  including the apex is hypokinetic and dyskinetic. Left ventricular systolic function is mildly reduced. Left ventricular ejection fraction 45 %. Grade II (moderate) left ventricular diastolic dysfunction. Left atrium is moderately dilated. Right atrial dilatation. Right ventricle is normal in size with poor apical function. Aortic valve sclerosis without stenosis. Mild mitral valve thickening with annular calcification. Normal tricuspid valve leaflets. Mild tricuspid regurgitation. Estimated right ventricular systolic pressure is 58 mmHg. Moderate pulmonary hypertension. Small pericardial effusion. Aortic root dimension is at upper limit of normal.  IVC Increased diameter and impaired or no inspiratory variation indicating  elevated RA filling pressure (i.e. CVP) . E/E' average = 18.        1 COLLADO   2 Elevated troponin   3 PNA   4 PAF   5 HTN   6 HLD       Plan:  Proceed with planned procedure. Further orders to follow.       Pre Procedure Conscious Sedation Data:     ASA Class:                  [] I [] II [x] III [] IV     Mallampati Class:       [] I [] II [x] III [] IV    Risks, benefits, and alternatives of cardiac catheterization were discussed, in detail, with patient. Risks include, but not limited to, bleeding, requiring blood transfusion, vascular complication requiring surgery, renal failure with need of dialysis, CVA, MI, death and anesthesia complications including intubation were discussed. Patient verbalized understanding and agreed to proceed with the procedure understanding the above risks and alternatives to the procedure. Electronically signed on 06/09/22 at 7:49 AM by:    Marley Ma MD, MD   Fellow, [address]        Attending Physician Statement  I have discussed the case of Jacqueline Segal including pertinent history and exam findings with the resident. I have seen and examined the patient and the key elements of the encounter have been performed by me. I agree with the assessment, plan and orders as documented by the resident With changes made to the note.      Electronically signed by Rosamond Scheuermann, MD on 6/10/2022 at [address] [facility]      [phone]

## 2022-06-09 NOTE — PROGRESS NOTES
Patient received post cath to [facility] room 14. Assessment obtained. Post cath pathway initiated. Right radial site with TR band inflated with 10 mL of air intact. No hematoma noted. Patient without complaints.

## 2022-06-09 NOTE — PROGRESS NOTES
All air removed from vasc-band as reported to writer from [employer]. Vasc-band removed by writer at 17:15. No hematoma or bleeding noted, radial pulse palpable. Pressure dressing applied.

## 2022-06-09 NOTE — PROGRESS NOTES
Patient admitted, consent signed and questions answered. Patient ready for procedure. Call light to reach with side rails up 2 of 2. Bilateral groins clipped. No  at bedside with patient. History and physical updated. Patient transferred from [facility] . Patient alert and oriented. Denies any pain at this time. no family present with patient  on arrival to unit

## 2022-06-09 NOTE — FLOWSHEET NOTE
Chaplain Shanon munguia on PCU, patient to be transferred to [facility] for deni cath. Assessment:  Patient  waiting  transport. Intervention:  Ian Johnson prayed with patient while waiting for transport. Outcome:  Patient seemed comforted by prayer and expressed her gratitude for visit. Plan:  Chaplains will remain available to offer spiritual and emotional support as needed. 06/09/22 0917   Encounter Summary   Encounter Overview/Reason  Initial Encounter   Service Provided For: Patient   Referral/Consult From: Trini   Support System Spouse; Children   Last Encounter  06/08/22   Complexity of Encounter Low   Begin Time 0810   End Time  0820   Total Time Calculated 10 min   Encounter    Type Pre-Procedural   Assessment/Intervention/Outcome   Assessment Anxious; Fearful;Tearful   Intervention Active listening;Discussed belief system/Scientology practices/marco antonio;Prayer (assurance of)/[place];Nurtured Hope;Sustaining Presence/Ministry of presence   Outcome Comfort; Concerns relieved;Engaged in conversation;Expressed Gratitude; Optimistic       Electronically signed by Sridevi Carver on 6/9/2022 at 9:24 AM

## 2022-06-10 ENCOUNTER — APPOINTMENT (OUTPATIENT)
Dept: GENERAL RADIOLOGY | Age: 77
DRG: 193 | End: 2022-06-10
Payer: MEDICARE

## 2022-06-10 VITALS
RESPIRATION RATE: 18 BRPM | TEMPERATURE: 98.7 F | WEIGHT: 243.2 LBS | BODY MASS INDEX: 45.91 KG/M2 | HEART RATE: 63 BPM | OXYGEN SATURATION: 96 % | HEIGHT: 61 IN | DIASTOLIC BLOOD PRESSURE: 68 MMHG | SYSTOLIC BLOOD PRESSURE: 128 MMHG

## 2022-06-10 LAB
ABSOLUTE EOS #: <0.03 K/UL (ref 0–0.44)
ABSOLUTE IMMATURE GRANULOCYTE: 0.09 K/UL (ref 0–0.3)
ABSOLUTE LYMPH #: 0.84 K/UL (ref 1.1–3.7)
ABSOLUTE MONO #: 0.19 K/UL (ref 0.1–1.2)
ANION GAP SERPL CALCULATED.3IONS-SCNC: 9 MMOL/L (ref 9–17)
BASOPHILS # BLD: 0 % (ref 0–2)
BASOPHILS ABSOLUTE: <0.03 K/UL (ref 0–0.2)
BUN BLDV-MCNC: 36 MG/DL (ref 8–23)
BUN/CREAT BLD: 31 (ref 9–20)
CALCIUM SERPL-MCNC: 8.9 MG/DL (ref 8.6–10.4)
CHLORIDE BLD-SCNC: 107 MMOL/L (ref 98–107)
CO2: 26 MMOL/L (ref 20–31)
CREAT SERPL-MCNC: 1.16 MG/DL (ref 0.5–0.9)
EOSINOPHILS RELATIVE PERCENT: 0 % (ref 1–4)
GFR AFRICAN AMERICAN: 55 ML/MIN
GFR NON-AFRICAN AMERICAN: 45 ML/MIN
GFR SERPL CREATININE-BSD FRML MDRD: ABNORMAL ML/MIN/{1.73_M2}
GLUCOSE BLD-MCNC: 156 MG/DL (ref 70–99)
HCT VFR BLD CALC: 36.7 % (ref 36.3–47.1)
HEMOGLOBIN: 11.2 G/DL (ref 11.9–15.1)
IMMATURE GRANULOCYTES: 1 %
LYMPHOCYTES # BLD: 7 % (ref 24–43)
MCH RBC QN AUTO: 29.9 PG (ref 25.2–33.5)
MCHC RBC AUTO-ENTMCNC: 30.5 G/DL (ref 25.2–33.5)
MCV RBC AUTO: 98.1 FL (ref 82.6–102.9)
MONOCYTES # BLD: 2 % (ref 3–12)
NRBC AUTOMATED: 0 PER 100 WBC
PDW BLD-RTO: 16.1 % (ref 11.8–14.4)
PLATELET # BLD: 255 K/UL (ref 138–453)
PMV BLD AUTO: 10.3 FL (ref 8.1–13.5)
POTASSIUM SERPL-SCNC: 4 MMOL/L (ref 3.7–5.3)
PRO-BNP: ABNORMAL PG/ML
RBC # BLD: 3.74 M/UL (ref 3.95–5.11)
RBC # BLD: ABNORMAL 10*6/UL
SEG NEUTROPHILS: 90 % (ref 36–65)
SEGMENTED NEUTROPHILS ABSOLUTE COUNT: 10.88 K/UL (ref 1.5–8.1)
SODIUM BLD-SCNC: 142 MMOL/L (ref 135–144)
WBC # BLD: 12 K/UL (ref 3.5–11.3)

## 2022-06-10 PROCEDURE — 99238 HOSP IP/OBS DSCHRG MGMT 30/<: CPT | Performed by: FAMILY MEDICINE

## 2022-06-10 PROCEDURE — 94660 CPAP INITIATION&MGMT: CPT

## 2022-06-10 PROCEDURE — 71045 X-RAY EXAM CHEST 1 VIEW: CPT

## 2022-06-10 PROCEDURE — 6370000000 HC RX 637 (ALT 250 FOR IP): Performed by: NURSE PRACTITIONER

## 2022-06-10 PROCEDURE — 85025 COMPLETE CBC W/AUTO DIFF WBC: CPT

## 2022-06-10 PROCEDURE — 94640 AIRWAY INHALATION TREATMENT: CPT

## 2022-06-10 PROCEDURE — 6360000002 HC RX W HCPCS: Performed by: NURSE PRACTITIONER

## 2022-06-10 PROCEDURE — 36415 COLL VENOUS BLD VENIPUNCTURE: CPT

## 2022-06-10 PROCEDURE — 6370000000 HC RX 637 (ALT 250 FOR IP): Performed by: FAMILY MEDICINE

## 2022-06-10 PROCEDURE — 94760 N-INVAS EAR/PLS OXIMETRY 1: CPT

## 2022-06-10 PROCEDURE — 83880 ASSAY OF NATRIURETIC PEPTIDE: CPT

## 2022-06-10 PROCEDURE — 2580000003 HC RX 258: Performed by: NURSE PRACTITIONER

## 2022-06-10 PROCEDURE — 2700000000 HC OXYGEN THERAPY PER DAY

## 2022-06-10 PROCEDURE — 80048 BASIC METABOLIC PNL TOTAL CA: CPT

## 2022-06-10 RX ORDER — CEFUROXIME AXETIL 250 MG/1
250 TABLET ORAL 2 TIMES DAILY
Qty: 14 TABLET | Refills: 0 | Status: SHIPPED | OUTPATIENT
Start: 2022-06-10 | End: 2022-06-17

## 2022-06-10 RX ORDER — AZITHROMYCIN 250 MG/1
TABLET, FILM COATED ORAL
Qty: 3 TABLET | Refills: 0 | Status: SHIPPED | OUTPATIENT
Start: 2022-06-10 | End: 2022-07-05 | Stop reason: ALTCHOICE

## 2022-06-10 RX ORDER — GUAIFENESIN 600 MG/1
600 TABLET, EXTENDED RELEASE ORAL 2 TIMES DAILY
Qty: 20 TABLET | Refills: 0 | Status: SHIPPED | OUTPATIENT
Start: 2022-06-10

## 2022-06-10 RX ORDER — PREDNISONE 10 MG/1
10 TABLET ORAL 2 TIMES DAILY
Qty: 10 TABLET | Refills: 0 | Status: SHIPPED | OUTPATIENT
Start: 2022-06-10 | End: 2022-06-15

## 2022-06-10 RX ORDER — IPRATROPIUM BROMIDE AND ALBUTEROL SULFATE 2.5; .5 MG/3ML; MG/3ML
3 SOLUTION RESPIRATORY (INHALATION) EVERY 4 HOURS
Qty: 360 ML | Refills: 1 | Status: SHIPPED | OUTPATIENT
Start: 2022-06-10

## 2022-06-10 RX ADMIN — METHYLPREDNISOLONE SODIUM SUCCINATE 60 MG: 125 INJECTION, POWDER, FOR SOLUTION INTRAMUSCULAR; INTRAVENOUS at 04:14

## 2022-06-10 RX ADMIN — IPRATROPIUM BROMIDE AND ALBUTEROL SULFATE 1 AMPULE: .5; 3 SOLUTION RESPIRATORY (INHALATION) at 04:13

## 2022-06-10 RX ADMIN — METHYLPREDNISOLONE SODIUM SUCCINATE 60 MG: 125 INJECTION, POWDER, FOR SOLUTION INTRAMUSCULAR; INTRAVENOUS at 09:03

## 2022-06-10 RX ADMIN — MOMETASONE FUROATE AND FORMOTEROL FUMARATE DIHYDRATE 2 PUFF: 100; 5 AEROSOL RESPIRATORY (INHALATION) at 09:39

## 2022-06-10 RX ADMIN — IPRATROPIUM BROMIDE AND ALBUTEROL SULFATE 1 AMPULE: .5; 3 SOLUTION RESPIRATORY (INHALATION) at 09:39

## 2022-06-10 RX ADMIN — Medication 2 SPRAY: at 08:56

## 2022-06-10 RX ADMIN — METOPROLOL TARTRATE 25 MG: 25 TABLET, FILM COATED ORAL at 08:56

## 2022-06-10 RX ADMIN — FAMOTIDINE 20 MG: 20 TABLET, FILM COATED ORAL at 08:55

## 2022-06-10 RX ADMIN — AZITHROMYCIN MONOHYDRATE 500 MG: 500 INJECTION, POWDER, LYOPHILIZED, FOR SOLUTION INTRAVENOUS at 00:01

## 2022-06-10 RX ADMIN — AMIODARONE HYDROCHLORIDE 200 MG: 200 TABLET ORAL at 08:55

## 2022-06-10 RX ADMIN — FUROSEMIDE 20 MG: 20 TABLET ORAL at 08:55

## 2022-06-10 RX ADMIN — APIXABAN 5 MG: 5 TABLET, FILM COATED ORAL at 11:30

## 2022-06-10 RX ADMIN — GUAIFENESIN 600 MG: 600 TABLET, EXTENDED RELEASE ORAL at 08:56

## 2022-06-10 RX ADMIN — ASPIRIN 81 MG: 81 TABLET, COATED ORAL at 08:55

## 2022-06-10 NOTE — DISCHARGE SUMMARY
Hospitalist Discharge Summary    Caty Carpenter  :  1945  MRN:  [id number]    Admit date:  2022  Discharge date:  6/10/22    Admitting Physician:  Elvira Potter MD    Discharge Diagnoses:   Patient Active Problem List   Diagnosis    Anxiety    APC (atrial premature contractions)    Asthmatic bronchitis, moderate persistent, with acute exacerbation    COPD (chronic obstructive pulmonary disease) with acute bronchitis [facility]    Depression    H/O colon cancer, stage II    High risk medication use    Hx of abnormal mammogram    Hyperlipidemia    Morbid obesity with BMI of 40.0-44.9, adult ([facility]    Neuroendocrine tumor    OA (osteoarthritis)    Status post subtotal thyroidectomy    Thyroid goiter    Lipoma of abdominal wall    Pericardial effusion    Lymphadenopathy, axillary    Acute exacerbation of chronic obstructive pulmonary disease (COPD) ([facility]    Pneumonia of both lungs due to infectious organism    A-fib ([facility]    Mild pulmonary hypertension ([facility]    Acute respiratory failure with hypoxia ([facility]        Admission Condition:  fair      Discharged Condition:  good    Hospital Course/Treatments   Admitted with ac resp failure, pt was seen In ER admitted with pneumonia,, pt has h/o of paf, pt is on b blocker and eliquis, pt was taken to cath lab due to persist chest pain, pt cath was reported noral, pt will be d/c with following meds    Discharge Medications:         Medication List      START taking these medications    azithromycin 250 MG tablet  Commonly known as: Zithromax  Take one tab daily     cefUROXime 250 MG tablet  Commonly known as: CEFTIN  Take 1 tablet by mouth 2 times daily for 7 days     guaiFENesin 600 MG extended release tablet  Commonly known as: MUCINEX  Take 1 tablet by mouth 2 times daily     ipratropium-albuterol 0.5-2.5 (3) MG/3ML Soln nebulizer solution  Commonly known as: DUONEB  Inhale 3 mLs into the lungs every 4 hours     predniSONE 10 MG tablet  Commonly known as: DELTASONE  Take 1 tablet by mouth 2 times daily for 5 days        CONTINUE taking these medications    * albuterol (2.5 MG/3ML) 0.083% nebulizer solution  Commonly known as: PROVENTIL     * ProAir  (90 Base) MCG/ACT inhaler  Generic drug: albuterol sulfate HFA  Inhale 2 puffs into the lungs every 6 hours as needed for Wheezing or Shortness of Breath     amiodarone 200 MG tablet  Commonly known as: CORDARONE  Take 1 tablet by mouth 2 times daily     apixaban 5 MG Tabs tablet  Commonly known as: Eliquis  Take 1 tablet by mouth 2 times daily     aspirin EC 81 MG EC tablet     fluticasone 50 MCG/ACT nasal spray  Commonly known as: FLONASE     furosemide 20 MG tablet  Commonly known as: LASIX  Take 1 tablet by mouth daily     metoprolol tartrate 25 MG tablet  Commonly known as: LOPRESSOR  Take 1 tablet by mouth 2 times daily     montelukast 10 MG tablet  Commonly known as: SINGULAIR  TAKE 1 TABLET NIGHTLY     Symbicort 160-4.5 MCG/ACT Aero  Generic drug: budesonide-formoterol  USE 2 INHALATIONS TWICE A DAY     traZODone 100 MG tablet  Commonly known as: DESYREL         * This list has 2 medication(s) that are the same as other medications prescribed for you. Read the directions carefully, and ask your doctor or other care provider to review them with you.             STOP taking these medications    venlafaxine 75 MG extended release capsule  Commonly known as: EFFEXOR XR           Where to Get Your Medications      You can get these medications from any pharmacy    Bring a paper prescription for each of these medications  · azithromycin 250 MG tablet  · cefUROXime 250 MG tablet  · guaiFENesin 600 MG extended release tablet  · ipratropium-albuterol 0.5-2.5 (3) MG/3ML Soln nebulizer solution  · predniSONE 10 MG tablet         Consults:  IP CONSULT TO HOSPITALIST  IP CONSULT TO CARDIOLOGY    Significant Diagnostic Studies:  XR CHEST PORTABLE    Result Date: 6/10/2022  EXAMINATION: ONE XRAY VIEW OF THE CHEST 6/10/2022 7:04 am COMPARISON: 06/09/2022, 02/05/2020, 07/10/2019, 08/31/2017 HISTORY: ORDERING SYSTEM PROVIDED HISTORY: pneumonia TECHNOLOGIST PROVIDED HISTORY: pneumonia Reason for Exam: pneumonia FINDINGS: Persistent bibasilar pulmonary opacities with small effusions, overall not substantially changed from 1 day prior. Unchanged cardiomegaly. No pneumothorax. No acute bony findings. Examination is overall not substantially changed from yesterday. XR CHEST PORTABLE    Result Date: 6/9/2022  EXAMINATION: ONE XRAY VIEW OF THE CHEST 6/9/2022 6:49 am COMPARISON: Chest radiograph performed 02/05/2020. HISTORY: ORDERING SYSTEM PROVIDED HISTORY: pneumonia TECHNOLOGIST PROVIDED HISTORY: pneumonia Reason for Exam: Pneumonia FINDINGS: There are trace effusions. There are bibasilar infiltrates. There is no pneumothorax. The heart is enlarged. The upper abdomen is unremarkable. The extrathoracic soft tissues are unremarkable. Cardiomegaly with trace effusions and bibasilar infiltrates. Disposition:   home    Discharge Instructions: Activity: activity as tolerated  Diet:  regular diet    Follow up with Nadia Mcconnell in 1 weeks.     Signed:  Serenity Pinto MD  6/10/2022, 11:20 AM    Time spent in discharge of this pt is more than 30 minutes in examination,evaluvation,  counseling and review of medication and discharge plan

## 2022-06-10 NOTE — DISCHARGE INSTR - DIET

## 2022-06-10 NOTE — PLAN OF CARE
Problem: Discharge Planning  Goal: Discharge to home or other facility with appropriate resources  6/9/2022 2207 by Marcy Oneill RN  Outcome: Progressing  6/9/2022 1559 by Kristopher Goltz, RN  Outcome: Progressing  Flowsheets (Taken 6/9/2022 0815)  Discharge to home or other facility with appropriate resources:   Identify barriers to discharge with patient and caregiver   Arrange for needed discharge resources and transportation as appropriate     Problem: Pain  Goal: Verbalizes/displays adequate comfort level or baseline comfort level  6/9/2022 2207 by Marcy Oneill RN  Outcome: Progressing  6/9/2022 1559 by Kristopher Goltz, RN  Outcome: Progressing     Problem: Skin/Tissue Integrity  Goal: Absence of new skin breakdown  Description: 1. Monitor for areas of redness and/or skin breakdown  2. Assess vascular access sites hourly  3. Every 4-6 hours minimum:  Change oxygen saturation probe site  4. Every 4-6 hours:  If on nasal continuous positive airway pressure, respiratory therapy assess nares and determine need for appliance change or resting period.   6/9/2022 2207 by Marcy Oneill RN  Outcome: Progressing  6/9/2022 1559 by Kristopher Goltz, RN  Outcome: Progressing     Problem: Safety - Adult  Goal: Free from fall injury  6/9/2022 2207 by Marcy Oneill RN  Outcome: Progressing  6/9/2022 1559 by Kristopher Goltz, RN  Outcome: Progressing     Problem: ABCDS Injury Assessment  Goal: Absence of physical injury  6/9/2022 2207 by Marcy Oneill RN  Outcome: Progressing  6/9/2022 1559 by Kristopher Goltz, RN  Outcome: Progressing     Problem: Respiratory - Adult  Goal: Achieves optimal ventilation and oxygenation  6/9/2022 2207 by Marcy Oneill RN  Outcome: Progressing  6/9/2022 1559 by Kristopher Goltz, RN  Outcome: Progressing     Problem: Cardiovascular - Adult  Goal: Maintains optimal cardiac output and hemodynamic stability  6/9/2022 2207 by Marcy Oneill RN  Outcome: Progressing  6/9/2022 1559 by Kristopher Goltz, RN  Outcome: Progressing  Flowsheets (Taken 6/9/2022 0815)  Maintains optimal cardiac output and hemodynamic stability:   Monitor blood pressure and heart rate   Monitor urine output and notify Licensed Independent Practitioner for values outside of normal range     Problem: Musculoskeletal - Adult  Goal: Return mobility to safest level of function  6/9/2022 2207 by Saeid Orosco RN  Outcome: Progressing  6/9/2022 1559 by Romy Hodges RN  Outcome: Progressing  Flowsheets (Taken 6/9/2022 0815)  Return Mobility to Safest Level of Function:   Assess patient stability and activity tolerance for standing, transferring and ambulating with or without assistive devices   Assist with transfers and ambulation using safe patient handling equipment as needed

## 2022-06-10 NOTE — CARE COORDINATION
Case Management Initial Discharge Plan  Itzel Perez             Met with:patient to discuss discharge plans. Information verified: address, contacts, phone number, , and insurance: Yes  Insurance Provider: Primary:  Payor: Hayes Curling / Plan: Rey Whittaker PPO / Product Type: Medicare /                                         Emergency Contact/Next of Kin name & number: Renato Solitario, see chart  Who are involved in patient's support system? family    PCP: Kadi Gibbs  Date of last visit: see chart    Discharge Planning    Living Arrangements:  Spouse/Significant 3 Brad Tellez has 1 stories  0 stairs to climb to get into front door, n/a stairs to climb to reach second floor  Location of bedroom/bathroom in home first    Patient able to perform ADL's:Independent    Current Services (outpatient & in home) none   DME equipment: n/a  DME provider: n/a    Is patient receiving oral anticoagulation therapy? Yes    If indicated:   Physician managing anticoagulation treatment: PCP  Where does patient obtain lab work for ATC treatment? n/a      Potential Assistance Needed:  N/A    Patient agreeable to home care: Yes  Freedom of choice provided:  yes    Prior SNF/Rehab Placement and Facility: none  Agreeable to SNF/Rehab: No  [place] of choice provided: no      Expected Discharge date:       Patient expects to be discharged to: If home: is the family and/or caregiver wiling & able to provide support at home? yes  Who will be providing this support? family    Follow Up Appointment: Best Day/ Time:      Transportation provider: self  Transportation arrangements needed for discharge: No    Readmission Risk              Risk of Unplanned Readmission:  15             Does patient have a readmission risk score greater than 20?: No  If yes, follow-up appointment must be made within 7 days of discharge.      Goals of Care:       Educated patient on transitional options, provided freedom of choice and are agreeable with plan      Discharge Plan: Patient lives at home with her . Patient/Family/Responsible party discussed discharge planning, provided with list of SNF/Home health agencies. Chose [facility] health. Information called and faxed.           Electronically signed by Marya Bonds RN on 6/10/22 at 11:10 AM EDT

## 2022-06-12 LAB
CULTURE: NORMAL
CULTURE: NORMAL
SPECIMEN DESCRIPTION: NORMAL
SPECIMEN DESCRIPTION: NORMAL

## 2022-06-13 ENCOUNTER — CARE COORDINATION (OUTPATIENT)
Dept: CASE MANAGEMENT | Age: 77
End: 2022-06-13

## 2022-06-13 NOTE — CARE COORDINATION
[facility] Transitions Initial Follow Up Call    Call within 2 business days of discharge: Yes    Patient: Yolanda Justice Patient : 1945    MRN: <[id number]>  Reason for Admission: Pneumonia of both lungs due to infectious organism    Discharge Date: 6/10/22 RARS: Readmission Risk Score: 15.1 ( )      Last Discharge [facility]       Complaint Diagnosis Description Type Department Provider    22 trouble breathing Pneumonia of both lungs due to infectious organism, unspecified part of lung . .. ED to Hosp-Admission (Discharged) (ADMITTED) Harshil De La Fuente MD; Nadya Nayak. .. Spoke with: SOB fever, chills still has cough and occasional dizziness using walker when needed. She is eating and drinking well normal bowel and bladder elimination. Patient has all medications and taking as directed. Has PCP f/u scheduled and has no current concerns    Facility: [facility]    Non-face-to-face services provided:  Obtained and reviewed discharge summary and/or continuity of care documents  Transitions of Care Initial Call    Was this an external facility discharge? No     Challenges to be reviewed by the provider   Additional needs identified to be addressed with provider: No  none             Method of communication with provider : none    Advance Care Planning:   Does patient have an Advance Directive: reviewed and current, reviewed and needs to be updated, not on file; education provided, patient declined education, decision maker updated and referral to internal ACP facilitator. LPN Care Coordinator contacted the patient by telephone to perform post hospital discharge assessment. Verified name and  with patient as identifiers. Provided introduction to self, and explanation of the LPN CC role. LPN CC reviewed discharge instructions, medical action plan and red flags with patient who verbalized understanding.  Patient given an opportunity to ask questions and does not have any further questions or concerns at this time. Were discharge instructions available to patient? Yes. Reviewed appropriate site of care based on symptoms and resources available to patient including: PCP  Specialist. The patient agrees to contact the PCP office for questions related to their healthcare. Medication reconciliation was performed with patient, who verbalizes understanding of administration of home medications. Advised obtaining a 90-day supply of all daily and as-needed medications. Was patient discharged with a pulse oximeter? no    LPN CC provided contact information. No further follow-up call indicated based on severity of symptoms and risk factors.         Care Transitions 24 Hour Call    Do you have a copy of your discharge instructions?: Yes  Do you have all of your prescriptions and are they filled?: Yes  Have you been contacted by a [address]?: No  Have you scheduled your follow up appointment?: Yes  How are you going to get to your appointment?: Car - family or friend to transport  Do you feel like you have everything you need to keep you well at home?: Yes  Care Transitions Interventions         Follow Up  Future Appointments   Date Time Provider [place] Jorge   7/5/2022  1:45 PM DO KASEY Reinoso LPN

## 2022-07-05 ENCOUNTER — OFFICE VISIT (OUTPATIENT)
Dept: CARDIOLOGY | Age: 77
End: 2022-07-05
Payer: MEDICARE

## 2022-07-05 VITALS
WEIGHT: 228.8 LBS | BODY MASS INDEX: 43.23 KG/M2 | DIASTOLIC BLOOD PRESSURE: 64 MMHG | HEART RATE: 53 BPM | SYSTOLIC BLOOD PRESSURE: 110 MMHG

## 2022-07-05 DIAGNOSIS — I21.4 NSTEMI (NON-ST ELEVATED MYOCARDIAL INFARCTION) (HCC): ICD-10-CM

## 2022-07-05 DIAGNOSIS — E66.09 CLASS 1 OBESITY DUE TO EXCESS CALORIES WITH SERIOUS COMORBIDITY IN ADULT, UNSPECIFIED BMI: ICD-10-CM

## 2022-07-05 DIAGNOSIS — I10 HYPERTENSION, ESSENTIAL: ICD-10-CM

## 2022-07-05 DIAGNOSIS — I50.23 CHF NYHA CLASS II, ACUTE ON CHRONIC, SYSTOLIC (HCC): ICD-10-CM

## 2022-07-05 DIAGNOSIS — E78.00 PURE HYPERCHOLESTEROLEMIA: ICD-10-CM

## 2022-07-05 DIAGNOSIS — I48.0 PAROXYSMAL A-FIB (HCC): Primary | ICD-10-CM

## 2022-07-05 PROCEDURE — 93010 ELECTROCARDIOGRAM REPORT: CPT | Performed by: INTERNAL MEDICINE

## 2022-07-05 PROCEDURE — 1123F ACP DISCUSS/DSCN MKR DOCD: CPT | Performed by: INTERNAL MEDICINE

## 2022-07-05 PROCEDURE — 93005 ELECTROCARDIOGRAM TRACING: CPT | Performed by: INTERNAL MEDICINE

## 2022-07-05 PROCEDURE — 99213 OFFICE O/P EST LOW 20 MIN: CPT | Performed by: INTERNAL MEDICINE

## 2022-07-05 PROCEDURE — 99214 OFFICE O/P EST MOD 30 MIN: CPT | Performed by: INTERNAL MEDICINE

## 2022-07-05 NOTE — PROGRESS NOTES
Cardiology Consultation  [facility]      07/05/22      CC:  Afib, pre-op colonoscopy    HPI:  Александр Coffey  is doing well from a cardiac standpoint. Good functional capacity with no significant change in functional capacity. No chest pain, no dyspnea, no PND, no syncope or pre-syncope, no orthopnea. No symptoms of CHF or angina/chest pain.     Past Medical History:   Diagnosis Date    Abdominal pain, RLQ 11/8/2021    History of colon cancer     History of iron deficiency anemia     Hyperlipidemia     Prolonged Q-T interval on ECG 5/1/2019    Formatting of this note might be different from the original. on [place] report    Stress disorder, acute     Thyroid goiter        Past Surgical History:   Procedure Laterality Date    COLON SURGERY  04/2008    COLECTOMY    COLONOSCOPY      COLONOSCOPY  03/13/2017    POLYP,DIVERTICULOSIS    HEMORRHOID SURGERY      BANDING    HERNIA REPAIR  2004    VENTRAL HERNIA    HYSTERECTOMY (CERVIX STATUS UNKNOWN)      JOINT REPLACEMENT      BILATERAL KNEE REPLACEMENT    LAPAROSCOPY N/A 7/11/2019    EXPLORATORY LAPAROSCOPY CONVERTED TO OPEN LYSIS OF ADHESIONS PARTIAL COLECTOMY performed by Pito Vega MD at [address], PARTIAL      goiter removed     UPPER GASTROINTESTINAL ENDOSCOPY         Family History   Problem Relation Age of Onset    High Blood Pressure Mother     Heart Attack Father     No Known Problems Sister     No Known Problems Brother     No Known Problems Sister     No Known Problems Sister     No Known Problems Sister     No Known Problems Sister     No Known Problems Sister     No Known Problems Sister     No Known Problems Sister     No Known Problems Brother     Lung Cancer Brother     Colon Cancer Neg Hx     Colon Polyps Neg Hx        Social History     Socioeconomic History    Marital status:      Spouse name: None    Number of children: None    Years of education: None    Highest education level: None   Occupational History    None   Tobacco Use    Smoking status: Never Smoker    Smokeless tobacco: Never Used    Tobacco comment: KENNA Alvarez RRT 7/10/19   Vaping Use    Vaping Use: Never used   Substance and Sexual Activity    Alcohol use: No    Drug use: No    Sexual activity: None   Other Topics Concern    None   Social History Narrative    None     Social Determinants of Health     Financial Resource Strain:     Difficulty of Paying Living Expenses: Not on file   Food Insecurity:     Worried About Running Out of Food in the Last Year: Not on file    Javier of Food in the Last Year: Not on file   Transportation Needs:     Lack of Transportation (Medical): Not on file    Lack of Transportation (Non-Medical):  Not on file   Physical Activity:     Days of Exercise per Week: Not on file    Minutes of Exercise per Session: Not on file   Stress:     Feeling of Stress : Not on file   Social Connections:     Frequency of Communication with Friends and Family: Not on file    Frequency of Social Gatherings with Friends and Family: Not on file    Attends Bahai Services: Not on file    Active Member of [address] [place] [address] or Organizations: Not on file    Attends Club or Organization Meetings: Not on file    Marital Status: Not on file   Intimate Partner Violence:     Fear of Current or Ex-Partner: Not on file    Emotionally Abused: Not on file    Physically Abused: Not on file    Sexually Abused: Not on file   Housing Stability:     Unable to Pay for Housing in the Last Year: Not on file    Number of [place] in the Last Year: Not on file    Unstable Housing in the Last Year: Not on file        Allergies   Allergen Reactions    Erythromycin     Fenofibrate     Lipitor [Atorvastatin]     Lopid [Gemfibrozil]     Other      DARVOCET      Propoxyphene Other (See Comments)     GI ISSUE-NAUSEA       Current Outpatient Medications   Medication Sig Dispense Refill    ipratropium-albuterol (DUONEB) 0.5-2.5 (3) MG/3ML SOLN nebulizer solution Inhale 3 mLs into the lungs every 4 hours 360 mL 1    guaiFENesin (MUCINEX) 600 MG extended release tablet Take 1 tablet by mouth 2 times daily 20 tablet 0    furosemide (LASIX) 20 MG tablet Take 1 tablet by mouth daily 90 tablet 1    amiodarone (CORDARONE) 200 MG tablet Take 1 tablet by mouth 2 times daily 180 tablet 3    metoprolol tartrate (LOPRESSOR) 25 MG tablet Take 1 tablet by mouth 2 times daily 180 tablet 3    apixaban (ELIQUIS) 5 MG TABS tablet Take 1 tablet by mouth 2 times daily 180 tablet 3    SYMBICORT 160-4.5 MCG/ACT AERO USE 2 INHALATIONS TWICE A DAY 30.6 g 3    montelukast (SINGULAIR) 10 MG tablet TAKE 1 TABLET NIGHTLY 90 tablet 3    PROAIR  (90 Base) MCG/ACT inhaler Inhale 2 puffs into the lungs every 6 hours as needed for Wheezing or Shortness of Breath 3 Inhaler 3    albuterol (PROVENTIL) (2.5 MG/3ML) 0.083% nebulizer solution Inhale 2.5 mg into the lungs      aspirin EC 81 MG EC tablet Take 81 mg by mouth daily      fluticasone (FLONASE) 50 MCG/ACT nasal spray 2 sprays by Nasal route daily      traZODone (DESYREL) 100 MG tablet Take 100 mg by mouth nightly TAKE 1/4 OF A TABLET NIGHTLY PRN FOR SLEEP (Patient not taking: Reported on 7/5/2022)       No current facility-administered medications for this visit.         Patient Active Problem List   Diagnosis    Anxiety    APC (atrial premature contractions)    Asthmatic bronchitis, moderate persistent, with acute exacerbation    COPD (chronic obstructive pulmonary disease) with acute bronchitis ([address]    Depression    H/O colon cancer, stage II    High risk medication use    Hx of abnormal mammogram    Hyperlipidemia    Morbid obesity with BMI of 40.0-44.9, adult ([address]    Neuroendocrine tumor    OA (osteoarthritis)    Status post subtotal thyroidectomy    Thyroid goiter    Lipoma of abdominal wall    Pericardial effusion    Lymphadenopathy, axillary    Acute [facility]D) [facility]    Pneumonia of both lungs due to infectious organism    A-fib ([facility]    Mild pulmonary hypertension ([facility]    Acute respiratory failure with hypoxia (HCC)           REVIEW OF SYSTEMS:    · Constitutional: there has been no unanticipated weight loss. There's been No change in energy level, No change in activity level. · Eyes: No visual changes or diplopia. No scleral icterus. · ENT: No Headaches, hearing loss or vertigo. No mouth sores or sore throat. · Cardiovascular: per hpi  · Respiratory: per hpi  · Gastrointestinal: No abdominal pain, appetite loss, blood in stools. No change in bowel or bladder habits. · Genitourinary: No dysuria, trouble voiding, or hematuria. · Musculoskeletal:  No gait disturbance, No weakness or joint complaints. · Integumentary: No rash or pruritis. · Neurological: No headache, diplopia, change in muscle strength, numbness or tingling. No change in gait, balance, coordination, mood, affect, memory, mentation, behavior. · Psychiatric: No new anxiety or depression. · Endocrine: No temperature intolerance. No excessive thirst, fluid intake, or urination. No tremor. · Hematologic/Lymphatic: No abnormal bruising or bleeding, blood clots or swollen lymph nodes. · Allergic/Immunologic: No nasal congestion or hives. Physical Exam:   Vitals: /64   Pulse 53   Wt 228 lb 12.8 oz (103.8 kg)   BMI 43.23 kg/m²   General appearance: alert and cooperative with exam  HEENT: Head: Normocephalic, no lesions, without obvious abnormality.   Eyes: PERRL, EOMI  Ears: Not obvious deformations or lack of hearing  Neck: no carotid bruit, no JVD  Lungs: clear to auscultation bilaterally  Heart: Irregular rate and rhythm, Tachy, S1, S2 normal, no murmur, click, rub or gallop  Abdomen: soft, non-tender; bowel sounds normal; no masses,  no organomegaly  Extremities: extremities normal, atraumatic, no cyanosis or edema  Neurologic: Mental status: Alert, oriented, thought content appropriate  Skin: WNL for age and condition, no obvious rashes or leasions      EKG: Afib c RVR with no ischemic changes. Reviewed today's ECG along serial ECGs    LAST ECHO: 7/11/2019  Normal left ventricular diameter. Mild left ventricular hypertrophy. Left ventricular systolic function is normal. Estimated EF 55%. Small pericardial effusion. Echo 12/1/21  Normal left ventricular diameter. Borderline left ventricular hypertrophy. Left ventricular systolic function is normal.  Left ventricular ejection fraction 60 %. Unable to evaluate diastolic function. Left atrium is severely dilated. Right atrial dilatation. Right ventricular dilatation with reduced systolic function. Normal tricuspid valve leaflets. Trivial tricuspid regurgitation. Estimated right ventricular systolic pressure is 45 mmHg. Mild pulmonary hypertension. Normal function of other valves. Small pericardial effusion. LAST STRESS:    Echo: 6/8/22  Normal left ventricular diameter. Mild left ventricular hypertrophy. Proximal half of the left ventricle is hyperdynamic and distal half  including the apex is hypokinetic and dyskinetic. Left ventricular systolic function is mildly reduced. Left ventricular ejection fraction 45 %. Grade II (moderate) left ventricular diastolic dysfunction. Left atrium is moderately dilated. Right atrial dilatation. Right ventricle is normal in size with poor apical function. Aortic valve sclerosis without stenosis. Mild mitral valve thickening with annular calcification. Normal tricuspid valve leaflets. Mild tricuspid regurgitation. Estimated right ventricular systolic pressure is 58 mmHg. Moderate pulmonary hypertension. Small pericardial effusion.   Aortic root dimension is at upper limit of normal.  IVC Increased diameter and impaired or no inspiratory variation indicating  elevated RA filling pressure (i.e. CVP) .  E/E' average = 18. LAST CATH: 6/9/22  LMCA: Normal 0% stenosis. LAD: Normal 0% stenosis. LCx: Normal 0% stenosis. RCA: Normal 0% stenosis. Past Medical and Surgical History, Problem List, Allergies, Medications, Labs, Imaging, all reviewed extensively in EMR and with the patient. Assessment and Plan:    1. NSTEMI- had cath as above, no stents. Start cardiac rehab. Optimize medical management. 2. S/p cardiac cath- doing well. 3. Afib c RVR- in Afib, rate controlled, Feels better. Now on amio and Elquis. 4. Echo reviewed with her and compared to prior  5. New leg edema, add some lasix for 3-4 days. 6. Pre-op for colonoscopy- can wait till afib resolves and echo completed  7. HTN- Chronic. Hedrick Gloss well controlled at this time. Cont to optimize meds. 8. Obesity - Chronic. Encouraged diet, exercise, and discussed weight loss extensively. 9. Hyperlipidemia- Chronic. As above. LDL goal < 70. Optimize therapy. Thank you for allowing me to participate in the care of this patient, please do not hesitate to call if you have any questions. Tamera Sharp, [id number] [facility]  [facility]. [facility]  [phone]

## 2022-07-11 ENCOUNTER — HOSPITAL ENCOUNTER (OUTPATIENT)
Dept: CARDIAC REHAB | Age: 77
Setting detail: THERAPIES SERIES
Discharge: HOME | End: 2022-07-11
Payer: MEDICARE

## 2022-07-11 VITALS — WEIGHT: 230.2 LBS | BODY MASS INDEX: 43.5 KG/M2

## 2022-07-11 PROCEDURE — 93798 PHYS/QHP OP CAR RHAB W/ECG: CPT

## 2022-07-11 ASSESSMENT — EXERCISE STRESS TEST
PEAK_BP: 124/62
PEAK_HR: 85

## 2022-07-13 ENCOUNTER — HOSPITAL ENCOUNTER (OUTPATIENT)
Dept: CARDIAC REHAB | Age: 77
Setting detail: THERAPIES SERIES
Discharge: HOME | End: 2022-07-13
Payer: MEDICARE

## 2022-07-13 VITALS — BODY MASS INDEX: 43.19 KG/M2 | WEIGHT: 228.6 LBS

## 2022-07-13 PROCEDURE — 93798 PHYS/QHP OP CAR RHAB W/ECG: CPT

## 2022-07-13 ASSESSMENT — EXERCISE STRESS TEST
PEAK_METS: 2.3
PEAK_HR: 78
PEAK_BP: 130/60
PEAK_RPE: 12

## 2022-07-15 ENCOUNTER — HOSPITAL ENCOUNTER (OUTPATIENT)
Dept: CARDIAC REHAB | Age: 77
Setting detail: THERAPIES SERIES
Discharge: HOME | End: 2022-07-15
Payer: MEDICARE

## 2022-07-15 VITALS — BODY MASS INDEX: 43.46 KG/M2 | WEIGHT: 230 LBS

## 2022-07-15 PROCEDURE — 93798 PHYS/QHP OP CAR RHAB W/ECG: CPT

## 2022-07-15 ASSESSMENT — EXERCISE STRESS TEST
PEAK_RPE: 12
PEAK_HR: 59
PEAK_BP: 130/74
PEAK_METS: 2.4

## 2022-07-18 ENCOUNTER — HOSPITAL ENCOUNTER (OUTPATIENT)
Dept: CARDIAC REHAB | Age: 77
Setting detail: THERAPIES SERIES
Discharge: HOME | End: 2022-07-18
Payer: MEDICARE

## 2022-07-18 VITALS — WEIGHT: 224.4 LBS | BODY MASS INDEX: 42.4 KG/M2

## 2022-07-18 PROCEDURE — 93798 PHYS/QHP OP CAR RHAB W/ECG: CPT

## 2022-07-18 ASSESSMENT — EXERCISE STRESS TEST
PEAK_RPE: 12
PEAK_BP: 130/64
PEAK_METS: 3
PEAK_HR: 61

## 2022-07-18 ASSESSMENT — EJECTION FRACTION: EF_VALUE: 45

## 2022-07-20 ENCOUNTER — HOSPITAL ENCOUNTER (OUTPATIENT)
Dept: CARDIAC REHAB | Age: 77
Setting detail: THERAPIES SERIES
Discharge: HOME | End: 2022-07-20
Payer: MEDICARE

## 2022-07-20 VITALS — BODY MASS INDEX: 42.51 KG/M2 | WEIGHT: 225 LBS

## 2022-07-20 PROCEDURE — 93798 PHYS/QHP OP CAR RHAB W/ECG: CPT

## 2022-07-20 ASSESSMENT — EXERCISE STRESS TEST
PEAK_METS: 2.9
PEAK_RPE: 12
PEAK_HR: 62
PEAK_BP: 132/70

## 2022-07-22 ENCOUNTER — HOSPITAL ENCOUNTER (OUTPATIENT)
Dept: CARDIAC REHAB | Age: 77
Setting detail: THERAPIES SERIES
Discharge: HOME | End: 2022-07-22
Payer: MEDICARE

## 2022-07-22 VITALS — WEIGHT: 226.2 LBS | BODY MASS INDEX: 42.74 KG/M2

## 2022-07-22 PROCEDURE — 93798 PHYS/QHP OP CAR RHAB W/ECG: CPT

## 2022-07-22 ASSESSMENT — EXERCISE STRESS TEST
PEAK_METS: 2.6
PEAK_RPE: 12
PEAK_BP: 120/64
PEAK_HR: 57

## 2022-07-27 ENCOUNTER — HOSPITAL ENCOUNTER (OUTPATIENT)
Dept: CARDIAC REHAB | Age: 77
Setting detail: THERAPIES SERIES
Discharge: HOME | End: 2022-07-27
Payer: MEDICARE

## 2022-07-27 VITALS — BODY MASS INDEX: 43.23 KG/M2 | WEIGHT: 228.8 LBS

## 2022-07-27 PROCEDURE — 93798 PHYS/QHP OP CAR RHAB W/ECG: CPT

## 2022-07-27 ASSESSMENT — EXERCISE STRESS TEST
PEAK_BP: 142/60
PEAK_RPE: 12
PEAK_HR: 67
PEAK_METS: 2.6

## 2022-07-29 ENCOUNTER — HOSPITAL ENCOUNTER (OUTPATIENT)
Dept: CARDIAC REHAB | Age: 77
Setting detail: THERAPIES SERIES
Discharge: HOME | End: 2022-07-29
Payer: MEDICARE

## 2022-07-29 VITALS — BODY MASS INDEX: 43.16 KG/M2 | WEIGHT: 228.4 LBS

## 2022-07-29 PROCEDURE — 93798 PHYS/QHP OP CAR RHAB W/ECG: CPT

## 2022-07-29 ASSESSMENT — EXERCISE STRESS TEST
PEAK_RPE: 12
PEAK_METS: 2.7
PEAK_HR: 55
PEAK_BP: 126/56

## 2022-07-30 ENCOUNTER — OFFICE VISIT (OUTPATIENT)
Dept: PRIMARY CARE CLINIC | Age: 77
End: 2022-07-30
Payer: MEDICARE

## 2022-07-30 VITALS
HEIGHT: 62 IN | TEMPERATURE: 97.3 F | WEIGHT: 228 LBS | HEART RATE: 53 BPM | DIASTOLIC BLOOD PRESSURE: 74 MMHG | OXYGEN SATURATION: 95 % | SYSTOLIC BLOOD PRESSURE: 128 MMHG | BODY MASS INDEX: 41.96 KG/M2

## 2022-07-30 DIAGNOSIS — L03.114 CELLULITIS OF LEFT HAND: Primary | ICD-10-CM

## 2022-07-30 DIAGNOSIS — L08.9 INFECTED SKIN TEAR: ICD-10-CM

## 2022-07-30 DIAGNOSIS — T14.8XXA INFECTED SKIN TEAR: ICD-10-CM

## 2022-07-30 DIAGNOSIS — S60.222A TRAUMATIC HEMATOMA OF LEFT HAND, INITIAL ENCOUNTER: ICD-10-CM

## 2022-07-30 PROCEDURE — 1123F ACP DISCUSS/DSCN MKR DOCD: CPT | Performed by: NURSE PRACTITIONER

## 2022-07-30 PROCEDURE — 99213 OFFICE O/P EST LOW 20 MIN: CPT | Performed by: NURSE PRACTITIONER

## 2022-07-30 PROCEDURE — 99212 OFFICE O/P EST SF 10 MIN: CPT | Performed by: NURSE PRACTITIONER

## 2022-07-30 RX ORDER — CEPHALEXIN 500 MG/1
500 CAPSULE ORAL 3 TIMES DAILY
Qty: 21 CAPSULE | Refills: 0 | Status: SHIPPED | OUTPATIENT
Start: 2022-07-30 | End: 2022-08-06

## 2022-07-30 ASSESSMENT — ENCOUNTER SYMPTOMS
VOICE CHANGE: 0
SINUS PRESSURE: 0
RHINORRHEA: 0
CHEST TIGHTNESS: 0
COUGH: 0
VOMITING: 0
ABDOMINAL PAIN: 0
SHORTNESS OF BREATH: 0
NAUSEA: 0
RESPIRATORY NEGATIVE: 1
SORE THROAT: 0
WHEEZING: 0

## 2022-07-30 NOTE — PROGRESS NOTES
[facility]             [address], [address]                        Telephone [phone]             Fax [phone]     Suly Long  1945  BAL:[id number]   Date of visit:  7/30/2022    Subjective:    Suly Long is a 68 y.o.  female who presents to [facility] today (7/30/2022) for evaluation of:    Chief Complaint   Patient presents with    Hand Injury     Pt states a door to a freezer at [address] was shut on her left hand three days ago       Hand Injury   The incident occurred 3 to 5 days ago (X 3 days ago). The injury mechanism was a direct blow (freezer door at [employer] shut onto left hand). The pain is present in the left hand. Quality: \"sore\" The pain does not radiate. The pain is at a severity of 3/10. The pain has been Constant since the incident. Pertinent negatives include no chest pain, numbness or tingling. Associated symptoms comments: Bruised, skin tear to left hand with erythema and swelling. The symptoms are aggravated by palpation. Treatments tried: peroxide, soap and water, OTC anibiotic ointment. The treatment provided no relief. Last Tdap was 05/13/2022.     She has the following problem list:  Patient Active Problem List   Diagnosis    Anxiety    APC (atrial premature contractions)    Asthmatic bronchitis, moderate persistent, with acute exacerbation    COPD (chronic obstructive pulmonary disease) with acute bronchitis (HCC)    Depression    H/O colon cancer, stage II    High risk medication use    Hx of abnormal mammogram    Hyperlipidemia    Morbid obesity with BMI of 40.0-44.9, adult ([address]    Neuroendocrine tumor    OA (osteoarthritis)    Status post subtotal thyroidectomy    Thyroid goiter    Lipoma of abdominal wall    Pericardial effusion    Lymphadenopathy, axillary    Acute exacerbation of chronic obstructive pulmonary disease (COPD) ([address]    Pneumonia of both lungs due to infectious organism    A-fib ([facility]    Mild pulmonary hypertension (HCC)    Acute respiratory failure with hypoxia (HCC)        Current medications are:  Current Outpatient Medications   Medication Sig Dispense Refill    ipratropium-albuterol (DUONEB) 0.5-2.5 (3) MG/3ML SOLN nebulizer solution Inhale 3 mLs into the lungs every 4 hours 360 mL 1    guaiFENesin (MUCINEX) 600 MG extended release tablet Take 1 tablet by mouth 2 times daily 20 tablet 0    amiodarone (CORDARONE) 200 MG tablet Take 1 tablet by mouth 2 times daily 180 tablet 3    metoprolol tartrate (LOPRESSOR) 25 MG tablet Take 1 tablet by mouth 2 times daily 180 tablet 3    apixaban (ELIQUIS) 5 MG TABS tablet Take 1 tablet by mouth 2 times daily 180 tablet 3    SYMBICORT 160-4.5 MCG/ACT AERO USE 2 INHALATIONS TWICE A DAY 30.6 g 3    montelukast (SINGULAIR) 10 MG tablet TAKE 1 TABLET NIGHTLY 90 tablet 3    PROAIR  (90 Base) MCG/ACT inhaler Inhale 2 puffs into the lungs every 6 hours as needed for Wheezing or Shortness of Breath 3 Inhaler 3    aspirin EC 81 MG EC tablet Take 81 mg by mouth daily      fluticasone (FLONASE) 50 MCG/ACT nasal spray 2 sprays by Nasal route daily      furosemide (LASIX) 20 MG tablet Take 1 tablet by mouth daily 90 tablet 1    albuterol (PROVENTIL) (2.5 MG/3ML) 0.083% nebulizer solution Inhale 2.5 mg into the lungs      traZODone (DESYREL) 100 MG tablet Take 100 mg by mouth nightly TAKE 1/4 OF A TABLET NIGHTLY PRN FOR SLEEP (Patient not taking: No sig reported)       No current facility-administered medications for this visit. She is allergic to erythromycin, fenofibrate, lipitor [atorvastatin], lopid [gemfibrozil], other, and propoxyphene. .    She  reports that she has never smoked.  She has never used smokeless tobacco.      Objective:    Vitals:    07/30/22 1234   BP: 128/74   Site: Left Upper Arm   Position: Sitting   Cuff Size: Large Adult   Pulse: 53   Temp: 97.3 °F (36.3 °C)   TempSrc: Infrared SpO2: 95%   Weight: 228 lb (103.4 kg)   Height: 5' 2\" (1.575 m)     Body mass index is 41.7 kg/m². Review of Systems   Constitutional: Negative. Negative for appetite change, chills, fatigue and fever. HENT:  Negative for congestion, ear pain, postnasal drip, rhinorrhea, sinus pressure, sore throat and voice change. Respiratory: Negative. Negative for cough, chest tightness, shortness of breath and wheezing. Cardiovascular: Negative. Negative for chest pain, palpitations and leg swelling. Gastrointestinal:  Negative for abdominal pain, nausea and vomiting. Genitourinary:  Negative for frequency and urgency. Skin:  Positive for wound. Neurological:  Negative for tingling and numbness. Physical Exam  Vitals and nursing note reviewed. Constitutional:       Appearance: She is well-developed. HENT:      Head: Normocephalic. Eyes:      Pupils: Pupils are equal, round, and reactive to light. Cardiovascular:      Rate and Rhythm: Normal rate and regular rhythm. Heart sounds: Normal heart sounds. Pulmonary:      Effort: Pulmonary effort is normal.      Breath sounds: Normal breath sounds and air entry. Musculoskeletal:      Left hand: Swelling (ecchymosis of dorsal aspect of hand and fingers) and tenderness present. No bony tenderness. Normal range of motion. Normal strength. Normal sensation. There is no disruption of two-point discrimination. Normal capillary refill. Normal pulse. Hands:       Cervical back: Normal range of motion and neck supple. Skin:     General: Skin is warm and dry. Neurological:      General: No focal deficit present. Mental Status: She is alert and oriented to person, place, and time. Psychiatric:         Behavior: Behavior normal.         Thought Content: Thought content normal.       Assessment and Plan:    No results found for this visit on 07/30/22. Diagnosis Orders   1. Cellulitis of left hand        2.  Infected skin tear 3. Traumatic hematoma of left hand, initial encounter          Wound was cleansed with chlorhexidine and water. Skin tear was approximated as well as possible. 1/8\" steri strips were applied. She tolerated well. Complete full course of antibiotic. We discussed further signs of infection. Follow up with PCP if symptoms persist or worsen. The use, risks, benefits, and side effects of prescribed or recommended medications were discussed. All questions were answered and the patient/caregiver voiced understanding. No orders of the defined types were placed in this encounter.         Electronically signed by JANNETH John CNP on 7/30/22 at 12:44 PM EDT

## 2022-08-01 ENCOUNTER — HOSPITAL ENCOUNTER (OUTPATIENT)
Dept: CARDIAC REHAB | Age: 77
Setting detail: THERAPIES SERIES
Discharge: HOME | End: 2022-08-01
Payer: MEDICARE

## 2022-08-01 VITALS — BODY MASS INDEX: 41.26 KG/M2 | WEIGHT: 225.6 LBS

## 2022-08-01 PROCEDURE — 93798 PHYS/QHP OP CAR RHAB W/ECG: CPT

## 2022-08-01 ASSESSMENT — EXERCISE STRESS TEST
PEAK_HR: 85
PEAK_RPE: 12
PEAK_BP: 152/64
PEAK_METS: 2.8

## 2022-08-03 ENCOUNTER — HOSPITAL ENCOUNTER (OUTPATIENT)
Dept: CARDIAC REHAB | Age: 77
Setting detail: THERAPIES SERIES
Discharge: HOME | End: 2022-08-03
Payer: MEDICARE

## 2022-08-03 VITALS — BODY MASS INDEX: 41.26 KG/M2 | WEIGHT: 225.6 LBS

## 2022-08-03 PROCEDURE — 93798 PHYS/QHP OP CAR RHAB W/ECG: CPT

## 2022-08-03 ASSESSMENT — EXERCISE STRESS TEST
PEAK_METS: 2.9
PEAK_RPE: 12
PEAK_HR: 74
PEAK_BP: 150/62

## 2022-08-05 ENCOUNTER — HOSPITAL ENCOUNTER (OUTPATIENT)
Dept: CARDIAC REHAB | Age: 77
Setting detail: THERAPIES SERIES
Discharge: HOME | End: 2022-08-05
Payer: MEDICARE

## 2022-08-05 VITALS — BODY MASS INDEX: 41.74 KG/M2 | WEIGHT: 228.2 LBS

## 2022-08-05 PROCEDURE — 93798 PHYS/QHP OP CAR RHAB W/ECG: CPT

## 2022-08-05 ASSESSMENT — EXERCISE STRESS TEST
PEAK_BP: 132/70
PEAK_HR: 58
PEAK_RPE: 12
PEAK_METS: 2.8

## 2022-08-08 ENCOUNTER — HOSPITAL ENCOUNTER (OUTPATIENT)
Dept: CARDIAC REHAB | Age: 77
Setting detail: THERAPIES SERIES
Discharge: HOME | End: 2022-08-08
Payer: MEDICARE

## 2022-08-08 VITALS — WEIGHT: 228.6 LBS | BODY MASS INDEX: 41.81 KG/M2

## 2022-08-08 PROCEDURE — 93798 PHYS/QHP OP CAR RHAB W/ECG: CPT

## 2022-08-08 ASSESSMENT — EXERCISE STRESS TEST
PEAK_BP: 140/68
PEAK_RPE: 12
PEAK_HR: 61
PEAK_METS: 3

## 2022-08-12 ENCOUNTER — HOSPITAL ENCOUNTER (OUTPATIENT)
Dept: CARDIAC REHAB | Age: 77
Setting detail: THERAPIES SERIES
Discharge: HOME | End: 2022-08-12
Payer: MEDICARE

## 2022-08-12 VITALS — BODY MASS INDEX: 41.45 KG/M2 | WEIGHT: 226.6 LBS

## 2022-08-12 PROCEDURE — 93798 PHYS/QHP OP CAR RHAB W/ECG: CPT

## 2022-08-12 ASSESSMENT — EXERCISE STRESS TEST
PEAK_METS: 2.7
PEAK_RPE: 12
PEAK_HR: 55
PEAK_BP: 144/64

## 2022-08-15 ENCOUNTER — HOSPITAL ENCOUNTER (OUTPATIENT)
Dept: CARDIAC REHAB | Age: 77
Setting detail: THERAPIES SERIES
Discharge: HOME | End: 2022-08-15
Payer: MEDICARE

## 2022-08-15 VITALS — BODY MASS INDEX: 41.12 KG/M2 | WEIGHT: 224.8 LBS

## 2022-08-15 PROCEDURE — 93798 PHYS/QHP OP CAR RHAB W/ECG: CPT

## 2022-08-15 ASSESSMENT — EXERCISE STRESS TEST
PEAK_HR: 55
PEAK_BP: 146/64
PEAK_RPE: 12
PEAK_METS: 2.8

## 2022-08-29 ENCOUNTER — HOSPITAL ENCOUNTER (OUTPATIENT)
Dept: CARDIAC REHAB | Age: 77
Setting detail: THERAPIES SERIES
Discharge: HOME | End: 2022-08-29
Payer: MEDICARE

## 2022-08-29 VITALS — WEIGHT: 229 LBS | BODY MASS INDEX: 41.88 KG/M2

## 2022-08-29 PROCEDURE — 93798 PHYS/QHP OP CAR RHAB W/ECG: CPT

## 2022-08-29 ASSESSMENT — EXERCISE STRESS TEST
PEAK_RPE: 12
PEAK_BP: 156/80
PEAK_METS: 2.9
PEAK_HR: 63

## 2022-08-31 ENCOUNTER — HOSPITAL ENCOUNTER (OUTPATIENT)
Dept: CARDIAC REHAB | Age: 77
Setting detail: THERAPIES SERIES
Discharge: HOME | End: 2022-08-31
Payer: MEDICARE

## 2022-08-31 VITALS — BODY MASS INDEX: 41.74 KG/M2 | WEIGHT: 228.2 LBS

## 2022-08-31 PROCEDURE — 93798 PHYS/QHP OP CAR RHAB W/ECG: CPT

## 2022-08-31 ASSESSMENT — EXERCISE STRESS TEST
PEAK_METS: 3
PEAK_BP: 142/60
PEAK_HR: 81
PEAK_RPE: 12

## 2022-09-02 ENCOUNTER — HOSPITAL ENCOUNTER (OUTPATIENT)
Dept: CARDIAC REHAB | Age: 77
Setting detail: THERAPIES SERIES
Discharge: HOME | End: 2022-09-02
Payer: MEDICARE

## 2022-09-02 VITALS — BODY MASS INDEX: 42.25 KG/M2 | WEIGHT: 231 LBS

## 2022-09-02 PROCEDURE — 93798 PHYS/QHP OP CAR RHAB W/ECG: CPT

## 2022-09-02 ASSESSMENT — EXERCISE STRESS TEST
PEAK_METS: 2.7
PEAK_HR: 75
PEAK_RPE: 12
PEAK_BP: 132/58

## 2022-09-07 ENCOUNTER — HOSPITAL ENCOUNTER (OUTPATIENT)
Dept: CARDIAC REHAB | Age: 77
Setting detail: THERAPIES SERIES
Discharge: HOME | End: 2022-09-07
Payer: MEDICARE

## 2022-09-07 VITALS — WEIGHT: 225 LBS | BODY MASS INDEX: 41.15 KG/M2

## 2022-09-07 PROCEDURE — 93798 PHYS/QHP OP CAR RHAB W/ECG: CPT

## 2022-09-07 ASSESSMENT — EXERCISE STRESS TEST
PEAK_RPE: 12
PEAK_METS: 2.7
PEAK_BP: 154/64
PEAK_HR: 65

## 2022-09-08 RX ORDER — FUROSEMIDE 20 MG/1
TABLET ORAL
Qty: 90 TABLET | Refills: 3 | Status: SHIPPED | OUTPATIENT
Start: 2022-09-08

## 2022-09-08 NOTE — TELEPHONE ENCOUNTER
Last Appt:  7/5/2022  Next Appt:   1/24/2023  Med verified in [facility] [address]

## 2022-09-09 ENCOUNTER — HOSPITAL ENCOUNTER (OUTPATIENT)
Dept: CARDIAC REHAB | Age: 77
Setting detail: THERAPIES SERIES
Discharge: HOME | End: 2022-09-09
Payer: MEDICARE

## 2022-09-09 VITALS — BODY MASS INDEX: 41.37 KG/M2 | WEIGHT: 226.2 LBS

## 2022-09-09 PROCEDURE — 93798 PHYS/QHP OP CAR RHAB W/ECG: CPT

## 2022-09-09 ASSESSMENT — EXERCISE STRESS TEST
PEAK_METS: 2.8
PEAK_BP: 130/76
PEAK_RPE: 12
PEAK_HR: 58

## 2022-09-12 ENCOUNTER — HOSPITAL ENCOUNTER (OUTPATIENT)
Dept: CARDIAC REHAB | Age: 77
Setting detail: THERAPIES SERIES
Discharge: HOME | End: 2022-09-12
Payer: MEDICARE

## 2022-09-12 VITALS — BODY MASS INDEX: 41.77 KG/M2 | WEIGHT: 228.4 LBS

## 2022-09-12 PROCEDURE — 93798 PHYS/QHP OP CAR RHAB W/ECG: CPT

## 2022-09-12 ASSESSMENT — EXERCISE STRESS TEST
PEAK_BP: 130/90
PEAK_METS: 3
PEAK_HR: 61
PEAK_RPE: 12

## 2022-09-14 ENCOUNTER — HOSPITAL ENCOUNTER (OUTPATIENT)
Dept: CARDIAC REHAB | Age: 77
Setting detail: THERAPIES SERIES
Discharge: HOME | End: 2022-09-14
Payer: MEDICARE

## 2022-09-14 VITALS — BODY MASS INDEX: 41.52 KG/M2 | WEIGHT: 227 LBS

## 2022-09-14 PROCEDURE — 93798 PHYS/QHP OP CAR RHAB W/ECG: CPT

## 2022-09-14 ASSESSMENT — EXERCISE STRESS TEST
PEAK_METS: 2.7
PEAK_BP: 138/80
PEAK_HR: 59
PEAK_RPE: 12

## 2022-09-16 ENCOUNTER — HOSPITAL ENCOUNTER (OUTPATIENT)
Dept: CARDIAC REHAB | Age: 77
Setting detail: THERAPIES SERIES
Discharge: HOME | End: 2022-09-16
Payer: MEDICARE

## 2022-09-16 VITALS — BODY MASS INDEX: 41.45 KG/M2 | WEIGHT: 226.6 LBS

## 2022-09-16 PROCEDURE — 93798 PHYS/QHP OP CAR RHAB W/ECG: CPT

## 2022-09-16 ASSESSMENT — EXERCISE STRESS TEST
PEAK_RPE: 12
PEAK_BP: 146/84
PEAK_HR: 58
PEAK_METS: 2.7

## 2022-09-19 ENCOUNTER — HOSPITAL ENCOUNTER (OUTPATIENT)
Dept: CARDIAC REHAB | Age: 77
Setting detail: THERAPIES SERIES
Discharge: HOME | End: 2022-09-19
Payer: MEDICARE

## 2022-09-19 VITALS — WEIGHT: 227.2 LBS | BODY MASS INDEX: 41.56 KG/M2

## 2022-09-19 PROCEDURE — 93798 PHYS/QHP OP CAR RHAB W/ECG: CPT

## 2022-09-19 ASSESSMENT — EXERCISE STRESS TEST
PEAK_BP: 132/70
PEAK_METS: 3.4
PEAK_HR: 68
PEAK_RPE: 12

## 2022-09-21 ENCOUNTER — HOSPITAL ENCOUNTER (OUTPATIENT)
Dept: CARDIAC REHAB | Age: 77
Setting detail: THERAPIES SERIES
Discharge: HOME | End: 2022-09-21
Payer: MEDICARE

## 2022-09-21 VITALS — BODY MASS INDEX: 41.63 KG/M2 | WEIGHT: 227.6 LBS

## 2022-09-21 PROCEDURE — 93798 PHYS/QHP OP CAR RHAB W/ECG: CPT

## 2022-09-21 ASSESSMENT — EXERCISE STRESS TEST
PEAK_HR: 59
PEAK_BP: 124/50
PEAK_METS: 2.9
PEAK_RPE: 12

## 2022-09-23 ENCOUNTER — HOSPITAL ENCOUNTER (OUTPATIENT)
Dept: CARDIAC REHAB | Age: 77
Setting detail: THERAPIES SERIES
Discharge: HOME | End: 2022-09-23
Payer: MEDICARE

## 2022-09-23 VITALS — BODY MASS INDEX: 41.3 KG/M2 | WEIGHT: 225.8 LBS

## 2022-09-23 PROCEDURE — 93798 PHYS/QHP OP CAR RHAB W/ECG: CPT

## 2022-09-23 ASSESSMENT — EXERCISE STRESS TEST
PEAK_RPE: 12
PEAK_BP: 100/60
PEAK_HR: 81
PEAK_METS: 3.1

## 2022-09-26 ENCOUNTER — HOSPITAL ENCOUNTER (OUTPATIENT)
Dept: CARDIAC REHAB | Age: 77
Setting detail: THERAPIES SERIES
Discharge: HOME | End: 2022-09-26
Payer: MEDICARE

## 2022-09-26 VITALS — WEIGHT: 230.4 LBS | BODY MASS INDEX: 42.14 KG/M2

## 2022-09-26 PROCEDURE — 93798 PHYS/QHP OP CAR RHAB W/ECG: CPT

## 2022-09-26 ASSESSMENT — EXERCISE STRESS TEST
PEAK_RPE: 12
PEAK_METS: 2.7
PEAK_BP: 142/68
PEAK_HR: 62

## 2022-09-26 ASSESSMENT — EJECTION FRACTION: EF_VALUE: 45

## 2022-09-27 ENCOUNTER — OFFICE VISIT (OUTPATIENT)
Dept: CARDIOLOGY | Age: 77
End: 2022-09-27
Payer: MEDICARE

## 2022-09-27 VITALS
BODY MASS INDEX: 42.14 KG/M2 | HEART RATE: 43 BPM | OXYGEN SATURATION: 95 % | SYSTOLIC BLOOD PRESSURE: 130 MMHG | DIASTOLIC BLOOD PRESSURE: 70 MMHG | HEIGHT: 62 IN | WEIGHT: 229 LBS

## 2022-09-27 DIAGNOSIS — I48.0 PAROXYSMAL A-FIB (HCC): Primary | ICD-10-CM

## 2022-09-27 DIAGNOSIS — Z95.5 S/P CORONARY ARTERY STENT PLACEMENT: ICD-10-CM

## 2022-09-27 DIAGNOSIS — E66.01 CLASS 2 SEVERE OBESITY DUE TO EXCESS CALORIES WITH SERIOUS COMORBIDITY IN ADULT, UNSPECIFIED BMI (HCC): ICD-10-CM

## 2022-09-27 DIAGNOSIS — E78.00 PURE HYPERCHOLESTEROLEMIA: ICD-10-CM

## 2022-09-27 DIAGNOSIS — I21.4 NSTEMI (NON-ST ELEVATED MYOCARDIAL INFARCTION) (HCC): ICD-10-CM

## 2022-09-27 DIAGNOSIS — Z98.890 S/P CARDIAC CATH: ICD-10-CM

## 2022-09-27 DIAGNOSIS — R06.02 SOB (SHORTNESS OF BREATH): ICD-10-CM

## 2022-09-27 PROCEDURE — 99214 OFFICE O/P EST MOD 30 MIN: CPT | Performed by: INTERNAL MEDICINE

## 2022-09-27 PROCEDURE — 93005 ELECTROCARDIOGRAM TRACING: CPT | Performed by: INTERNAL MEDICINE

## 2022-09-27 PROCEDURE — 1123F ACP DISCUSS/DSCN MKR DOCD: CPT | Performed by: INTERNAL MEDICINE

## 2022-09-27 PROCEDURE — 93010 ELECTROCARDIOGRAM REPORT: CPT | Performed by: INTERNAL MEDICINE

## 2022-09-27 PROCEDURE — 99212 OFFICE O/P EST SF 10 MIN: CPT | Performed by: INTERNAL MEDICINE

## 2022-09-27 RX ORDER — CLOBETASOL PROPIONATE 0.5 MG/G
CREAM TOPICAL PRN
COMMUNITY
Start: 2022-09-22

## 2022-09-27 RX ORDER — LEVOTHYROXINE SODIUM 0.05 MG/1
1 TABLET ORAL DAILY
COMMUNITY
Start: 2022-08-29

## 2022-09-27 NOTE — PROGRESS NOTES
Cardiology Consultation  [facility]      09/27/22      CC:  Afib, pre-op colonoscopy    HPI:  Mark Trotter  is doing well from a cardiac standpoint. Good functional capacity with no significant change in functional capacity. No chest pain, no dyspnea, no PND, no syncope or pre-syncope, no orthopnea. No symptoms of CHF or angina/chest pain.     Past Medical History:   Diagnosis Date    Abdominal pain, RLQ 11/8/2021    History of colon cancer     History of iron deficiency anemia     Hyperlipidemia     Prolonged Q-T interval on ECG 5/1/2019    Formatting of this note might be different from the original. on [facility] report    Stress disorder, acute     Thyroid goiter        Past Surgical History:   Procedure Laterality Date    COLON SURGERY  04/2008    COLECTOMY    COLONOSCOPY      COLONOSCOPY  03/13/2017    POLYP,DIVERTICULOSIS    HEMORRHOID SURGERY      BANDING    HERNIA REPAIR  2004    VENTRAL HERNIA    HYSTERECTOMY (CERVIX STATUS UNKNOWN)      JOINT REPLACEMENT      BILATERAL KNEE REPLACEMENT    LAPAROSCOPY N/A 7/11/2019    EXPLORATORY LAPAROSCOPY CONVERTED TO OPEN LYSIS OF ADHESIONS PARTIAL COLECTOMY performed by Delores Tavarez MD at [facility], PARTIAL      goiter removed     UPPER GASTROINTESTINAL ENDOSCOPY         Family History   Problem Relation Age of Onset    High Blood Pressure Mother     Heart Attack Father     No Known Problems Sister     No Known Problems Brother     No Known Problems Sister     No Known Problems Sister     No Known Problems Sister     No Known Problems Sister     No Known Problems Sister     No Known Problems Sister     No Known Problems Sister     No Known Problems Brother     Lung Cancer Brother     Colon Cancer Neg Hx     Colon Polyps Neg Hx        Social History     Socioeconomic History    Marital status:      Spouse name: None    Number of children: None    Years of education: None    Highest education level: None   Tobacco Use    Smoking status: Never    Smokeless tobacco: Never    Tobacco comments:     KENNA Loyd RRT 7/10/19   Vaping Use    Vaping Use: Never used   Substance and Sexual Activity    Alcohol use: No    Drug use: No        Allergies   Allergen Reactions    Erythromycin     Fenofibrate     Lipitor [Atorvastatin]     Lopid [Gemfibrozil]     Other      DARVOCET      Propoxyphene Other (See Comments)     GI ISSUE-NAUSEA       Current Outpatient Medications   Medication Sig Dispense Refill    levothyroxine (SYNTHROID) 50 MCG tablet Take 1 tablet by mouth daily      clobetasol (TEMOVATE) 0.05 % cream Apply topically as needed      furosemide (LASIX) 20 MG tablet TAKE 1 TABLET DAILY 90 tablet 3    ipratropium-albuterol (DUONEB) 0.5-2.5 (3) MG/3ML SOLN nebulizer solution Inhale 3 mLs into the lungs every 4 hours 360 mL 1    guaiFENesin (MUCINEX) 600 MG extended release tablet Take 1 tablet by mouth 2 times daily (Patient taking differently: Take 600 mg by mouth as needed) 20 tablet 0    amiodarone (CORDARONE) 200 MG tablet Take 1 tablet by mouth 2 times daily 180 tablet 3    metoprolol tartrate (LOPRESSOR) 25 MG tablet Take 1 tablet by mouth 2 times daily 180 tablet 3    apixaban (ELIQUIS) 5 MG TABS tablet Take 1 tablet by mouth 2 times daily 180 tablet 3    SYMBICORT 160-4.5 MCG/ACT AERO USE 2 INHALATIONS TWICE A DAY 30.6 g 3    montelukast (SINGULAIR) 10 MG tablet TAKE 1 TABLET NIGHTLY 90 tablet 3    PROAIR  (90 Base) MCG/ACT inhaler Inhale 2 puffs into the lungs every 6 hours as needed for Wheezing or Shortness of Breath 3 Inhaler 3    aspirin EC 81 MG EC tablet Take 81 mg by mouth daily      fluticasone (FLONASE) 50 MCG/ACT nasal spray 2 sprays by Nasal route daily      albuterol (PROVENTIL) (2.5 MG/3ML) 0.083% nebulizer solution Inhale 2.5 mg into the lungs      traZODone (DESYREL) 100 MG tablet Take 100 mg by mouth nightly TAKE 1/4 OF A TABLET NIGHTLY PRN FOR SLEEP (Patient not taking: No sig reported)       No current facility-administered medications for this visit. Patient Active Problem List   Diagnosis    Anxiety    APC (atrial premature contractions)    Asthmatic bronchitis, moderate persistent, with acute exacerbation    COPD (chronic obstructive pulmonary disease) with acute bronchitis (HCC)    Depression    H/O colon cancer, stage II    High risk medication use    Hx of abnormal mammogram    Hyperlipidemia    Morbid obesity with BMI of 40.0-44.9, adult (HCC)    Neuroendocrine tumor    OA (osteoarthritis)    Status post subtotal thyroidectomy    Thyroid goiter    Lipoma of abdominal wall    Pericardial effusion    Lymphadenopathy, axillary    Acute exacerbation of chronic obstructive pulmonary disease (COPD) ([address]    Pneumonia of both lungs due to infectious organism    A-fib ([address]    Mild pulmonary hypertension ([address]    Acute respiratory failure with hypoxia ([address]           REVIEW OF SYSTEMS:    Constitutional: there has been no unanticipated weight loss. There's been No change in energy level, No change in activity level. Eyes: No visual changes or diplopia. No scleral icterus. ENT: No Headaches, hearing loss or vertigo. No mouth sores or sore throat. Cardiovascular: per hpi  Respiratory: per hpi  Gastrointestinal: No abdominal pain, appetite loss, blood in stools. No change in bowel or bladder habits. Genitourinary: No dysuria, trouble voiding, or hematuria. Musculoskeletal:  No gait disturbance, No weakness or joint complaints. Integumentary: No rash or pruritis. Neurological: No headache, diplopia, change in muscle strength, numbness or tingling. No change in gait, balance, coordination, mood, affect, memory, mentation, behavior. Psychiatric: No new anxiety or depression. Endocrine: No temperature intolerance. No excessive thirst, fluid intake, or urination. No tremor. Hematologic/Lymphatic: No abnormal bruising or bleeding, blood clots or swollen lymph nodes.   Allergic/Immunologic: No nasal congestion or hives. Physical Exam:   Vitals: /70 (Site: Left Upper Arm, Position: Sitting, Cuff Size: Large Adult)   Pulse (!) 43   Ht 5' 2\" (1.575 m)   Wt 229 lb (103.9 kg)   BMI 41.88 kg/m²   General appearance: alert and cooperative with exam  HEENT: Head: Normocephalic, no lesions, without obvious abnormality. Eyes: PERRL, EOMI  Ears: Not obvious deformations or lack of hearing  Neck: no carotid bruit, no JVD  Lungs: clear to auscultation bilaterally  Heart: Irregular rate and rhythm, Tachy, S1, S2 normal, no murmur, click, rub or gallop  Abdomen: soft, non-tender; bowel sounds normal; no masses,  no organomegaly  Extremities: extremities normal, atraumatic, no cyanosis or edema  Neurologic: Mental status: Alert, oriented, thought content appropriate  Skin: WNL for age and condition, no obvious rashes or leasions      EKG: Afib c RVR with no ischemic changes. Reviewed today's ECG along serial ECGs    LAST ECHO: 7/11/2019  Normal left ventricular diameter. Mild left ventricular hypertrophy. Left ventricular systolic function is normal. Estimated EF 55%. Small pericardial effusion. Echo 12/1/21  Normal left ventricular diameter. Borderline left ventricular hypertrophy. Left ventricular systolic function is normal.  Left ventricular ejection fraction 60 %. Unable to evaluate diastolic function. Left atrium is severely dilated. Right atrial dilatation. Right ventricular dilatation with reduced systolic function. Normal tricuspid valve leaflets. Trivial tricuspid regurgitation. Estimated right ventricular systolic pressure is 45 mmHg. Mild pulmonary hypertension. Normal function of other valves. Small pericardial effusion. LAST STRESS:    Echo: 6/8/22  Normal left ventricular diameter. Mild left ventricular hypertrophy. Proximal half of the left ventricle is hyperdynamic and distal half  including the apex is hypokinetic and dyskinetic.   Left ventricular systolic function is mildly reduced. Left ventricular ejection fraction 45 %. Grade II (moderate) left ventricular diastolic dysfunction. Left atrium is moderately dilated. Right atrial dilatation. Right ventricle is normal in size with poor apical function. Aortic valve sclerosis without stenosis. Mild mitral valve thickening with annular calcification. Normal tricuspid valve leaflets. Mild tricuspid regurgitation. Estimated right ventricular systolic pressure is 58 mmHg. Moderate pulmonary hypertension. Small pericardial effusion. Aortic root dimension is at upper limit of normal.  IVC Increased diameter and impaired or no inspiratory variation indicating  elevated RA filling pressure (i.e. CVP) . E/E' average = 18. LAST CATH: 6/9/22  LMCA: Normal 0% stenosis. LAD: Normal 0% stenosis. LCx: Normal 0% stenosis. RCA: Normal 0% stenosis. Past Medical and Surgical History, Problem List, Allergies, Medications, Labs, Imaging, all reviewed extensively in EMR and with the patient. Assessment and Plan:    Bradycardia- stop amio and metop for 3 days. Resume amio at once a day. Recheck in 1 month. SOB- increase lasix to 40 daily for 4 days and see if this helps. NSTEMI- had cath as above, no stents. Did cardiac rehab. Optimize medical management. S/p cardiac cath- doing well. Afib c RVR- in Afib, rate controlled, Feels better. Now on amio and Elquis. Echo reviewed with her and compared to prior  New leg edema, add some lasix for 3-4 days. Pre-op for colonoscopy- can wait till afib resolves and echo completed  HTN- Chronic. Marvin Lock well controlled at this time. Cont to optimize meds. Obesity - Chronic. Encouraged diet, exercise, and discussed weight loss extensively. Hyperlipidemia- Chronic. As above. LDL goal < 70. Optimize therapy. Thank you for allowing me to participate in the care of this patient, please do not hesitate to call if you have any questions.     Jake Briceno, [id number] [facility]  [facility]  [phone]

## 2022-09-28 ENCOUNTER — HOSPITAL ENCOUNTER (OUTPATIENT)
Dept: CARDIAC REHAB | Age: 77
Setting detail: THERAPIES SERIES
Discharge: HOME | End: 2022-09-28
Payer: MEDICARE

## 2022-09-28 VITALS — WEIGHT: 225.2 LBS | BODY MASS INDEX: 41.19 KG/M2

## 2022-09-28 PROCEDURE — 93798 PHYS/QHP OP CAR RHAB W/ECG: CPT

## 2022-09-28 ASSESSMENT — EXERCISE STRESS TEST
PEAK_HR: 63
PEAK_BP: 130/78
PEAK_METS: 3.4
PEAK_RPE: 12

## 2022-09-30 ENCOUNTER — HOSPITAL ENCOUNTER (OUTPATIENT)
Dept: CARDIAC REHAB | Age: 77
Setting detail: THERAPIES SERIES
Discharge: HOME | End: 2022-09-30
Payer: MEDICARE

## 2022-09-30 VITALS — WEIGHT: 224 LBS | BODY MASS INDEX: 40.97 KG/M2

## 2022-09-30 PROCEDURE — 93798 PHYS/QHP OP CAR RHAB W/ECG: CPT

## 2022-09-30 ASSESSMENT — EXERCISE STRESS TEST
PEAK_HR: 95
PEAK_RPE: 12
PEAK_METS: 3.1
PEAK_BP: 140/60

## 2022-10-03 ENCOUNTER — HOSPITAL ENCOUNTER (OUTPATIENT)
Dept: CARDIAC REHAB | Age: 77
Setting detail: THERAPIES SERIES
Discharge: HOME | End: 2022-10-03
Payer: MEDICARE

## 2022-10-03 ENCOUNTER — HOSPITAL ENCOUNTER (EMERGENCY)
Age: 77
Discharge: HOME | End: 2022-10-03
Attending: EMERGENCY MEDICINE
Payer: MEDICARE

## 2022-10-03 VITALS
HEIGHT: 62 IN | RESPIRATION RATE: 15 BRPM | OXYGEN SATURATION: 95 % | SYSTOLIC BLOOD PRESSURE: 142 MMHG | WEIGHT: 228 LBS | HEART RATE: 50 BPM | TEMPERATURE: 98.1 F | DIASTOLIC BLOOD PRESSURE: 59 MMHG | BODY MASS INDEX: 41.96 KG/M2

## 2022-10-03 VITALS — BODY MASS INDEX: 41.7 KG/M2 | WEIGHT: 228 LBS

## 2022-10-03 DIAGNOSIS — R00.1 BRADYCARDIA: Primary | ICD-10-CM

## 2022-10-03 PROCEDURE — 93798 PHYS/QHP OP CAR RHAB W/ECG: CPT

## 2022-10-03 PROCEDURE — 93005 ELECTROCARDIOGRAM TRACING: CPT

## 2022-10-03 PROCEDURE — 2500000003 HC RX 250 WO HCPCS: Performed by: EMERGENCY MEDICINE

## 2022-10-03 RX ADMIN — GLUCAGON HYDROCHLORIDE 2 MG: KIT at 21:41

## 2022-10-03 ASSESSMENT — PAIN DESCRIPTION - PAIN TYPE: TYPE: ACUTE PAIN

## 2022-10-03 ASSESSMENT — EXERCISE STRESS TEST
PEAK_HR: 56
PEAK_METS: 3
PEAK_RPE: 12
PEAK_BP: 130/74

## 2022-10-03 ASSESSMENT — LIFESTYLE VARIABLES
HOW MANY STANDARD DRINKS CONTAINING ALCOHOL DO YOU HAVE ON A TYPICAL DAY: PATIENT DOES NOT DRINK
HOW OFTEN DO YOU HAVE A DRINK CONTAINING ALCOHOL: NEVER

## 2022-10-03 ASSESSMENT — PAIN - FUNCTIONAL ASSESSMENT: PAIN_FUNCTIONAL_ASSESSMENT: NONE - DENIES PAIN

## 2022-10-04 NOTE — ED PROVIDER NOTES
eMERGENCY dEPARTMENT eNCOUnter      Pt Name: Aliza Angel  MRN: [id number]  [place] 1945  Date of evaluation: 10/3/2022      CHIEF COMPLAINT       Chief Complaint   Patient presents with    Bradycardia         HISTORY OF PRESENT ILLNESS    Aliza Angel is a 68 y.o. female who presents with slow heart rate. Patient states that she was in her cardiologist office about a week ago heart rate was slow and was told to change her medications she states that she frequently checks her heart rate was found to be slow so came in for evaluation she denied any chest pain or shortness of breath denies any symptoms        REVIEW OF SYSTEMS       Review of systems are all reviewed and negative except stated above in HPI    PAST MEDICAL HISTORY    has a past medical history of Abdominal pain, RLQ, History of colon cancer, History of iron deficiency anemia, Hyperlipidemia, Prolonged Q-T interval on ECG, Stress disorder, acute, and Thyroid goiter. SURGICAL HISTORY      has a past surgical history that includes Colonoscopy; Colon surgery (04/2008); Thyroidectomy, partial; Upper gastrointestinal endoscopy; joint replacement; hernia repair (2004); Hemorrhoid surgery; Colonoscopy (03/13/2017); Hysterectomy; and laparoscopy (N/A, 7/11/2019).     CURRENT MEDICATIONS       Discharge Medication List as of 10/3/2022  9:26 PM        CONTINUE these medications which have NOT CHANGED    Details   levothyroxine (SYNTHROID) 50 MCG tablet Take 1 tablet by mouth dailyHistorical Med      clobetasol (TEMOVATE) 0.05 % cream Apply topically as needed, Topical, PRN Starting Thu 9/22/2022, Historical Med      furosemide (LASIX) 20 MG tablet TAKE 1 TABLET DAILY, Disp-90 tablet, R-3Normal      ipratropium-albuterol (DUONEB) 0.5-2.5 (3) MG/3ML SOLN nebulizer solution Inhale 3 mLs into the lungs every 4 hours, Disp-360 mL, R-1Print      guaiFENesin (MUCINEX) 600 MG extended release tablet Take 1 tablet by mouth 2 times daily, Disp-20 tablet, R-0Print      amiodarone (CORDARONE) 200 MG tablet Take 1 tablet by mouth 2 times daily, Disp-180 tablet, R-3Normal      metoprolol tartrate (LOPRESSOR) 25 MG tablet Take 1 tablet by mouth 2 times daily, Disp-180 tablet, R-3Normal      apixaban (ELIQUIS) 5 MG TABS tablet Take 1 tablet by mouth 2 times daily, Disp-180 tablet, R-3Normal      SYMBICORT 160-4.5 MCG/ACT AERO USE 2 INHALATIONS TWICE A DAY, Disp-30.6 g, R-3Normal      montelukast (SINGULAIR) 10 MG tablet TAKE 1 TABLET NIGHTLY, Disp-90 tablet, R-3Normal      PROAIR  (90 Base) MCG/ACT inhaler Inhale 2 puffs into the lungs every 6 hours as needed for Wheezing or Shortness of Breath, Disp-3 Inhaler, R-3, DAWNormal      albuterol (PROVENTIL) (2.5 MG/3ML) 0.083% nebulizer solution Inhale 2.5 mg into the lungsHistorical Med      aspirin EC 81 MG EC tablet Take 81 mg by mouth dailyHistorical Med      fluticasone (FLONASE) 50 MCG/ACT nasal spray 2 sprays by Nasal route dailyHistorical Med      traZODone (DESYREL) 100 MG tablet Take 100 mg by mouth nightly TAKE 1/4 OF A TABLET NIGHTLY PRN FOR SLEEPHistorical Med             ALLERGIES     is allergic to erythromycin, fenofibrate, lipitor [atorvastatin], lopid [gemfibrozil], other, and propoxyphene. FAMILY HISTORY     She indicated that her mother is . She indicated that the status of her father is unknown. She indicated that three of her eight sisters are alive. She indicated that the status of her neg hx is unknown.     family history includes Heart Attack in her father; High Blood Pressure in her mother; Kacey Radha in her brother; No Known Problems in her brother, brother, sister, sister, sister, sister, sister, sister, sister, and sister. SOCIAL HISTORY      reports that she has never smoked. She has never used smokeless tobacco. She reports that she does not drink alcohol and does not use drugs. PHYSICAL EXAM     INITIAL VITALS:  height is 5' 2\" (1.575 m) and weight is 228 lb (103.4 kg). Her tympanic temperature is 98.1 °F (36.7 °C). Her blood pressure is 142/59 (abnormal) and her pulse is 50. Her respiration is 15 and oxygen saturation is 95%. General: Patient alert nontoxic-appearing elderly female in no apparent distress  HEENT: Head is atraumatic conjunctive are clear  Neck: Supple  Respiratory: Lung sounds are clear bilateral  Cardiac: Heart is bradycardic without appreciable murmurs  GI: Abdomen soft nontender    DIFFERENTIAL DIAGNOSIS/ MDM:     Bradycardia    DIAGNOSTIC RESULTS     EKG: All EKG's are interpreted by the Emergency Department Physician who either signs or Co-signs this chart in the absence of a cardiologist.    Interpreted by me reveals sinus rhythm rate of 51 with no acute ST elevation depressions normal MT interval QRS of 96    RADIOLOGY:   I directly visualized the following  images and reviewed the radiologist interpretations:  No orders to display         LABS:  Labs Reviewed - No data to display      EMERGENCY DEPARTMENT COURSE:   Vitals:    Vitals:    10/03/22 2045 10/03/22 2100 10/03/22 2115 10/03/22 2130   BP: (!) 142/60 (!) 144/68 131/60 (!) 142/59   Pulse: 51 51 50 50   Resp: 16 14 14 15   Temp:       TempSrc:       SpO2: 95% 96% 95% 95%   Weight:       Height:         -------------------------  BP: (!) 142/59, Temp: 98.1 °F (36.7 °C), Heart Rate: 50, Resp: 15    Orders Placed This Encounter   Medications    glucagon (rDNA) injection 2 mg           Re-evaluation Notes    Patient is asymptomatic here I did speak with cardiology she was supposed to hold both of her blood pressure medicines and only take one of them once a day she was confused and has been taking the other 1 twice a day at this time he recommended giving her glucagon for heart rate ensure that she does not take her psych medications follow-up with cardiology return if worse    CRITICAL CARE:   None      CONSULTS:      PROCEDURES:  None    FINAL IMPRESSION      1.  Bradycardia DISPOSITION/PLAN   DISPOSITION Decision To Discharge 10/03/2022 09:26:04 PM      Condition on Disposition    Stable    PATIENT REFERRED TO:  Kody Christianson  [address] [place] [id number] [phone]    In 2 days        DISCHARGE MEDICATIONS:  Discharge Medication List as of 10/3/2022  9:26 PM          (Please note that portions of this note were completed with a voice recognition program.  Efforts were made to edit the dictations but occasionally words are mis-transcribed.)    Kavita Camp MD,, MD, F.A.C.E.P.   Attending Emergency Physician        Kavita Camp MD  10/04/22 [id number]

## 2022-10-05 ENCOUNTER — HOSPITAL ENCOUNTER (OUTPATIENT)
Dept: CARDIAC REHAB | Age: 77
Setting detail: THERAPIES SERIES
Discharge: HOME | End: 2022-10-05
Payer: MEDICARE

## 2022-10-05 VITALS — WEIGHT: 227.2 LBS | BODY MASS INDEX: 41.56 KG/M2

## 2022-10-05 LAB
EKG ATRIAL RATE: 57 BPM
EKG Q-T INTERVAL: 354 MS
EKG QRS DURATION: 96 MS
EKG QTC CALCULATION (BAZETT): 326 MS
EKG R AXIS: 112 DEGREES
EKG T AXIS: 96 DEGREES
EKG VENTRICULAR RATE: 51 BPM

## 2022-10-05 PROCEDURE — 93798 PHYS/QHP OP CAR RHAB W/ECG: CPT

## 2022-10-05 ASSESSMENT — EXERCISE STRESS TEST
PEAK_BP: 160/60
PEAK_RPE: 12
PEAK_HR: 68
PEAK_METS: 3

## 2022-10-07 ENCOUNTER — HOSPITAL ENCOUNTER (OUTPATIENT)
Dept: CARDIAC REHAB | Age: 77
Setting detail: THERAPIES SERIES
Discharge: HOME | End: 2022-10-07
Payer: MEDICARE

## 2022-10-07 VITALS — WEIGHT: 228.4 LBS | BODY MASS INDEX: 41.77 KG/M2

## 2022-10-07 PROCEDURE — 93798 PHYS/QHP OP CAR RHAB W/ECG: CPT

## 2022-10-07 ASSESSMENT — EXERCISE STRESS TEST
PEAK_RPE: 12
PEAK_BP: 154/66
PEAK_METS: 3.3
PEAK_HR: 77

## 2022-10-10 ENCOUNTER — HOSPITAL ENCOUNTER (OUTPATIENT)
Dept: CARDIAC REHAB | Age: 77
Setting detail: THERAPIES SERIES
Discharge: HOME | End: 2022-10-10
Payer: MEDICARE

## 2022-10-10 VITALS — BODY MASS INDEX: 42.1 KG/M2 | WEIGHT: 230.2 LBS

## 2022-10-10 PROCEDURE — 93798 PHYS/QHP OP CAR RHAB W/ECG: CPT

## 2022-10-10 ASSESSMENT — EXERCISE STRESS TEST
PEAK_HR: 94
PEAK_METS: 2.9
PEAK_BP: 150/84
PEAK_RPE: 12

## 2022-10-12 ENCOUNTER — HOSPITAL ENCOUNTER (OUTPATIENT)
Dept: CARDIAC REHAB | Age: 77
Setting detail: THERAPIES SERIES
Discharge: HOME | End: 2022-10-12
Payer: MEDICARE

## 2022-10-12 VITALS — WEIGHT: 230.4 LBS | BODY MASS INDEX: 42.14 KG/M2

## 2022-10-12 PROCEDURE — 93798 PHYS/QHP OP CAR RHAB W/ECG: CPT

## 2022-10-12 ASSESSMENT — EXERCISE STRESS TEST
PEAK_METS: 3.1
PEAK_HR: 78
PEAK_BP: 146/64
PEAK_RPE: 12

## 2022-10-14 ENCOUNTER — HOSPITAL ENCOUNTER (OUTPATIENT)
Dept: CARDIAC REHAB | Age: 77
Setting detail: THERAPIES SERIES
Discharge: HOME | End: 2022-10-14
Payer: MEDICARE

## 2022-10-14 VITALS — WEIGHT: 229 LBS | BODY MASS INDEX: 41.88 KG/M2

## 2022-10-14 PROCEDURE — 93798 PHYS/QHP OP CAR RHAB W/ECG: CPT

## 2022-10-14 ASSESSMENT — EXERCISE STRESS TEST
PEAK_BP: 148/62
PEAK_HR: 79
PEAK_RPE: 12
PEAK_METS: 2.9

## 2022-10-18 ENCOUNTER — HOSPITAL ENCOUNTER (OUTPATIENT)
Dept: CARDIAC REHAB | Age: 77
Setting detail: THERAPIES SERIES
Discharge: HOME | End: 2022-10-18
Payer: MEDICARE

## 2022-10-18 VITALS — BODY MASS INDEX: 42.14 KG/M2 | WEIGHT: 230.4 LBS

## 2022-10-18 PROCEDURE — 93798 PHYS/QHP OP CAR RHAB W/ECG: CPT

## 2022-10-18 ASSESSMENT — EXERCISE STRESS TEST
PEAK_METS: 3.5
PEAK_RPE: 12
PEAK_HR: 86
PEAK_BP: 142/54

## 2022-10-20 ENCOUNTER — HOSPITAL ENCOUNTER (OUTPATIENT)
Dept: CARDIAC REHAB | Age: 77
Setting detail: THERAPIES SERIES
Discharge: HOME | End: 2022-10-20
Payer: MEDICARE

## 2022-10-20 VITALS — BODY MASS INDEX: 42.07 KG/M2 | WEIGHT: 230 LBS

## 2022-10-20 PROCEDURE — 93798 PHYS/QHP OP CAR RHAB W/ECG: CPT

## 2022-10-20 ASSESSMENT — EXERCISE STRESS TEST
PEAK_RPE: 12
PEAK_METS: 3.5
PEAK_HR: 77
PEAK_BP: 140/70

## 2022-10-20 ASSESSMENT — EJECTION FRACTION: EF_VALUE: 45

## 2022-10-25 ENCOUNTER — OFFICE VISIT (OUTPATIENT)
Dept: CARDIOLOGY | Age: 77
End: 2022-10-25
Payer: MEDICARE

## 2022-10-25 VITALS
HEIGHT: 62 IN | SYSTOLIC BLOOD PRESSURE: 136 MMHG | WEIGHT: 229.4 LBS | DIASTOLIC BLOOD PRESSURE: 62 MMHG | HEART RATE: 67 BPM | BODY MASS INDEX: 42.21 KG/M2

## 2022-10-25 DIAGNOSIS — J44.9 COPD, MILD (HCC): ICD-10-CM

## 2022-10-25 DIAGNOSIS — E66.09 CLASS 1 OBESITY DUE TO EXCESS CALORIES WITH SERIOUS COMORBIDITY IN ADULT, UNSPECIFIED BMI: ICD-10-CM

## 2022-10-25 DIAGNOSIS — R00.1 BRADYCARDIA: ICD-10-CM

## 2022-10-25 DIAGNOSIS — Z87.891 FORMER TOBACCO USE: ICD-10-CM

## 2022-10-25 DIAGNOSIS — R06.02 SOB (SHORTNESS OF BREATH): Primary | ICD-10-CM

## 2022-10-25 DIAGNOSIS — I10 HYPERTENSION, ESSENTIAL: ICD-10-CM

## 2022-10-25 DIAGNOSIS — E78.00 PURE HYPERCHOLESTEROLEMIA: ICD-10-CM

## 2022-10-25 DIAGNOSIS — I48.0 PAROXYSMAL A-FIB (HCC): ICD-10-CM

## 2022-10-25 PROCEDURE — 1123F ACP DISCUSS/DSCN MKR DOCD: CPT | Performed by: INTERNAL MEDICINE

## 2022-10-25 PROCEDURE — 99214 OFFICE O/P EST MOD 30 MIN: CPT | Performed by: INTERNAL MEDICINE

## 2022-10-25 PROCEDURE — 99212 OFFICE O/P EST SF 10 MIN: CPT | Performed by: INTERNAL MEDICINE

## 2022-10-25 NOTE — PROGRESS NOTES
Cardiology Consultation  [facility]      10/25/22      CC:  Afib, pre-op colonoscopy    HPI:  Lgiia Corral  is doing well from a cardiac standpoint. Good functional capacity with no significant change in functional capacity. No chest pain, no dyspnea, no PND, no syncope or pre-syncope, no orthopnea. No symptoms of CHF or angina/chest pain.     Past Medical History:   Diagnosis Date    Abdominal pain, RLQ 11/8/2021    History of colon cancer     History of iron deficiency anemia     Hyperlipidemia     Prolonged Q-T interval on ECG 5/1/2019    Formatting of this note might be different from the original. on [place] report    Stress disorder, acute     Thyroid goiter        Past Surgical History:   Procedure Laterality Date    COLON SURGERY  04/2008    COLECTOMY    COLONOSCOPY      COLONOSCOPY  03/13/2017    POLYP,DIVERTICULOSIS    HEMORRHOID SURGERY      BANDING    HERNIA REPAIR  2004    VENTRAL HERNIA    HYSTERECTOMY (CERVIX STATUS UNKNOWN)      JOINT REPLACEMENT      BILATERAL KNEE REPLACEMENT    LAPAROSCOPY N/A 7/11/2019    EXPLORATORY LAPAROSCOPY CONVERTED TO OPEN LYSIS OF ADHESIONS PARTIAL COLECTOMY performed by Giancarlo Catherine MD at [facility], PARTIAL      goiter removed     UPPER GASTROINTESTINAL ENDOSCOPY         Family History   Problem Relation Age of Onset    High Blood Pressure Mother     Heart Attack Father     No Known Problems Sister     No Known Problems Brother     No Known Problems Sister     No Known Problems Sister     No Known Problems Sister     No Known Problems Sister     No Known Problems Sister     No Known Problems Sister     No Known Problems Sister     No Known Problems Brother     Lung Cancer Brother     Colon Cancer Neg Hx     Colon Polyps Neg Hx        Social History     Socioeconomic History    Marital status:      Spouse name: None    Number of children: None    Years of education: None    Highest education level: None   Tobacco Use    Smoking status: Never    Smokeless tobacco: Never    Tobacco comments:     KENNA Loyd RRT 7/10/19   Vaping Use    Vaping Use: Never used   Substance and Sexual Activity    Alcohol use: No    Drug use: No        Allergies   Allergen Reactions    Erythromycin     Fenofibrate     Lipitor [Atorvastatin]     Lopid [Gemfibrozil]     Other      DARVOCET      Propoxyphene Other (See Comments)     GI ISSUE-NAUSEA       Current Outpatient Medications   Medication Sig Dispense Refill    levothyroxine (SYNTHROID) 50 MCG tablet Take 1 tablet by mouth daily      clobetasol (TEMOVATE) 0.05 % cream Apply topically as needed      furosemide (LASIX) 20 MG tablet TAKE 1 TABLET DAILY 90 tablet 3    ipratropium-albuterol (DUONEB) 0.5-2.5 (3) MG/3ML SOLN nebulizer solution Inhale 3 mLs into the lungs every 4 hours 360 mL 1    guaiFENesin (MUCINEX) 600 MG extended release tablet Take 1 tablet by mouth 2 times daily 20 tablet 0    amiodarone (CORDARONE) 200 MG tablet Take 1 tablet by mouth 2 times daily (Patient taking differently: Take 200 mg by mouth daily) 180 tablet 3    metoprolol tartrate (LOPRESSOR) 25 MG tablet Take 1 tablet by mouth 2 times daily 180 tablet 3    apixaban (ELIQUIS) 5 MG TABS tablet Take 1 tablet by mouth 2 times daily 180 tablet 3    SYMBICORT 160-4.5 MCG/ACT AERO USE 2 INHALATIONS TWICE A DAY 30.6 g 3    montelukast (SINGULAIR) 10 MG tablet TAKE 1 TABLET NIGHTLY 90 tablet 3    PROAIR  (90 Base) MCG/ACT inhaler Inhale 2 puffs into the lungs every 6 hours as needed for Wheezing or Shortness of Breath 3 Inhaler 3    albuterol (PROVENTIL) (2.5 MG/3ML) 0.083% nebulizer solution Inhale 2.5 mg into the lungs      aspirin EC 81 MG EC tablet Take 81 mg by mouth daily      fluticasone (FLONASE) 50 MCG/ACT nasal spray 2 sprays by Nasal route daily      traZODone (DESYREL) 100 MG tablet Take 100 mg by mouth nightly TAKE 1/4 OF A TABLET NIGHTLY PRN FOR SLEEP       No current facility-administered medications for this visit. Patient Active Problem List   Diagnosis    Anxiety    APC (atrial premature contractions)    Asthmatic bronchitis, moderate persistent, with acute exacerbation    COPD (chronic obstructive pulmonary disease) with acute bronchitis (HCC)    Depression    H/O colon cancer, stage II    High risk medication use    Hx of abnormal mammogram    Hyperlipidemia    Morbid obesity with BMI of 40.0-44.9, adult (HCC)    Neuroendocrine tumor    OA (osteoarthritis)    Status post subtotal thyroidectomy    Thyroid goiter    Lipoma of abdominal wall    Pericardial effusion    Lymphadenopathy, axillary    Acute exacerbation of chronic obstructive pulmonary disease (COPD) ([address]    Pneumonia of both lungs due to infectious organism    A-fib ([address]    Mild pulmonary hypertension ([address]    Acute respiratory failure with hypoxia ([address]           REVIEW OF SYSTEMS:    Constitutional: there has been no unanticipated weight loss. There's been No change in energy level, No change in activity level. Eyes: No visual changes or diplopia. No scleral icterus. ENT: No Headaches, hearing loss or vertigo. No mouth sores or sore throat. Cardiovascular: per hpi  Respiratory: per hpi  Gastrointestinal: No abdominal pain, appetite loss, blood in stools. No change in bowel or bladder habits. Genitourinary: No dysuria, trouble voiding, or hematuria. Musculoskeletal:  No gait disturbance, No weakness or joint complaints. Integumentary: No rash or pruritis. Neurological: No headache, diplopia, change in muscle strength, numbness or tingling. No change in gait, balance, coordination, mood, affect, memory, mentation, behavior. Psychiatric: No new anxiety or depression. Endocrine: No temperature intolerance. No excessive thirst, fluid intake, or urination. No tremor. Hematologic/Lymphatic: No abnormal bruising or bleeding, blood clots or swollen lymph nodes. Allergic/Immunologic: No nasal congestion or hives.         Physical Exam:   Vitals: BP 136/62   Pulse 67   Ht 5' 2\" (1.575 m)   Wt 229 lb 6.4 oz (104.1 kg)   BMI 41.96 kg/m²   General appearance: alert and cooperative with exam  HEENT: Head: Normocephalic, no lesions, without obvious abnormality. Eyes: PERRL, EOMI  Ears: Not obvious deformations or lack of hearing  Neck: no carotid bruit, no JVD  Lungs: clear to auscultation bilaterally  Heart: Irregular rate and rhythm, Tachy, S1, S2 normal, no murmur, click, rub or gallop  Abdomen: soft, non-tender; bowel sounds normal; no masses,  no organomegaly  Extremities: extremities normal, atraumatic, no cyanosis or edema  Neurologic: Mental status: Alert, oriented, thought content appropriate  Skin: WNL for age and condition, no obvious rashes or leasions      EKG: Afib c RVR with no ischemic changes. Reviewed today's ECG along serial ECGs    LAST ECHO: 7/11/2019  Normal left ventricular diameter. Mild left ventricular hypertrophy. Left ventricular systolic function is normal. Estimated EF 55%. Small pericardial effusion. Echo 12/1/21  Normal left ventricular diameter. Borderline left ventricular hypertrophy. Left ventricular systolic function is normal.  Left ventricular ejection fraction 60 %. Unable to evaluate diastolic function. Left atrium is severely dilated. Right atrial dilatation. Right ventricular dilatation with reduced systolic function. Normal tricuspid valve leaflets. Trivial tricuspid regurgitation. Estimated right ventricular systolic pressure is 45 mmHg. Mild pulmonary hypertension. Normal function of other valves. Small pericardial effusion. LAST STRESS:    Echo: 6/8/22  Normal left ventricular diameter. Mild left ventricular hypertrophy. Proximal half of the left ventricle is hyperdynamic and distal half  including the apex is hypokinetic and dyskinetic. Left ventricular systolic function is mildly reduced. Left ventricular ejection fraction 45 %.   Grade II (moderate) left ventricular diastolic dysfunction. Left atrium is moderately dilated. Right atrial dilatation. Right ventricle is normal in size with poor apical function. Aortic valve sclerosis without stenosis. Mild mitral valve thickening with annular calcification. Normal tricuspid valve leaflets. Mild tricuspid regurgitation. Estimated right ventricular systolic pressure is 58 mmHg. Moderate pulmonary hypertension. Small pericardial effusion. Aortic root dimension is at upper limit of normal.  IVC Increased diameter and impaired or no inspiratory variation indicating  elevated RA filling pressure (i.e. CVP) . E/E' average = 18. LAST CATH: 6/9/22  LMCA: Normal 0% stenosis. LAD: Normal 0% stenosis. LCx: Normal 0% stenosis. RCA: Normal 0% stenosis. Past Medical and Surgical History, Problem List, Allergies, Medications, Labs, Imaging, all reviewed extensively in EMR and with the patient. Assessment and Plan:    Bradycardia- We held BB. Now on amio once a day. Feels much better  SOB- increase lasix to 40 daily for 4 days and see if this helps. Deconditioned- will try increasing activity  NSTEMI- had cath as above, no stents. Did cardiac rehab. Optimize medical management. S/p cardiac cath- doing well. Afib c RVR- in Afib, rate controlled, Feels better. Now on amio and Elquis. Echo reviewed with her and compared to prior  New leg edema, add some lasix for 3-4 days. Pre-op for colonoscopy- can wait till afib resolves and echo completed  HTN- Chronic. Aida Screws well controlled at this time. Cont to optimize meds. Obesity - Chronic. Encouraged diet, exercise, and discussed weight loss extensively. Hyperlipidemia- Chronic. As above. LDL goal < 70. Optimize therapy. Thank you for allowing me to participate in the care of this patient, please do not hesitate to call if you have any questions. Radha Graham, [id number] [facility]  [facility] [place]  [phone]

## 2022-12-06 ENCOUNTER — OFFICE VISIT (OUTPATIENT)
Dept: CARDIOLOGY | Age: 77
End: 2022-12-06
Payer: MEDICARE

## 2022-12-06 VITALS
HEIGHT: 62 IN | WEIGHT: 231.4 LBS | DIASTOLIC BLOOD PRESSURE: 66 MMHG | BODY MASS INDEX: 42.58 KG/M2 | SYSTOLIC BLOOD PRESSURE: 122 MMHG | HEART RATE: 67 BPM

## 2022-12-06 DIAGNOSIS — I48.91 ATRIAL FIBRILLATION, UNSPECIFIED TYPE (HCC): Primary | ICD-10-CM

## 2022-12-06 PROCEDURE — 93005 ELECTROCARDIOGRAM TRACING: CPT | Performed by: INTERNAL MEDICINE

## 2022-12-06 PROCEDURE — 93010 ELECTROCARDIOGRAM REPORT: CPT | Performed by: INTERNAL MEDICINE

## 2022-12-06 PROCEDURE — 99214 OFFICE O/P EST MOD 30 MIN: CPT | Performed by: INTERNAL MEDICINE

## 2022-12-06 PROCEDURE — 99212 OFFICE O/P EST SF 10 MIN: CPT | Performed by: INTERNAL MEDICINE

## 2022-12-06 PROCEDURE — 1123F ACP DISCUSS/DSCN MKR DOCD: CPT | Performed by: INTERNAL MEDICINE

## 2022-12-06 RX ORDER — METOPROLOL SUCCINATE 25 MG/1
12.5 TABLET, EXTENDED RELEASE ORAL DAILY
Qty: 30 TABLET | Refills: 3 | Status: SHIPPED | OUTPATIENT
Start: 2022-12-06

## 2022-12-06 NOTE — PROGRESS NOTES
Cardiology Consultation  [facility]      12/06/22      CC: Atrial fibrillation     HPI:  Teresa Herrera  is doing well from a cardiac standpoint. Denies any anginal chest pain. She reports focal pain in the left lower chest with tenderness on palpation. No significant change in baseline dyspnea on exertion. No lower extremity edema or orthopnea. Has been compliant with the medication. She is maintaining normal sinus rhythm.         Past Medical History:   Diagnosis Date    Abdominal pain, RLQ 11/8/2021    History of colon cancer     History of iron deficiency anemia     Hyperlipidemia     Prolonged Q-T interval on ECG 5/1/2019    Formatting of this note might be different from the original. on [place] report    Stress disorder, acute     Thyroid goiter        Past Surgical History:   Procedure Laterality Date    COLON SURGERY  04/2008    COLECTOMY    COLONOSCOPY      COLONOSCOPY  03/13/2017    POLYP,DIVERTICULOSIS    HEMORRHOID SURGERY      BANDING    HERNIA REPAIR  2004    VENTRAL HERNIA    HYSTERECTOMY (CERVIX STATUS UNKNOWN)      JOINT REPLACEMENT      BILATERAL KNEE REPLACEMENT    LAPAROSCOPY N/A 7/11/2019    EXPLORATORY LAPAROSCOPY CONVERTED TO OPEN LYSIS OF ADHESIONS PARTIAL COLECTOMY performed by Chester Crawford MD at [facility] , PARTIAL      goiter removed     UPPER GASTROINTESTINAL ENDOSCOPY         Family History   Problem Relation Age of Onset    High Blood Pressure Mother     Heart Attack Father     No Known Problems Sister     No Known Problems Brother     No Known Problems Sister     No Known Problems Sister     No Known Problems Sister     No Known Problems Sister     No Known Problems Sister     No Known Problems Sister     No Known Problems Sister     No Known Problems Brother     Lung Cancer Brother     Colon Cancer Neg Hx     Colon Polyps Neg Hx        Social History     Socioeconomic History    Marital status:      Spouse name: None    Number of children: None    Years of education: None    Highest education level: None   Tobacco Use    Smoking status: Never    Smokeless tobacco: Never    Tobacco comments:     KENNA Garcia RRT 7/10/19   Vaping Use    Vaping Use: Never used   Substance and Sexual Activity    Alcohol use: No    Drug use: No        Allergies   Allergen Reactions    Erythromycin     Fenofibrate     Lipitor [Atorvastatin]     Lopid [Gemfibrozil]     Other      DARVOCET      Propoxyphene Other (See Comments)     GI ISSUE-NAUSEA       Current Outpatient Medications   Medication Sig Dispense Refill    levothyroxine (SYNTHROID) 50 MCG tablet Take 1 tablet by mouth daily      clobetasol (TEMOVATE) 0.05 % cream Apply topically as needed      furosemide (LASIX) 20 MG tablet TAKE 1 TABLET DAILY 90 tablet 3    ipratropium-albuterol (DUONEB) 0.5-2.5 (3) MG/3ML SOLN nebulizer solution Inhale 3 mLs into the lungs every 4 hours 360 mL 1    guaiFENesin (MUCINEX) 600 MG extended release tablet Take 1 tablet by mouth 2 times daily 20 tablet 0    amiodarone (CORDARONE) 200 MG tablet Take 1 tablet by mouth 2 times daily (Patient taking differently: Take 200 mg by mouth daily) 180 tablet 3    metoprolol tartrate (LOPRESSOR) 25 MG tablet Take 1 tablet by mouth 2 times daily 180 tablet 3    apixaban (ELIQUIS) 5 MG TABS tablet Take 1 tablet by mouth 2 times daily 180 tablet 3    SYMBICORT 160-4.5 MCG/ACT AERO USE 2 INHALATIONS TWICE A DAY 30.6 g 3    montelukast (SINGULAIR) 10 MG tablet TAKE 1 TABLET NIGHTLY 90 tablet 3    PROAIR  (90 Base) MCG/ACT inhaler Inhale 2 puffs into the lungs every 6 hours as needed for Wheezing or Shortness of Breath 3 Inhaler 3    aspirin EC 81 MG EC tablet Take 81 mg by mouth daily      fluticasone (FLONASE) 50 MCG/ACT nasal spray 2 sprays by Nasal route daily      traZODone (DESYREL) 100 MG tablet Take 100 mg by mouth nightly TAKE 1/4 OF A TABLET NIGHTLY PRN FOR SLEEP      albuterol (PROVENTIL) (2.5 MG/3ML) 0.083% nebulizer solution Inhale 2.5 mg into the lungs       No current facility-administered medications for this visit. Patient Active Problem List   Diagnosis    Anxiety    APC (atrial premature contractions)    Asthmatic bronchitis, moderate persistent, with acute exacerbation    COPD (chronic obstructive pulmonary disease) with acute bronchitis (HCC)    Depression    H/O colon cancer, stage II    High risk medication use    Hx of abnormal mammogram    Hyperlipidemia    Morbid obesity with BMI of 40.0-44.9, adult (HCC)    Neuroendocrine tumor    OA (osteoarthritis)    Status post subtotal thyroidectomy    Thyroid goiter    Lipoma of abdominal wall    Pericardial effusion    Lymphadenopathy, axillary    Acute exacerbation of chronic obstructive pulmonary disease (COPD) ([address]    Pneumonia of both lungs due to infectious organism    A-fib ([address]    Mild pulmonary hypertension ([address]    Acute respiratory failure with hypoxia ([address]           REVIEW OF SYSTEMS:    Constitutional: there has been no unanticipated weight loss. There's been No change in energy level, No change in activity level. Eyes: No visual changes or diplopia. No scleral icterus. ENT: No Headaches, hearing loss or vertigo. No mouth sores or sore throat. Cardiovascular: per hpi  Respiratory: per hpi  Gastrointestinal: No abdominal pain, appetite loss, blood in stools. No change in bowel or bladder habits. Genitourinary: No dysuria, trouble voiding, or hematuria. Musculoskeletal:  No gait disturbance, No weakness or joint complaints. Integumentary: No rash or pruritis. Neurological: No headache, diplopia, change in muscle strength, numbness or tingling. No change in gait, balance, coordination, mood, affect, memory, mentation, behavior. Psychiatric: No new anxiety or depression. Endocrine: No temperature intolerance. No excessive thirst, fluid intake, or urination. No tremor.   Hematologic/Lymphatic: No abnormal bruising or bleeding, blood clots or swollen lymph nodes. Allergic/Immunologic: No nasal congestion or hives. Physical Exam:   Vitals: /66   Pulse 67   Ht 5' 2\" (1.575 m)   Wt 231 lb 6.4 oz (105 kg)   BMI 42.32 kg/m²   General appearance: alert and cooperative with exam  HEENT: Head: Normocephalic, no lesions, without obvious abnormality. Eyes: PERRL, EOMI  Ears: Not obvious deformations or lack of hearing  Neck: no carotid bruit, no JVD  Lungs: clear to auscultation bilaterally  Heart: Irregular rate and rhythm, Tachy, S1, S2 normal, no murmur, click, rub or gallop  Abdomen: soft, non-tender; bowel sounds normal; no masses,  no organomegaly  Extremities: extremities normal, atraumatic, no cyanosis or edema  Neurologic: Mental status: Alert, oriented, thought content appropriate  Skin: WNL for age and condition, no obvious rashes or leasions      EKG 12/6/2022: NSR, non-specific ST-T changes. LAST ECHO: 7/11/2019  Normal left ventricular diameter. Mild left ventricular hypertrophy. Left ventricular systolic function is normal. Estimated EF 55%. Small pericardial effusion. Echo 12/1/21  Normal left ventricular diameter. Borderline left ventricular hypertrophy. Left ventricular systolic function is normal.  Left ventricular ejection fraction 60 %. Unable to evaluate diastolic function. Left atrium is severely dilated. Right atrial dilatation. Right ventricular dilatation with reduced systolic function. Normal tricuspid valve leaflets. Trivial tricuspid regurgitation. Estimated right ventricular systolic pressure is 45 mmHg. Mild pulmonary hypertension. Normal function of other valves. Small pericardial effusion. LAST STRESS:    Echo: 6/8/22  Normal left ventricular diameter. Mild left ventricular hypertrophy. Proximal half of the left ventricle is hyperdynamic and distal half  including the apex is hypokinetic and dyskinetic. Left ventricular systolic function is mildly reduced.   Left ventricular ejection fraction 45 %.  Grade II (moderate) left ventricular diastolic dysfunction. Left atrium is moderately dilated. Right atrial dilatation. Right ventricle is normal in size with poor apical function. Aortic valve sclerosis without stenosis. Mild mitral valve thickening with annular calcification. Normal tricuspid valve leaflets. Mild tricuspid regurgitation. Estimated right ventricular systolic pressure is 58 mmHg. Moderate pulmonary hypertension. Small pericardial effusion. Aortic root dimension is at upper limit of normal.  IVC Increased diameter and impaired or no inspiratory variation indicating  elevated RA filling pressure (i.e. CVP) . E/E' average = 18. LAST CATH: 6/9/22  LMCA: Normal 0% stenosis. LAD: Normal 0% stenosis. LCx: Normal 0% stenosis. RCA: Normal 0% stenosis. Past Medical and Surgical History, Problem List, Allergies, Medications, Labs, Imaging, all reviewed extensively in EMR and with the patient. Assessment and Plan:    Paroxysmal atrial fibrillation, currently in normal sinus rhythm, maintained on amiodarone and Eliquis. Restart beta-blocker at lower dose. History of bradycardia, now resolved  HFrEF in June 2022 secondary to stress-induced cardiomyopathy, normal coronaries on angiogram, LVEF was 45% with apical hypokinesis. Currently asymptomatic and without any volume overload. Maintained on Lasix 20 mg daily. Plan to restart Toprol-XL 12.5 mg daily. Not on ACEI due to elevated Cr. Deconditioned- will try increasing activity  HTN- Chronic. Zak Mortimer well controlled at this time. Cont to optimize meds. Obesity - Chronic. Encouraged diet, exercise, and discussed weight loss extensively. Hyperlipidemia- Chronic. As above. LDL goal < 70. Optimize therapy. Routine follow-up in 6 months or earlier if needed. Thank you for allowing me to participate in the care of this patient, please do not hesitate to call if you have any questions.     Berto Rogel MD  [facility]  [facility]  [phone]

## 2023-02-21 NOTE — TELEPHONE ENCOUNTER
Andrew Lira called requesting a refill of the below medication which has been pended for you:     Requested Prescriptions     Pending Prescriptions Disp Refills    apixaban (ELIQUIS) 5 MG TABS tablet 180 tablet 3     Sig: Take 1 tablet by mouth 2 times daily       Last Appointment Date: 10/25/2022  Next Appointment Date: 4/26/2023    Allergies   Allergen Reactions    Erythromycin     Fenofibrate     Lipitor [Atorvastatin]     Lopid [Gemfibrozil]     Other      DARVOCET      Propoxyphene Other (See Comments)     GI ISSUE-NAUSEA       Pharmacy:  [facility]

## 2023-04-26 ENCOUNTER — OFFICE VISIT (OUTPATIENT)
Dept: CARDIOLOGY | Age: 78
End: 2023-04-26
Payer: MEDICARE

## 2023-04-26 VITALS
DIASTOLIC BLOOD PRESSURE: 64 MMHG | HEART RATE: 58 BPM | WEIGHT: 229 LBS | SYSTOLIC BLOOD PRESSURE: 116 MMHG | BODY MASS INDEX: 41.88 KG/M2

## 2023-04-26 DIAGNOSIS — I48.91 ATRIAL FIBRILLATION, UNSPECIFIED TYPE (HCC): Primary | ICD-10-CM

## 2023-04-26 DIAGNOSIS — I50.43 ACUTE ON CHRONIC COMBINED SYSTOLIC AND DIASTOLIC CHF (CONGESTIVE HEART FAILURE) (HCC): ICD-10-CM

## 2023-04-26 PROCEDURE — G8417 CALC BMI ABV UP PARAM F/U: HCPCS | Performed by: NURSE PRACTITIONER

## 2023-04-26 PROCEDURE — 93005 ELECTROCARDIOGRAM TRACING: CPT | Performed by: NURSE PRACTITIONER

## 2023-04-26 PROCEDURE — 99212 OFFICE O/P EST SF 10 MIN: CPT | Performed by: NURSE PRACTITIONER

## 2023-04-26 PROCEDURE — 1036F TOBACCO NON-USER: CPT | Performed by: NURSE PRACTITIONER

## 2023-04-26 PROCEDURE — G8400 PT W/DXA NO RESULTS DOC: HCPCS | Performed by: NURSE PRACTITIONER

## 2023-04-26 PROCEDURE — 99214 OFFICE O/P EST MOD 30 MIN: CPT | Performed by: NURSE PRACTITIONER

## 2023-04-26 PROCEDURE — 93010 ELECTROCARDIOGRAM REPORT: CPT | Performed by: NURSE PRACTITIONER

## 2023-04-26 PROCEDURE — 1090F PRES/ABSN URINE INCON ASSESS: CPT | Performed by: NURSE PRACTITIONER

## 2023-04-26 PROCEDURE — G8427 DOCREV CUR MEDS BY ELIG CLIN: HCPCS | Performed by: NURSE PRACTITIONER

## 2023-04-26 PROCEDURE — 1123F ACP DISCUSS/DSCN MKR DOCD: CPT | Performed by: NURSE PRACTITIONER

## 2023-04-26 NOTE — PROGRESS NOTES
History    Marital status:      Spouse name: None    Number of children: None    Years of education: None    Highest education level: None   Tobacco Use    Smoking status: Never    Smokeless tobacco: Never    Tobacco comments:     KENNA Cabezasorn RRT 7/10/19   Vaping Use    Vaping Use: Never used   Substance and Sexual Activity    Alcohol use: No    Drug use: No        Allergies   Allergen Reactions    Erythromycin     Fenofibrate     Lipitor [Atorvastatin]     Lopid [Gemfibrozil]     Other      DARVOCET      Propoxyphene Other (See Comments)     GI ISSUE-NAUSEA       Current Outpatient Medications   Medication Sig Dispense Refill    apixaban (ELIQUIS) 5 MG TABS tablet Take 1 tablet by mouth 2 times daily 180 tablet 3    metoprolol succinate (TOPROL XL) 25 MG extended release tablet Take 0.5 tablets by mouth daily 30 tablet 3    levothyroxine (SYNTHROID) 50 MCG tablet Take 1 tablet by mouth daily      clobetasol (TEMOVATE) 0.05 % cream Apply topically as needed      furosemide (LASIX) 20 MG tablet TAKE 1 TABLET DAILY 90 tablet 3    ipratropium-albuterol (DUONEB) 0.5-2.5 (3) MG/3ML SOLN nebulizer solution Inhale 3 mLs into the lungs every 4 hours 360 mL 1    guaiFENesin (MUCINEX) 600 MG extended release tablet Take 1 tablet by mouth 2 times daily 20 tablet 0    amiodarone (CORDARONE) 200 MG tablet Take 1 tablet by mouth 2 times daily (Patient taking differently: Take 1 tablet by mouth daily) 180 tablet 3    SYMBICORT 160-4.5 MCG/ACT AERO USE 2 INHALATIONS TWICE A DAY 30.6 g 3    montelukast (SINGULAIR) 10 MG tablet TAKE 1 TABLET NIGHTLY 90 tablet 3    PROAIR  (90 Base) MCG/ACT inhaler Inhale 2 puffs into the lungs every 6 hours as needed for Wheezing or Shortness of Breath 3 Inhaler 3    albuterol (PROVENTIL) (2.5 MG/3ML) 0.083% nebulizer solution Inhale 3 mLs into the lungs      aspirin EC 81 MG EC tablet Take 1 tablet by mouth daily      fluticasone (FLONASE) 50 MCG/ACT nasal spray 2 sprays by Nasal route

## 2023-05-19 DIAGNOSIS — J45.50 ASTHMA, SEVERE PERSISTENT, POORLY-CONTROLLED, UNCOMPLICATED: Primary | ICD-10-CM

## 2023-07-25 ENCOUNTER — TELEPHONE (OUTPATIENT)
Dept: CARDIOLOGY | Age: 78
End: 2023-07-25

## 2023-07-25 NOTE — TELEPHONE ENCOUNTER
Pt called to ask prescription for amiodarone 200 mg daily be sent to [facility]. Pt has 1 tab left and will need to have a local fill at [facility] today please.     Last Appt:  4/26/2023  Next Appt:   11/7/2023  Med verified in [address] [place] [address]

## 2023-07-26 RX ORDER — FUROSEMIDE 20 MG/1
TABLET ORAL
Qty: 90 TABLET | Refills: 3 | Status: SHIPPED | OUTPATIENT
Start: 2023-07-26

## 2023-07-26 RX ORDER — AMIODARONE HYDROCHLORIDE 200 MG/1
200 TABLET ORAL 2 TIMES DAILY
Qty: 180 TABLET | Refills: 3 | Status: SHIPPED | OUTPATIENT
Start: 2023-07-26

## 2023-07-26 NOTE — TELEPHONE ENCOUNTER
Last Appt:  4/26/2023  Next Appt:   11/7/2023  Med verified in [address] [place] [address] 15

## 2023-07-27 NOTE — TELEPHONE ENCOUNTER
Pt needed a prescription sent to [facility] for the amiodarone while she was waiting on [facility] to come in mail. Pt had 1 tab left on 7/25, as per note, and has been without since. Please have 1 month sent to [facility].

## 2023-07-27 NOTE — TELEPHONE ENCOUNTER
I called in a one month supply of Amiodarone 200 mg once daily #30 Rf 0 to [facility]  in [place],. Rx that was sent to [facility] was for BID. Patient stated she was previously on 200 mg BID but that Dr Brad Goodwin lowered her to 200 mg daily.

## 2023-08-03 DIAGNOSIS — J45.50 ASTHMA, SEVERE PERSISTENT, POORLY-CONTROLLED, UNCOMPLICATED: Primary | ICD-10-CM

## 2023-08-09 ENCOUNTER — HOSPITAL ENCOUNTER (OUTPATIENT)
Age: 78
Discharge: HOME | End: 2023-08-09
Payer: MEDICARE

## 2023-08-09 ENCOUNTER — HOSPITAL ENCOUNTER (OUTPATIENT)
Dept: GENERAL RADIOLOGY | Age: 78
Discharge: HOME | End: 2023-08-11
Payer: MEDICARE

## 2023-08-09 ENCOUNTER — HOSPITAL ENCOUNTER (OUTPATIENT)
Dept: PULMONOLOGY | Age: 78
Discharge: HOME | End: 2023-08-09
Payer: MEDICARE

## 2023-08-09 ENCOUNTER — OFFICE VISIT (OUTPATIENT)
Dept: PULMONOLOGY | Age: 78
End: 2023-08-09
Payer: MEDICARE

## 2023-08-09 VITALS
OXYGEN SATURATION: 96 % | HEART RATE: 64 BPM | HEIGHT: 62 IN | TEMPERATURE: 97.3 F | DIASTOLIC BLOOD PRESSURE: 68 MMHG | SYSTOLIC BLOOD PRESSURE: 124 MMHG | WEIGHT: 228 LBS | BODY MASS INDEX: 41.96 KG/M2

## 2023-08-09 DIAGNOSIS — J45.50 ASTHMA, SEVERE PERSISTENT, POORLY-CONTROLLED, UNCOMPLICATED: ICD-10-CM

## 2023-08-09 DIAGNOSIS — J82.83 EOSINOPHILIC ASTHMA: ICD-10-CM

## 2023-08-09 DIAGNOSIS — R09.82 POST-NASAL DRIP: ICD-10-CM

## 2023-08-09 DIAGNOSIS — J45.50 SEVERE PERSISTENT STEROID-DEPENDENT ASTHMA WITHOUT COMPLICATION: ICD-10-CM

## 2023-08-09 DIAGNOSIS — E66.01 CLASS 3 SEVERE OBESITY DUE TO EXCESS CALORIES WITH SERIOUS COMORBIDITY AND BODY MASS INDEX (BMI) OF 40.0 TO 44.9 IN ADULT (HCC): ICD-10-CM

## 2023-08-09 DIAGNOSIS — J45.50 SEVERE PERSISTENT STEROID-DEPENDENT ASTHMA WITHOUT COMPLICATION: Primary | ICD-10-CM

## 2023-08-09 LAB
DLCO %PRED: NORMAL
DLCO PRED: NORMAL
DLCO/VA %PRED: NORMAL
DLCO/VA PRED: NORMAL
DLCO/VA: NORMAL
DLCO: NORMAL
EOSINOPHIL # BLD: 300 /UL (ref 200–400)
EXPIRATORY TIME-POST: NORMAL
EXPIRATORY TIME: NORMAL
FEF 25-75% %CHNG: NORMAL
FEF 25-75% %PRED-POST: NORMAL
FEF 25-75% %PRED-PRE: NORMAL
FEF 25-75% PRED: NORMAL
FEF 25-75%-POST: NORMAL
FEF 25-75%-PRE: NORMAL
FEV1 %PRED-POST: NORMAL
FEV1 %PRED-PRE: NORMAL
FEV1 PRED: NORMAL
FEV1-POST: NORMAL
FEV1-PRE: NORMAL
FEV1/FVC %PRED-POST: NORMAL
FEV1/FVC %PRED-PRE: NORMAL
FEV1/FVC PRED: NORMAL
FEV1/FVC-POST: NORMAL
FEV1/FVC-PRE: NORMAL
FVC %PRED-POST: NORMAL
FVC %PRED-PRE: NORMAL
FVC PRED: NORMAL
FVC-POST: NORMAL
FVC-PRE: NORMAL
GAW %PRED: NORMAL
GAW PRED: NORMAL
GAW: NORMAL
IC %PRED: NORMAL
IC PRED: NORMAL
IC: NORMAL
MEP: NORMAL
MIP: NORMAL
MVV %PRED-PRE: NORMAL
MVV PRED: NORMAL
MVV-PRE: NORMAL
PEF %PRED-POST: NORMAL
PEF %PRED-PRE: NORMAL
PEF PRED: NORMAL
PEF%CHNG: NORMAL
PEF-POST: NORMAL
PEF-PRE: NORMAL
RAW %PRED: NORMAL
RAW PRED: NORMAL
RAW: NORMAL
RV %PRED: NORMAL
RV PRED: NORMAL
RV: NORMAL
SVC %PRED: NORMAL
SVC PRED: NORMAL
SVC: NORMAL
TLC %PRED: NORMAL
TLC PRED: NORMAL
TLC: NORMAL
VA %PRED: NORMAL
VA PRED: NORMAL
VA: NORMAL
VTG %PRED: NORMAL
VTG PRED: NORMAL
VTG: NORMAL

## 2023-08-09 PROCEDURE — G8417 CALC BMI ABV UP PARAM F/U: HCPCS | Performed by: INTERNAL MEDICINE

## 2023-08-09 PROCEDURE — 99213 OFFICE O/P EST LOW 20 MIN: CPT | Performed by: INTERNAL MEDICINE

## 2023-08-09 PROCEDURE — 36415 COLL VENOUS BLD VENIPUNCTURE: CPT

## 2023-08-09 PROCEDURE — 94726 PLETHYSMOGRAPHY LUNG VOLUMES: CPT

## 2023-08-09 PROCEDURE — 94640 AIRWAY INHALATION TREATMENT: CPT

## 2023-08-09 PROCEDURE — 1090F PRES/ABSN URINE INCON ASSESS: CPT | Performed by: INTERNAL MEDICINE

## 2023-08-09 PROCEDURE — 85048 AUTOMATED LEUKOCYTE COUNT: CPT

## 2023-08-09 PROCEDURE — 71046 X-RAY EXAM CHEST 2 VIEWS: CPT

## 2023-08-09 PROCEDURE — 94060 EVALUATION OF WHEEZING: CPT

## 2023-08-09 PROCEDURE — 94729 DIFFUSING CAPACITY: CPT

## 2023-08-09 PROCEDURE — G8427 DOCREV CUR MEDS BY ELIG CLIN: HCPCS | Performed by: INTERNAL MEDICINE

## 2023-08-09 PROCEDURE — 6370000000 HC RX 637 (ALT 250 FOR IP): Performed by: INTERNAL MEDICINE

## 2023-08-09 PROCEDURE — 1123F ACP DISCUSS/DSCN MKR DOCD: CPT | Performed by: INTERNAL MEDICINE

## 2023-08-09 PROCEDURE — 1036F TOBACCO NON-USER: CPT | Performed by: INTERNAL MEDICINE

## 2023-08-09 PROCEDURE — 99203 OFFICE O/P NEW LOW 30 MIN: CPT | Performed by: INTERNAL MEDICINE

## 2023-08-09 PROCEDURE — G8400 PT W/DXA NO RESULTS DOC: HCPCS | Performed by: INTERNAL MEDICINE

## 2023-08-09 RX ORDER — TIOTROPIUM BROMIDE INHALATION SPRAY 1.56 UG/1
2 SPRAY, METERED RESPIRATORY (INHALATION) DAILY
Qty: 1 EACH | Refills: 11 | Status: SHIPPED | OUTPATIENT
Start: 2023-08-09

## 2023-08-09 RX ORDER — ALBUTEROL SULFATE 90 UG/1
4 AEROSOL, METERED RESPIRATORY (INHALATION) ONCE
Status: COMPLETED | OUTPATIENT
Start: 2023-08-09 | End: 2023-08-09

## 2023-08-09 RX ADMIN — ALBUTEROL SULFATE 4 PUFF: 90 AEROSOL, METERED RESPIRATORY (INHALATION) at 14:24

## 2023-08-09 ASSESSMENT — ENCOUNTER SYMPTOMS
SHORTNESS OF BREATH: 1
GASTROINTESTINAL NEGATIVE: 1
WHEEZING: 1
EYES NEGATIVE: 1
CHEST TIGHTNESS: 1

## 2023-08-09 NOTE — PROCEDURES
[address]                 [address] [facility], [address] [address]                               PULMONARY FUNCTION    PATIENT NAME: Tomas Mejia                   :        1945  MED REC NO:   [id number]                             ROOM:  ACCOUNT NO:   [de-identified]                           ADMIT DATE: 2023  PROVIDER:     Colt Leon    DATE OF PROCEDURE:  2023    INTERPRETATION:  Spirometry demonstrates a moderate restrictive and  obstructive ventilatory defect. A significant bronchospastic component  is identified. FVC 1.48 liters (59%), FEV1 1.19 liters (63%), FEV1/FVC  80%. After bronchodilator, FEV1 increases to 1.36 liters (14%). Static  lung volumes as measured by body plethysmography demonstrate  moderate-to-severe reductions in RV and TLC. RV 0.97 liters (43%), TLC  3.08 liters (64%), RV/TLC 32%. Air trapping is present. Diffusion  capacity moderately decreased. DLCO 10.98 (50%). Airways resistance is  markedly increased. IMPRESSION:  This study demonstrates both obstructive and restrictive  defects. The restriction appears to be extrapulmonic. The obstruction  appears to be reversible (asthma). Clinical correlation is required.         Colt Leon    D: 2023 16:25:42       T: 2023 16:28:05     [email or web address]  Job#: [id number]     Doc#: [id number]    CC:

## 2023-08-09 NOTE — PROGRESS NOTES
Tenderness: There is no abdominal tenderness. Musculoskeletal:      Cervical back: Neck supple. Right lower leg: Edema present. Left lower leg: Edema present. Lymphadenopathy:      Cervical: No cervical adenopathy. Skin:     General: Skin is warm and dry. Neurological:      Mental Status: She is alert and oriented to person, place, and time. Wt Readings from Last 3 Encounters:   08/09/23 228 lb (103.4 kg)   04/26/23 229 lb (103.9 kg)   12/06/22 231 lb 6.4 oz (105 kg)     Results for orders placed or performed during the hospital encounter of 10/03/22   EKG 12 Lead   Result Value Ref Range    Ventricular Rate 51 BPM    Atrial Rate 57 BPM    QRS Duration 96 ms    Q-T Interval 354 ms    QTc Calculation (Bazett) 326 ms    R Axis 112 degrees    T Axis 96 degrees       Assessment:      1. Severe persistent steroid-dependent asthma without complication    2. Eosinophilic asthma    3. Post-nasal drip    4. Class 3 severe obesity due to excess calories with serious comorbidity and body mass index (BMI) of 40.0 to 44.9 in adult [facility])      Patient Active Problem List   Diagnosis    Anxiety    APC (atrial premature contractions)    Asthmatic bronchitis, moderate persistent, with acute exacerbation    COPD (chronic obstructive pulmonary disease) with acute bronchitis (HCC)    Depression    H/O colon cancer, stage II    High risk medication use    Hx of abnormal mammogram    Hyperlipidemia    Morbid obesity with BMI of 40.0-44.9, adult (HCC)    Neuroendocrine tumor    OA (osteoarthritis)    Status post subtotal thyroidectomy    Thyroid goiter    Lipoma of abdominal wall    Pericardial effusion    Lymphadenopathy, axillary    Acute exacerbation of chronic obstructive pulmonary disease (COPD) ([address])    Pneumonia of both lungs due to infectious organism    A-fib (HCC)    Mild pulmonary hypertension (HCC)    Acute respiratory failure with hypoxia ([address])         Plan:      Long discussion with patient.   Dyspnea

## 2023-08-18 RX ORDER — AMIODARONE HYDROCHLORIDE 200 MG/1
TABLET ORAL
Qty: 30 TABLET | Refills: 3 | Status: SHIPPED | OUTPATIENT
Start: 2023-08-18

## 2023-08-18 NOTE — TELEPHONE ENCOUNTER
Last Appt:  4/26/2023  Next Appt:   11/7/2023  Med verified in [address] [place] [address] 15

## 2023-09-16 ENCOUNTER — HOSPITAL ENCOUNTER (INPATIENT)
Age: 78
LOS: 1 days | Discharge: HOME | DRG: 202 | End: 2023-09-17
Attending: EMERGENCY MEDICINE | Admitting: FAMILY MEDICINE
Payer: MEDICARE

## 2023-09-16 ENCOUNTER — APPOINTMENT (OUTPATIENT)
Dept: GENERAL RADIOLOGY | Age: 78
DRG: 202 | End: 2023-09-16
Payer: MEDICARE

## 2023-09-16 DIAGNOSIS — J40 BRONCHITIS: Primary | ICD-10-CM

## 2023-09-16 DIAGNOSIS — R09.02 HYPOXIA: ICD-10-CM

## 2023-09-16 PROBLEM — J44.1 ACUTE EXACERBATION OF CHRONIC OBSTRUCTIVE PULMONARY DISEASE (COPD) (HCC): Status: RESOLVED | Noted: 2022-06-07 | Resolved: 2023-09-16

## 2023-09-16 PROBLEM — J18.9 PNEUMONIA OF BOTH LUNGS DUE TO INFECTIOUS ORGANISM: Status: RESOLVED | Noted: 2022-06-07 | Resolved: 2023-09-16

## 2023-09-16 PROBLEM — J44.1 ASTHMA EXACERBATION WITH COPD (CHRONIC OBSTRUCTIVE PULMONARY DISEASE) (HCC): Status: ACTIVE | Noted: 2023-09-16

## 2023-09-16 PROBLEM — J45.901 ASTHMA EXACERBATION WITH COPD (CHRONIC OBSTRUCTIVE PULMONARY DISEASE) (HCC): Status: ACTIVE | Noted: 2023-09-16

## 2023-09-16 LAB
ANION GAP SERPL CALCULATED.3IONS-SCNC: 8 MMOL/L (ref 9–17)
BASOPHILS # BLD: 0.05 K/UL (ref 0–0.2)
BASOPHILS NFR BLD: 1 % (ref 0–2)
BNP SERPL-MCNC: 458 PG/ML
BUN SERPL-MCNC: 24 MG/DL (ref 8–23)
BUN/CREAT SERPL: 22 (ref 9–20)
CALCIUM SERPL-MCNC: 8.8 MG/DL (ref 8.6–10.4)
CHLORIDE SERPL-SCNC: 100 MMOL/L (ref 98–107)
CO2 SERPL-SCNC: 30 MMOL/L (ref 20–31)
CREAT SERPL-MCNC: 1.1 MG/DL (ref 0.5–0.9)
EOSINOPHIL # BLD: 0.57 K/UL (ref 0–0.44)
EOSINOPHILS RELATIVE PERCENT: 8 % (ref 1–4)
ERYTHROCYTE [DISTWIDTH] IN BLOOD BY AUTOMATED COUNT: 16.8 % (ref 11.8–14.4)
FLUAV AG SPEC QL: NEGATIVE
FLUBV AG SPEC QL: NEGATIVE
GFR SERPL CREATININE-BSD FRML MDRD: 52 ML/MIN/1.73M2
GLUCOSE SERPL-MCNC: 112 MG/DL (ref 70–99)
HCT VFR BLD AUTO: 36.9 % (ref 36.3–47.1)
HGB BLD-MCNC: 11.2 G/DL (ref 11.9–15.1)
IMM GRANULOCYTES # BLD AUTO: 0.03 K/UL (ref 0–0.3)
IMM GRANULOCYTES NFR BLD: 0 %
LACTATE BLDV-SCNC: 1.3 MMOL/L (ref 0.5–2.2)
LYMPHOCYTES NFR BLD: 2.02 K/UL (ref 1.1–3.7)
LYMPHOCYTES RELATIVE PERCENT: 28 % (ref 24–43)
MCH RBC QN AUTO: 26 PG (ref 25.2–33.5)
MCHC RBC AUTO-ENTMCNC: 30.4 G/DL (ref 25.2–33.5)
MCV RBC AUTO: 85.6 FL (ref 82.6–102.9)
MONOCYTES NFR BLD: 0.45 K/UL (ref 0.1–1.2)
MONOCYTES NFR BLD: 6 % (ref 3–12)
NEUTROPHILS NFR BLD: 57 % (ref 36–65)
NEUTS SEG NFR BLD: 4.12 K/UL (ref 1.5–8.1)
NRBC BLD-RTO: 0 PER 100 WBC
PLATELET # BLD AUTO: 264 K/UL (ref 138–453)
PMV BLD AUTO: 9.3 FL (ref 8.1–13.5)
POTASSIUM SERPL-SCNC: 4.1 MMOL/L (ref 3.7–5.3)
RBC # BLD AUTO: 4.31 M/UL (ref 3.95–5.11)
RBC # BLD: ABNORMAL 10*6/UL
SARS-COV-2 RDRP RESP QL NAA+PROBE: NOT DETECTED
SODIUM SERPL-SCNC: 138 MMOL/L (ref 135–144)
SPECIMEN DESCRIPTION: NORMAL
TROPONIN I SERPL HS-MCNC: 18 NG/L (ref 0–14)
WBC OTHER # BLD: 7.2 K/UL (ref 3.5–11.3)

## 2023-09-16 PROCEDURE — 87635 SARS-COV-2 COVID-19 AMP PRB: CPT

## 2023-09-16 PROCEDURE — 6370000000 HC RX 637 (ALT 250 FOR IP): Performed by: FAMILY MEDICINE

## 2023-09-16 PROCEDURE — 96374 THER/PROPH/DIAG INJ IV PUSH: CPT

## 2023-09-16 PROCEDURE — 2060000000 HC ICU INTERMEDIATE R&B

## 2023-09-16 PROCEDURE — 6360000002 HC RX W HCPCS: Performed by: EMERGENCY MEDICINE

## 2023-09-16 PROCEDURE — 6370000000 HC RX 637 (ALT 250 FOR IP): Performed by: EMERGENCY MEDICINE

## 2023-09-16 PROCEDURE — 2580000003 HC RX 258: Performed by: FAMILY MEDICINE

## 2023-09-16 PROCEDURE — 99285 EMERGENCY DEPT VISIT HI MDM: CPT

## 2023-09-16 PROCEDURE — 87804 INFLUENZA ASSAY W/OPTIC: CPT

## 2023-09-16 PROCEDURE — 84484 ASSAY OF TROPONIN QUANT: CPT

## 2023-09-16 PROCEDURE — 6360000002 HC RX W HCPCS: Performed by: FAMILY MEDICINE

## 2023-09-16 PROCEDURE — 85025 COMPLETE CBC W/AUTO DIFF WBC: CPT

## 2023-09-16 PROCEDURE — 36415 COLL VENOUS BLD VENIPUNCTURE: CPT

## 2023-09-16 PROCEDURE — 71045 X-RAY EXAM CHEST 1 VIEW: CPT

## 2023-09-16 PROCEDURE — 93005 ELECTROCARDIOGRAM TRACING: CPT | Performed by: EMERGENCY MEDICINE

## 2023-09-16 PROCEDURE — 94640 AIRWAY INHALATION TREATMENT: CPT

## 2023-09-16 PROCEDURE — 83605 ASSAY OF LACTIC ACID: CPT

## 2023-09-16 PROCEDURE — 83880 ASSAY OF NATRIURETIC PEPTIDE: CPT

## 2023-09-16 PROCEDURE — 6370000000 HC RX 637 (ALT 250 FOR IP): Performed by: NURSE PRACTITIONER

## 2023-09-16 PROCEDURE — 80048 BASIC METABOLIC PNL TOTAL CA: CPT

## 2023-09-16 RX ORDER — SODIUM CHLORIDE 0.9 % (FLUSH) 0.9 %
5-40 SYRINGE (ML) INJECTION PRN
Status: DISCONTINUED | OUTPATIENT
Start: 2023-09-16 | End: 2023-09-17 | Stop reason: HOSPADM

## 2023-09-16 RX ORDER — SODIUM CHLORIDE 9 MG/ML
INJECTION, SOLUTION INTRAVENOUS PRN
Status: DISCONTINUED | OUTPATIENT
Start: 2023-09-16 | End: 2023-09-17 | Stop reason: HOSPADM

## 2023-09-16 RX ORDER — CEFUROXIME AXETIL 250 MG/1
250 TABLET ORAL EVERY 12 HOURS SCHEDULED
Status: DISCONTINUED | OUTPATIENT
Start: 2023-09-16 | End: 2023-09-17 | Stop reason: HOSPADM

## 2023-09-16 RX ORDER — ACETAMINOPHEN 325 MG/1
650 TABLET ORAL EVERY 6 HOURS PRN
Status: DISCONTINUED | OUTPATIENT
Start: 2023-09-16 | End: 2023-09-17 | Stop reason: HOSPADM

## 2023-09-16 RX ORDER — ASPIRIN 81 MG/1
81 TABLET ORAL DAILY
Status: DISCONTINUED | OUTPATIENT
Start: 2023-09-16 | End: 2023-09-17 | Stop reason: HOSPADM

## 2023-09-16 RX ORDER — ACETAMINOPHEN 650 MG/1
650 SUPPOSITORY RECTAL EVERY 6 HOURS PRN
Status: DISCONTINUED | OUTPATIENT
Start: 2023-09-16 | End: 2023-09-17 | Stop reason: HOSPADM

## 2023-09-16 RX ORDER — HYDROCODONE POLISTIREX AND CHLORPHENIRAMINE POLISTIREX 10; 8 MG/5ML; MG/5ML
SUSPENSION, EXTENDED RELEASE ORAL
Status: ON HOLD | COMMUNITY
Start: 2023-09-14 | End: 2023-09-17 | Stop reason: HOSPADM

## 2023-09-16 RX ORDER — BENZONATATE 100 MG/1
200 CAPSULE ORAL 3 TIMES DAILY PRN
Status: DISCONTINUED | OUTPATIENT
Start: 2023-09-16 | End: 2023-09-17 | Stop reason: HOSPADM

## 2023-09-16 RX ORDER — ONDANSETRON 4 MG/1
4 TABLET, ORALLY DISINTEGRATING ORAL EVERY 8 HOURS PRN
Status: DISCONTINUED | OUTPATIENT
Start: 2023-09-16 | End: 2023-09-16

## 2023-09-16 RX ORDER — IPRATROPIUM BROMIDE AND ALBUTEROL SULFATE 2.5; .5 MG/3ML; MG/3ML
1 SOLUTION RESPIRATORY (INHALATION) ONCE
Status: COMPLETED | OUTPATIENT
Start: 2023-09-16 | End: 2023-09-16

## 2023-09-16 RX ORDER — MONTELUKAST SODIUM 10 MG/1
10 TABLET ORAL NIGHTLY
Status: DISCONTINUED | OUTPATIENT
Start: 2023-09-16 | End: 2023-09-17 | Stop reason: HOSPADM

## 2023-09-16 RX ORDER — POLYETHYLENE GLYCOL 3350 17 G/17G
17 POWDER, FOR SOLUTION ORAL DAILY PRN
Status: DISCONTINUED | OUTPATIENT
Start: 2023-09-16 | End: 2023-09-17 | Stop reason: HOSPADM

## 2023-09-16 RX ORDER — ONDANSETRON 2 MG/ML
4 INJECTION INTRAMUSCULAR; INTRAVENOUS EVERY 6 HOURS PRN
Status: DISCONTINUED | OUTPATIENT
Start: 2023-09-16 | End: 2023-09-16

## 2023-09-16 RX ORDER — FUROSEMIDE 20 MG/1
20 TABLET ORAL DAILY
Status: DISCONTINUED | OUTPATIENT
Start: 2023-09-16 | End: 2023-09-17 | Stop reason: HOSPADM

## 2023-09-16 RX ORDER — FLUTICASONE PROPIONATE 50 MCG
2 SPRAY, SUSPENSION (ML) NASAL DAILY
Status: DISCONTINUED | OUTPATIENT
Start: 2023-09-16 | End: 2023-09-17 | Stop reason: HOSPADM

## 2023-09-16 RX ORDER — IPRATROPIUM BROMIDE AND ALBUTEROL SULFATE 2.5; .5 MG/3ML; MG/3ML
SOLUTION RESPIRATORY (INHALATION)
Status: DISPENSED
Start: 2023-09-16 | End: 2023-09-16

## 2023-09-16 RX ORDER — AMIODARONE HYDROCHLORIDE 200 MG/1
200 TABLET ORAL DAILY
Status: DISCONTINUED | OUTPATIENT
Start: 2023-09-16 | End: 2023-09-17 | Stop reason: HOSPADM

## 2023-09-16 RX ORDER — TRAZODONE HYDROCHLORIDE 50 MG/1
100 TABLET ORAL NIGHTLY
Status: DISCONTINUED | OUTPATIENT
Start: 2023-09-16 | End: 2023-09-17 | Stop reason: HOSPADM

## 2023-09-16 RX ORDER — SODIUM CHLORIDE 9 MG/ML
INJECTION, SOLUTION INTRAVENOUS CONTINUOUS
Status: DISCONTINUED | OUTPATIENT
Start: 2023-09-16 | End: 2023-09-16

## 2023-09-16 RX ORDER — SODIUM CHLORIDE 0.9 % (FLUSH) 0.9 %
5-40 SYRINGE (ML) INJECTION EVERY 12 HOURS SCHEDULED
Status: DISCONTINUED | OUTPATIENT
Start: 2023-09-16 | End: 2023-09-17 | Stop reason: HOSPADM

## 2023-09-16 RX ORDER — PANTOPRAZOLE SODIUM 40 MG/1
40 TABLET, DELAYED RELEASE ORAL
Status: DISCONTINUED | OUTPATIENT
Start: 2023-09-17 | End: 2023-09-17 | Stop reason: HOSPADM

## 2023-09-16 RX ORDER — LEVOTHYROXINE SODIUM 0.03 MG/1
50 TABLET ORAL DAILY
Status: DISCONTINUED | OUTPATIENT
Start: 2023-09-16 | End: 2023-09-17 | Stop reason: HOSPADM

## 2023-09-16 RX ORDER — GUAIFENESIN 200 MG/10ML
200 LIQUID ORAL EVERY 4 HOURS PRN
Status: DISCONTINUED | OUTPATIENT
Start: 2023-09-16 | End: 2023-09-17 | Stop reason: HOSPADM

## 2023-09-16 RX ORDER — IPRATROPIUM BROMIDE AND ALBUTEROL SULFATE 2.5; .5 MG/3ML; MG/3ML
1 SOLUTION RESPIRATORY (INHALATION) EVERY 4 HOURS PRN
Status: DISCONTINUED | OUTPATIENT
Start: 2023-09-16 | End: 2023-09-17 | Stop reason: HOSPADM

## 2023-09-16 RX ORDER — PROMETHAZINE HYDROCHLORIDE 25 MG/1
25 TABLET ORAL EVERY 6 HOURS PRN
Status: DISCONTINUED | OUTPATIENT
Start: 2023-09-16 | End: 2023-09-17 | Stop reason: HOSPADM

## 2023-09-16 RX ADMIN — TRAZODONE HYDROCHLORIDE 100 MG: 50 TABLET ORAL at 20:40

## 2023-09-16 RX ADMIN — MONTELUKAST 10 MG: 10 TABLET, FILM COATED ORAL at 20:40

## 2023-09-16 RX ADMIN — METHYLPREDNISOLONE SODIUM SUCCINATE 40 MG: 40 INJECTION, POWDER, FOR SOLUTION INTRAMUSCULAR; INTRAVENOUS at 18:57

## 2023-09-16 RX ADMIN — SODIUM CHLORIDE, PRESERVATIVE FREE 10 ML: 5 INJECTION INTRAVENOUS at 18:57

## 2023-09-16 RX ADMIN — BENZONATATE 200 MG: 100 CAPSULE ORAL at 20:40

## 2023-09-16 RX ADMIN — APIXABAN 5 MG: 5 TABLET, FILM COATED ORAL at 13:23

## 2023-09-16 RX ADMIN — IPRATROPIUM BROMIDE AND ALBUTEROL SULFATE 1 DOSE: .5; 2.5 SOLUTION RESPIRATORY (INHALATION) at 08:35

## 2023-09-16 RX ADMIN — SODIUM CHLORIDE, PRESERVATIVE FREE 10 ML: 5 INJECTION INTRAVENOUS at 20:42

## 2023-09-16 RX ADMIN — SODIUM CHLORIDE, PRESERVATIVE FREE 10 ML: 5 INJECTION INTRAVENOUS at 13:23

## 2023-09-16 RX ADMIN — CEFUROXIME AXETIL 250 MG: 250 TABLET ORAL at 20:40

## 2023-09-16 RX ADMIN — FLUTICASONE PROPIONATE 2 SPRAY: 50 SPRAY, METERED NASAL at 13:22

## 2023-09-16 RX ADMIN — AMIODARONE HYDROCHLORIDE 200 MG: 200 TABLET ORAL at 13:22

## 2023-09-16 RX ADMIN — Medication 2 PUFF: at 19:58

## 2023-09-16 RX ADMIN — METHYLPREDNISOLONE SODIUM SUCCINATE 125 MG: 125 INJECTION, POWDER, FOR SOLUTION INTRAMUSCULAR; INTRAVENOUS at 08:24

## 2023-09-16 RX ADMIN — FUROSEMIDE 20 MG: 20 TABLET ORAL at 13:23

## 2023-09-16 RX ADMIN — ASPIRIN 81 MG: 81 TABLET, COATED ORAL at 13:23

## 2023-09-16 RX ADMIN — IPRATROPIUM BROMIDE AND ALBUTEROL SULFATE 1 DOSE: 2.5; .5 SOLUTION RESPIRATORY (INHALATION) at 08:19

## 2023-09-16 RX ADMIN — APIXABAN 5 MG: 5 TABLET, FILM COATED ORAL at 20:40

## 2023-09-16 RX ADMIN — LEVOTHYROXINE SODIUM 50 MCG: 0.03 TABLET ORAL at 13:22

## 2023-09-16 ASSESSMENT — LIFESTYLE VARIABLES
HOW OFTEN DO YOU HAVE A DRINK CONTAINING ALCOHOL: NEVER
HOW MANY STANDARD DRINKS CONTAINING ALCOHOL DO YOU HAVE ON A TYPICAL DAY: PATIENT DOES NOT DRINK

## 2023-09-16 ASSESSMENT — ENCOUNTER SYMPTOMS
SPUTUM PRODUCTION: 1
NAUSEA: 0
WHEEZING: 1
SHORTNESS OF BREATH: 1
ABDOMINAL PAIN: 0
VOMITING: 0
COUGH: 1

## 2023-09-16 ASSESSMENT — PAIN - FUNCTIONAL ASSESSMENT: PAIN_FUNCTIONAL_ASSESSMENT: NONE - DENIES PAIN

## 2023-09-16 NOTE — H&P
tenderness/mass/nodules  Lungs: wheezes bilaterally  Heart: Irregularly irregular  Abdomen: soft, non-tender; bowel sounds normal; no masses,  no organomegaly  Extremities: extremities normal, atraumatic, no cyanosis or edema  Neurologic: Mental status: Alert, oriented, thought content appropriate    CBC:   Recent Labs     09/16/23  0811   WBC 7.2   HGB 11.2*        BMP:    Recent Labs     09/16/23  0811      K 4.1      CO2 30   BUN 24*   CREATININE 1.1*   GLUCOSE 112*     Hepatic: No results for input(s): \"AST\", \"ALT\", \"ALB\", \"BILITOT\", \"ALKPHOS\" in the last 72 hours. Troponin: No results for input(s): \"TROPONINI\" in the last 72 hours. BNP: No results for input(s): \"BNP\" in the last 72 hours. Lipids: No results for input(s): \"CHOL\", \"HDL\" in the last 72 hours. Invalid input(s): \"LDLCALCU\"  ABGs: No results found for: \"PHART\", \"PO2ART\", \"VIN4AJL\"  INR: No results for input(s): \"INR\" in the last 72 hours. -----------------------------------------------------------------  PA/lat CXR: No acute findings  EKG: NSR    Assessment and Plan   Asthma exacerbation  Sinusitis  Chronic AFIB  H/O Combined CHF    Plan:IV solumedrol. aerosol treatments RTprotocol. Telemetry. Oral ceftin for sinusistis. Continue Eliquis. Protonix for GI prophylaxis. Continue home meds. Estimated length of stay 1-2 nights. Patient Active Problem List   Diagnosis Code    Anxiety F41.9    APC (atrial premature contractions) I49.1    Asthmatic bronchitis, moderate persistent, with acute exacerbation J45.41    COPD (chronic obstructive pulmonary disease) with acute bronchitis ([address]) J44.0, J20.9    Depression F32. A    H/O colon cancer, stage II Z85.038    High risk medication use Z79.899    Hx of abnormal mammogram Z87.898    Hyperlipidemia E78.5    Morbid obesity with BMI of 40.0-44.9, adult (HCC) E66.01, Z68.41    Neuroendocrine tumor D3A.8    OA (osteoarthritis) M19.90    Status post subtotal thyroidectomy E89.0

## 2023-09-16 NOTE — ED PROVIDER NOTES
[facility]  Phone: [phone]        ADDENDUM:      Care of this patient was assumed from Dr. Maira Batres. The patient was seen for Cough and Respiratory Distress (Has been having worsening breathing for a few weeks but had to apply her husbands oxygen on at 0300 and took a breathing treatment. States her oxygen sat was 89% prior to putting the oxygen on.)  . The patient's initial evaluation and plan have been discussed with the prior provider who initially evaluated the patient. Nursing Notes, Past Medical Hx, Past Surgical Hx, Allergies, were all reviewed. PAST MEDICAL HISTORY    has a past medical history of Abdominal pain, RLQ, Active asthma, Atrial fibrillation ([address]), History of colon cancer, History of iron deficiency anemia, Hyperlipidemia, MI (myocardial infarction) ([address]), Prolonged Q-T interval on ECG, Stress disorder, acute, and Thyroid goiter. SURGICAL HISTORY      has a past surgical history that includes Colonoscopy; Colon surgery (04/2008); Thyroidectomy, partial; Upper gastrointestinal endoscopy; joint replacement; hernia repair (2004); Hemorrhoid surgery; Colonoscopy (03/13/2017); Hysterectomy; laparoscopy (N/A, 07/11/2019); and Cardiac catheterization.     CURRENT MEDICATIONS       Previous Medications    ALBUTEROL (PROVENTIL) (2.5 MG/3ML) 0.083% NEBULIZER SOLUTION    Inhale 3 mLs into the lungs    AMIODARONE (CORDARONE) 200 MG TABLET    take 1 tablet by mouth once daily    APIXABAN (ELIQUIS) 5 MG TABS TABLET    Take 1 tablet by mouth 2 times daily    ASPIRIN EC 81 MG EC TABLET    Take 1 tablet by mouth daily    CLOBETASOL (TEMOVATE) 0.05 % CREAM    Apply topically as needed    FLUTICASONE (FLONASE) 50 MCG/ACT NASAL SPRAY    2 sprays by Nasal route daily    FUROSEMIDE (LASIX) 20 MG TABLET    TAKE 1 TABLET DAILY    GUAIFENESIN (MUCINEX) 600 MG EXTENDED RELEASE TABLET    Take 1 tablet by mouth 2 times daily
DISPOSITION/PLAN   DISPOSITION        PATIENT REFERRED TO:  No follow-up provider specified. DISCHARGE MEDICATIONS:  New Prescriptions    No medications on file       There are no active hospital problems to display for this patient.       (Please note that portions of this note were completed with a voice recognition program.  Efforts were made to edit the dictations but occasionally words are mis-transcribed.)    Gayla Dimas MD, [facility]  Attending Emergency Medicine Physician        Gayla Dimas MD  09/16/23 [id number]

## 2023-09-17 VITALS
BODY MASS INDEX: 42.78 KG/M2 | HEART RATE: 72 BPM | WEIGHT: 232.5 LBS | HEIGHT: 62 IN | DIASTOLIC BLOOD PRESSURE: 60 MMHG | TEMPERATURE: 98.6 F | SYSTOLIC BLOOD PRESSURE: 133 MMHG | RESPIRATION RATE: 18 BRPM | OXYGEN SATURATION: 96 %

## 2023-09-17 PROBLEM — J40 BRONCHITIS: Status: ACTIVE | Noted: 2023-09-17

## 2023-09-17 LAB
ANION GAP SERPL CALCULATED.3IONS-SCNC: 8 MMOL/L (ref 9–17)
BASOPHILS # BLD: <0.03 K/UL (ref 0–0.2)
BASOPHILS NFR BLD: 0 % (ref 0–2)
BUN SERPL-MCNC: 18 MG/DL (ref 8–23)
BUN/CREAT SERPL: 18 (ref 9–20)
CALCIUM SERPL-MCNC: 8.7 MG/DL (ref 8.6–10.4)
CHLORIDE SERPL-SCNC: 105 MMOL/L (ref 98–107)
CO2 SERPL-SCNC: 30 MMOL/L (ref 20–31)
CREAT SERPL-MCNC: 1 MG/DL (ref 0.5–0.9)
EOSINOPHIL # BLD: <0.03 K/UL (ref 0–0.44)
EOSINOPHILS RELATIVE PERCENT: 0 % (ref 1–4)
ERYTHROCYTE [DISTWIDTH] IN BLOOD BY AUTOMATED COUNT: 16.6 % (ref 11.8–14.4)
GFR SERPL CREATININE-BSD FRML MDRD: 58 ML/MIN/1.73M2
GLUCOSE SERPL-MCNC: 160 MG/DL (ref 70–99)
HCT VFR BLD AUTO: 32.8 % (ref 36.3–47.1)
HGB BLD-MCNC: 10.1 G/DL (ref 11.9–15.1)
IMM GRANULOCYTES # BLD AUTO: 0.05 K/UL (ref 0–0.3)
IMM GRANULOCYTES NFR BLD: 1 %
LYMPHOCYTES NFR BLD: 1.19 K/UL (ref 1.1–3.7)
LYMPHOCYTES RELATIVE PERCENT: 16 % (ref 24–43)
MCH RBC QN AUTO: 26.3 PG (ref 25.2–33.5)
MCHC RBC AUTO-ENTMCNC: 30.8 G/DL (ref 25.2–33.5)
MCV RBC AUTO: 85.4 FL (ref 82.6–102.9)
MONOCYTES NFR BLD: 0.1 K/UL (ref 0.1–1.2)
MONOCYTES NFR BLD: 1 % (ref 3–12)
NEUTROPHILS NFR BLD: 82 % (ref 36–65)
NEUTS SEG NFR BLD: 6 K/UL (ref 1.5–8.1)
NRBC BLD-RTO: 0 PER 100 WBC
PLATELET # BLD AUTO: 264 K/UL (ref 138–453)
PMV BLD AUTO: 10 FL (ref 8.1–13.5)
POTASSIUM SERPL-SCNC: 3.9 MMOL/L (ref 3.7–5.3)
RBC # BLD AUTO: 3.84 M/UL (ref 3.95–5.11)
RBC # BLD: ABNORMAL 10*6/UL
SODIUM SERPL-SCNC: 143 MMOL/L (ref 135–144)
WBC OTHER # BLD: 7.4 K/UL (ref 3.5–11.3)

## 2023-09-17 PROCEDURE — 36415 COLL VENOUS BLD VENIPUNCTURE: CPT

## 2023-09-17 PROCEDURE — 6360000002 HC RX W HCPCS: Performed by: FAMILY MEDICINE

## 2023-09-17 PROCEDURE — 80048 BASIC METABOLIC PNL TOTAL CA: CPT

## 2023-09-17 PROCEDURE — 85025 COMPLETE CBC W/AUTO DIFF WBC: CPT

## 2023-09-17 PROCEDURE — 6370000000 HC RX 637 (ALT 250 FOR IP): Performed by: NURSE PRACTITIONER

## 2023-09-17 PROCEDURE — 6370000000 HC RX 637 (ALT 250 FOR IP): Performed by: FAMILY MEDICINE

## 2023-09-17 PROCEDURE — 99238 HOSP IP/OBS DSCHRG MGMT 30/<: CPT | Performed by: FAMILY MEDICINE

## 2023-09-17 PROCEDURE — 2580000003 HC RX 258: Performed by: FAMILY MEDICINE

## 2023-09-17 RX ORDER — METHYLPREDNISOLONE 4 MG/1
TABLET ORAL
Qty: 1 KIT | Refills: 0 | Status: SHIPPED | OUTPATIENT
Start: 2023-09-17 | End: 2023-09-23

## 2023-09-17 RX ORDER — PANTOPRAZOLE SODIUM 40 MG/1
40 TABLET, DELAYED RELEASE ORAL
Qty: 30 TABLET | Refills: 3 | Status: SHIPPED | OUTPATIENT
Start: 2023-09-18

## 2023-09-17 RX ORDER — BENZONATATE 200 MG/1
200 CAPSULE ORAL 3 TIMES DAILY PRN
Qty: 30 CAPSULE | Refills: 0 | Status: SHIPPED | OUTPATIENT
Start: 2023-09-17 | End: 2023-09-24

## 2023-09-17 RX ORDER — CEFUROXIME AXETIL 250 MG/1
250 TABLET ORAL EVERY 12 HOURS SCHEDULED
Qty: 20 TABLET | Refills: 0 | Status: SHIPPED | OUTPATIENT
Start: 2023-09-17 | End: 2023-09-27

## 2023-09-17 RX ORDER — GUAIFENESIN 200 MG/10ML
200 LIQUID ORAL EVERY 4 HOURS PRN
COMMUNITY
Start: 2023-09-17

## 2023-09-17 RX ADMIN — FUROSEMIDE 20 MG: 20 TABLET ORAL at 08:30

## 2023-09-17 RX ADMIN — APIXABAN 5 MG: 5 TABLET, FILM COATED ORAL at 08:30

## 2023-09-17 RX ADMIN — METHYLPREDNISOLONE SODIUM SUCCINATE 40 MG: 40 INJECTION, POWDER, FOR SOLUTION INTRAMUSCULAR; INTRAVENOUS at 05:34

## 2023-09-17 RX ADMIN — AMIODARONE HYDROCHLORIDE 200 MG: 200 TABLET ORAL at 08:29

## 2023-09-17 RX ADMIN — FLUTICASONE PROPIONATE 2 SPRAY: 50 SPRAY, METERED NASAL at 08:30

## 2023-09-17 RX ADMIN — ASPIRIN 81 MG: 81 TABLET, COATED ORAL at 08:30

## 2023-09-17 RX ADMIN — PANTOPRAZOLE SODIUM 40 MG: 40 TABLET, DELAYED RELEASE ORAL at 05:34

## 2023-09-17 RX ADMIN — BENZONATATE 200 MG: 100 CAPSULE ORAL at 05:40

## 2023-09-17 RX ADMIN — CEFUROXIME AXETIL 250 MG: 250 TABLET ORAL at 08:30

## 2023-09-17 RX ADMIN — SODIUM CHLORIDE, PRESERVATIVE FREE 10 ML: 5 INJECTION INTRAVENOUS at 08:30

## 2023-09-17 RX ADMIN — LEVOTHYROXINE SODIUM 50 MCG: 0.03 TABLET ORAL at 08:29

## 2023-09-17 NOTE — DISCHARGE SUMMARY
Discharge Summary    Ira Quevedo Patient Status:  Inpatient    1945 MRN [id number]   Location [address] [facility] Attending Ruth Ann Varghese MD   Hosp Day # 1 PCP Olivia So     Date of Admission: 2023    Date of Discharge: 2023    Admitting Diagnosis: Bronchitis [J40]  Hypoxia [R09.02]  Asthma exacerbation with COPD (chronic obstructive pulmonary disease) ([address]) [J44.1, J45.901]    Discharge Diagnosis: Principal Problem:    Asthma exacerbation with COPD (chronic obstructive pulmonary disease) ([address])  Resolved Problems:    * No resolved hospital problems. *  Sinusitis     Reason for Admission/HPI: Shortness of breath with wheezing was hypoxic h/o asthma with exacerbations    Hospital Course: Patient was on IV solumedrol low dose,aerosol treatment has not needed supplemental oxygen since being up on floor. Was started on Ceftin 250 mg bid for symptoms of sinusitis. h/o AFIB and CHF have bben stable was continued on oral lasix and Eliquis.     Consultations: None    Procedures: None    Complications: None    Disposition: Home    Discharge Condition: Good    Discharge Medications:      Medication List        START taking these medications      benzonatate 200 MG capsule  Commonly known as: TESSALON  Take 1 capsule by mouth 3 times daily as needed for Cough     cefUROXime 250 MG tablet  Commonly known as: CEFTIN  Take 1 tablet by mouth every 12 hours for 10 days     guaiFENesin 100 MG/5ML liquid  Commonly known as: ROBITUSSIN  Take 10 mLs by mouth every 4 hours as needed for Cough  Replaces: guaiFENesin 600 MG extended release tablet     methylPREDNISolone 4 MG tablet  Commonly known as: MEDROL DOSEPACK  Take by mouth.     pantoprazole 40 MG tablet  Commonly known as: PROTONIX  Take 1 tablet by mouth every morning (before breakfast)  Start taking on: 2023            CONTINUE taking these medications      * albuterol (2.5 MG/3ML) 0.083% nebulizer solution  Commonly known as:

## 2023-09-17 NOTE — PLAN OF CARE
Problem: Discharge Planning  Goal: Discharge to home or other facility with appropriate resources  9/17/2023 0837 by Paola Berg RN  Outcome: Progressing  Flowsheets (Taken 9/17/2023 2124)  Discharge to home or other facility with appropriate resources: Identify barriers to discharge with patient and caregiver  9/16/2023 2117 by Lennie Patino RN  Outcome: Progressing     Problem: Safety - Adult  Goal: Free from fall injury  9/17/2023 0837 by Paola Berg RN  Outcome: Progressing  9/16/2023 2117 by Lennie Patino RN  Outcome: Progressing     Problem: Skin/Tissue Integrity  Goal: Absence of new skin breakdown  Description: 1. Monitor for areas of redness and/or skin breakdown  2. Assess vascular access sites hourly  3. Every 4-6 hours minimum:  Change oxygen saturation probe site  4. Every 4-6 hours:  If on nasal continuous positive airway pressure, respiratory therapy assess nares and determine need for appliance change or resting period.   9/17/2023 0837 by Paola Berg RN  Outcome: Progressing  9/16/2023 2117 by Lennie Patino RN  Outcome: Progressing     Problem: Pain  Goal: Verbalizes/displays adequate comfort level or baseline comfort level  9/17/2023 0837 by Paola Berg RN  Outcome: Progressing  9/16/2023 2117 by Lennie Patino RN  Outcome: Progressing     Problem: Respiratory - Adult  Goal: Achieves optimal ventilation and oxygenation  9/17/2023 0837 by Paola Berg RN  Outcome: Progressing  9/16/2023 2117 by Lennie Patino RN  Outcome: Progressing     Problem: Skin/Tissue Integrity - Adult  Goal: Skin integrity remains intact  9/17/2023 0837 by Paola Berg RN  Outcome: Progressing  Flowsheets (Taken 9/17/2023 0834)  Skin Integrity Remains Intact: Monitor for areas of redness and/or skin breakdown  9/16/2023 2117 by Lennie Patino RN  Outcome: Progressing     Problem: Musculoskeletal - Adult  Goal: Return mobility to safest level of function  9/17/2023 0837 by Paola Berg
Problem: Discharge Planning  Goal: Discharge to home or other facility with appropriate resources  Outcome: Progressing  Flowsheets  Taken 9/16/2023 1142  Discharge to home or other facility with appropriate resources: Identify barriers to discharge with patient and caregiver  Taken 9/16/2023 1137  Discharge to home or other facility with appropriate resources: Identify barriers to discharge with patient and caregiver     Problem: Safety - Adult  Goal: Free from fall injury  Outcome: Progressing     Problem: Skin/Tissue Integrity  Goal: Absence of new skin breakdown  Description: 1. Monitor for areas of redness and/or skin breakdown  2. Assess vascular access sites hourly  3. Every 4-6 hours minimum:  Change oxygen saturation probe site  4. Every 4-6 hours:  If on nasal continuous positive airway pressure, respiratory therapy assess nares and determine need for appliance change or resting period.   Outcome: Progressing     Problem: Pain  Goal: Verbalizes/displays adequate comfort level or baseline comfort level  Outcome: Progressing
Tonny Hamilton, RN  Outcome: Completed  9/17/2023 0837 by Tonny Hamilton RN  Outcome: Progressing  Flowsheets (Taken 9/17/2023 4546)  Return Mobility to Safest Level of Function: Assess patient stability and activity tolerance for standing, transferring and ambulating with or without assistive devices

## 2023-09-18 ENCOUNTER — FOLLOWUP TELEPHONE ENCOUNTER (OUTPATIENT)
Dept: INPATIENT UNIT | Age: 78
End: 2023-09-18

## 2023-09-18 LAB
EKG ATRIAL RATE: 68 BPM
EKG P AXIS: 70 DEGREES
EKG P-R INTERVAL: 192 MS
EKG Q-T INTERVAL: 448 MS
EKG QRS DURATION: 90 MS
EKG QTC CALCULATION (BAZETT): 476 MS
EKG R AXIS: 48 DEGREES
EKG T AXIS: 66 DEGREES
EKG VENTRICULAR RATE: 68 BPM

## 2023-09-19 NOTE — FLOWSHEET NOTE
Provide education on:   COPD exacerbation  COPD action plan - explained action plan and went over scoring with patient while on phone call and informed that score is 8  COPD zones - explained COPD zones to patient while on phone call. All zones reviewed with patient along with accompanying actions. Patient states is doing well today informed patient that she would be in the green zone today. Informed patient that she will receive a phone call once a week for the next 4 weeks and the calls will be every Tuesday. Patient states that she would like called between  on Tuesdays.

## 2023-10-03 ENCOUNTER — FOLLOWUP TELEPHONE ENCOUNTER (OUTPATIENT)
Dept: INPATIENT UNIT | Age: 78
End: 2023-10-03

## 2023-10-15 ENCOUNTER — FOLLOWUP TELEPHONE ENCOUNTER (OUTPATIENT)
Dept: INPATIENT UNIT | Age: 78
End: 2023-10-15

## 2023-10-26 NOTE — PROGRESS NOTES
purulent secretions hemoptysis   Eyes: Negative. Respiratory:          Exertional dyspnea, cough productive of pale/white/creamy secretions. Denies purulent secretions or hemoptysis   Cardiovascular: Negative. Gastrointestinal:         Endorses abdominal fullness and heartburn symptoms   Endocrine: Negative. Genitourinary: Negative. Musculoskeletal: Negative. Skin: Negative. Allergic/Immunologic: Negative. Neurological: Negative. Hematological: Negative. Psychiatric/Behavioral: Negative. Objective:       Physical Exam  General appearance - alert, well appearing, and in no distress, oriented to person, place, and time, and overweight  Mental status - alert, oriented to person, place, and time  Eyes - pupils equal and reactive, extraocular eye movements intact  Ears -not examined  Nose - normal and patent, no erythema, discharge or polyps  Mouth - mucous membranes moist, pharynx normal without lesions  Neck - supple, no significant adenopathy  Chest - clear to auscultation, no wheezes, rales or rhonchi, symmetric air entry  Heart -normal rate, regular rhythm, normal S1, S2, no murmurs, rubs, clicks or gallops  Abdomen - soft, nontender, nondistended, no masses or organomegaly  Neuro- alert, oriented, normal speech, no focal findings or movement disorder noted}  Extremities - peripheral pulses normal, no pedal edema, no clubbing or cyanosis  Skin - normal coloration and turgor, no rashes, no suspicious skin lesions noted     Wt Readings from Last 3 Encounters:   11/08/23 105.7 kg (233 lb)   11/07/23 104.3 kg (230 lb)   09/17/23 105.5 kg (232 lb 8 oz)       Results for orders placed or performed during the hospital encounter of 09/16/23   COVID-19, Rapid    Specimen: Nasopharyngeal Swab   Result Value Ref Range    Specimen Description . NASOPHARYNGEAL SWAB     SARS-CoV-2, Rapid Not Detected Not Detected   RAPID INFLUENZA A/B ANTIGENS    Specimen: Nasopharyngeal   Result Value Ref Range

## 2023-11-07 ENCOUNTER — OFFICE VISIT (OUTPATIENT)
Dept: CARDIOLOGY | Age: 78
End: 2023-11-07
Payer: MEDICARE

## 2023-11-07 VITALS
WEIGHT: 230 LBS | HEART RATE: 61 BPM | DIASTOLIC BLOOD PRESSURE: 62 MMHG | BODY MASS INDEX: 42.07 KG/M2 | SYSTOLIC BLOOD PRESSURE: 118 MMHG

## 2023-11-07 DIAGNOSIS — I48.91 ATRIAL FIBRILLATION, UNSPECIFIED TYPE (HCC): Primary | ICD-10-CM

## 2023-11-07 DIAGNOSIS — I50.43 ACUTE ON CHRONIC COMBINED SYSTOLIC (CONGESTIVE) AND DIASTOLIC (CONGESTIVE) HEART FAILURE (HCC): ICD-10-CM

## 2023-11-07 DIAGNOSIS — R94.31 ABNORMAL ELECTROCARDIOGRAM (ECG) (EKG): ICD-10-CM

## 2023-11-07 PROCEDURE — 1123F ACP DISCUSS/DSCN MKR DOCD: CPT | Performed by: INTERNAL MEDICINE

## 2023-11-07 PROCEDURE — 99214 OFFICE O/P EST MOD 30 MIN: CPT | Performed by: INTERNAL MEDICINE

## 2023-11-07 PROCEDURE — 1090F PRES/ABSN URINE INCON ASSESS: CPT | Performed by: INTERNAL MEDICINE

## 2023-11-07 PROCEDURE — G8427 DOCREV CUR MEDS BY ELIG CLIN: HCPCS | Performed by: INTERNAL MEDICINE

## 2023-11-07 PROCEDURE — 93005 ELECTROCARDIOGRAM TRACING: CPT | Performed by: INTERNAL MEDICINE

## 2023-11-07 PROCEDURE — 1036F TOBACCO NON-USER: CPT | Performed by: INTERNAL MEDICINE

## 2023-11-07 PROCEDURE — G8484 FLU IMMUNIZE NO ADMIN: HCPCS | Performed by: INTERNAL MEDICINE

## 2023-11-07 PROCEDURE — 93010 ELECTROCARDIOGRAM REPORT: CPT | Performed by: INTERNAL MEDICINE

## 2023-11-07 PROCEDURE — G8417 CALC BMI ABV UP PARAM F/U: HCPCS | Performed by: INTERNAL MEDICINE

## 2023-11-07 PROCEDURE — G8400 PT W/DXA NO RESULTS DOC: HCPCS | Performed by: INTERNAL MEDICINE

## 2023-11-07 RX ORDER — FUROSEMIDE 40 MG/1
40 TABLET ORAL DAILY
Qty: 90 TABLET | Refills: 3 | Status: SHIPPED | OUTPATIENT
Start: 2023-11-07

## 2023-11-07 NOTE — PROGRESS NOTES
Mental status: Alert, oriented, thought content appropriate  Skin: WNL for age and condition, no obvious rashes or leasions      EKG \"  Sinus  Rhythm   -  Nonspecific T-abnormality. Low voltage with rightward P-axis and rotation -possible pulmonary disease. ECHO: 7/11/2019  Normal left ventricular diameter. Mild left ventricular hypertrophy. Left ventricular systolic function is normal. Estimated EF 55%. Small pericardial effusion. Echo 12/1/21  Normal left ventricular diameter. Borderline left ventricular hypertrophy. Left ventricular systolic function is normal.  Left ventricular ejection fraction 60 %. Unable to evaluate diastolic function. Left atrium is severely dilated. Right atrial dilatation. Right ventricular dilatation with reduced systolic function. Normal tricuspid valve leaflets. Trivial tricuspid regurgitation. Estimated right ventricular systolic pressure is 45 mmHg. Mild pulmonary hypertension. Normal function of other valves. Small pericardial effusion. Echo: 6/8/22  Normal left ventricular diameter. Mild left ventricular hypertrophy. Proximal half of the left ventricle is hyperdynamic and distal half  including the apex is hypokinetic and dyskinetic. Left ventricular systolic function is mildly reduced. Left ventricular ejection fraction 45 %. Grade II (moderate) left ventricular diastolic dysfunction. Left atrium is moderately dilated. Right atrial dilatation. Right ventricle is normal in size with poor apical function. Aortic valve sclerosis without stenosis. Mild mitral valve thickening with annular calcification. Normal tricuspid valve leaflets. Mild tricuspid regurgitation. Estimated right ventricular systolic pressure is 58 mmHg. Moderate pulmonary hypertension. Small pericardial effusion.   Aortic root dimension is at upper limit of normal.  IVC Increased diameter and impaired or no inspiratory variation indicating  elevated RA filling pressure

## 2023-11-08 ENCOUNTER — OFFICE VISIT (OUTPATIENT)
Dept: PULMONOLOGY | Age: 78
End: 2023-11-08
Payer: MEDICARE

## 2023-11-08 VITALS
SYSTOLIC BLOOD PRESSURE: 118 MMHG | DIASTOLIC BLOOD PRESSURE: 72 MMHG | HEART RATE: 72 BPM | BODY MASS INDEX: 42.88 KG/M2 | WEIGHT: 233 LBS | HEIGHT: 62 IN | RESPIRATION RATE: 18 BRPM

## 2023-11-08 DIAGNOSIS — J45.50 SEVERE PERSISTENT STEROID-DEPENDENT ASTHMA WITHOUT COMPLICATION: Primary | ICD-10-CM

## 2023-11-08 DIAGNOSIS — J82.83 EOSINOPHILIC ASTHMA: ICD-10-CM

## 2023-11-08 DIAGNOSIS — R09.82 POST-NASAL DRIP: ICD-10-CM

## 2023-11-08 DIAGNOSIS — K21.9 GASTROESOPHAGEAL REFLUX DISEASE, UNSPECIFIED WHETHER ESOPHAGITIS PRESENT: ICD-10-CM

## 2023-11-08 DIAGNOSIS — E66.01 CLASS 3 SEVERE OBESITY DUE TO EXCESS CALORIES WITH SERIOUS COMORBIDITY AND BODY MASS INDEX (BMI) OF 40.0 TO 44.9 IN ADULT (HCC): ICD-10-CM

## 2023-11-08 PROCEDURE — 1090F PRES/ABSN URINE INCON ASSESS: CPT | Performed by: NURSE PRACTITIONER

## 2023-11-08 PROCEDURE — 99213 OFFICE O/P EST LOW 20 MIN: CPT | Performed by: NURSE PRACTITIONER

## 2023-11-08 PROCEDURE — G8484 FLU IMMUNIZE NO ADMIN: HCPCS | Performed by: NURSE PRACTITIONER

## 2023-11-08 PROCEDURE — 99214 OFFICE O/P EST MOD 30 MIN: CPT | Performed by: NURSE PRACTITIONER

## 2023-11-08 PROCEDURE — 1036F TOBACCO NON-USER: CPT | Performed by: NURSE PRACTITIONER

## 2023-11-08 PROCEDURE — G8400 PT W/DXA NO RESULTS DOC: HCPCS | Performed by: NURSE PRACTITIONER

## 2023-11-08 PROCEDURE — G8427 DOCREV CUR MEDS BY ELIG CLIN: HCPCS | Performed by: NURSE PRACTITIONER

## 2023-11-08 PROCEDURE — G8417 CALC BMI ABV UP PARAM F/U: HCPCS | Performed by: NURSE PRACTITIONER

## 2023-11-08 PROCEDURE — 1123F ACP DISCUSS/DSCN MKR DOCD: CPT | Performed by: NURSE PRACTITIONER

## 2023-11-08 RX ORDER — CETIRIZINE HYDROCHLORIDE 10 MG/1
10 TABLET ORAL DAILY
Qty: 30 TABLET | Refills: 11 | Status: SHIPPED | OUTPATIENT
Start: 2023-11-08 | End: 2024-11-02

## 2023-11-08 RX ORDER — OMEPRAZOLE 20 MG/1
20 CAPSULE, DELAYED RELEASE ORAL
Qty: 30 CAPSULE | Refills: 0 | Status: SHIPPED | OUTPATIENT
Start: 2023-11-08

## 2023-11-08 ASSESSMENT — ENCOUNTER SYMPTOMS
EYES NEGATIVE: 1
ALLERGIC/IMMUNOLOGIC NEGATIVE: 1

## 2023-11-09 ENCOUNTER — TELEPHONE (OUTPATIENT)
Dept: CARDIOLOGY | Age: 78
End: 2023-11-09

## 2023-11-09 ENCOUNTER — HOSPITAL ENCOUNTER (OUTPATIENT)
Age: 78
Discharge: HOME | End: 2023-11-11
Attending: INTERNAL MEDICINE
Payer: MEDICARE

## 2023-11-09 VITALS
HEART RATE: 66 BPM | WEIGHT: 233 LBS | BODY MASS INDEX: 42.88 KG/M2 | DIASTOLIC BLOOD PRESSURE: 75 MMHG | HEIGHT: 62 IN | SYSTOLIC BLOOD PRESSURE: 190 MMHG

## 2023-11-09 DIAGNOSIS — R94.31 ABNORMAL ELECTROCARDIOGRAM (ECG) (EKG): ICD-10-CM

## 2023-11-09 DIAGNOSIS — I48.91 ATRIAL FIBRILLATION, UNSPECIFIED TYPE (HCC): ICD-10-CM

## 2023-11-09 LAB
ECHO AO ROOT DIAM: 2.8 CM
ECHO AO ROOT INDEX: 1.37 CM/M2
ECHO AV PEAK GRADIENT: 11 MMHG
ECHO AV PEAK VELOCITY: 1.6 M/S
ECHO AV VELOCITY RATIO: 0.44
ECHO BSA: 2.15 M2
ECHO LA DIAMETER INDEX: 2.35 CM/M2
ECHO LA DIAMETER: 4.8 CM
ECHO LA TO AORTIC ROOT RATIO: 1.71
ECHO LV E' LATERAL VELOCITY: 7 CM/S
ECHO LV E' SEPTAL VELOCITY: 9 CM/S
ECHO LV EJECTION FRACTION BIPLANE: 62 % (ref 55–100)
ECHO LV FRACTIONAL SHORTENING: 30 % (ref 28–44)
ECHO LV INTERNAL DIMENSION DIASTOLE INDEX: 2.75 CM/M2
ECHO LV INTERNAL DIMENSION DIASTOLIC: 5.6 CM (ref 3.9–5.3)
ECHO LV INTERNAL DIMENSION SYSTOLIC INDEX: 1.91 CM/M2
ECHO LV INTERNAL DIMENSION SYSTOLIC: 3.9 CM
ECHO LV IVSD: 0.9 CM (ref 0.6–0.9)
ECHO LV MASS 2D: 205.5 G (ref 67–162)
ECHO LV MASS INDEX 2D: 100.7 G/M2 (ref 43–95)
ECHO LV POSTERIOR WALL DIASTOLIC: 1 CM (ref 0.6–0.9)
ECHO LV RELATIVE WALL THICKNESS RATIO: 0.36
ECHO LVOT PEAK GRADIENT: 2 MMHG
ECHO LVOT PEAK VELOCITY: 0.7 M/S
ECHO MV A VELOCITY: 0.39 M/S
ECHO MV E DECELERATION TIME (DT): 254 MS
ECHO MV E VELOCITY: 0.99 M/S
ECHO MV E/A RATIO: 2.54
ECHO MV E/E' LATERAL: 14.14
ECHO MV MAX VELOCITY: 1.1 M/S
ECHO MV PEAK GRADIENT: 5 MMHG
ECHO PV MAX VELOCITY: 0.8 M/S
ECHO PV PEAK GRADIENT: 3 MMHG
ECHO TV PEAK GRADIENT: 3 MMHG

## 2023-11-09 PROCEDURE — 93306 TTE W/DOPPLER COMPLETE: CPT

## 2023-11-14 NOTE — TELEPHONE ENCOUNTER
Left message for patient to call for echo results
Left message for patient to call for echo results
Patient notified of echo results. Pt verbalized understanding and had no further questions at the time. Advised pt to keep all upcoming appts and call the office with any problems or concerns.
aortic root and ascending aorta. IVC/Hepatic Veins IVC size is normal.   Pericardium There is evidence of epicardial fat. Trivial circumferential pericardial effusion present.      Wall Scoring    Baseline Score Index: 1.00

## 2023-11-30 RX ORDER — OMEPRAZOLE 20 MG/1
20 CAPSULE, DELAYED RELEASE ORAL
Qty: 30 CAPSULE | Refills: 3 | Status: SHIPPED | OUTPATIENT
Start: 2023-11-30

## 2023-11-30 NOTE — TELEPHONE ENCOUNTER
Last Appt:  11/8/2023  Next Appt:   5/8/2024  Med verified in [address] [place] [address] 15

## 2024-04-16 NOTE — PROGRESS NOTES
in diffusion capacity at 10.98, 50% of predicted.     CT Imaging:  CT chest 6/7/2022: Negative for pulmonary embolism.  Main pulmonary artery measuring 3.7 cm.  Subcarinal nodes measuring up to 1.3 cm no other areas of adenopathy.  Pulmonary infiltrates in the right upper lobe right lower lobe and lingula.  Atelectatic changes in the left lower lobe.  Small bilateral pleural effusions worse on the right.     Sleep Study:        Laboratory Evaluation:  Eosinophil count 8/9/2023: 300  Eosinophil count 2/5/2020: 446    Immunizations:   Immunization History   Administered Date(s) Administered    COVID-19, MODERNA BLUE border, Primary or Immunocompromised, (age 12y+), IM, 100 mcg/0.5mL 02/10/2021, 03/11/2021    Influenza A (Z3M7-86) Vaccine PF IM 12/16/2009    Influenza A (G9w3-88),all Formulations 12/16/2009    Influenza Vaccine, unspecified formulation 09/01/2013, 09/23/2014, 10/07/2015, 10/12/2016, 09/08/2017    Influenza Virus Vaccine 10/12/2016, 09/21/2018, 10/15/2019    Influenza Whole 10/15/2012    Influenza, FLUAD, (age 65 y+), Adjuvanted, 0.5mL 10/06/2020    Influenza, High Dose (Fluzone 65 yrs and older) 10/07/2015    Influenza, Triv, inactivated, subunit, adjuvanted, IM (Fluad 65 yrs and older) 09/21/2018, 10/15/2019    Pneumococcal, PCV-13, PREVNAR 13, (age 6w+), IM, 0.5mL 06/14/2015, 10/01/2015    Pneumococcal, PPSV23, PNEUMOVAX 23, (age 2y+), SC/IM, 0.5mL 09/12/2012, 11/08/2018    TDaP, ADACEL (age 10y-64y), BOOSTRIX (age 10y+), IM, 0.5mL 09/12/2012    Zoster Live (Zostavax) 02/01/2012    Zoster Recombinant (Shingrix) 11/29/2019, 02/28/2020             No data to display                /60 (Site: Left Upper Arm, Position: Sitting, Cuff Size: Large Adult)   Pulse 62   Temp 97.2 °F (36.2 °C) (Temporal)   Resp 12   Ht 1.575 m (5' 2\")   Wt 101.6 kg (224 lb)   SpO2 95%   BMI 40.97 kg/m²     Past Medical History:   Diagnosis Date    Abdominal pain, RLQ 11/08/2021    Active asthma     Acute

## 2024-05-01 ENCOUNTER — HOSPITAL ENCOUNTER (OUTPATIENT)
Age: 79
Discharge: HOME | End: 2024-05-01
Payer: MEDICARE

## 2024-05-01 ENCOUNTER — OFFICE VISIT (OUTPATIENT)
Dept: PULMONOLOGY | Age: 79
End: 2024-05-01
Payer: MEDICARE

## 2024-05-01 VITALS
HEIGHT: 62 IN | TEMPERATURE: 97.2 F | DIASTOLIC BLOOD PRESSURE: 60 MMHG | BODY MASS INDEX: 41.22 KG/M2 | HEART RATE: 62 BPM | RESPIRATION RATE: 12 BRPM | OXYGEN SATURATION: 95 % | WEIGHT: 224 LBS | SYSTOLIC BLOOD PRESSURE: 128 MMHG

## 2024-05-01 DIAGNOSIS — J45.50 SEVERE PERSISTENT STEROID-DEPENDENT ASTHMA WITHOUT COMPLICATION: ICD-10-CM

## 2024-05-01 DIAGNOSIS — J82.83 EOSINOPHILIC ASTHMA: Primary | ICD-10-CM

## 2024-05-01 DIAGNOSIS — E66.01 CLASS 3 SEVERE OBESITY DUE TO EXCESS CALORIES WITH SERIOUS COMORBIDITY AND BODY MASS INDEX (BMI) OF 40.0 TO 44.9 IN ADULT (HCC): ICD-10-CM

## 2024-05-01 DIAGNOSIS — J45.50 ASTHMA, SEVERE PERSISTENT, POORLY-CONTROLLED, UNCOMPLICATED: ICD-10-CM

## 2024-05-01 DIAGNOSIS — K21.9 GASTROESOPHAGEAL REFLUX DISEASE, UNSPECIFIED WHETHER ESOPHAGITIS PRESENT: ICD-10-CM

## 2024-05-01 DIAGNOSIS — R09.82 POST-NASAL DRIP: ICD-10-CM

## 2024-05-01 LAB — EOSINOPHIL # BLD: 498 /UL (ref 200–400)

## 2024-05-01 PROCEDURE — 85048 AUTOMATED LEUKOCYTE COUNT: CPT

## 2024-05-01 PROCEDURE — 82785 ASSAY OF IGE: CPT

## 2024-05-01 PROCEDURE — 36415 COLL VENOUS BLD VENIPUNCTURE: CPT

## 2024-05-01 PROCEDURE — G8417 CALC BMI ABV UP PARAM F/U: HCPCS | Performed by: NURSE PRACTITIONER

## 2024-05-01 PROCEDURE — 1090F PRES/ABSN URINE INCON ASSESS: CPT | Performed by: NURSE PRACTITIONER

## 2024-05-01 PROCEDURE — G8400 PT W/DXA NO RESULTS DOC: HCPCS | Performed by: NURSE PRACTITIONER

## 2024-05-01 PROCEDURE — 1036F TOBACCO NON-USER: CPT | Performed by: NURSE PRACTITIONER

## 2024-05-01 PROCEDURE — G8427 DOCREV CUR MEDS BY ELIG CLIN: HCPCS | Performed by: NURSE PRACTITIONER

## 2024-05-01 PROCEDURE — 99213 OFFICE O/P EST LOW 20 MIN: CPT | Performed by: NURSE PRACTITIONER

## 2024-05-01 PROCEDURE — 1123F ACP DISCUSS/DSCN MKR DOCD: CPT | Performed by: NURSE PRACTITIONER

## 2024-05-01 PROCEDURE — 99214 OFFICE O/P EST MOD 30 MIN: CPT | Performed by: NURSE PRACTITIONER

## 2024-05-01 RX ORDER — CETIRIZINE HYDROCHLORIDE 10 MG/1
10 TABLET ORAL DAILY
Qty: 90 TABLET | Refills: 3 | Status: SHIPPED | OUTPATIENT
Start: 2024-05-01 | End: 2025-04-26

## 2024-05-01 RX ORDER — OMEPRAZOLE 20 MG/1
20 CAPSULE, DELAYED RELEASE ORAL
Qty: 90 CAPSULE | Refills: 3 | Status: SHIPPED | OUTPATIENT
Start: 2024-05-01

## 2024-05-01 RX ORDER — CETIRIZINE HYDROCHLORIDE 10 MG/1
10 TABLET ORAL DAILY
Qty: 30 TABLET | Refills: 11 | Status: SHIPPED | OUTPATIENT
Start: 2024-05-01 | End: 2024-05-01 | Stop reason: SDUPTHER

## 2024-05-01 RX ORDER — MONTELUKAST SODIUM 10 MG/1
10 TABLET ORAL NIGHTLY
Qty: 90 TABLET | Refills: 3 | Status: SHIPPED | OUTPATIENT
Start: 2024-05-01

## 2024-05-01 RX ORDER — BUDESONIDE AND FORMOTEROL FUMARATE DIHYDRATE 160; 4.5 UG/1; UG/1
2 AEROSOL RESPIRATORY (INHALATION) 2 TIMES DAILY
Qty: 3 EACH | Refills: 3 | Status: SHIPPED | OUTPATIENT
Start: 2024-05-01

## 2024-05-01 RX ORDER — TIOTROPIUM BROMIDE INHALATION SPRAY 1.56 UG/1
2 SPRAY, METERED RESPIRATORY (INHALATION) DAILY
Qty: 3 EACH | Refills: 3 | Status: SHIPPED | OUTPATIENT
Start: 2024-05-01

## 2024-05-01 ASSESSMENT — ENCOUNTER SYMPTOMS
GASTROINTESTINAL NEGATIVE: 1
EYES NEGATIVE: 1
ALLERGIC/IMMUNOLOGIC NEGATIVE: 1

## 2024-05-02 LAB — IGE SERPL-ACNC: 50 IU/ML (ref 0–100)

## 2024-05-03 ENCOUNTER — TELEPHONE (OUTPATIENT)
Dept: PULMONOLOGY | Age: 79
End: 2024-05-03

## 2024-05-03 NOTE — TELEPHONE ENCOUNTER
Please see attached scan regarding ProAir inhaler. Thanks    Last Appt:  5/1/2024  Next Appt:  11/13/2024  Med verified in Epic

## 2024-05-06 RX ORDER — ALBUTEROL SULFATE 90 UG/1
2 AEROSOL, METERED RESPIRATORY (INHALATION) EVERY 6 HOURS PRN
Qty: 18 G | Refills: 3 | Status: ON HOLD | OUTPATIENT
Start: 2024-05-06 | End: 2024-05-10 | Stop reason: SDUPTHER

## 2024-05-07 ENCOUNTER — OFFICE VISIT (OUTPATIENT)
Dept: CARDIOLOGY | Age: 79
End: 2024-05-07
Payer: MEDICARE

## 2024-05-07 VITALS
DIASTOLIC BLOOD PRESSURE: 58 MMHG | WEIGHT: 222 LBS | SYSTOLIC BLOOD PRESSURE: 102 MMHG | BODY MASS INDEX: 40.6 KG/M2 | TEMPERATURE: 75 F

## 2024-05-07 DIAGNOSIS — I10 HYPERTENSION, ESSENTIAL: ICD-10-CM

## 2024-05-07 DIAGNOSIS — I48.91 ATRIAL FIBRILLATION, UNSPECIFIED TYPE (HCC): Primary | ICD-10-CM

## 2024-05-07 DIAGNOSIS — E78.00 PURE HYPERCHOLESTEROLEMIA: ICD-10-CM

## 2024-05-07 DIAGNOSIS — E66.01 CLASS 2 SEVERE OBESITY DUE TO EXCESS CALORIES WITH SERIOUS COMORBIDITY IN ADULT, UNSPECIFIED BMI (HCC): ICD-10-CM

## 2024-05-07 DIAGNOSIS — J44.9 COPD, MILD (HCC): ICD-10-CM

## 2024-05-07 DIAGNOSIS — J45.41 ASTHMATIC BRONCHITIS, MODERATE PERSISTENT, WITH ACUTE EXACERBATION: ICD-10-CM

## 2024-05-07 DIAGNOSIS — R06.02 SOB (SHORTNESS OF BREATH): ICD-10-CM

## 2024-05-07 DIAGNOSIS — I48.0 PAROXYSMAL A-FIB (HCC): ICD-10-CM

## 2024-05-07 PROCEDURE — 93005 ELECTROCARDIOGRAM TRACING: CPT | Performed by: INTERNAL MEDICINE

## 2024-05-07 PROCEDURE — 1090F PRES/ABSN URINE INCON ASSESS: CPT | Performed by: INTERNAL MEDICINE

## 2024-05-07 PROCEDURE — G8427 DOCREV CUR MEDS BY ELIG CLIN: HCPCS | Performed by: INTERNAL MEDICINE

## 2024-05-07 PROCEDURE — 1123F ACP DISCUSS/DSCN MKR DOCD: CPT | Performed by: INTERNAL MEDICINE

## 2024-05-07 PROCEDURE — 99214 OFFICE O/P EST MOD 30 MIN: CPT | Performed by: INTERNAL MEDICINE

## 2024-05-07 PROCEDURE — 3023F SPIROM DOC REV: CPT | Performed by: INTERNAL MEDICINE

## 2024-05-07 PROCEDURE — 3078F DIAST BP <80 MM HG: CPT | Performed by: INTERNAL MEDICINE

## 2024-05-07 PROCEDURE — 3074F SYST BP LT 130 MM HG: CPT | Performed by: INTERNAL MEDICINE

## 2024-05-07 PROCEDURE — G8417 CALC BMI ABV UP PARAM F/U: HCPCS | Performed by: INTERNAL MEDICINE

## 2024-05-07 PROCEDURE — 93010 ELECTROCARDIOGRAM REPORT: CPT | Performed by: INTERNAL MEDICINE

## 2024-05-07 PROCEDURE — 99213 OFFICE O/P EST LOW 20 MIN: CPT | Performed by: INTERNAL MEDICINE

## 2024-05-07 PROCEDURE — G8400 PT W/DXA NO RESULTS DOC: HCPCS | Performed by: INTERNAL MEDICINE

## 2024-05-07 PROCEDURE — 1036F TOBACCO NON-USER: CPT | Performed by: INTERNAL MEDICINE

## 2024-05-07 RX ORDER — CODEINE PHOSPHATE AND GUAIFENESIN 10; 100 MG/5ML; MG/5ML
SOLUTION ORAL
Status: ON HOLD | COMMUNITY
Start: 2024-05-06

## 2024-05-07 RX ORDER — PREDNISONE 10 MG/1
30 TABLET ORAL DAILY
Status: ON HOLD | COMMUNITY
Start: 2024-05-06

## 2024-05-07 RX ORDER — BENZONATATE 200 MG/1
CAPSULE ORAL
Status: ON HOLD | COMMUNITY
Start: 2024-04-30

## 2024-05-07 NOTE — PROGRESS NOTES
Cough      pantoprazole (PROTONIX) 40 MG tablet Take 1 tablet by mouth every morning (before breakfast) (Patient not taking: Reported on 5/1/2024) 30 tablet 3    amiodarone (CORDARONE) 200 MG tablet take 1 tablet by mouth once daily 30 tablet 3    levothyroxine (SYNTHROID) 50 MCG tablet Take 1 tablet by mouth daily      clobetasol (TEMOVATE) 0.05 % cream Apply topically as needed      ipratropium-albuterol (DUONEB) 0.5-2.5 (3) MG/3ML SOLN nebulizer solution Inhale 3 mLs into the lungs every 4 hours 360 mL 1    albuterol (PROVENTIL) (2.5 MG/3ML) 0.083% nebulizer solution Inhale 3 mLs into the lungs      aspirin EC 81 MG EC tablet Take 1 tablet by mouth daily      fluticasone (FLONASE) 50 MCG/ACT nasal spray 2 sprays by Nasal route daily      traZODone (DESYREL) 100 MG tablet Take 1 tablet by mouth nightly TAKE 1/4 OF A TABLET NIGHTLY PRN FOR SLEEP       No current facility-administered medications for this visit.        REVIEW OF SYSTEMS:    Constitutional: there has been no unanticipated weight loss. There's been No change in energy level, No change in activity level.     Eyes: No visual changes or diplopia. No scleral icterus.  ENT: No Headaches, hearing loss or vertigo. No mouth sores or sore throat.  Cardiovascular: per hpi  Respiratory: per hpi  Gastrointestinal: No abdominal pain, appetite loss, blood in stools. No change in bowel or bladder habits.  Genitourinary: No dysuria, trouble voiding, or hematuria.  Musculoskeletal:  No gait disturbance, No weakness or joint complaints.  Integumentary: No rash or pruritis.  Neurological: No headache, diplopia, change in muscle strength, numbness or tingling. No change in gait, balance, coordination, mood, affect, memory, mentation, behavior.  Psychiatric: No new anxiety or depression.  Endocrine: No temperature intolerance. No excessive thirst, fluid intake, or urination. No tremor.  Hematologic/Lymphatic: No abnormal bruising or bleeding, blood clots or swollen lymph

## 2024-05-08 ENCOUNTER — HOSPITAL ENCOUNTER (INPATIENT)
Age: 79
LOS: 5 days | Discharge: HOME | DRG: 202 | End: 2024-05-13
Attending: EMERGENCY MEDICINE | Admitting: INTERNAL MEDICINE
Payer: MEDICARE

## 2024-05-08 ENCOUNTER — APPOINTMENT (OUTPATIENT)
Dept: GENERAL RADIOLOGY | Age: 79
DRG: 202 | End: 2024-05-08
Payer: MEDICARE

## 2024-05-08 DIAGNOSIS — J44.1 COPD EXACERBATION (HCC): Primary | ICD-10-CM

## 2024-05-08 DIAGNOSIS — J45.51 SEVERE PERSISTENT ASTHMA WITH ACUTE EXACERBATION: ICD-10-CM

## 2024-05-08 LAB
ANION GAP SERPL CALCULATED.3IONS-SCNC: 13 MMOL/L (ref 9–17)
BASOPHILS # BLD: <0.03 K/UL (ref 0–0.2)
BASOPHILS NFR BLD: 0 % (ref 0–2)
BNP SERPL-MCNC: 827 PG/ML
BUN SERPL-MCNC: 25 MG/DL (ref 8–23)
BUN/CREAT SERPL: 18 (ref 9–20)
CALCIUM SERPL-MCNC: 8.6 MG/DL (ref 8.6–10.4)
CHLORIDE SERPL-SCNC: 99 MMOL/L (ref 98–107)
CO2 SERPL-SCNC: 30 MMOL/L (ref 20–31)
CREAT SERPL-MCNC: 1.4 MG/DL (ref 0.5–0.9)
EOSINOPHIL # BLD: <0.03 K/UL (ref 0–0.44)
EOSINOPHILS RELATIVE PERCENT: 0 % (ref 1–4)
ERYTHROCYTE [DISTWIDTH] IN BLOOD BY AUTOMATED COUNT: 18.6 % (ref 11.8–14.4)
FLUAV AG SPEC QL: NEGATIVE
FLUBV AG SPEC QL: NEGATIVE
GFR, ESTIMATED: 39 ML/MIN/1.73M2
GLUCOSE BLD-MCNC: 172 MG/DL (ref 65–105)
GLUCOSE BLD-MCNC: 196 MG/DL (ref 65–105)
GLUCOSE SERPL-MCNC: 177 MG/DL (ref 70–99)
HCT VFR BLD AUTO: 41 % (ref 36.3–47.1)
HGB BLD-MCNC: 12.6 G/DL (ref 11.9–15.1)
IMM GRANULOCYTES # BLD AUTO: 0.07 K/UL (ref 0–0.3)
IMM GRANULOCYTES NFR BLD: 1 %
INR PPP: 1.4
LYMPHOCYTES NFR BLD: 0.71 K/UL (ref 1.1–3.7)
LYMPHOCYTES RELATIVE PERCENT: 9 % (ref 24–43)
MAGNESIUM SERPL-MCNC: 2.2 MG/DL (ref 1.6–2.6)
MCH RBC QN AUTO: 26.3 PG (ref 25.2–33.5)
MCHC RBC AUTO-ENTMCNC: 30.7 G/DL (ref 25.2–33.5)
MCV RBC AUTO: 85.6 FL (ref 82.6–102.9)
MONOCYTES NFR BLD: 0.12 K/UL (ref 0.1–1.2)
MONOCYTES NFR BLD: 2 % (ref 3–12)
NEUTROPHILS NFR BLD: 88 % (ref 36–65)
NEUTS SEG NFR BLD: 7.2 K/UL (ref 1.5–8.1)
NRBC BLD-RTO: 0 PER 100 WBC
PARTIAL THROMBOPLASTIN TIME: 31.7 SEC (ref 23.9–33.8)
PLATELET # BLD AUTO: 250 K/UL (ref 138–453)
PMV BLD AUTO: 9.7 FL (ref 8.1–13.5)
POTASSIUM SERPL-SCNC: 3.7 MMOL/L (ref 3.7–5.3)
PROTHROMBIN TIME: 16.5 SEC (ref 11.5–14.2)
RBC # BLD AUTO: 4.79 M/UL (ref 3.95–5.11)
RBC # BLD: ABNORMAL 10*6/UL
SARS-COV-2 RDRP RESP QL NAA+PROBE: NOT DETECTED
SODIUM SERPL-SCNC: 142 MMOL/L (ref 135–144)
SPECIMEN DESCRIPTION: NORMAL
TROPONIN I SERPL HS-MCNC: 16 NG/L (ref 0–14)
TROPONIN I SERPL HS-MCNC: 19 NG/L (ref 0–14)
WBC OTHER # BLD: 8.1 K/UL (ref 3.5–11.3)

## 2024-05-08 PROCEDURE — 6370000000 HC RX 637 (ALT 250 FOR IP): Performed by: INTERNAL MEDICINE

## 2024-05-08 PROCEDURE — 94640 AIRWAY INHALATION TREATMENT: CPT

## 2024-05-08 PROCEDURE — 87804 INFLUENZA ASSAY W/OPTIC: CPT

## 2024-05-08 PROCEDURE — 84484 ASSAY OF TROPONIN QUANT: CPT

## 2024-05-08 PROCEDURE — 2580000003 HC RX 258: Performed by: INTERNAL MEDICINE

## 2024-05-08 PROCEDURE — 85025 COMPLETE CBC W/AUTO DIFF WBC: CPT

## 2024-05-08 PROCEDURE — 36415 COLL VENOUS BLD VENIPUNCTURE: CPT

## 2024-05-08 PROCEDURE — 83735 ASSAY OF MAGNESIUM: CPT

## 2024-05-08 PROCEDURE — 94761 N-INVAS EAR/PLS OXIMETRY MLT: CPT

## 2024-05-08 PROCEDURE — 2060000000 HC ICU INTERMEDIATE R&B

## 2024-05-08 PROCEDURE — 2700000000 HC OXYGEN THERAPY PER DAY

## 2024-05-08 PROCEDURE — 71046 X-RAY EXAM CHEST 2 VIEWS: CPT

## 2024-05-08 PROCEDURE — 6360000002 HC RX W HCPCS: Performed by: EMERGENCY MEDICINE

## 2024-05-08 PROCEDURE — 93005 ELECTROCARDIOGRAM TRACING: CPT | Performed by: EMERGENCY MEDICINE

## 2024-05-08 PROCEDURE — 99222 1ST HOSP IP/OBS MODERATE 55: CPT | Performed by: INTERNAL MEDICINE

## 2024-05-08 PROCEDURE — 6370000000 HC RX 637 (ALT 250 FOR IP): Performed by: EMERGENCY MEDICINE

## 2024-05-08 PROCEDURE — 84145 PROCALCITONIN (PCT): CPT

## 2024-05-08 PROCEDURE — 85730 THROMBOPLASTIN TIME PARTIAL: CPT

## 2024-05-08 PROCEDURE — 82947 ASSAY GLUCOSE BLOOD QUANT: CPT

## 2024-05-08 PROCEDURE — 96374 THER/PROPH/DIAG INJ IV PUSH: CPT

## 2024-05-08 PROCEDURE — 6360000002 HC RX W HCPCS: Performed by: INTERNAL MEDICINE

## 2024-05-08 PROCEDURE — 85610 PROTHROMBIN TIME: CPT

## 2024-05-08 PROCEDURE — 80048 BASIC METABOLIC PNL TOTAL CA: CPT

## 2024-05-08 PROCEDURE — 99285 EMERGENCY DEPT VISIT HI MDM: CPT

## 2024-05-08 PROCEDURE — 87635 SARS-COV-2 COVID-19 AMP PRB: CPT

## 2024-05-08 PROCEDURE — 83880 ASSAY OF NATRIURETIC PEPTIDE: CPT

## 2024-05-08 RX ORDER — FUROSEMIDE 10 MG/ML
10 INJECTION INTRAMUSCULAR; INTRAVENOUS ONCE
Status: COMPLETED | OUTPATIENT
Start: 2024-05-08 | End: 2024-05-08

## 2024-05-08 RX ORDER — FLUTICASONE PROPIONATE 50 MCG
2 SPRAY, SUSPENSION (ML) NASAL DAILY
Status: DISCONTINUED | OUTPATIENT
Start: 2024-05-08 | End: 2024-05-13 | Stop reason: HOSPADM

## 2024-05-08 RX ORDER — INSULIN LISPRO 100 [IU]/ML
0-4 INJECTION, SOLUTION INTRAVENOUS; SUBCUTANEOUS NIGHTLY
Status: DISCONTINUED | OUTPATIENT
Start: 2024-05-08 | End: 2024-05-13 | Stop reason: HOSPADM

## 2024-05-08 RX ORDER — ALBUTEROL SULFATE 2.5 MG/3ML
2.5 SOLUTION RESPIRATORY (INHALATION)
Status: DISCONTINUED | OUTPATIENT
Start: 2024-05-08 | End: 2024-05-13 | Stop reason: HOSPADM

## 2024-05-08 RX ORDER — DEXTROSE MONOHYDRATE 100 MG/ML
INJECTION, SOLUTION INTRAVENOUS CONTINUOUS PRN
Status: DISCONTINUED | OUTPATIENT
Start: 2024-05-08 | End: 2024-05-13 | Stop reason: HOSPADM

## 2024-05-08 RX ORDER — METHYLPREDNISOLONE SODIUM SUCCINATE 125 MG/2ML
125 INJECTION, POWDER, LYOPHILIZED, FOR SOLUTION INTRAMUSCULAR; INTRAVENOUS ONCE
Status: COMPLETED | OUTPATIENT
Start: 2024-05-08 | End: 2024-05-08

## 2024-05-08 RX ORDER — SODIUM CHLORIDE 9 MG/ML
INJECTION, SOLUTION INTRAVENOUS PRN
Status: DISCONTINUED | OUTPATIENT
Start: 2024-05-08 | End: 2024-05-13 | Stop reason: HOSPADM

## 2024-05-08 RX ORDER — MAGNESIUM SULFATE IN WATER 40 MG/ML
2000 INJECTION, SOLUTION INTRAVENOUS PRN
Status: DISCONTINUED | OUTPATIENT
Start: 2024-05-08 | End: 2024-05-13 | Stop reason: HOSPADM

## 2024-05-08 RX ORDER — POTASSIUM CHLORIDE 7.45 MG/ML
10 INJECTION INTRAVENOUS PRN
Status: DISCONTINUED | OUTPATIENT
Start: 2024-05-08 | End: 2024-05-13 | Stop reason: HOSPADM

## 2024-05-08 RX ORDER — IPRATROPIUM BROMIDE AND ALBUTEROL SULFATE 2.5; .5 MG/3ML; MG/3ML
1 SOLUTION RESPIRATORY (INHALATION)
Status: DISCONTINUED | OUTPATIENT
Start: 2024-05-08 | End: 2024-05-08

## 2024-05-08 RX ORDER — PANTOPRAZOLE SODIUM 40 MG/1
40 TABLET, DELAYED RELEASE ORAL
Status: DISCONTINUED | OUTPATIENT
Start: 2024-05-09 | End: 2024-05-11

## 2024-05-08 RX ORDER — LEVOTHYROXINE SODIUM 0.03 MG/1
50 TABLET ORAL DAILY
Status: DISCONTINUED | OUTPATIENT
Start: 2024-05-08 | End: 2024-05-13 | Stop reason: HOSPADM

## 2024-05-08 RX ORDER — AMIODARONE HYDROCHLORIDE 200 MG/1
200 TABLET ORAL DAILY
Status: DISCONTINUED | OUTPATIENT
Start: 2024-05-08 | End: 2024-05-13 | Stop reason: HOSPADM

## 2024-05-08 RX ORDER — POTASSIUM CHLORIDE 20 MEQ/1
40 TABLET, EXTENDED RELEASE ORAL PRN
Status: DISCONTINUED | OUTPATIENT
Start: 2024-05-08 | End: 2024-05-13 | Stop reason: HOSPADM

## 2024-05-08 RX ORDER — SODIUM CHLORIDE 0.9 % (FLUSH) 0.9 %
10 SYRINGE (ML) INJECTION EVERY 12 HOURS SCHEDULED
Status: DISCONTINUED | OUTPATIENT
Start: 2024-05-08 | End: 2024-05-13 | Stop reason: HOSPADM

## 2024-05-08 RX ORDER — ALBUTEROL SULFATE 2.5 MG/3ML
5 SOLUTION RESPIRATORY (INHALATION) ONCE
Status: COMPLETED | OUTPATIENT
Start: 2024-05-08 | End: 2024-05-08

## 2024-05-08 RX ORDER — IPRATROPIUM BROMIDE AND ALBUTEROL SULFATE 2.5; .5 MG/3ML; MG/3ML
1 SOLUTION RESPIRATORY (INHALATION)
Status: DISCONTINUED | OUTPATIENT
Start: 2024-05-08 | End: 2024-05-13 | Stop reason: HOSPADM

## 2024-05-08 RX ORDER — TRAZODONE HYDROCHLORIDE 50 MG/1
100 TABLET ORAL NIGHTLY
Status: DISCONTINUED | OUTPATIENT
Start: 2024-05-08 | End: 2024-05-13 | Stop reason: HOSPADM

## 2024-05-08 RX ORDER — FUROSEMIDE 40 MG/1
40 TABLET ORAL DAILY
Status: DISCONTINUED | OUTPATIENT
Start: 2024-05-08 | End: 2024-05-13 | Stop reason: HOSPADM

## 2024-05-08 RX ORDER — ASPIRIN 81 MG/1
81 TABLET ORAL DAILY
Status: DISCONTINUED | OUTPATIENT
Start: 2024-05-08 | End: 2024-05-13 | Stop reason: HOSPADM

## 2024-05-08 RX ORDER — SODIUM CHLORIDE 0.9 % (FLUSH) 0.9 %
10 SYRINGE (ML) INJECTION PRN
Status: DISCONTINUED | OUTPATIENT
Start: 2024-05-08 | End: 2024-05-13 | Stop reason: HOSPADM

## 2024-05-08 RX ORDER — ACETAMINOPHEN 650 MG/1
650 SUPPOSITORY RECTAL EVERY 6 HOURS PRN
Status: DISCONTINUED | OUTPATIENT
Start: 2024-05-08 | End: 2024-05-13 | Stop reason: HOSPADM

## 2024-05-08 RX ORDER — INSULIN LISPRO 100 [IU]/ML
0-4 INJECTION, SOLUTION INTRAVENOUS; SUBCUTANEOUS
Status: DISCONTINUED | OUTPATIENT
Start: 2024-05-08 | End: 2024-05-13 | Stop reason: HOSPADM

## 2024-05-08 RX ORDER — CETIRIZINE HYDROCHLORIDE 5 MG/1
10 TABLET ORAL DAILY
Status: DISCONTINUED | OUTPATIENT
Start: 2024-05-08 | End: 2024-05-13 | Stop reason: HOSPADM

## 2024-05-08 RX ORDER — MONTELUKAST SODIUM 10 MG/1
10 TABLET ORAL NIGHTLY
Status: DISCONTINUED | OUTPATIENT
Start: 2024-05-08 | End: 2024-05-13 | Stop reason: HOSPADM

## 2024-05-08 RX ORDER — ACETAMINOPHEN 325 MG/1
650 TABLET ORAL EVERY 6 HOURS PRN
Status: DISCONTINUED | OUTPATIENT
Start: 2024-05-08 | End: 2024-05-13 | Stop reason: HOSPADM

## 2024-05-08 RX ORDER — SODIUM CHLORIDE FOR INHALATION 0.9 %
3 VIAL, NEBULIZER (ML) INHALATION
Status: DISCONTINUED | OUTPATIENT
Start: 2024-05-08 | End: 2024-05-08

## 2024-05-08 RX ORDER — IPRATROPIUM BROMIDE AND ALBUTEROL SULFATE 2.5; .5 MG/3ML; MG/3ML
1 SOLUTION RESPIRATORY (INHALATION) ONCE
Status: COMPLETED | OUTPATIENT
Start: 2024-05-08 | End: 2024-05-08

## 2024-05-08 RX ORDER — CODEINE PHOSPHATE AND GUAIFENESIN 10; 100 MG/5ML; MG/5ML
5 SOLUTION ORAL EVERY 4 HOURS PRN
Status: DISCONTINUED | OUTPATIENT
Start: 2024-05-08 | End: 2024-05-13 | Stop reason: HOSPADM

## 2024-05-08 RX ORDER — GLUCAGON 1 MG/ML
1 KIT INJECTION PRN
Status: DISCONTINUED | OUTPATIENT
Start: 2024-05-08 | End: 2024-05-13 | Stop reason: HOSPADM

## 2024-05-08 RX ORDER — SODIUM CHLORIDE 9 MG/ML
INJECTION, SOLUTION INTRAVENOUS CONTINUOUS
Status: ACTIVE | OUTPATIENT
Start: 2024-05-08 | End: 2024-05-10

## 2024-05-08 RX ORDER — BUDESONIDE AND FORMOTEROL FUMARATE DIHYDRATE 160; 4.5 UG/1; UG/1
2 AEROSOL RESPIRATORY (INHALATION) 2 TIMES DAILY
Status: DISCONTINUED | OUTPATIENT
Start: 2024-05-08 | End: 2024-05-09 | Stop reason: CLARIF

## 2024-05-08 RX ORDER — ONDANSETRON 2 MG/ML
4 INJECTION INTRAMUSCULAR; INTRAVENOUS EVERY 6 HOURS PRN
Status: DISCONTINUED | OUTPATIENT
Start: 2024-05-08 | End: 2024-05-11

## 2024-05-08 RX ORDER — METHYLPREDNISOLONE SODIUM SUCCINATE 125 MG/2ML
80 INJECTION, POWDER, LYOPHILIZED, FOR SOLUTION INTRAMUSCULAR; INTRAVENOUS EVERY 6 HOURS
Status: DISCONTINUED | OUTPATIENT
Start: 2024-05-08 | End: 2024-05-12

## 2024-05-08 RX ADMIN — FUROSEMIDE 10 MG: 10 INJECTION, SOLUTION INTRAMUSCULAR; INTRAVENOUS at 18:47

## 2024-05-08 RX ADMIN — METHYLPREDNISOLONE SODIUM SUCCINATE 125 MG: 125 INJECTION INTRAMUSCULAR; INTRAVENOUS at 12:58

## 2024-05-08 RX ADMIN — APIXABAN 5 MG: 5 TABLET, FILM COATED ORAL at 20:56

## 2024-05-08 RX ADMIN — IPRATROPIUM BROMIDE AND ALBUTEROL SULFATE 1 DOSE: 2.5; .5 SOLUTION RESPIRATORY (INHALATION) at 20:08

## 2024-05-08 RX ADMIN — CETIRIZINE HYDROCHLORIDE 10 MG: 5 TABLET, FILM COATED ORAL at 20:56

## 2024-05-08 RX ADMIN — METHYLPREDNISOLONE SODIUM SUCCINATE 80 MG: 125 INJECTION INTRAMUSCULAR; INTRAVENOUS at 18:47

## 2024-05-08 RX ADMIN — ASPIRIN 81 MG: 81 TABLET, COATED ORAL at 20:57

## 2024-05-08 RX ADMIN — METHYLPREDNISOLONE SODIUM SUCCINATE 80 MG: 125 INJECTION INTRAMUSCULAR; INTRAVENOUS at 23:53

## 2024-05-08 RX ADMIN — MONTELUKAST 10 MG: 10 TABLET, FILM COATED ORAL at 20:56

## 2024-05-08 RX ADMIN — ALBUTEROL SULFATE 5 MG: 2.5 SOLUTION RESPIRATORY (INHALATION) at 14:09

## 2024-05-08 RX ADMIN — FLUTICASONE PROPIONATE 2 SPRAY: 50 SPRAY, METERED NASAL at 20:59

## 2024-05-08 RX ADMIN — IPRATROPIUM BROMIDE AND ALBUTEROL SULFATE 1 DOSE: .5; 2.5 SOLUTION RESPIRATORY (INHALATION) at 13:01

## 2024-05-08 RX ADMIN — TRAZODONE HYDROCHLORIDE 100 MG: 50 TABLET ORAL at 20:58

## 2024-05-08 RX ADMIN — SODIUM CHLORIDE: 9 INJECTION, SOLUTION INTRAVENOUS at 18:58

## 2024-05-08 ASSESSMENT — PAIN - FUNCTIONAL ASSESSMENT: PAIN_FUNCTIONAL_ASSESSMENT: NONE - DENIES PAIN

## 2024-05-08 NOTE — ED NOTES
ED Report    Presents to ED from: Home    Chief Complaint   Patient presents with    Shortness of Breath     1. COPD exacerbation (HCC)      Present Condition: stable on 2L NC  Pertinent Event(s): attempted to take off O2, sats dropped to 87%, put back on    LOC:  A+O x 4  Code Status: FULL    Vital Signs   Vitals:    05/08/24 1210 05/08/24 1215 05/08/24 1425   BP: (!) 146/59 (!) 146/59    Pulse: 76     Resp: 20     Temp: 98.5 °F (36.9 °C)     TempSrc: Tympanic     SpO2: (!) 88% 94% (!) 88%   Weight: 102.1 kg (225 lb)       Oxygen Baseline: Room Air       Current Oxygen Needs: Room Air  Mobility: x1 Assist       Mobility Aide: Independent    LDAs:   Peripheral IV 05/08/24 Left Antecubital (Active)   Site Assessment Clean, dry & intact 05/08/24 1258   Line Status Blood return noted 05/08/24 1258   Phlebitis Assessment No symptoms 05/08/24 1258   Infiltration Assessment 0 05/08/24 1258       Wound 08/20/19 #3 most superior wound (Active)       Wound 06/07/22 Leg Left Left lower generalized leg edema and redness. (Active)     Abnormal ED Labs:    Labs Reviewed   BASIC METABOLIC PANEL - Abnormal; Notable for the following components:       Result Value    Glucose 177 (*)     BUN 25 (*)     Creatinine 1.4 (*)     Est, Glom Filt Rate 39 (*)     All other components within normal limits   BRAIN NATRIURETIC PEPTIDE - Abnormal; Notable for the following components:    Pro- (*)     All other components within normal limits   CBC WITH AUTO DIFFERENTIAL - Abnormal; Notable for the following components:    RDW 18.6 (*)     Neutrophils % 88 (*)     Lymphocytes % 9 (*)     Monocytes % 2 (*)     Eosinophils % 0 (*)     Immature Granulocytes % 1 (*)     Lymphocytes Absolute 0.71 (*)     All other components within normal limits   TROPONIN - Abnormal; Notable for the following components:    Troponin, High Sensitivity 19 (*)     All other components within normal limits   TROPONIN - Abnormal; Notable for the following

## 2024-05-08 NOTE — ED PROVIDER NOTES
[facility] ED  [address] [facility] [address]  Phone: [phone]        Pt Name: Marah Loyd  MRN: [id number]  Birthdate 1945  Date of evaluation: 5/8/24      CHIEF COMPLAINT     Chief Complaint   Patient presents with    Shortness of Breath         HISTORY OF PRESENT ILLNESS    Marah Loyd is a 78 y.o. female who presents to our Emergency Department.    Patient presents emerged part complaining shortness of breath.  Ongoing for the past week.  States that she saw her physician was started on steroids.  Was diagnosed with bronchitis.  Patient states that over the past couple days she is felt like she has been wheezing.  Has had a cough that is been nonproductive.  Has had not had any abdominal pain.  No diarrhea no constipation.  No nausea no vomiting.  Just feels like she is wheezing.  No fevers no chills.  Patient states that she is on anticoagulation.  No lower extremity edema.  No recent mobilization.  No recent surgery          REVIEW OF SYSTEMS       Review of Systems   Constitutional:  Negative for chills, diaphoresis and fever.   HENT:  Negative for drooling.    Eyes:  Negative for redness.   Respiratory:  Positive for cough, shortness of breath and wheezing. Negative for chest tightness.    Cardiovascular:  Negative for chest pain and palpitations.   Gastrointestinal:  Negative for abdominal pain, constipation, diarrhea, nausea and vomiting.   Genitourinary:  Negative for dysuria and hematuria.   Musculoskeletal:  Negative for neck stiffness.   Skin:  Negative for rash.   Neurological:  Negative for weakness, numbness and headaches.   Psychiatric/Behavioral:  Negative for agitation.        PAST MEDICAL HISTORY     Past Medical History:   Diagnosis Date    Abdominal pain, RLQ 11/08/2021    Active asthma     Acute exacerbation of chronic obstructive pulmonary disease (COPD) (HCC) 6/7/2022    Atrial fibrillation (HCC)     History of colon cancer     History of iron

## 2024-05-09 ENCOUNTER — APPOINTMENT (OUTPATIENT)
Dept: GENERAL RADIOLOGY | Age: 79
DRG: 202 | End: 2024-05-09
Payer: MEDICARE

## 2024-05-09 PROBLEM — Z91.148 NONCOMPLIANCE WITH MEDICATION REGIMEN: Status: ACTIVE | Noted: 2024-05-09

## 2024-05-09 PROBLEM — J45.51 SEVERE PERSISTENT ASTHMA WITH ACUTE EXACERBATION: Status: ACTIVE | Noted: 2024-05-09

## 2024-05-09 PROBLEM — J82.83 EOSINOPHILIC ASTHMA: Status: ACTIVE | Noted: 2024-05-09

## 2024-05-09 PROBLEM — E11.22 TYPE 2 DIABETES MELLITUS WITH STAGE 3B CHRONIC KIDNEY DISEASE, WITHOUT LONG-TERM CURRENT USE OF INSULIN (HCC): Status: ACTIVE | Noted: 2024-05-09

## 2024-05-09 PROBLEM — I31.39 PERICARDIAL EFFUSION: Status: RESOLVED | Noted: 2021-11-08 | Resolved: 2024-05-09

## 2024-05-09 PROBLEM — Z91.199 NONCOMPLIANCE WITH DIAGNOSTIC TESTING: Status: ACTIVE | Noted: 2024-05-09

## 2024-05-09 PROBLEM — G47.00 INSOMNIA: Status: ACTIVE | Noted: 2024-05-09

## 2024-05-09 PROBLEM — D68.69 SECONDARY HYPERCOAGULABLE STATE (HCC): Status: ACTIVE | Noted: 2024-05-09

## 2024-05-09 PROBLEM — K57.90 DIVERTICULOSIS: Status: ACTIVE | Noted: 2024-05-09

## 2024-05-09 PROBLEM — N18.32 TYPE 2 DIABETES MELLITUS WITH STAGE 3B CHRONIC KIDNEY DISEASE, WITHOUT LONG-TERM CURRENT USE OF INSULIN (HCC): Status: ACTIVE | Noted: 2024-05-09

## 2024-05-09 PROBLEM — K21.9 GERD (GASTROESOPHAGEAL REFLUX DISEASE): Status: ACTIVE | Noted: 2024-05-09

## 2024-05-09 PROBLEM — I50.42 CHRONIC COMBINED SYSTOLIC AND DIASTOLIC CHF (CONGESTIVE HEART FAILURE) (HCC): Status: ACTIVE | Noted: 2024-05-09

## 2024-05-09 PROBLEM — J96.01 ACUTE RESPIRATORY FAILURE WITH HYPOXIA (HCC): Status: RESOLVED | Noted: 2022-06-07 | Resolved: 2024-05-09

## 2024-05-09 PROBLEM — K44.9 HIATAL HERNIA: Status: ACTIVE | Noted: 2024-05-09

## 2024-05-09 PROBLEM — E86.0 DEHYDRATION: Status: ACTIVE | Noted: 2024-05-09

## 2024-05-09 PROBLEM — R39.15 URINARY URGENCY: Status: ACTIVE | Noted: 2024-05-09

## 2024-05-09 PROBLEM — Z79.01 LONG TERM (CURRENT) USE OF ANTICOAGULANTS: Status: ACTIVE | Noted: 2024-05-09

## 2024-05-09 PROBLEM — I51.81 STRESS-INDUCED CARDIOMYOPATHY: Status: ACTIVE | Noted: 2022-06-01

## 2024-05-09 PROBLEM — N18.32 STAGE 3B CHRONIC KIDNEY DISEASE (HCC): Status: ACTIVE | Noted: 2024-05-09

## 2024-05-09 PROBLEM — R09.02 HYPOXIA: Status: ACTIVE | Noted: 2024-05-09

## 2024-05-09 LAB
ALBUMIN SERPL-MCNC: 3.7 G/DL (ref 3.5–5.2)
ANION GAP SERPL CALCULATED.3IONS-SCNC: 10 MMOL/L (ref 9–17)
BASOPHILS # BLD: <0.03 K/UL (ref 0–0.2)
BASOPHILS NFR BLD: 0 % (ref 0–2)
BUN SERPL-MCNC: 30 MG/DL (ref 8–23)
BUN/CREAT SERPL: 25 (ref 9–20)
CALCIUM SERPL-MCNC: 8.3 MG/DL (ref 8.6–10.4)
CHLORIDE SERPL-SCNC: 106 MMOL/L (ref 98–107)
CO2 SERPL-SCNC: 30 MMOL/L (ref 20–31)
CREAT SERPL-MCNC: 1.2 MG/DL (ref 0.5–0.9)
EOSINOPHIL # BLD: <0.03 K/UL (ref 0–0.44)
EOSINOPHILS RELATIVE PERCENT: 0 % (ref 1–4)
ERYTHROCYTE [DISTWIDTH] IN BLOOD BY AUTOMATED COUNT: 18.4 % (ref 11.8–14.4)
GFR, ESTIMATED: 46 ML/MIN/1.73M2
GLUCOSE BLD-MCNC: 137 MG/DL (ref 65–105)
GLUCOSE BLD-MCNC: 150 MG/DL (ref 65–105)
GLUCOSE BLD-MCNC: 150 MG/DL (ref 65–105)
GLUCOSE SERPL-MCNC: 159 MG/DL (ref 70–99)
HCT VFR BLD AUTO: 38.2 % (ref 36.3–47.1)
HGB BLD-MCNC: 11.6 G/DL (ref 11.9–15.1)
IMM GRANULOCYTES # BLD AUTO: 0.05 K/UL (ref 0–0.3)
IMM GRANULOCYTES NFR BLD: 1 %
LYMPHOCYTES NFR BLD: 0.91 K/UL (ref 1.1–3.7)
LYMPHOCYTES RELATIVE PERCENT: 13 % (ref 24–43)
MAGNESIUM SERPL-MCNC: 2.4 MG/DL (ref 1.6–2.6)
MCH RBC QN AUTO: 26 PG (ref 25.2–33.5)
MCHC RBC AUTO-ENTMCNC: 30.4 G/DL (ref 25.2–33.5)
MCV RBC AUTO: 85.7 FL (ref 82.6–102.9)
MONOCYTES NFR BLD: 0.07 K/UL (ref 0.1–1.2)
MONOCYTES NFR BLD: 1 % (ref 3–12)
NEUTROPHILS NFR BLD: 85 % (ref 36–65)
NEUTS SEG NFR BLD: 5.76 K/UL (ref 1.5–8.1)
NRBC BLD-RTO: 0 PER 100 WBC
PLATELET # BLD AUTO: 247 K/UL (ref 138–453)
PMV BLD AUTO: 9.6 FL (ref 8.1–13.5)
POTASSIUM SERPL-SCNC: 3.5 MMOL/L (ref 3.7–5.3)
PROCALCITONIN SERPL-MCNC: 0.07 NG/ML (ref 0–0.09)
RBC # BLD AUTO: 4.46 M/UL (ref 3.95–5.11)
RBC # BLD: ABNORMAL 10*6/UL
SODIUM SERPL-SCNC: 146 MMOL/L (ref 135–144)
WBC OTHER # BLD: 6.8 K/UL (ref 3.5–11.3)

## 2024-05-09 PROCEDURE — 2060000000 HC ICU INTERMEDIATE R&B

## 2024-05-09 PROCEDURE — 83735 ASSAY OF MAGNESIUM: CPT

## 2024-05-09 PROCEDURE — 2580000003 HC RX 258: Performed by: INTERNAL MEDICINE

## 2024-05-09 PROCEDURE — 2700000000 HC OXYGEN THERAPY PER DAY

## 2024-05-09 PROCEDURE — 99222 1ST HOSP IP/OBS MODERATE 55: CPT | Performed by: NURSE PRACTITIONER

## 2024-05-09 PROCEDURE — 82947 ASSAY GLUCOSE BLOOD QUANT: CPT

## 2024-05-09 PROCEDURE — 94761 N-INVAS EAR/PLS OXIMETRY MLT: CPT

## 2024-05-09 PROCEDURE — 6360000002 HC RX W HCPCS: Performed by: INTERNAL MEDICINE

## 2024-05-09 PROCEDURE — 99231 SBSQ HOSP IP/OBS SF/LOW 25: CPT | Performed by: INTERNAL MEDICINE

## 2024-05-09 PROCEDURE — 85025 COMPLETE CBC W/AUTO DIFF WBC: CPT

## 2024-05-09 PROCEDURE — 71045 X-RAY EXAM CHEST 1 VIEW: CPT

## 2024-05-09 PROCEDURE — 94640 AIRWAY INHALATION TREATMENT: CPT

## 2024-05-09 PROCEDURE — 6370000000 HC RX 637 (ALT 250 FOR IP): Performed by: INTERNAL MEDICINE

## 2024-05-09 PROCEDURE — 82040 ASSAY OF SERUM ALBUMIN: CPT

## 2024-05-09 PROCEDURE — 2500000003 HC RX 250 WO HCPCS: Performed by: INTERNAL MEDICINE

## 2024-05-09 PROCEDURE — 36415 COLL VENOUS BLD VENIPUNCTURE: CPT

## 2024-05-09 PROCEDURE — 80048 BASIC METABOLIC PNL TOTAL CA: CPT

## 2024-05-09 RX ORDER — GUAIFENESIN 600 MG/1
600 TABLET, EXTENDED RELEASE ORAL 2 TIMES DAILY
Status: DISCONTINUED | OUTPATIENT
Start: 2024-05-09 | End: 2024-05-13 | Stop reason: HOSPADM

## 2024-05-09 RX ADMIN — METHYLPREDNISOLONE SODIUM SUCCINATE 80 MG: 125 INJECTION INTRAMUSCULAR; INTRAVENOUS at 18:26

## 2024-05-09 RX ADMIN — METHYLPREDNISOLONE SODIUM SUCCINATE 80 MG: 125 INJECTION INTRAMUSCULAR; INTRAVENOUS at 05:55

## 2024-05-09 RX ADMIN — METHYLPREDNISOLONE SODIUM SUCCINATE 80 MG: 125 INJECTION INTRAMUSCULAR; INTRAVENOUS at 12:33

## 2024-05-09 RX ADMIN — PANTOPRAZOLE SODIUM 40 MG: 40 TABLET, DELAYED RELEASE ORAL at 05:55

## 2024-05-09 RX ADMIN — IPRATROPIUM BROMIDE AND ALBUTEROL SULFATE 1 DOSE: 2.5; .5 SOLUTION RESPIRATORY (INHALATION) at 04:23

## 2024-05-09 RX ADMIN — IPRATROPIUM BROMIDE AND ALBUTEROL SULFATE 1 DOSE: 2.5; .5 SOLUTION RESPIRATORY (INHALATION) at 20:46

## 2024-05-09 RX ADMIN — CEFTRIAXONE 1000 MG: 1 INJECTION, POWDER, FOR SOLUTION INTRAMUSCULAR; INTRAVENOUS at 10:11

## 2024-05-09 RX ADMIN — MONTELUKAST 10 MG: 10 TABLET, FILM COATED ORAL at 21:31

## 2024-05-09 RX ADMIN — ASPIRIN 81 MG: 81 TABLET, COATED ORAL at 21:24

## 2024-05-09 RX ADMIN — APIXABAN 5 MG: 5 TABLET, FILM COATED ORAL at 21:23

## 2024-05-09 RX ADMIN — GUAIFENESIN 600 MG: 600 TABLET, EXTENDED RELEASE ORAL at 21:23

## 2024-05-09 RX ADMIN — IPRATROPIUM BROMIDE AND ALBUTEROL SULFATE 1 DOSE: 2.5; .5 SOLUTION RESPIRATORY (INHALATION) at 16:05

## 2024-05-09 RX ADMIN — APIXABAN 5 MG: 5 TABLET, FILM COATED ORAL at 08:32

## 2024-05-09 RX ADMIN — CETIRIZINE HYDROCHLORIDE 10 MG: 5 TABLET, FILM COATED ORAL at 21:23

## 2024-05-09 RX ADMIN — POTASSIUM CHLORIDE 40 MEQ: 1500 TABLET, EXTENDED RELEASE ORAL at 06:05

## 2024-05-09 RX ADMIN — IPRATROPIUM BROMIDE AND ALBUTEROL SULFATE 1 DOSE: 2.5; .5 SOLUTION RESPIRATORY (INHALATION) at 11:28

## 2024-05-09 RX ADMIN — DOXYCYCLINE 100 MG: 100 INJECTION, POWDER, LYOPHILIZED, FOR SOLUTION INTRAVENOUS at 11:09

## 2024-05-09 RX ADMIN — IPRATROPIUM BROMIDE AND ALBUTEROL SULFATE 1 DOSE: 2.5; .5 SOLUTION RESPIRATORY (INHALATION) at 07:49

## 2024-05-09 RX ADMIN — GUAIFENESIN 600 MG: 600 TABLET, EXTENDED RELEASE ORAL at 10:09

## 2024-05-09 RX ADMIN — AMIODARONE HYDROCHLORIDE 200 MG: 200 TABLET ORAL at 08:32

## 2024-05-09 RX ADMIN — SODIUM CHLORIDE, PRESERVATIVE FREE 10 ML: 5 INJECTION INTRAVENOUS at 21:24

## 2024-05-09 RX ADMIN — EMPAGLIFLOZIN 10 MG: 10 TABLET, FILM COATED ORAL at 08:33

## 2024-05-09 RX ADMIN — IPRATROPIUM BROMIDE AND ALBUTEROL SULFATE 1 DOSE: 2.5; .5 SOLUTION RESPIRATORY (INHALATION) at 00:04

## 2024-05-09 RX ADMIN — LEVOTHYROXINE SODIUM 50 MCG: 0.03 TABLET ORAL at 08:32

## 2024-05-09 RX ADMIN — TRAZODONE HYDROCHLORIDE 100 MG: 50 TABLET ORAL at 21:24

## 2024-05-09 RX ADMIN — DOXYCYCLINE 100 MG: 100 INJECTION, POWDER, LYOPHILIZED, FOR SOLUTION INTRAVENOUS at 21:29

## 2024-05-09 RX ADMIN — SODIUM CHLORIDE: 9 INJECTION, SOLUTION INTRAVENOUS at 18:30

## 2024-05-09 RX ADMIN — FUROSEMIDE 40 MG: 40 TABLET ORAL at 08:33

## 2024-05-09 NOTE — H&P
patient/caregiver/family.    Electronically signed by JANNETH Elliott CNP, NPSonaC, BECKY-BC on 5/9/2024 at 1:16 AM  Hospitalist/Nocturnist

## 2024-05-10 ENCOUNTER — APPOINTMENT (OUTPATIENT)
Dept: GENERAL RADIOLOGY | Age: 79
DRG: 202 | End: 2024-05-10
Payer: MEDICARE

## 2024-05-10 LAB
ANION GAP SERPL CALCULATED.3IONS-SCNC: 11 MMOL/L (ref 9–17)
BASOPHILS # BLD: <0.03 K/UL (ref 0–0.2)
BASOPHILS NFR BLD: 0 % (ref 0–2)
BUN SERPL-MCNC: 38 MG/DL (ref 8–23)
BUN/CREAT SERPL: 29 (ref 9–20)
CALCIUM SERPL-MCNC: 8.7 MG/DL (ref 8.6–10.4)
CHLORIDE SERPL-SCNC: 104 MMOL/L (ref 98–107)
CO2 SERPL-SCNC: 29 MMOL/L (ref 20–31)
CREAT SERPL-MCNC: 1.3 MG/DL (ref 0.5–0.9)
EOSINOPHIL # BLD: <0.03 K/UL (ref 0–0.44)
EOSINOPHILS RELATIVE PERCENT: 0 % (ref 1–4)
ERYTHROCYTE [DISTWIDTH] IN BLOOD BY AUTOMATED COUNT: 18.4 % (ref 11.8–14.4)
GFR, ESTIMATED: 42 ML/MIN/1.73M2
GLUCOSE BLD-MCNC: 158 MG/DL (ref 65–105)
GLUCOSE BLD-MCNC: 164 MG/DL (ref 65–105)
GLUCOSE BLD-MCNC: 182 MG/DL (ref 65–105)
GLUCOSE SERPL-MCNC: 152 MG/DL (ref 70–99)
HCT VFR BLD AUTO: 38.8 % (ref 36.3–47.1)
HGB BLD-MCNC: 12 G/DL (ref 11.9–15.1)
IMM GRANULOCYTES # BLD AUTO: 0.06 K/UL (ref 0–0.3)
IMM GRANULOCYTES NFR BLD: 1 %
LYMPHOCYTES NFR BLD: 1.04 K/UL (ref 1.1–3.7)
LYMPHOCYTES RELATIVE PERCENT: 10 % (ref 24–43)
MAGNESIUM SERPL-MCNC: 2.3 MG/DL (ref 1.6–2.6)
MCH RBC QN AUTO: 26.5 PG (ref 25.2–33.5)
MCHC RBC AUTO-ENTMCNC: 30.9 G/DL (ref 25.2–33.5)
MCV RBC AUTO: 85.8 FL (ref 82.6–102.9)
MONOCYTES NFR BLD: 0.12 K/UL (ref 0.1–1.2)
MONOCYTES NFR BLD: 1 % (ref 3–12)
NEUTROPHILS NFR BLD: 88 % (ref 36–65)
NEUTS SEG NFR BLD: 9.37 K/UL (ref 1.5–8.1)
NRBC BLD-RTO: 0 PER 100 WBC
PLATELET # BLD AUTO: 248 K/UL (ref 138–453)
PMV BLD AUTO: 9.2 FL (ref 8.1–13.5)
POTASSIUM SERPL-SCNC: 4.2 MMOL/L (ref 3.7–5.3)
RBC # BLD AUTO: 4.52 M/UL (ref 3.95–5.11)
RBC # BLD: ABNORMAL 10*6/UL
SODIUM SERPL-SCNC: 144 MMOL/L (ref 135–144)
WBC OTHER # BLD: 10.6 K/UL (ref 3.5–11.3)

## 2024-05-10 PROCEDURE — 83735 ASSAY OF MAGNESIUM: CPT

## 2024-05-10 PROCEDURE — 6370000000 HC RX 637 (ALT 250 FOR IP): Performed by: INTERNAL MEDICINE

## 2024-05-10 PROCEDURE — 80048 BASIC METABOLIC PNL TOTAL CA: CPT

## 2024-05-10 PROCEDURE — 94667 MNPJ CHEST WALL 1ST: CPT

## 2024-05-10 PROCEDURE — 2060000000 HC ICU INTERMEDIATE R&B

## 2024-05-10 PROCEDURE — 36415 COLL VENOUS BLD VENIPUNCTURE: CPT

## 2024-05-10 PROCEDURE — 99231 SBSQ HOSP IP/OBS SF/LOW 25: CPT | Performed by: INTERNAL MEDICINE

## 2024-05-10 PROCEDURE — 71045 X-RAY EXAM CHEST 1 VIEW: CPT

## 2024-05-10 PROCEDURE — 85025 COMPLETE CBC W/AUTO DIFF WBC: CPT

## 2024-05-10 PROCEDURE — 82947 ASSAY GLUCOSE BLOOD QUANT: CPT

## 2024-05-10 PROCEDURE — 2580000003 HC RX 258: Performed by: INTERNAL MEDICINE

## 2024-05-10 PROCEDURE — APPNB15 APP NON BILLABLE TIME 0-15 MINS: Performed by: NURSE PRACTITIONER

## 2024-05-10 PROCEDURE — 6360000002 HC RX W HCPCS: Performed by: INTERNAL MEDICINE

## 2024-05-10 PROCEDURE — 94640 AIRWAY INHALATION TREATMENT: CPT

## 2024-05-10 PROCEDURE — 2700000000 HC OXYGEN THERAPY PER DAY

## 2024-05-10 PROCEDURE — 6360000002 HC RX W HCPCS: Performed by: NURSE PRACTITIONER

## 2024-05-10 PROCEDURE — 94761 N-INVAS EAR/PLS OXIMETRY MLT: CPT

## 2024-05-10 RX ORDER — DOCUSATE SODIUM 100 MG/1
100 CAPSULE, LIQUID FILLED ORAL DAILY
Status: DISCONTINUED | OUTPATIENT
Start: 2024-05-11 | End: 2024-05-13 | Stop reason: HOSPADM

## 2024-05-10 RX ORDER — LACTULOSE 10 G/15ML
20 SOLUTION ORAL 3 TIMES DAILY
Status: DISCONTINUED | OUTPATIENT
Start: 2024-05-10 | End: 2024-05-11

## 2024-05-10 RX ORDER — SODIUM CHLORIDE 9 MG/ML
INJECTION, SOLUTION INTRAVENOUS CONTINUOUS
Status: DISCONTINUED | OUTPATIENT
Start: 2024-05-10 | End: 2024-05-12

## 2024-05-10 RX ORDER — PROCHLORPERAZINE EDISYLATE 5 MG/ML
10 INJECTION INTRAMUSCULAR; INTRAVENOUS EVERY 6 HOURS PRN
Status: DISCONTINUED | OUTPATIENT
Start: 2024-05-10 | End: 2024-05-13 | Stop reason: HOSPADM

## 2024-05-10 RX ORDER — ALBUTEROL SULFATE 90 UG/1
2 AEROSOL, METERED RESPIRATORY (INHALATION) EVERY 6 HOURS PRN
Qty: 3 EACH | Refills: 3 | Status: SHIPPED | OUTPATIENT
Start: 2024-05-10

## 2024-05-10 RX ORDER — DOXYCYCLINE 100 MG/1
100 CAPSULE ORAL EVERY 12 HOURS SCHEDULED
Status: DISCONTINUED | OUTPATIENT
Start: 2024-05-10 | End: 2024-05-13 | Stop reason: HOSPADM

## 2024-05-10 RX ADMIN — METHYLPREDNISOLONE SODIUM SUCCINATE 80 MG: 125 INJECTION INTRAMUSCULAR; INTRAVENOUS at 17:54

## 2024-05-10 RX ADMIN — MOMETASONE FUROATE AND FORMOTEROL FUMARATE DIHYDRATE 2 PUFF: 200; 5 AEROSOL RESPIRATORY (INHALATION) at 07:56

## 2024-05-10 RX ADMIN — APIXABAN 5 MG: 5 TABLET, FILM COATED ORAL at 22:09

## 2024-05-10 RX ADMIN — IPRATROPIUM BROMIDE AND ALBUTEROL SULFATE 1 DOSE: 2.5; .5 SOLUTION RESPIRATORY (INHALATION) at 11:59

## 2024-05-10 RX ADMIN — PANTOPRAZOLE SODIUM 40 MG: 40 TABLET, DELAYED RELEASE ORAL at 06:17

## 2024-05-10 RX ADMIN — GUAIFENESIN 600 MG: 600 TABLET, EXTENDED RELEASE ORAL at 08:26

## 2024-05-10 RX ADMIN — APIXABAN 5 MG: 5 TABLET, FILM COATED ORAL at 08:25

## 2024-05-10 RX ADMIN — METHYLPREDNISOLONE SODIUM SUCCINATE 80 MG: 125 INJECTION INTRAMUSCULAR; INTRAVENOUS at 00:30

## 2024-05-10 RX ADMIN — AMIODARONE HYDROCHLORIDE 200 MG: 200 TABLET ORAL at 08:25

## 2024-05-10 RX ADMIN — IPRATROPIUM BROMIDE AND ALBUTEROL SULFATE 1 DOSE: 2.5; .5 SOLUTION RESPIRATORY (INHALATION) at 07:56

## 2024-05-10 RX ADMIN — METHYLPREDNISOLONE SODIUM SUCCINATE 80 MG: 125 INJECTION INTRAMUSCULAR; INTRAVENOUS at 11:56

## 2024-05-10 RX ADMIN — FUROSEMIDE 40 MG: 40 TABLET ORAL at 08:26

## 2024-05-10 RX ADMIN — METHYLPREDNISOLONE SODIUM SUCCINATE 80 MG: 125 INJECTION INTRAMUSCULAR; INTRAVENOUS at 06:17

## 2024-05-10 RX ADMIN — LACTULOSE 20 G: 10 SOLUTION ORAL at 16:32

## 2024-05-10 RX ADMIN — ONDANSETRON 4 MG: 2 INJECTION INTRAMUSCULAR; INTRAVENOUS at 20:18

## 2024-05-10 RX ADMIN — IPRATROPIUM BROMIDE AND ALBUTEROL SULFATE 1 DOSE: 2.5; .5 SOLUTION RESPIRATORY (INHALATION) at 16:16

## 2024-05-10 RX ADMIN — PROCHLORPERAZINE EDISYLATE 10 MG: 5 INJECTION INTRAMUSCULAR; INTRAVENOUS at 21:39

## 2024-05-10 RX ADMIN — IPRATROPIUM BROMIDE AND ALBUTEROL SULFATE 1 DOSE: 2.5; .5 SOLUTION RESPIRATORY (INHALATION) at 19:41

## 2024-05-10 RX ADMIN — ACETAMINOPHEN 650 MG: 325 TABLET ORAL at 16:31

## 2024-05-10 RX ADMIN — LEVOTHYROXINE SODIUM 50 MCG: 0.03 TABLET ORAL at 08:26

## 2024-05-10 RX ADMIN — MOMETASONE FUROATE AND FORMOTEROL FUMARATE DIHYDRATE 2 PUFF: 200; 5 AEROSOL RESPIRATORY (INHALATION) at 19:43

## 2024-05-10 RX ADMIN — EMPAGLIFLOZIN 10 MG: 10 TABLET, FILM COATED ORAL at 08:26

## 2024-05-10 RX ADMIN — IPRATROPIUM BROMIDE AND ALBUTEROL SULFATE 1 DOSE: 2.5; .5 SOLUTION RESPIRATORY (INHALATION) at 04:38

## 2024-05-10 RX ADMIN — DOXYCYCLINE 100 MG: 100 CAPSULE ORAL at 08:26

## 2024-05-10 RX ADMIN — CEFTRIAXONE 1000 MG: 1 INJECTION, POWDER, FOR SOLUTION INTRAMUSCULAR; INTRAVENOUS at 10:29

## 2024-05-10 RX ADMIN — ONDANSETRON 4 MG: 2 INJECTION INTRAMUSCULAR; INTRAVENOUS at 13:59

## 2024-05-10 RX ADMIN — SODIUM CHLORIDE: 9 INJECTION, SOLUTION INTRAVENOUS at 16:53

## 2024-05-10 RX ADMIN — IPRATROPIUM BROMIDE AND ALBUTEROL SULFATE 1 DOSE: 2.5; .5 SOLUTION RESPIRATORY (INHALATION) at 01:10

## 2024-05-10 ASSESSMENT — PAIN DESCRIPTION - PAIN TYPE: TYPE: ACUTE PAIN

## 2024-05-10 ASSESSMENT — PAIN DESCRIPTION - FREQUENCY: FREQUENCY: INTERMITTENT

## 2024-05-10 ASSESSMENT — PAIN DESCRIPTION - LOCATION: LOCATION: HEAD

## 2024-05-10 ASSESSMENT — PAIN SCALES - GENERAL: PAINLEVEL_OUTOF10: 4

## 2024-05-10 ASSESSMENT — PAIN - FUNCTIONAL ASSESSMENT: PAIN_FUNCTIONAL_ASSESSMENT: ACTIVITIES ARE NOT PREVENTED

## 2024-05-10 ASSESSMENT — PAIN DESCRIPTION - DESCRIPTORS: DESCRIPTORS: ACHING

## 2024-05-10 NOTE — TELEPHONE ENCOUNTER
Medication needs to go to mail in pharmacy, thanks.    Last Appt:  5/1/2024  Next Appt:   6/5/2024  Med verified in Epic

## 2024-05-11 ENCOUNTER — APPOINTMENT (OUTPATIENT)
Dept: GENERAL RADIOLOGY | Age: 79
DRG: 202 | End: 2024-05-11
Payer: MEDICARE

## 2024-05-11 LAB
ANION GAP SERPL CALCULATED.3IONS-SCNC: 13 MMOL/L (ref 9–17)
BASOPHILS # BLD: <0.03 K/UL (ref 0–0.2)
BASOPHILS NFR BLD: 0 % (ref 0–2)
BUN SERPL-MCNC: 45 MG/DL (ref 8–23)
BUN/CREAT SERPL: 32 (ref 9–20)
CALCIUM SERPL-MCNC: 8.5 MG/DL (ref 8.6–10.4)
CHLORIDE SERPL-SCNC: 106 MMOL/L (ref 98–107)
CO2 SERPL-SCNC: 24 MMOL/L (ref 20–31)
CREAT SERPL-MCNC: 1.4 MG/DL (ref 0.5–0.9)
EOSINOPHIL # BLD: <0.03 K/UL (ref 0–0.44)
EOSINOPHILS RELATIVE PERCENT: 0 % (ref 1–4)
ERYTHROCYTE [DISTWIDTH] IN BLOOD BY AUTOMATED COUNT: 18.7 % (ref 11.8–14.4)
GFR, ESTIMATED: 39 ML/MIN/1.73M2
GLUCOSE BLD-MCNC: 149 MG/DL (ref 65–105)
GLUCOSE BLD-MCNC: 155 MG/DL (ref 65–105)
GLUCOSE BLD-MCNC: 162 MG/DL (ref 65–105)
GLUCOSE SERPL-MCNC: 187 MG/DL (ref 70–99)
HCT VFR BLD AUTO: 43.2 % (ref 36.3–47.1)
HGB BLD-MCNC: 13.2 G/DL (ref 11.9–15.1)
IMM GRANULOCYTES # BLD AUTO: 0.13 K/UL (ref 0–0.3)
IMM GRANULOCYTES NFR BLD: 1 %
LYMPHOCYTES NFR BLD: 0.78 K/UL (ref 1.1–3.7)
LYMPHOCYTES RELATIVE PERCENT: 5 % (ref 24–43)
MAGNESIUM SERPL-MCNC: 2.4 MG/DL (ref 1.6–2.6)
MCH RBC QN AUTO: 26.1 PG (ref 25.2–33.5)
MCHC RBC AUTO-ENTMCNC: 30.6 G/DL (ref 25.2–33.5)
MCV RBC AUTO: 85.5 FL (ref 82.6–102.9)
MONOCYTES NFR BLD: 0.36 K/UL (ref 0.1–1.2)
MONOCYTES NFR BLD: 2 % (ref 3–12)
NEUTROPHILS NFR BLD: 92 % (ref 36–65)
NEUTS SEG NFR BLD: 15.7 K/UL (ref 1.5–8.1)
NRBC BLD-RTO: 0 PER 100 WBC
PLATELET # BLD AUTO: 338 K/UL (ref 138–453)
PMV BLD AUTO: 9.3 FL (ref 8.1–13.5)
POTASSIUM SERPL-SCNC: 4 MMOL/L (ref 3.7–5.3)
RBC # BLD AUTO: 5.05 M/UL (ref 3.95–5.11)
RBC # BLD: ABNORMAL 10*6/UL
SODIUM SERPL-SCNC: 143 MMOL/L (ref 135–144)
WBC OTHER # BLD: 17 K/UL (ref 3.5–11.3)

## 2024-05-11 PROCEDURE — C9113 INJ PANTOPRAZOLE SODIUM, VIA: HCPCS | Performed by: SURGERY

## 2024-05-11 PROCEDURE — 36415 COLL VENOUS BLD VENIPUNCTURE: CPT

## 2024-05-11 PROCEDURE — 94669 MECHANICAL CHEST WALL OSCILL: CPT

## 2024-05-11 PROCEDURE — 80048 BASIC METABOLIC PNL TOTAL CA: CPT

## 2024-05-11 PROCEDURE — 94761 N-INVAS EAR/PLS OXIMETRY MLT: CPT

## 2024-05-11 PROCEDURE — 6370000000 HC RX 637 (ALT 250 FOR IP): Performed by: INTERNAL MEDICINE

## 2024-05-11 PROCEDURE — 83735 ASSAY OF MAGNESIUM: CPT

## 2024-05-11 PROCEDURE — 2700000000 HC OXYGEN THERAPY PER DAY

## 2024-05-11 PROCEDURE — 71045 X-RAY EXAM CHEST 1 VIEW: CPT

## 2024-05-11 PROCEDURE — 85025 COMPLETE CBC W/AUTO DIFF WBC: CPT

## 2024-05-11 PROCEDURE — 99232 SBSQ HOSP IP/OBS MODERATE 35: CPT | Performed by: FAMILY MEDICINE

## 2024-05-11 PROCEDURE — 2580000003 HC RX 258: Performed by: NURSE PRACTITIONER

## 2024-05-11 PROCEDURE — 74018 RADEX ABDOMEN 1 VIEW: CPT

## 2024-05-11 PROCEDURE — 2060000000 HC ICU INTERMEDIATE R&B

## 2024-05-11 PROCEDURE — 82947 ASSAY GLUCOSE BLOOD QUANT: CPT

## 2024-05-11 PROCEDURE — APPNB15 APP NON BILLABLE TIME 0-15 MINS: Performed by: NURSE PRACTITIONER

## 2024-05-11 PROCEDURE — 6360000002 HC RX W HCPCS: Performed by: INTERNAL MEDICINE

## 2024-05-11 PROCEDURE — 6360000002 HC RX W HCPCS: Performed by: SURGERY

## 2024-05-11 PROCEDURE — 94640 AIRWAY INHALATION TREATMENT: CPT

## 2024-05-11 PROCEDURE — 2580000003 HC RX 258: Performed by: SURGERY

## 2024-05-11 PROCEDURE — 2580000003 HC RX 258: Performed by: INTERNAL MEDICINE

## 2024-05-11 PROCEDURE — 6360000002 HC RX W HCPCS: Performed by: NURSE PRACTITIONER

## 2024-05-11 PROCEDURE — 0D9670Z DRAINAGE OF STOMACH WITH DRAINAGE DEVICE, VIA NATURAL OR ARTIFICIAL OPENING: ICD-10-PCS | Performed by: INTERNAL MEDICINE

## 2024-05-11 PROCEDURE — 6370000000 HC RX 637 (ALT 250 FOR IP): Performed by: NURSE PRACTITIONER

## 2024-05-11 RX ORDER — LORAZEPAM 2 MG/ML
0.25 INJECTION INTRAMUSCULAR ONCE
Status: COMPLETED | OUTPATIENT
Start: 2024-05-11 | End: 2024-05-11

## 2024-05-11 RX ORDER — ONDANSETRON 2 MG/ML
8 INJECTION INTRAMUSCULAR; INTRAVENOUS EVERY 4 HOURS PRN
Status: DISCONTINUED | OUTPATIENT
Start: 2024-05-11 | End: 2024-05-13 | Stop reason: HOSPADM

## 2024-05-11 RX ORDER — INSULIN GLARGINE 100 [IU]/ML
5 INJECTION, SOLUTION SUBCUTANEOUS DAILY
Status: DISCONTINUED | OUTPATIENT
Start: 2024-05-11 | End: 2024-05-12

## 2024-05-11 RX ADMIN — METHYLPREDNISOLONE SODIUM SUCCINATE 80 MG: 125 INJECTION INTRAMUSCULAR; INTRAVENOUS at 17:59

## 2024-05-11 RX ADMIN — IPRATROPIUM BROMIDE AND ALBUTEROL SULFATE 1 DOSE: 2.5; .5 SOLUTION RESPIRATORY (INHALATION) at 08:11

## 2024-05-11 RX ADMIN — IPRATROPIUM BROMIDE AND ALBUTEROL SULFATE 1 DOSE: 2.5; .5 SOLUTION RESPIRATORY (INHALATION) at 19:42

## 2024-05-11 RX ADMIN — IPRATROPIUM BROMIDE AND ALBUTEROL SULFATE 1 DOSE: 2.5; .5 SOLUTION RESPIRATORY (INHALATION) at 03:36

## 2024-05-11 RX ADMIN — SODIUM CHLORIDE, PRESERVATIVE FREE 10 ML: 5 INJECTION INTRAVENOUS at 09:05

## 2024-05-11 RX ADMIN — IPRATROPIUM BROMIDE AND ALBUTEROL SULFATE 1 DOSE: 2.5; .5 SOLUTION RESPIRATORY (INHALATION) at 23:49

## 2024-05-11 RX ADMIN — SODIUM CHLORIDE: 9 INJECTION, SOLUTION INTRAVENOUS at 06:23

## 2024-05-11 RX ADMIN — SODIUM CHLORIDE, PRESERVATIVE FREE 10 ML: 5 INJECTION INTRAVENOUS at 12:47

## 2024-05-11 RX ADMIN — AMIODARONE HYDROCHLORIDE 200 MG: 200 TABLET ORAL at 09:16

## 2024-05-11 RX ADMIN — METHYLPREDNISOLONE SODIUM SUCCINATE 80 MG: 125 INJECTION INTRAMUSCULAR; INTRAVENOUS at 23:00

## 2024-05-11 RX ADMIN — METHYLPREDNISOLONE SODIUM SUCCINATE 80 MG: 125 INJECTION INTRAMUSCULAR; INTRAVENOUS at 00:16

## 2024-05-11 RX ADMIN — ONDANSETRON 4 MG: 2 INJECTION INTRAMUSCULAR; INTRAVENOUS at 02:22

## 2024-05-11 RX ADMIN — ONDANSETRON 4 MG: 2 INJECTION INTRAMUSCULAR; INTRAVENOUS at 07:44

## 2024-05-11 RX ADMIN — INSULIN GLARGINE 5 UNITS: 100 INJECTION, SOLUTION SUBCUTANEOUS at 09:16

## 2024-05-11 RX ADMIN — MOMETASONE FUROATE AND FORMOTEROL FUMARATE DIHYDRATE 2 PUFF: 200; 5 AEROSOL RESPIRATORY (INHALATION) at 19:43

## 2024-05-11 RX ADMIN — SODIUM CHLORIDE, PRESERVATIVE FREE 10 ML: 5 INJECTION INTRAVENOUS at 14:38

## 2024-05-11 RX ADMIN — APIXABAN 5 MG: 5 TABLET, FILM COATED ORAL at 09:15

## 2024-05-11 RX ADMIN — SODIUM CHLORIDE, PRESERVATIVE FREE 40 MG: 5 INJECTION INTRAVENOUS at 14:37

## 2024-05-11 RX ADMIN — PROCHLORPERAZINE EDISYLATE 10 MG: 5 INJECTION INTRAMUSCULAR; INTRAVENOUS at 09:05

## 2024-05-11 RX ADMIN — METHYLPREDNISOLONE SODIUM SUCCINATE 80 MG: 125 INJECTION INTRAMUSCULAR; INTRAVENOUS at 12:46

## 2024-05-11 RX ADMIN — LORAZEPAM 0.26 MG: 2 INJECTION INTRAMUSCULAR; INTRAVENOUS at 23:12

## 2024-05-11 RX ADMIN — CEFTRIAXONE 1000 MG: 1 INJECTION, POWDER, FOR SOLUTION INTRAMUSCULAR; INTRAVENOUS at 09:25

## 2024-05-11 RX ADMIN — FUROSEMIDE 40 MG: 40 TABLET ORAL at 09:15

## 2024-05-11 RX ADMIN — IPRATROPIUM BROMIDE AND ALBUTEROL SULFATE 1 DOSE: 2.5; .5 SOLUTION RESPIRATORY (INHALATION) at 15:44

## 2024-05-11 RX ADMIN — PROCHLORPERAZINE EDISYLATE 10 MG: 5 INJECTION INTRAMUSCULAR; INTRAVENOUS at 03:42

## 2024-05-11 RX ADMIN — IPRATROPIUM BROMIDE AND ALBUTEROL SULFATE 1 DOSE: 2.5; .5 SOLUTION RESPIRATORY (INHALATION) at 11:52

## 2024-05-11 RX ADMIN — IPRATROPIUM BROMIDE AND ALBUTEROL SULFATE 1 DOSE: 2.5; .5 SOLUTION RESPIRATORY (INHALATION) at 00:01

## 2024-05-11 RX ADMIN — METHYLPREDNISOLONE SODIUM SUCCINATE 80 MG: 125 INJECTION INTRAMUSCULAR; INTRAVENOUS at 06:23

## 2024-05-11 RX ADMIN — SODIUM CHLORIDE: 9 INJECTION, SOLUTION INTRAVENOUS at 17:57

## 2024-05-11 RX ADMIN — SODIUM CHLORIDE, PRESERVATIVE FREE 10 ML: 5 INJECTION INTRAVENOUS at 18:00

## 2024-05-11 RX ADMIN — SODIUM CHLORIDE, PRESERVATIVE FREE 10 ML: 5 INJECTION INTRAVENOUS at 07:44

## 2024-05-11 ASSESSMENT — PAIN SCALES - GENERAL
PAINLEVEL_OUTOF10: 0
PAINLEVEL_OUTOF10: 0

## 2024-05-11 NOTE — DISCHARGE INSTR - DIET
Good nutrition is important when healing from an illness, injury, or surgery.  Follow any nutrition recommendations given to you during your hospital stay.   Follow a cardiac, diabetic diet  If you were given an oral nutrition supplement while in the hospital, continue to take this supplement at home.  You can take it with meals, in-between meals, and/or before bedtime. These supplements can be purchased at most local grocery stores, pharmacies, and chain "[employer]"-stores.   If you have any questions about your diet or nutrition, call the hospital and ask for the dietitian.  Diabetic,

## 2024-05-11 NOTE — RT PROTOCOL NOTE
Unable to do apnea link because patient has been vomiting all night.   Spoke with maritza Amezquita NP   Apnea link on hold

## 2024-05-11 NOTE — DISCHARGE INSTR - ACTIVITY
Activity as tolerated. Use fall precautions.   

## 2024-05-12 ENCOUNTER — APPOINTMENT (OUTPATIENT)
Dept: GENERAL RADIOLOGY | Age: 79
DRG: 202 | End: 2024-05-12
Payer: MEDICARE

## 2024-05-12 LAB
ALBUMIN SERPL-MCNC: 3.3 G/DL (ref 3.5–5.2)
ALBUMIN/GLOB SERPL: 1.6 {RATIO} (ref 1–2.5)
ALP SERPL-CCNC: 64 U/L (ref 35–104)
ALT SERPL-CCNC: 27 U/L (ref 5–33)
ANION GAP SERPL CALCULATED.3IONS-SCNC: 10 MMOL/L (ref 9–17)
AST SERPL-CCNC: 26 U/L
BASOPHILS # BLD: <0.03 K/UL (ref 0–0.2)
BASOPHILS NFR BLD: 0 % (ref 0–2)
BILIRUB SERPL-MCNC: 0.3 MG/DL (ref 0.3–1.2)
BUN SERPL-MCNC: 46 MG/DL (ref 8–23)
BUN/CREAT SERPL: 31 (ref 9–20)
CALCIUM SERPL-MCNC: 8.2 MG/DL (ref 8.6–10.4)
CHLORIDE SERPL-SCNC: 111 MMOL/L (ref 98–107)
CO2 SERPL-SCNC: 25 MMOL/L (ref 20–31)
CREAT SERPL-MCNC: 1.5 MG/DL (ref 0.5–0.9)
EKG ATRIAL RATE: 71 BPM
EKG P AXIS: 86 DEGREES
EKG P-R INTERVAL: 174 MS
EKG Q-T INTERVAL: 312 MS
EKG QRS DURATION: 96 MS
EKG QTC CALCULATION (BAZETT): 339 MS
EKG R AXIS: -6 DEGREES
EKG T AXIS: 63 DEGREES
EKG VENTRICULAR RATE: 71 BPM
EOSINOPHIL # BLD: <0.03 K/UL (ref 0–0.44)
EOSINOPHILS RELATIVE PERCENT: 0 % (ref 1–4)
ERYTHROCYTE [DISTWIDTH] IN BLOOD BY AUTOMATED COUNT: 18.6 % (ref 11.8–14.4)
GFR, ESTIMATED: 35 ML/MIN/1.73M2
GLUCOSE BLD-MCNC: 115 MG/DL (ref 65–105)
GLUCOSE BLD-MCNC: 137 MG/DL (ref 65–105)
GLUCOSE BLD-MCNC: 143 MG/DL (ref 65–105)
GLUCOSE SERPL-MCNC: 164 MG/DL (ref 70–99)
HCT VFR BLD AUTO: 38.8 % (ref 36.3–47.1)
HGB BLD-MCNC: 11.8 G/DL (ref 11.9–15.1)
IMM GRANULOCYTES # BLD AUTO: 0.11 K/UL (ref 0–0.3)
IMM GRANULOCYTES NFR BLD: 1 %
LYMPHOCYTES NFR BLD: 0.68 K/UL (ref 1.1–3.7)
LYMPHOCYTES RELATIVE PERCENT: 6 % (ref 24–43)
MCH RBC QN AUTO: 26.1 PG (ref 25.2–33.5)
MCHC RBC AUTO-ENTMCNC: 30.4 G/DL (ref 25.2–33.5)
MCV RBC AUTO: 85.8 FL (ref 82.6–102.9)
MONOCYTES NFR BLD: 0.16 K/UL (ref 0.1–1.2)
MONOCYTES NFR BLD: 1 % (ref 3–12)
NEUTROPHILS NFR BLD: 92 % (ref 36–65)
NEUTS SEG NFR BLD: 11.03 K/UL (ref 1.5–8.1)
NRBC BLD-RTO: 0 PER 100 WBC
PLATELET # BLD AUTO: 271 K/UL (ref 138–453)
PMV BLD AUTO: 9.5 FL (ref 8.1–13.5)
POTASSIUM SERPL-SCNC: 4.1 MMOL/L (ref 3.7–5.3)
PROT SERPL-MCNC: 5.4 G/DL (ref 6.4–8.3)
RBC # BLD AUTO: 4.52 M/UL (ref 3.95–5.11)
RBC # BLD: ABNORMAL 10*6/UL
SODIUM SERPL-SCNC: 146 MMOL/L (ref 135–144)
WBC OTHER # BLD: 12 K/UL (ref 3.5–11.3)

## 2024-05-12 PROCEDURE — 6370000000 HC RX 637 (ALT 250 FOR IP): Performed by: INTERNAL MEDICINE

## 2024-05-12 PROCEDURE — 36415 COLL VENOUS BLD VENIPUNCTURE: CPT

## 2024-05-12 PROCEDURE — 94761 N-INVAS EAR/PLS OXIMETRY MLT: CPT

## 2024-05-12 PROCEDURE — 80053 COMPREHEN METABOLIC PANEL: CPT

## 2024-05-12 PROCEDURE — 99232 SBSQ HOSP IP/OBS MODERATE 35: CPT | Performed by: FAMILY MEDICINE

## 2024-05-12 PROCEDURE — 99231 SBSQ HOSP IP/OBS SF/LOW 25: CPT | Performed by: SURGERY

## 2024-05-12 PROCEDURE — 82947 ASSAY GLUCOSE BLOOD QUANT: CPT

## 2024-05-12 PROCEDURE — 94640 AIRWAY INHALATION TREATMENT: CPT

## 2024-05-12 PROCEDURE — 2580000003 HC RX 258: Performed by: INTERNAL MEDICINE

## 2024-05-12 PROCEDURE — 74018 RADEX ABDOMEN 1 VIEW: CPT

## 2024-05-12 PROCEDURE — 6370000000 HC RX 637 (ALT 250 FOR IP): Performed by: NURSE PRACTITIONER

## 2024-05-12 PROCEDURE — 2060000000 HC ICU INTERMEDIATE R&B

## 2024-05-12 PROCEDURE — 6360000002 HC RX W HCPCS: Performed by: SURGERY

## 2024-05-12 PROCEDURE — 2580000003 HC RX 258: Performed by: NURSE PRACTITIONER

## 2024-05-12 PROCEDURE — 2580000003 HC RX 258: Performed by: SURGERY

## 2024-05-12 PROCEDURE — 85025 COMPLETE CBC W/AUTO DIFF WBC: CPT

## 2024-05-12 PROCEDURE — C9113 INJ PANTOPRAZOLE SODIUM, VIA: HCPCS | Performed by: SURGERY

## 2024-05-12 PROCEDURE — 6360000002 HC RX W HCPCS: Performed by: FAMILY MEDICINE

## 2024-05-12 PROCEDURE — 99221 1ST HOSP IP/OBS SF/LOW 40: CPT | Performed by: SURGERY

## 2024-05-12 PROCEDURE — 6360000002 HC RX W HCPCS: Performed by: INTERNAL MEDICINE

## 2024-05-12 PROCEDURE — 2700000000 HC OXYGEN THERAPY PER DAY

## 2024-05-12 RX ORDER — INSULIN GLARGINE 100 [IU]/ML
7 INJECTION, SOLUTION SUBCUTANEOUS DAILY
Status: DISCONTINUED | OUTPATIENT
Start: 2024-05-12 | End: 2024-05-13 | Stop reason: HOSPADM

## 2024-05-12 RX ORDER — METHYLPREDNISOLONE SODIUM SUCCINATE 40 MG/ML
40 INJECTION, POWDER, LYOPHILIZED, FOR SOLUTION INTRAMUSCULAR; INTRAVENOUS EVERY 12 HOURS
Status: DISCONTINUED | OUTPATIENT
Start: 2024-05-13 | End: 2024-05-13 | Stop reason: HOSPADM

## 2024-05-12 RX ORDER — SODIUM CHLORIDE 450 MG/100ML
INJECTION, SOLUTION INTRAVENOUS CONTINUOUS
Status: DISCONTINUED | OUTPATIENT
Start: 2024-05-12 | End: 2024-05-12

## 2024-05-12 RX ADMIN — DOXYCYCLINE 100 MG: 100 CAPSULE ORAL at 20:42

## 2024-05-12 RX ADMIN — SODIUM CHLORIDE: 9 INJECTION, SOLUTION INTRAVENOUS at 03:35

## 2024-05-12 RX ADMIN — IPRATROPIUM BROMIDE AND ALBUTEROL SULFATE 1 DOSE: 2.5; .5 SOLUTION RESPIRATORY (INHALATION) at 12:45

## 2024-05-12 RX ADMIN — SODIUM CHLORIDE: 4.5 INJECTION, SOLUTION INTRAVENOUS at 06:32

## 2024-05-12 RX ADMIN — FLUTICASONE PROPIONATE 2 SPRAY: 50 SPRAY, METERED NASAL at 20:42

## 2024-05-12 RX ADMIN — INSULIN GLARGINE 7 UNITS: 100 INJECTION, SOLUTION SUBCUTANEOUS at 09:19

## 2024-05-12 RX ADMIN — METHYLPREDNISOLONE SODIUM SUCCINATE 80 MG: 125 INJECTION INTRAMUSCULAR; INTRAVENOUS at 05:50

## 2024-05-12 RX ADMIN — CETIRIZINE HYDROCHLORIDE 10 MG: 5 TABLET, FILM COATED ORAL at 20:42

## 2024-05-12 RX ADMIN — SODIUM CHLORIDE, PRESERVATIVE FREE 10 ML: 5 INJECTION INTRAVENOUS at 23:23

## 2024-05-12 RX ADMIN — FUROSEMIDE 40 MG: 40 TABLET ORAL at 09:19

## 2024-05-12 RX ADMIN — GUAIFENESIN 600 MG: 600 TABLET, EXTENDED RELEASE ORAL at 09:19

## 2024-05-12 RX ADMIN — IPRATROPIUM BROMIDE AND ALBUTEROL SULFATE 1 DOSE: 2.5; .5 SOLUTION RESPIRATORY (INHALATION) at 04:15

## 2024-05-12 RX ADMIN — IPRATROPIUM BROMIDE AND ALBUTEROL SULFATE 1 DOSE: 2.5; .5 SOLUTION RESPIRATORY (INHALATION) at 15:54

## 2024-05-12 RX ADMIN — ASPIRIN 81 MG: 81 TABLET, COATED ORAL at 20:42

## 2024-05-12 RX ADMIN — MOMETASONE FUROATE AND FORMOTEROL FUMARATE DIHYDRATE 2 PUFF: 200; 5 AEROSOL RESPIRATORY (INHALATION) at 21:19

## 2024-05-12 RX ADMIN — MONTELUKAST 10 MG: 10 TABLET, FILM COATED ORAL at 20:42

## 2024-05-12 RX ADMIN — SODIUM CHLORIDE, PRESERVATIVE FREE 10 ML: 5 INJECTION INTRAVENOUS at 20:42

## 2024-05-12 RX ADMIN — TRAZODONE HYDROCHLORIDE 100 MG: 50 TABLET ORAL at 20:42

## 2024-05-12 RX ADMIN — DOCUSATE SODIUM 100 MG: 100 CAPSULE, LIQUID FILLED ORAL at 09:19

## 2024-05-12 RX ADMIN — APIXABAN 5 MG: 5 TABLET, FILM COATED ORAL at 20:42

## 2024-05-12 RX ADMIN — GUAIFENESIN 600 MG: 600 TABLET, EXTENDED RELEASE ORAL at 20:42

## 2024-05-12 RX ADMIN — EMPAGLIFLOZIN 10 MG: 10 TABLET, FILM COATED ORAL at 09:19

## 2024-05-12 RX ADMIN — MOMETASONE FUROATE AND FORMOTEROL FUMARATE DIHYDRATE 2 PUFF: 200; 5 AEROSOL RESPIRATORY (INHALATION) at 07:45

## 2024-05-12 RX ADMIN — LEVOTHYROXINE SODIUM 50 MCG: 0.03 TABLET ORAL at 09:20

## 2024-05-12 RX ADMIN — CEFTRIAXONE 1000 MG: 1 INJECTION, POWDER, FOR SOLUTION INTRAMUSCULAR; INTRAVENOUS at 09:21

## 2024-05-12 RX ADMIN — APIXABAN 5 MG: 5 TABLET, FILM COATED ORAL at 09:19

## 2024-05-12 RX ADMIN — METHYLPREDNISOLONE SODIUM SUCCINATE 40 MG: 40 INJECTION INTRAMUSCULAR; INTRAVENOUS at 23:23

## 2024-05-12 RX ADMIN — AMIODARONE HYDROCHLORIDE 200 MG: 200 TABLET ORAL at 09:19

## 2024-05-12 RX ADMIN — IPRATROPIUM BROMIDE AND ALBUTEROL SULFATE 1 DOSE: 2.5; .5 SOLUTION RESPIRATORY (INHALATION) at 07:44

## 2024-05-12 RX ADMIN — DOXYCYCLINE 100 MG: 100 CAPSULE ORAL at 09:20

## 2024-05-12 RX ADMIN — IPRATROPIUM BROMIDE AND ALBUTEROL SULFATE 1 DOSE: 2.5; .5 SOLUTION RESPIRATORY (INHALATION) at 21:18

## 2024-05-12 RX ADMIN — SODIUM CHLORIDE, PRESERVATIVE FREE 40 MG: 5 INJECTION INTRAVENOUS at 09:19

## 2024-05-12 NOTE — CONSULTS
murmurs  Abd:  Soft, NT, ND,   Ext:  No edema, no cyanosis  Psych: reveals appropriate mood, memory and judgment,  Neuro:  Reveals no gross motor or sensory deficits,   Msk:  5/5 strength all 4 extremities, no joint tenderness        ASSESS MENT:    PSBO vs ileus    -  pt may have had significant ileus from the bronchitis vs PSBO from probable adhessions.  -  not surgical abdomen at this time.        PLAN:    Continue NGT  Then in am re evaluate if not improving then may consider sbft

## 2024-05-12 NOTE — PLAN OF CARE
Problem: Discharge Planning  Goal: Discharge to home or other facility with appropriate resources  5/12/2024 1045 by Marquita Monroe RN  Outcome: Progressing  5/12/2024 0432 by Tri Cassidy RN  Outcome: Progressing  Flowsheets (Taken 5/11/2024 1911)  Discharge to home or other facility with appropriate resources:   Identify barriers to discharge with patient and caregiver   Arrange for needed discharge resources and transportation as appropriate   Identify discharge learning needs (meds, wound care, etc)   Refer to discharge planning if patient needs post-hospital services based on physician order or complex needs related to functional status, cognitive ability or social support system     Problem: ABCDS Injury Assessment  Goal: Absence of physical injury  5/12/2024 1045 by Marquita Monroe RN  Outcome: Progressing  5/12/2024 0432 by Tri Cassidy RN  Outcome: Progressing     Problem: Safety - Adult  Goal: Free from fall injury  5/12/2024 1045 by Marquita Monroe RN  Outcome: Progressing  5/12/2024 0432 by Tri Cassidy RN  Outcome: Progressing     Problem: Chronic Conditions and Co-morbidities  Goal: Patient's chronic conditions and co-morbidity symptoms are monitored and maintained or improved  5/12/2024 1045 by Marquita Monroe RN  Outcome: Progressing  5/12/2024 0432 by Tri Cassidy RN  Outcome: Progressing  Flowsheets (Taken 5/11/2024 1911)  Care Plan - Patient's Chronic Conditions and Co-Morbidity Symptoms are Monitored and Maintained or Improved:   Monitor and assess patient's chronic conditions and comorbid symptoms for stability, deterioration, or improvement   Collaborate with multidisciplinary team to address chronic and comorbid conditions and prevent exacerbation or deterioration   Update acute care plan with appropriate goals if chronic or comorbid symptoms are exacerbated and prevent overall improvement and discharge     Problem: Nutrition Deficit:  Goal: Optimize nutritional 
  Problem: Discharge Planning  Goal: Discharge to home or other facility with appropriate resources  5/8/2024 2040 by Yaneth Billings RN  Outcome: Progressing  5/8/2024 1828 by Marquita Monroe RN  Outcome: Progressing     Problem: ABCDS Injury Assessment  Goal: Absence of physical injury  5/8/2024 2040 by Yaneth Billings RN  Outcome: Progressing  5/8/2024 1828 by Marquita Monroe RN  Outcome: Progressing     Problem: Safety - Adult  Goal: Free from fall injury  5/8/2024 2040 by Yaneth Billings RN  Outcome: Progressing  5/8/2024 1828 by Marquita Monroe RN  Outcome: Progressing     
  Problem: Discharge Planning  Goal: Discharge to home or other facility with appropriate resources  Outcome: Progressing     Problem: ABCDS Injury Assessment  Goal: Absence of physical injury  Outcome: Progressing     Problem: Safety - Adult  Goal: Free from fall injury  Outcome: Progressing     
  Problem: Respiratory - Adult  Goal: Achieves optimal ventilation and oxygenation  Recent Flowsheet Documentation  Taken 5/10/2024 0332 by Amber Foster RN  Achieves optimal ventilation and oxygenation: Assess for changes in respiratory status     Problem: Metabolic/Fluid and Electrolytes - Adult  Goal: Electrolytes maintained within normal limits  Recent Flowsheet Documentation  Taken 5/10/2024 0332 by Amber Foster RN  Electrolytes maintained within normal limits: Monitor labs and assess patient for signs and symptoms of electrolyte imbalances     
hospitalization  Outcome: Progressing  Goal: Absence of fever/infection during anticipated neutropenic period  Outcome: Progressing     Problem: Cardiovascular - Adult  Goal: Maintains optimal cardiac output and hemodynamic stability  Outcome: Progressing  Flowsheets (Taken 5/11/2024 0926)  Maintains optimal cardiac output and hemodynamic stability:   Monitor blood pressure and heart rate   Monitor urine output and notify Licensed Independent Practitioner for values outside of normal range   Assess for signs of decreased cardiac output   Administer fluid and/or volume expanders as ordered   Administer vasoactive medications as ordered  Goal: Absence of cardiac dysrhythmias or at baseline  Outcome: Progressing  Flowsheets (Taken 5/11/2024 0926)  Absence of cardiac dysrhythmias or at baseline:   Monitor cardiac rate and rhythm   Assess for signs of decreased cardiac output   Administer antiarrhythmia medication and electrolyte replacement as ordered     Problem: Pain  Goal: Verbalizes/displays adequate comfort level or baseline comfort level  Outcome: Progressing     
infection during hospitalization  Outcome: Progressing  Flowsheets (Taken 5/11/2024 1911)  Absence of infection during hospitalization:   Assess and monitor for signs and symptoms of infection   Monitor lab/diagnostic results   Monitor all insertion sites i.e., indwelling lines, tubes and drains   Monitor endotracheal (as able) and nasal secretions for changes in amount and color   Instruct and encourage patient and family to use good hand hygiene technique  Goal: Absence of fever/infection during anticipated neutropenic period  Outcome: Progressing  Flowsheets (Taken 5/11/2024 1911)  Absence of fever/infection during anticipated neutropenic period:   Monitor white blood cell count   Implement neutropenic guidelines     Problem: Cardiovascular - Adult  Goal: Maintains optimal cardiac output and hemodynamic stability  Outcome: Progressing  Flowsheets (Taken 5/11/2024 1911)  Maintains optimal cardiac output and hemodynamic stability:   Monitor blood pressure and heart rate   Monitor urine output and notify Licensed Independent Practitioner for values outside of normal range   Assess for signs of decreased cardiac output   Administer fluid and/or volume expanders as ordered   Administer vasoactive medications as ordered  Goal: Absence of cardiac dysrhythmias or at baseline  Outcome: Progressing  Flowsheets (Taken 5/11/2024 1911)  Absence of cardiac dysrhythmias or at baseline:   Monitor cardiac rate and rhythm   Assess for signs of decreased cardiac output   Administer antiarrhythmia medication and electrolyte replacement as ordered     Problem: Pain  Goal: Verbalizes/displays adequate comfort level or baseline comfort level  Outcome: Progressing

## 2024-05-13 ENCOUNTER — APPOINTMENT (OUTPATIENT)
Dept: GENERAL RADIOLOGY | Age: 79
DRG: 202 | End: 2024-05-13
Payer: MEDICARE

## 2024-05-13 VITALS
HEART RATE: 77 BPM | BODY MASS INDEX: 42.97 KG/M2 | SYSTOLIC BLOOD PRESSURE: 110 MMHG | RESPIRATION RATE: 18 BRPM | OXYGEN SATURATION: 91 % | HEIGHT: 62 IN | TEMPERATURE: 98.3 F | WEIGHT: 233.5 LBS | DIASTOLIC BLOOD PRESSURE: 74 MMHG

## 2024-05-13 LAB
ANION GAP SERPL CALCULATED.3IONS-SCNC: 8 MMOL/L (ref 9–17)
BASOPHILS # BLD: <0.03 K/UL (ref 0–0.2)
BASOPHILS NFR BLD: 0 % (ref 0–2)
BUN SERPL-MCNC: 42 MG/DL (ref 8–23)
BUN/CREAT SERPL: 32 (ref 9–20)
CALCIUM SERPL-MCNC: 8.1 MG/DL (ref 8.6–10.4)
CHLORIDE SERPL-SCNC: 108 MMOL/L (ref 98–107)
CO2 SERPL-SCNC: 27 MMOL/L (ref 20–31)
CREAT SERPL-MCNC: 1.3 MG/DL (ref 0.5–0.9)
EOSINOPHIL # BLD: <0.03 K/UL (ref 0–0.44)
EOSINOPHILS RELATIVE PERCENT: 0 % (ref 1–4)
ERYTHROCYTE [DISTWIDTH] IN BLOOD BY AUTOMATED COUNT: 18.2 % (ref 11.8–14.4)
GFR, ESTIMATED: 42 ML/MIN/1.73M2
GLUCOSE BLD-MCNC: 116 MG/DL (ref 65–105)
GLUCOSE SERPL-MCNC: 144 MG/DL (ref 70–99)
HCT VFR BLD AUTO: 34.6 % (ref 36.3–47.1)
HGB BLD-MCNC: 10.7 G/DL (ref 11.9–15.1)
IMM GRANULOCYTES # BLD AUTO: 0.1 K/UL (ref 0–0.3)
IMM GRANULOCYTES NFR BLD: 1 %
LYMPHOCYTES NFR BLD: 0.77 K/UL (ref 1.1–3.7)
LYMPHOCYTES RELATIVE PERCENT: 9 % (ref 24–43)
MCH RBC QN AUTO: 26.2 PG (ref 25.2–33.5)
MCHC RBC AUTO-ENTMCNC: 30.9 G/DL (ref 25.2–33.5)
MCV RBC AUTO: 84.8 FL (ref 82.6–102.9)
MONOCYTES NFR BLD: 0.23 K/UL (ref 0.1–1.2)
MONOCYTES NFR BLD: 3 % (ref 3–12)
NEUTROPHILS NFR BLD: 87 % (ref 36–65)
NEUTS SEG NFR BLD: 7.98 K/UL (ref 1.5–8.1)
NRBC BLD-RTO: 0 PER 100 WBC
PLATELET # BLD AUTO: 232 K/UL (ref 138–453)
PMV BLD AUTO: 9.5 FL (ref 8.1–13.5)
POTASSIUM SERPL-SCNC: 4 MMOL/L (ref 3.7–5.3)
RBC # BLD AUTO: 4.08 M/UL (ref 3.95–5.11)
RBC # BLD: ABNORMAL 10*6/UL
SODIUM SERPL-SCNC: 143 MMOL/L (ref 135–144)
WBC OTHER # BLD: 9.1 K/UL (ref 3.5–11.3)

## 2024-05-13 PROCEDURE — 94760 N-INVAS EAR/PLS OXIMETRY 1: CPT

## 2024-05-13 PROCEDURE — 6360000002 HC RX W HCPCS: Performed by: INTERNAL MEDICINE

## 2024-05-13 PROCEDURE — 6360000002 HC RX W HCPCS: Performed by: FAMILY MEDICINE

## 2024-05-13 PROCEDURE — 80048 BASIC METABOLIC PNL TOTAL CA: CPT

## 2024-05-13 PROCEDURE — 2580000003 HC RX 258: Performed by: INTERNAL MEDICINE

## 2024-05-13 PROCEDURE — 6370000000 HC RX 637 (ALT 250 FOR IP): Performed by: INTERNAL MEDICINE

## 2024-05-13 PROCEDURE — 99238 HOSP IP/OBS DSCHRG MGMT 30/<: CPT | Performed by: INTERNAL MEDICINE

## 2024-05-13 PROCEDURE — 6360000002 HC RX W HCPCS: Performed by: SURGERY

## 2024-05-13 PROCEDURE — 94640 AIRWAY INHALATION TREATMENT: CPT

## 2024-05-13 PROCEDURE — 2580000003 HC RX 258: Performed by: SURGERY

## 2024-05-13 PROCEDURE — 85025 COMPLETE CBC W/AUTO DIFF WBC: CPT

## 2024-05-13 PROCEDURE — 6370000000 HC RX 637 (ALT 250 FOR IP): Performed by: NURSE PRACTITIONER

## 2024-05-13 PROCEDURE — 82947 ASSAY GLUCOSE BLOOD QUANT: CPT

## 2024-05-13 PROCEDURE — 2700000000 HC OXYGEN THERAPY PER DAY

## 2024-05-13 PROCEDURE — 74018 RADEX ABDOMEN 1 VIEW: CPT

## 2024-05-13 PROCEDURE — 36415 COLL VENOUS BLD VENIPUNCTURE: CPT

## 2024-05-13 PROCEDURE — C9113 INJ PANTOPRAZOLE SODIUM, VIA: HCPCS | Performed by: SURGERY

## 2024-05-13 RX ORDER — GLIMEPIRIDE 2 MG/1
2 TABLET ORAL
Qty: 4 TABLET | Refills: 0 | Status: SHIPPED | OUTPATIENT
Start: 2024-05-13

## 2024-05-13 RX ORDER — CEFDINIR 300 MG/1
300 CAPSULE ORAL 2 TIMES DAILY
Qty: 6 CAPSULE | Refills: 0 | Status: SHIPPED | OUTPATIENT
Start: 2024-05-13 | End: 2024-05-16

## 2024-05-13 RX ORDER — PREDNISONE 20 MG/1
40 TABLET ORAL DAILY
Qty: 8 TABLET | Refills: 0 | Status: SHIPPED | OUTPATIENT
Start: 2024-05-13 | End: 2024-05-17

## 2024-05-13 RX ORDER — DOXYCYCLINE 100 MG/1
100 CAPSULE ORAL 2 TIMES DAILY
Qty: 9 CAPSULE | Refills: 0 | Status: SHIPPED | OUTPATIENT
Start: 2024-05-13 | End: 2024-05-18

## 2024-05-13 RX ADMIN — IPRATROPIUM BROMIDE AND ALBUTEROL SULFATE 1 DOSE: 2.5; .5 SOLUTION RESPIRATORY (INHALATION) at 08:27

## 2024-05-13 RX ADMIN — SODIUM CHLORIDE, PRESERVATIVE FREE 10 ML: 5 INJECTION INTRAVENOUS at 09:00

## 2024-05-13 RX ADMIN — SODIUM CHLORIDE, PRESERVATIVE FREE 40 MG: 5 INJECTION INTRAVENOUS at 08:36

## 2024-05-13 RX ADMIN — DOCUSATE SODIUM 100 MG: 100 CAPSULE, LIQUID FILLED ORAL at 08:39

## 2024-05-13 RX ADMIN — DOXYCYCLINE 100 MG: 100 CAPSULE ORAL at 08:37

## 2024-05-13 RX ADMIN — IPRATROPIUM BROMIDE AND ALBUTEROL SULFATE 1 DOSE: 2.5; .5 SOLUTION RESPIRATORY (INHALATION) at 05:09

## 2024-05-13 RX ADMIN — INSULIN GLARGINE 7 UNITS: 100 INJECTION, SOLUTION SUBCUTANEOUS at 08:36

## 2024-05-13 RX ADMIN — CEFTRIAXONE 1000 MG: 1 INJECTION, POWDER, FOR SOLUTION INTRAMUSCULAR; INTRAVENOUS at 09:00

## 2024-05-13 RX ADMIN — LEVOTHYROXINE SODIUM 50 MCG: 0.03 TABLET ORAL at 08:37

## 2024-05-13 RX ADMIN — FUROSEMIDE 40 MG: 40 TABLET ORAL at 08:39

## 2024-05-13 RX ADMIN — METHYLPREDNISOLONE SODIUM SUCCINATE 40 MG: 40 INJECTION INTRAMUSCULAR; INTRAVENOUS at 11:27

## 2024-05-13 RX ADMIN — MOMETASONE FUROATE AND FORMOTEROL FUMARATE DIHYDRATE 2 PUFF: 200; 5 AEROSOL RESPIRATORY (INHALATION) at 08:27

## 2024-05-13 RX ADMIN — EMPAGLIFLOZIN 10 MG: 10 TABLET, FILM COATED ORAL at 08:37

## 2024-05-13 RX ADMIN — IPRATROPIUM BROMIDE AND ALBUTEROL SULFATE 1 DOSE: 2.5; .5 SOLUTION RESPIRATORY (INHALATION) at 12:08

## 2024-05-13 RX ADMIN — GUAIFENESIN 600 MG: 600 TABLET, EXTENDED RELEASE ORAL at 08:37

## 2024-05-13 RX ADMIN — APIXABAN 5 MG: 5 TABLET, FILM COATED ORAL at 08:37

## 2024-05-13 RX ADMIN — AMIODARONE HYDROCHLORIDE 200 MG: 200 TABLET ORAL at 08:39

## 2024-05-13 NOTE — CARE COORDINATION
Case Management Assessment  Initial Evaluation    Date/Time of Evaluation: 5/9/2024 11:12 AM  Assessment Completed by: Maty Hawley RN    If patient is discharged prior to next notation, then this note serves as note for discharge by case management.    Patient Name: Marah Loyd                   YOB: 1945  Diagnosis: COPD exacerbation (HCC) [J44.1]                   Date / Time: 5/8/2024 12:08 PM    Patient Admission Status: Inpatient   Readmission Risk (Low < 19, Mod (19-27), High > 27): Readmission Risk Score: 14.9    Current PCP: Peter Ceron MD  PCP verified by CM? Yes    Chart Reviewed: Yes      History Provided by: Patient  Patient Orientation: Alert and Oriented    Patient Cognition: Alert    Hospitalization in the last 30 days (Readmission):  No    If yes, Readmission Assessment in CM Navigator will be completed.    Advance Directives:      Code Status: Full Code   Patient's Primary Decision Maker is:        Discharge Planning:    Patient lives with: Spouse/Significant Other Type of Home: House  Primary Care Giver: Self  Patient Support Systems include: Spouse/Significant Other   Current Financial resources: Medicare  Current community resources: None  Current services prior to admission: None            Current DME:              Type of Home Care services:  None    ADLS  Prior functional level: Independent in ADLs/IADLs  Current functional level: Assistance with the following:, Mobility, Dressing    PT AM-PAC:   /24  OT AM-PAC:   /24    Family can provide assistance at DC: Yes  Would you like Case Management to discuss the discharge plan with any other family members/significant others, and if so, who? No  Plans to Return to Present Housing: Yes  Other Identified Issues/Barriers to RETURNING to current housing: yes  Potential Assistance needed at discharge: Durable Medical Equipment            Potential DME: Oxygen Therapy (Comment), Walker  Patient expects to discharge 
IMM letter provided to patient.  Patient offered four hours to make informed decision regarding appeal process; patient agreeable to discharge.   
Loyd is a Full Code status and has NO advanced directive - not interested in additional information.      Collaborative List of SNF/ECF/HH were provided: offered, declined No discharge order at this time.    Anticipated Needs/Discharge Plan:  Spoke with patient/family/representative about discharge plan. Patient/Family/Representative verbalizes understanding of the plan of care and denies discharge needs or further services at this time. SW provided business card. SW will continue to monitor needs and assist as appropriate.              Electronically signed by MARTA Elizabeth on 5/9/2024 at 1:00 PM             
Stable

## 2024-05-13 NOTE — PROGRESS NOTES
05/09/24 1057   Encounter Summary   Encounter Overview/Reason Spiritual/Emotional Needs   Service Provided For Patient and family together   Referral/Consult From Rounding   Support System Spouse;Family members   Last Encounter  05/09/24   Complexity of Encounter Low   Begin Time 1047   End Time  1058   Total Time Calculated 11 min   Spiritual/Emotional needs   Type Spiritual Support   Assessment/Intervention/Outcome   Assessment Calm   Intervention Active listening;Prayer (assurance of)/[place]   Outcome Acceptance;Engaged in conversation;Expressed Gratitude       
  Physician Progress Note      PATIENT:               CAITIE HE  CSN #:                  [id number]  :                       1945  ADMIT DATE:       2024 12:08 PM  DISCH DATE:  RESPONDING  PROVIDER #:        Mesfin Acharya MD          QUERY TEXT:    Patient admitted with COPD exacerbation, noted to have paroxsymal atrial   fibrillation and is maintained on Eliquis.    If possible, please document in progress notes and discharge summary if you   are evaluating and/or treating any of the following:    The medical record reflects the following:  Risk Factors: 79 y/o female, CHF, HTN  Clinical Indicators: paroxsymal atrial fibrillation, CHF, HTN,  Treatment: Eliquis home med, continued this hospitalization    Marcella Vázquez, MSN, RN, CCDS, CRCR  Clinical   .  Options provided:  -- Secondary hypercoagulable state in a patient with atrial fibrillation  -- Other - I will add my own diagnosis  -- Disagree - Not applicable / Not valid  -- Disagree - Clinically unable to determine / Unknown  -- Refer to Clinical Documentation Reviewer    PROVIDER RESPONSE TEXT:    This patient has secondary hypercoagulable state in a patient with atrial   fibrillation.    Query created by: Marcella Vázquez on 2024 12:08 PM      Electronically signed by:  Mesfin Acharya MD 2024 1:19 PM          
 rounding on PCU     Assessment: Patient is resting in bed and welcomed  She has strong family support and is well connected to her Sabianist, Two daughters are present for support.    Intervention: Engaged in conversation. Patient expressed appreciation for visit and offer of continued prayer.    Plan: Chaplains are available on site or on call 24/7 for spiritual and emotional support.  
CLINICAL PHARMACY NOTE: MEDS TO BEDS    Total # of Prescriptions Filled: 4   The following medications were delivered to the patient:  Prednisone 20mg  Cefdinir 300mg  Doxycycline Hyc 100mg  Glimepiride 2mg      Additional Documentation:    
Comprehensive Nutrition Assessment    Type and Reason for Visit:  Initial, Positive Nutrition Screen    Nutrition Recommendations/Plan:   Change diet to 3 Carb Choice/Cardiac/CARLOTA.  Recommend patient pursue outpatient nutrition counseling.  Monitor and follow while inpatient.     Malnutrition Assessment:  Malnutrition Status:  At risk for malnutrition (Comment) (r/t presence of chronic disease and BMI > 25) (05/09/24 1059)    Context:  Acute Illness     Findings of the 6 clinical characteristics of malnutrition:  Energy Intake:  Mild decrease in energy intake (Comment)  Weight Loss:  No significant weight loss     Body Fat Loss:  No significant body fat loss     Muscle Mass Loss:  Unable to assess    Fluid Accumulation:  Mild     Strength:  Not Performed    Nutrition Assessment:    Pt assessed r/t + nutrition screen; was admitted with COPD exacerbation. Pt has had a decreased appetite, though no  weight loss is noted, only minor fluctuations. Pt expresses UBW as 220-225#. Obese. Pt confides that she would like to lose weight. Mentioned in EMR that Pt has DM, though Pt says she has never been told that she is diabetic. Most recent A1C was 5.7. Pt does take Jardiance, but she reports it is not for DM. Pt reports a good appetite; ate 100% of the partial meal that she ordered for breakfast. Diet order slightly high in carb allowance- writer will adjust. No ONS needed currently. BLE trace edema noted. GI function wnl per nsg notes.    Nutrition Related Findings:    Meds/labs reviewed. Meds include steroid, insulins, Jardiance, Lasix, IV abx. Labs: PrBNP 827, Na 146, K 3.5, renal fx abnormal, hgb 11.6, Ca 8.3, -196 past 24 hrs. NS running @ 50 ml/hr. Wound Type: None       Current Nutrition Intake & Therapies:    Average Meal Intake: %  Average Supplements Intake: None Ordered  ADULT DIET; Regular; 4 carb choices (60 gm/meal); Low Fat/Low Chol/High Fiber/CARLOTA    Anthropometric Measures:  Height: 157.5 cm (5' 
Hospitalist Progress Note    Patient:  Marah Loyd     YOB: 1945    MRN: [id number]   Admit date: 5/8/2024     Acct: [id number]     PCP: Peter Ceron MD    CC--Interval History:    COPD exacerbation---bronchitis---asthma---improved---home---5.13.2024--follow up Dr. Ceron----IV --> PO  Antibiotics---steroids    Nocturnal hypoxia---requires supplemental oxygen---see note below     Ileus---5.11.2024 ---> NGT----NGT out---5.12.2024---diet advanced and tolerated---gas--[+]---BMs--[+]    PAF--paroxysmal atrial fibrillation----currently remains in SR    Dehydration at admission---resolved    DM2----140---exacerbation by steroids    See note below       All other ROS negative except noted in HPI    Diet:  ADULT DIET; Regular; 4 carb choices (60 gm/meal)    Medications:  Scheduled Meds:   insulin glargine  7 Units SubCUTAneous Daily    methylPREDNISolone  40 mg IntraVENous Q12H    pantoprazole (PROTONIX) 40 mg in sodium chloride (PF) 0.9 % 10 mL injection  40 mg IntraVENous Daily    doxycycline monohydrate  100 mg Oral 2 times per day    docusate sodium  100 mg Oral Daily    mometasone-formoterol  2 puff Inhalation BID RT    guaiFENesin  600 mg Oral BID    cefTRIAXone (ROCEPHIN) IV  1,000 mg IntraVENous Q24H    sodium chloride flush  10 mL IntraVENous 2 times per day    amiodarone  200 mg Oral Daily    aspirin EC  81 mg Oral Daily    cetirizine  10 mg Oral Daily    apixaban  5 mg Oral BID    empagliflozin  10 mg Oral Daily    fluticasone  2 spray Nasal Daily    furosemide  40 mg Oral Daily    levothyroxine  50 mcg Oral Daily    montelukast  10 mg Oral Nightly    traZODone  100 mg Oral Nightly    insulin lispro  0-4 Units SubCUTAneous TID WC    insulin lispro  0-4 Units SubCUTAneous Nightly    ipratropium 0.5 mg-albuterol 2.5 mg  1 Dose Inhalation Q4H RT     Continuous Infusions:   sodium chloride      dextrose       PRN Meds:ondansetron, prochlorperazine, sodium chloride flush, sodium 
Hospitalist Progress Note  5/12/2024 12:58 PM  Subjective:   Admit Date: 5/8/2024  PCP: Peter Ceron MD     Full Code      C/c:  Chief Complaint   Patient presents with    Shortness of Breath         Interval History: doing slightly better, ng out, tolerating clears, and surgery on board, most likely sbo due to bronchitis    Diet: ADULT DIET; Clear Liquid; 4 carb choices (60 gm/meal)                                ip days:4  Medications:   Scheduled Meds:   insulin glargine  7 Units SubCUTAneous Daily    pantoprazole (PROTONIX) 40 mg in sodium chloride (PF) 0.9 % 10 mL injection  40 mg IntraVENous Daily    doxycycline monohydrate  100 mg Oral 2 times per day    docusate sodium  100 mg Oral Daily    mometasone-formoterol  2 puff Inhalation BID RT    guaiFENesin  600 mg Oral BID    cefTRIAXone (ROCEPHIN) IV  1,000 mg IntraVENous Q24H    sodium chloride flush  10 mL IntraVENous 2 times per day    amiodarone  200 mg Oral Daily    aspirin EC  81 mg Oral Daily    cetirizine  10 mg Oral Daily    apixaban  5 mg Oral BID    empagliflozin  10 mg Oral Daily    fluticasone  2 spray Nasal Daily    furosemide  40 mg Oral Daily    levothyroxine  50 mcg Oral Daily    montelukast  10 mg Oral Nightly    traZODone  100 mg Oral Nightly    methylPREDNISolone  80 mg IntraVENous Q6H    insulin lispro  0-4 Units SubCUTAneous TID WC    insulin lispro  0-4 Units SubCUTAneous Nightly    ipratropium 0.5 mg-albuterol 2.5 mg  1 Dose Inhalation Q4H RT     Continuous Infusions:   sodium chloride 125 mL/hr at 05/12/24 0653    sodium chloride      dextrose       PRN Meds:.ondansetron, prochlorperazine, sodium chloride flush, sodium chloride, potassium chloride **OR** potassium alternative oral replacement **OR** potassium chloride, magnesium sulfate, acetaminophen **OR** acetaminophen, albuterol, guaiFENesin-codeine, glucose, dextrose bolus **OR** dextrose bolus, glucagon (rDNA), dextrose     CBC:   Recent Labs     05/10/24  0519 
Patient had complained of constipation yesterday so was started on lactulose last night. Patient reports developing nausea after taking the lactulose last night. She is dry heaving despite zofran and compazine. Denies significant abdominal pain, bowel sounds hypoactive. Changed diet to NPO and KUB ordered.   
Pt doing better with NGT.  Now no pain, nausea and she did pass flatus.  Bryan clamping of ngt  BP (!) 143/70   Pulse 74   Temp 97.8 °F (36.6 °C) (Temporal)   Resp 18   Ht 1.575 m (5' 2\")   Wt 103.4 kg (228 lb)   SpO2 93%   BMI 41.70 kg/m²     Abd - soft + BS NT    Wbc- down to 12    IMP/Plan:  Mostly likely an ileus from bronchitis and/or mild PSBO from adhesions    But better so far,   Will dc ngt  She would like to test GI tract with adv diet today.  If bryan may consider home in am  Will need CS in about 6 weeks as out pt.  
Pt. Is on room air for apnea and desaturation test. Test was set up at 2200.  
[facility]oyd[facility] ([facility] [J44.1] .   Vitals:    05/11/24 1822   BP: (!) 164/85   Pulse: 82   Resp: 20   Temp: 99.8 °F (37.7 °C)   SpO2: 93%   . Patients home meds are   Prior to Admission medications    Medication Sig Start Date End Date Taking? Authorizing Provider   albuterol sulfate HFA (VENTOLIN HFA) 108 (90 Base) MCG/ACT inhaler Inhale 2 puffs into the lungs every 6 hours as needed for Wheezing 5/10/24   Nikolas Ji APRN - CNP   benzonatate (TESSALON) 200 MG capsule take 1 capsule by mouth three times a day if needed for cough  Patient not taking: Reported on 5/8/2024 4/30/24   ProviderWicho MD   guaiFENesin-codeine (GUAIFENESIN AC) 100-10 MG/5ML liquid  5/6/24   Wicho Diego MD   predniSONE (DELTASONE) 10 MG tablet Take 3 tablets by mouth daily 5/6/24   ProviderWicho MD   tiotropium (SPIRIVA RESPIMAT) 1.25 MCG/ACT AERS inhaler Inhale 2 puffs into the lungs daily 5/1/24   Nikolas Ji APRN - CNP   budesonide-formoterol (SYMBICORT) 160-4.5 MCG/ACT AERO Inhale 2 puffs into the lungs 2 times daily 5/1/24   Nikolas Ji APRN - CNP   PROAIR  (90 Base) MCG/ACT inhaler Inhale 2 puffs into the lungs every 6 hours as needed for Wheezing or Shortness of Breath 5/1/24   Nikolas Ji APRN - CNP   montelukast (SINGULAIR) 10 MG tablet Take 1 tablet by mouth nightly 5/1/24   Nikolas Ji APRN - CNP   omeprazole (PRILOSEC) 20 MG delayed release capsule Take 1 capsule by mouth every morning (before breakfast) 5/1/24   Nikolas Ji APRN - CNP   cetirizine (ZYRTEC) 10 MG tablet Take 1 tablet by mouth daily 5/1/24 4/26/25  Ji, Nikolas A, APRN - CNP   ELIQUIS 5 MG TABS tablet TAKE 1 TABLET BY MOUTH TWICE  DAILY 12/19/23   Fidencio Jean Baptiste DO   furosemide (LASIX) 40 MG tablet Take 1 tablet by mouth daily 11/7/23   Michael Jean Baptiste DO   empagliflozin (JARDIANCE) 10 MG tablet Take 1 tablet by mouth daily 11/7/23   Michael Jean Baptiste DO   guaiFENesin 
[facility], [facility] [J44.1] .   Vitals:    05/12/24 1554   BP:    Pulse: 85   Resp:    Temp:    SpO2: (!) 84%   . Patients home meds are   Prior to Admission medications    Medication Sig Start Date End Date Taking? Authorizing Provider   albuterol sulfate HFA (VENTOLIN HFA) 108 (90 Base) MCG/ACT inhaler Inhale 2 puffs into the lungs every 6 hours as needed for Wheezing 5/10/24   Nikolas Ji APRN - CNP   benzonatate (TESSALON) 200 MG capsule take 1 capsule by mouth three times a day if needed for cough  Patient not taking: Reported on 5/8/2024 4/30/24   Provider, MD Wicho   guaiFENesin-codeine (GUAIFENESIN AC) 100-10 MG/5ML liquid  5/6/24   ProviderWicho MD   predniSONE (DELTASONE) 10 MG tablet Take 3 tablets by mouth daily 5/6/24   Provider, MD Wicho   tiotropium (SPIRIVA RESPIMAT) 1.25 MCG/ACT AERS inhaler Inhale 2 puffs into the lungs daily 5/1/24   Nikolas Ji APRN - CNP   budesonide-formoterol (SYMBICORT) 160-4.5 MCG/ACT AERO Inhale 2 puffs into the lungs 2 times daily 5/1/24   Nikolas Ji APRN - CNP   PROAIR  (90 Base) MCG/ACT inhaler Inhale 2 puffs into the lungs every 6 hours as needed for Wheezing or Shortness of Breath 5/1/24   Nikolas Ji APRN - CNP   montelukast (SINGULAIR) 10 MG tablet Take 1 tablet by mouth nightly 5/1/24   Nikolas Ji APRN - CNP   omeprazole (PRILOSEC) 20 MG delayed release capsule Take 1 capsule by mouth every morning (before breakfast) 5/1/24   Nikolas Ji APRN - CNP   cetirizine (ZYRTEC) 10 MG tablet Take 1 tablet by mouth daily 5/1/24 4/26/25  Nikolas Ji APRN - CNP   ELIQUIS 5 MG TABS tablet TAKE 1 TABLET BY MOUTH TWICE  DAILY 12/19/23   Fidencio Jean Baptiste DO   furosemide (LASIX) 40 MG tablet Take 1 tablet by mouth daily 11/7/23   Michael Jean Baptiste DO   empagliflozin (JARDIANCE) 10 MG tablet Take 1 tablet by mouth daily 11/7/23   Michael Jean Baptiste DO   guaiFENesin (ROBITUSSIN) 100 MG/5ML 
[facility]oyd, [facility] ([facility] [J44.1] .   Vitals:    05/09/24 1131   BP:    Pulse: 61   Resp:    Temp:    SpO2: 96%   . Patients home meds are   Prior to Admission medications    Medication Sig Start Date End Date Taking? Authorizing Provider   benzonatate (TESSALON) 200 MG capsule take 1 capsule by mouth three times a day if needed for cough  Patient not taking: Reported on 5/8/2024 4/30/24   ProviderWicho MD guaiFENesin-codeine (GUAIFENESIN AC) 100-10 MG/5ML liquid  5/6/24   Wicho Diego MD   predniSONE (DELTASONE) 10 MG tablet Take 3 tablets by mouth daily 5/6/24   Wicho Diego MD   albuterol sulfate HFA (VENTOLIN HFA) 108 (90 Base) MCG/ACT inhaler Inhale 2 puffs into the lungs every 6 hours as needed for Wheezing 5/6/24   Nikolas Ji APRN - CNP   tiotropium (SPIRIVA RESPIMAT) 1.25 MCG/ACT AERS inhaler Inhale 2 puffs into the lungs daily 5/1/24   Nikolas Ji APRN - CNP   budesonide-formoterol (SYMBICORT) 160-4.5 MCG/ACT AERO Inhale 2 puffs into the lungs 2 times daily 5/1/24   Nikolas Ji APRN - CNP   PROAIR  (90 Base) MCG/ACT inhaler Inhale 2 puffs into the lungs every 6 hours as needed for Wheezing or Shortness of Breath 5/1/24   Nikolas Ji APRN - CNP   montelukast (SINGULAIR) 10 MG tablet Take 1 tablet by mouth nightly 5/1/24   Nikolas Ji APRN - CNP   omeprazole (PRILOSEC) 20 MG delayed release capsule Take 1 capsule by mouth every morning (before breakfast) 5/1/24   Nikolas Ji APRN - CNP   cetirizine (ZYRTEC) 10 MG tablet Take 1 tablet by mouth daily 5/1/24 4/26/25  Nikolas Ji APRN - CNP   ELIQUIS 5 MG TABS tablet TAKE 1 TABLET BY MOUTH TWICE  DAILY 12/19/23   Fidencio Jean Baptiste DO   furosemide (LASIX) 40 MG tablet Take 1 tablet by mouth daily 11/7/23   Michael Jean Baptiste DO   empagliflozin (JARDIANCE) 10 MG tablet Take 1 tablet by mouth daily 11/7/23   Michael Jean Baptiste DO   guaiFENesin (ROBITUSSIN) 100 MG/5ML liquid 
Disposition upon discharge: [] Home                                                                         [] Home with Home Health                                                                         [] Skilled Nursing Facility                                                                         [] [facility]      Patient is admitted as inpatient status because of co-morbidities listed above, severity of signs and symptoms as outlined, requirement for current medical therapies and most importantly because of direct risk to patient if care not provided in a hospital setting.          Taiwo Knox MD, MD  [facility] Hospitalist    
FEMALE                                  MALE                            FEV1 Predicted Normal Values                        FEV1 Predicted Normal Values          Age                                     Height in Feet and Inches       Age                                     Height in Feet and Inches       4' 11\" 5' 1\" 5' 3\" 5' 5\" 5' 7\" 5' 9\" 5' 11\" 6' 1\"  4' 11\" 5' 1\" 5' 3\" 5' 5\" 5' 7\" 5' 9\" 5' 11\" 6' 1\"   42 - 45 2.49 2.66 2.84 3.03 3.22 3.42 3.62 3.83 42 - 45 2.82 3.03 3.26 3.49 3.72 3.96 4.22 4.47   46 - 49 2.40 2.57 2.76 2.94 3.14 3.33 3.54 3.75 46 - 49 2.70 2.92 3.14 3.37 3.61 3.85 4.10 4.36   50 - 53 2.31 2.48 2.66 2.85 3.04 3.24 3.45 3.66 50 - 53 2.58 2.80 3.02 3.25 3.49 3.73 3.98 4.24   54 - 57 2.21 2.38 2.57 2.75 2.95 3.14 3.35 3.56 54 - 57 2.46 2.67 2.89 3.12 3.36 3.60 3.85 4.11   58 - 61 2.10 2.28 2.46 2.65 2.84 3.04 3.24 3.45 58 - 61 2.32 2.54 2.76 2.99 3.23 3.47 3.72 3.98   62 - 65 1.99 2.17 2.35 2.54 2.73 2.93 3.13 3.34 62 - 65 2.19 2.40 2.62 2.85 3.09 3.33 3.58 3.84   66 - 69 1.88 2.05 2.23 2.42 2.61 2.81 3.02 3.23 66 - 69 2.04 2.26 2.48 2.71 2.95 3.19 3.44 3.70   70+ 1.82 1.99 2.17 2.36 2.55 2.75 2.95 3.16 70+ 1.97 2.19 2.41 2.64 2.87 3.12 3.37 3.62             Predicted Peak Expiratory Flow Rate                                       Height (in)  Female       Height (in) Male           Age 56 58 60 62 64 66 68 70 Age            20 344 357 372 387 402 417 432 446  60 62 64 66 68 70 72 74 76   25 337 352 366 381 396 411 426 441 25 447 476 505 533 [phone] 677   30 329 344 359 374 389 404 419 434 30 437 466 494 523 552 580 609 638 667   35 322 337 351 366 381 396 411 426 35 426 455 484 512 541 570 598 627 657   40 314 329 344 359 374 389 404 419 40 416 445 473 502 531 559 588 617 647   45 307 322 336 351 366 381 396 411 45 405 434 463 491 520 549 577 606 636   50 299 314 329 344 359 374 389 404 50 395 424 452 481 510 538 567 596 625   55 292 307 321 336 351 366 381 396 55 384 413 442 [phone] 
exposure    PMH:      acute stress disorder, anemia---iron deficiency, colonic polyps---                 adhesions, goiter, prolonged QTc--2019, RUQ abdominal pain, PNA--2022,                  lipoma abdominal wall, axillary lymphadenopathy, diverticulosis, urinary urgency     PSH:       colectomy--2008, BERTO--colectomy---2019, colonoscopy--2017, hemorrhoidal                 banding, ventral hernia repair---2004, hysterectomy---cervix unknown, BLE TKA,                  partial thyroidectomy, EGD    Allergies:   erythromycin, fenofibrate, Lipitor--atorvastatin, Lopid--gemfibrozil, Darvocet--propoxyphene      Plan:    Cont'd IV antibiotics--med nebs---O2--IV Solumedrol    PAF----amiodarone    See orders    Electronically signed by Mesfin Acharya MD on 5/10/2024 at 12:16 PM    Hospitalist        
[phone]   65 277 292 306 321 336 351 366 381 65 363 392 421 449 478 507 535 564 594   70 269 284 299 314 329 344 359 374 70 353 382 410 439 468 496 525 554 583   75 261 274 289 305 319 334 348 364 75 344 372 400 429 458 487 515 544 573   80 253 266 282 296 312 327 342 356 80 335 362 390 419 448 476 [phone]

## 2024-05-13 NOTE — DISCHARGE INSTRUCTIONS
Follow up with Dr. Ceron in 1 week. Call for an appointment [phone]     Follow up with Dr. Delgado in 2 weeks to schedule outpatient Colonoscopy

## 2024-05-14 ENCOUNTER — FOLLOWUP TELEPHONE ENCOUNTER (OUTPATIENT)
Dept: INPATIENT UNIT | Age: 79
End: 2024-05-14

## 2024-05-14 NOTE — DISCHARGE SUMMARY
same.    Hypertension.  Blood pressure controlled at 104/50.    Hyperlipidemia, dietary control.    Hypothyroidism, on replacement therapy.    Diabetes mellitus, type 2.  The patient in addition to her usual medications while she is on steroids will be on Amaryl for a 4-day period in the home setting.  The patient's malignancies were not a factor this hospitalization.  Depression, anxiety, insomnia, controlled.    Tobacco, secondhand exposure only.    LABORATORY DATA:  Around the time of discharge; white cell count 9.1, hemoglobin 10.7, hematocrit 34.6, platelets 232,000.  Sodium 143, potassium 4.0, chloride 108, CO2 of 27, BUN 42, creatinine 1.3, glucose in the range of 115 to 144, GFR 42 up from 35, calcium 8.6, corrected.    DISCHARGE INSTRUCTIONS AND FOLLOWUP:  Discharged to home on 05/13/2024.    DIET:  Cardiac, diabetic.    ACTIVITY:  As tolerated.    CONDITION AT DISCHARGE:  Fair, improved.    MEDICATIONS:  New:  1. Cefdinir (Omnicef) 300 mg p.o. b.i.d. 3 days.  2. Doxycycline 100 mg p.o. b.i.d. 3 days.  3. Guaifenesin (Robitussin) 100 mg for 5 mL, 10 mL p.o. q.4 hours p.r.n. cough as an expectorant.  4. Supplemental oxygen 2 L by nasal cannula nightly.  May adjust to keep oxygen saturation 90% to 95%.  The following medications with changes may have occurred:  1. Prednisone 20 mg 2 tablets for 40 mg p.o. daily 4 days.  2. ProAir HFA 2 puffs every 6 hours p.r.n. shortness of breath, wheezing.  3. Albuterol sulfate HFA 2 puffs every 6 hours p.r.n. shortness of breath or wheezing.  Following continued without change:  1. Amiodarone (Cordarone) 200 mg p.o. daily.  2. Aspirin 81 mg p.o. daily.  3. Symbicort (budesonide/formoterol) 160/4.5 two puffs twice daily.  4. Cetirizine (Zyrtec) 10 mg p.o. daily.  5. Eliquis 5 mg p.o. twice daily.  6. Empagliflozin (Jardiance) 10 mg p.o. daily.  7. Fluticasone (Flonase) 2 sprays nasally daily.  8. Furosemide (Lasix) 40 mg p.o. daily.  9. Ipratropium (albuterol) 0.5/2.5

## 2024-05-15 NOTE — PROGRESS NOTES
Subjective:      Patient ID: Marah Loyd is a 78 y.o. female.     Patient must see physician at next appointment    HPI  Patient here for posthospital follow-up. Last seen in office per me in May 2024.  At last appointment we discussed biologic therapy, patient wished to defer.  Was recommended to resume her Zyrtec and Prilosec.    Patient is a 78-year-old female who was last seen in the office on 5/1/2024 who was admitted on 5/9/2024 after presenting to the emergency room with increased wheezing and shortness of breath x 1 week.  Endorsing a harsh cough productive of cream-colored sputum for 3 weeks with sinus congestion and postnasal drainage.  Patient is endorsing better home environment impacts her breathing as she lives near [facility] with turkey and hogs.  Patient with known eosinophilic asthma and chronic bronchitis on Spiriva, Symbicort, ProAir and Singulair.  She reports multiple bouts of steroids over the last 6 months, at last appointment she was advised for biologic therapy and resuming her Prilosec and Zyrtec.  Patient was seen by her PCP on 5/6/2024 with a negative chest x-ray received a Kenalog injection and was prescribed prednisone.  In the emergency room she was hypoxemic at 88% on room air requiring 4 L of oxygen to maintain her SpO2 above 90%.  Chest x-ray was again negative for acute process.  Influenza and COVID were negative.  proBNP was 827.  She was given 2 MetaNebs and IV Solu-Medrol in the emergency room.  Patient was found to have hypoxemia requiring oxygen.  Hospital course was complicated by an ileus on 5/11/2024 requiring NG tube placement which was subsequently removed on 5/12/2024.  She had an apnea link done on 5/12/2024 noting that she has nocturnal hypoxemia with the need for oxygen at home.  Patient's diet was advanced and she was stabilized for discharge home on 5/13/2024.  Antibiotics at discharge included cefdinir for 3 days through 5/16/2024 and doxycycline through

## 2024-05-24 ENCOUNTER — TELEPHONE (OUTPATIENT)
Dept: SURGERY | Age: 79
End: 2024-05-24

## 2024-05-24 NOTE — TELEPHONE ENCOUNTER
Patient does not want to proceed with colonoscopy at this time. Patient would like to feel better before having procedure. Patient will call the office back once she is feeling better.

## 2024-06-05 ENCOUNTER — OFFICE VISIT (OUTPATIENT)
Dept: PULMONOLOGY | Age: 79
End: 2024-06-05
Payer: MEDICARE

## 2024-06-05 VITALS
TEMPERATURE: 97.2 F | OXYGEN SATURATION: 95 % | WEIGHT: 226 LBS | HEIGHT: 62 IN | BODY MASS INDEX: 41.59 KG/M2 | HEART RATE: 67 BPM | DIASTOLIC BLOOD PRESSURE: 60 MMHG | SYSTOLIC BLOOD PRESSURE: 118 MMHG | RESPIRATION RATE: 14 BRPM

## 2024-06-05 DIAGNOSIS — G47.33 OSA (OBSTRUCTIVE SLEEP APNEA): Primary | ICD-10-CM

## 2024-06-05 DIAGNOSIS — J45.50 ASTHMA, SEVERE PERSISTENT, POORLY-CONTROLLED, UNCOMPLICATED: ICD-10-CM

## 2024-06-05 DIAGNOSIS — K21.9 GASTROESOPHAGEAL REFLUX DISEASE, UNSPECIFIED WHETHER ESOPHAGITIS PRESENT: ICD-10-CM

## 2024-06-05 DIAGNOSIS — E66.01 CLASS 3 SEVERE OBESITY DUE TO EXCESS CALORIES WITH SERIOUS COMORBIDITY AND BODY MASS INDEX (BMI) OF 40.0 TO 44.9 IN ADULT (HCC): ICD-10-CM

## 2024-06-05 DIAGNOSIS — R09.82 POST-NASAL DRIP: ICD-10-CM

## 2024-06-05 DIAGNOSIS — J82.83 EOSINOPHILIC ASTHMA: ICD-10-CM

## 2024-06-05 PROCEDURE — G8427 DOCREV CUR MEDS BY ELIG CLIN: HCPCS | Performed by: NURSE PRACTITIONER

## 2024-06-05 PROCEDURE — 99214 OFFICE O/P EST MOD 30 MIN: CPT | Performed by: NURSE PRACTITIONER

## 2024-06-05 PROCEDURE — G8417 CALC BMI ABV UP PARAM F/U: HCPCS | Performed by: NURSE PRACTITIONER

## 2024-06-05 PROCEDURE — 1090F PRES/ABSN URINE INCON ASSESS: CPT | Performed by: NURSE PRACTITIONER

## 2024-06-05 PROCEDURE — 1123F ACP DISCUSS/DSCN MKR DOCD: CPT | Performed by: NURSE PRACTITIONER

## 2024-06-05 PROCEDURE — G8400 PT W/DXA NO RESULTS DOC: HCPCS | Performed by: NURSE PRACTITIONER

## 2024-06-05 PROCEDURE — 99213 OFFICE O/P EST LOW 20 MIN: CPT | Performed by: NURSE PRACTITIONER

## 2024-06-05 PROCEDURE — 1111F DSCHRG MED/CURRENT MED MERGE: CPT | Performed by: NURSE PRACTITIONER

## 2024-06-05 PROCEDURE — 1036F TOBACCO NON-USER: CPT | Performed by: NURSE PRACTITIONER

## 2024-06-05 ASSESSMENT — SLEEP AND FATIGUE QUESTIONNAIRES
HOW LIKELY ARE YOU TO NOD OFF OR FALL ASLEEP WHILE SITTING AND TALKING TO SOMEONE: WOULD NEVER DOZE
HOW LIKELY ARE YOU TO NOD OFF OR FALL ASLEEP WHEN YOU ARE A PASSENGER IN A CAR FOR AN HOUR WITHOUT A BREAK: WOULD NEVER DOZE
HOW LIKELY ARE YOU TO NOD OFF OR FALL ASLEEP IN A CAR, WHILE STOPPED FOR A FEW MINUTES IN TRAFFIC: WOULD NEVER DOZE
ESS TOTAL SCORE: 5
HOW LIKELY ARE YOU TO NOD OFF OR FALL ASLEEP WHILE SITTING QUIETLY AFTER LUNCH WITHOUT ALCOHOL: WOULD NEVER DOZE
HOW LIKELY ARE YOU TO NOD OFF OR FALL ASLEEP WHILE SITTING INACTIVE IN A PUBLIC PLACE: WOULD NEVER DOZE
HOW LIKELY ARE YOU TO NOD OFF OR FALL ASLEEP WHILE LYING DOWN TO REST IN THE AFTERNOON WHEN CIRCUMSTANCES PERMIT: MODERATE CHANCE OF DOZING
HOW LIKELY ARE YOU TO NOD OFF OR FALL ASLEEP WHILE WATCHING TV: MODERATE CHANCE OF DOZING
HOW LIKELY ARE YOU TO NOD OFF OR FALL ASLEEP WHILE SITTING AND READING: SLIGHT CHANCE OF DOZING

## 2024-06-05 ASSESSMENT — ENCOUNTER SYMPTOMS
GASTROINTESTINAL NEGATIVE: 1
ALLERGIC/IMMUNOLOGIC NEGATIVE: 1
EYES NEGATIVE: 1

## 2024-06-08 PROBLEM — E86.0 DEHYDRATION: Status: RESOLVED | Noted: 2024-05-09 | Resolved: 2024-06-08

## 2024-07-01 NOTE — PROGRESS NOTES
Patient was advised that if insurance does not cover split-night sleep study that she may have to return to the sleep lab for a second night titration after her diagnostic evaluation.  Due to her having nocturnal oximetry requirements, she is unable to do a home sleep study.  In lab sleep study scheduled in August 2024.  Educated and clarified the medication use. Refills sent to Rx if requested.   Recommend influenza, pneumococcal and COVID vaccines per CDC guidelines.  Discussed strategies to protect against Covid 19.   Maintain an active lifestyle.  Patient's questions were answered to her satisfaction.  Supplemental oxygen continues at 2 L nocturnally for apnea link in May 2024  Pulmonary function tests were reviewed per physician  Chest x-ray was reviewed   CT scan of the chest was reviewed-  Polysomnogram with CPAP/BiPAP titration if needed.  Brochure provided on sleep apnea.  Weight loss was recommended and discussed.  Recommended following good sleep hygiene instructions.  Sleep hygieneinstructions were given to the patient.  We'll see the patient back after the sleep study.  We'll see the patient back in 4 months with a compliance report          Electronically signed by JANNETH Krishna CNP on 7/10/2024 at 12:21 PM    Note completed with voice recognition software.  Typographical errors, occasional wrong word or sound alike substitutions may have occurred due to the inherent limitations of voice recognition software.  Read the chart carefully and recognize using context where substitutions may have occurred.  If any questions please do not hesitate to contact me for clarification

## 2024-07-10 ENCOUNTER — OFFICE VISIT (OUTPATIENT)
Dept: PULMONOLOGY | Age: 79
End: 2024-07-10
Payer: MEDICARE

## 2024-07-10 VITALS
RESPIRATION RATE: 12 BRPM | OXYGEN SATURATION: 95 % | SYSTOLIC BLOOD PRESSURE: 132 MMHG | TEMPERATURE: 97.1 F | HEART RATE: 63 BPM | BODY MASS INDEX: 41.77 KG/M2 | HEIGHT: 62 IN | DIASTOLIC BLOOD PRESSURE: 70 MMHG | WEIGHT: 227 LBS

## 2024-07-10 DIAGNOSIS — E66.01 CLASS 3 SEVERE OBESITY DUE TO EXCESS CALORIES WITH SERIOUS COMORBIDITY AND BODY MASS INDEX (BMI) OF 40.0 TO 44.9 IN ADULT (HCC): Primary | ICD-10-CM

## 2024-07-10 DIAGNOSIS — G47.33 OSA (OBSTRUCTIVE SLEEP APNEA): ICD-10-CM

## 2024-07-10 DIAGNOSIS — R09.82 POST-NASAL DRIP: ICD-10-CM

## 2024-07-10 DIAGNOSIS — J82.83 EOSINOPHILIC ASTHMA: ICD-10-CM

## 2024-07-10 DIAGNOSIS — K21.9 GASTROESOPHAGEAL REFLUX DISEASE, UNSPECIFIED WHETHER ESOPHAGITIS PRESENT: ICD-10-CM

## 2024-07-10 PROCEDURE — G8427 DOCREV CUR MEDS BY ELIG CLIN: HCPCS | Performed by: NURSE PRACTITIONER

## 2024-07-10 PROCEDURE — 1090F PRES/ABSN URINE INCON ASSESS: CPT | Performed by: NURSE PRACTITIONER

## 2024-07-10 PROCEDURE — 1123F ACP DISCUSS/DSCN MKR DOCD: CPT | Performed by: NURSE PRACTITIONER

## 2024-07-10 PROCEDURE — 1036F TOBACCO NON-USER: CPT | Performed by: NURSE PRACTITIONER

## 2024-07-10 PROCEDURE — 99214 OFFICE O/P EST MOD 30 MIN: CPT | Performed by: NURSE PRACTITIONER

## 2024-07-10 PROCEDURE — G8417 CALC BMI ABV UP PARAM F/U: HCPCS | Performed by: NURSE PRACTITIONER

## 2024-07-10 PROCEDURE — 99213 OFFICE O/P EST LOW 20 MIN: CPT | Performed by: NURSE PRACTITIONER

## 2024-07-10 PROCEDURE — G8400 PT W/DXA NO RESULTS DOC: HCPCS | Performed by: NURSE PRACTITIONER

## 2024-07-23 ENCOUNTER — TELEPHONE (OUTPATIENT)
Dept: SLEEP CENTER | Age: 79
End: 2024-07-23

## 2024-08-14 RX ORDER — AMIODARONE HYDROCHLORIDE 200 MG/1
200 TABLET ORAL 2 TIMES DAILY
Qty: 180 TABLET | Refills: 3 | Status: SHIPPED | OUTPATIENT
Start: 2024-08-14

## 2024-08-20 ENCOUNTER — HOSPITAL ENCOUNTER (OUTPATIENT)
Age: 79
Discharge: HOME | End: 2024-08-20
Payer: MEDICARE

## 2024-08-20 DIAGNOSIS — R94.31 ABNORMAL ELECTROCARDIOGRAM (ECG) (EKG): ICD-10-CM

## 2024-08-20 DIAGNOSIS — I48.91 ATRIAL FIBRILLATION, UNSPECIFIED TYPE (HCC): ICD-10-CM

## 2024-08-20 LAB
ANION GAP SERPL CALCULATED.3IONS-SCNC: 12 MMOL/L (ref 9–17)
BUN SERPL-MCNC: 14 MG/DL (ref 8–23)
BUN/CREAT SERPL: 12 (ref 9–20)
CALCIUM SERPL-MCNC: 8.6 MG/DL (ref 8.6–10.4)
CHLORIDE SERPL-SCNC: 106 MMOL/L (ref 98–107)
CO2 SERPL-SCNC: 28 MMOL/L (ref 20–31)
CREAT SERPL-MCNC: 1.2 MG/DL (ref 0.5–0.9)
GFR, ESTIMATED: 46 ML/MIN/1.73M2
GLUCOSE SERPL-MCNC: 81 MG/DL (ref 70–99)
POTASSIUM SERPL-SCNC: 3.7 MMOL/L (ref 3.7–5.3)
SODIUM SERPL-SCNC: 146 MMOL/L (ref 135–144)

## 2024-08-20 PROCEDURE — 36415 COLL VENOUS BLD VENIPUNCTURE: CPT

## 2024-08-20 PROCEDURE — 80048 BASIC METABOLIC PNL TOTAL CA: CPT

## 2024-09-12 RX ORDER — FUROSEMIDE 40 MG
40 TABLET ORAL DAILY
Qty: 90 TABLET | Refills: 3 | Status: SHIPPED | OUTPATIENT
Start: 2024-09-12

## 2024-09-23 DIAGNOSIS — J45.50 ASTHMA, SEVERE PERSISTENT, POORLY-CONTROLLED, UNCOMPLICATED: Primary | ICD-10-CM

## 2024-09-23 RX ORDER — IPRATROPIUM BROMIDE AND ALBUTEROL SULFATE 2.5; .5 MG/3ML; MG/3ML
3 SOLUTION RESPIRATORY (INHALATION)
Qty: 360 ML | Refills: 1 | Status: SHIPPED | OUTPATIENT
Start: 2024-09-23

## 2024-10-02 ENCOUNTER — TELEPHONE (OUTPATIENT)
Dept: SURGERY | Age: 79
End: 2024-10-02

## 2024-10-02 ENCOUNTER — OFFICE VISIT (OUTPATIENT)
Dept: SURGERY | Age: 79
End: 2024-10-02
Payer: MEDICARE

## 2024-10-02 VITALS
WEIGHT: 222 LBS | SYSTOLIC BLOOD PRESSURE: 118 MMHG | HEART RATE: 66 BPM | HEIGHT: 62 IN | TEMPERATURE: 96.8 F | DIASTOLIC BLOOD PRESSURE: 60 MMHG | BODY MASS INDEX: 40.85 KG/M2 | RESPIRATION RATE: 18 BRPM

## 2024-10-02 DIAGNOSIS — Z85.038 HISTORY OF COLON CANCER: Primary | ICD-10-CM

## 2024-10-02 PROCEDURE — 1123F ACP DISCUSS/DSCN MKR DOCD: CPT | Performed by: PHYSICIAN ASSISTANT

## 2024-10-02 PROCEDURE — 99213 OFFICE O/P EST LOW 20 MIN: CPT | Performed by: PHYSICIAN ASSISTANT

## 2024-10-02 RX ORDER — FERROUS SULFATE 325(65) MG
325 TABLET ORAL
COMMUNITY

## 2024-10-02 NOTE — TELEPHONE ENCOUNTER
[facility]         Patient:Marah Loyd           :1945           Surgical/Procedure Planned: Colonoscopy     Date & Location: 24 @ [facility]       Outpatient   Planned Length of OR: 30 minutes    Sedation: intravenous sedation        Estimated Cardiac Risk for Non-Cardiac Surgery/Procedure     Low______ Moderate__X____ High______    Medication Instructions - Clarification needed by this date: 24    ASA 81 mg/325 mg Hold _7__ Days  Eliquis   Hold _3__ Days    Resume medications:     Lovenox indicated: _____Yes ___X__NO    Provider:Dr. Delgado       Signature of Provider Giving Orders for Medication holds:    ___Fidencio Jean Baptiste DO __________________________________________

## 2024-10-02 NOTE — PROGRESS NOTES
Subjective   Marah Loyd is a 78 y.o. female who presents today for office visit      Reason for evaluation: Hx of Colon Cancer- patient reports seeing Dr. Delgado in the past for several Colonoscopies     Recent hospitalization for ileus vs SBO in may 2024    Anemia - is currently following with Dr. Luciano with Heme/onc evaluating iron deficiency anemia    Pt states that she has not been having any sx, no blood or black in stools, no diarrhea or constipation, no f/c/n/v, is eating well, no concerns at this time. Is having some darker stools since she started taking iron. Denies heart burn or upper GI sx,        Abd pain: no  Anemia: yes, Takes iron supplement- hgb on 10/2 is 11.5  Bloating:no  Diarrhea: no  Constipation: no  Melena: no- stools look dark due to iron supplement   Hematochezia:no  Rectal Bleeding:no  Rectal/Anal Pain:no  Pruritus: no  Family history colon Cancer: no  Previous colon cancer: yes, 2008    HX r hemicolectomy 4/2008  Previous Colon Polyp: yes, 2017  Change in bowels: no  Decrease caliber of stool: no  Change in color of stool: no    Previous work up date: February72022 with Dr. Woods @ [facility] - extensive diverticulosis     Past Medical History:   Diagnosis Date    Abdominal pain, RLQ 11/08/2021    Acute exacerbation of chronic obstructive pulmonary disease (COPD) (HCC) 06/07/2022    Acute respiratory failure with hypoxia 06/07/2022    Atrial fibrillation (HCC)     Diverticulitis     History of iron deficiency anemia     Hyperlipidemia     Kidney stone     MI (myocardial infarction) (HCC)     Pericardial effusion 11/08/2021    Pneumonia of both lungs due to infectious organism 06/07/2022    Prolonged Q-T interval on ECG 05/01/2019    Formatting of this note might be different from the original. on [place] report    Small bowel obstruction (HCC)     Stress disorder, acute     Stress-induced cardiomyopathy     Thyroid goiter     Vitamin B12 deficiency     Vitamin D deficiency        Past

## 2024-10-30 RX ORDER — APIXABAN 5 MG/1
5 TABLET, FILM COATED ORAL 2 TIMES DAILY
Qty: 180 TABLET | Refills: 3 | Status: SHIPPED | OUTPATIENT
Start: 2024-10-30

## 2024-11-04 DIAGNOSIS — J45.50 ASTHMA, SEVERE PERSISTENT, POORLY-CONTROLLED, UNCOMPLICATED: ICD-10-CM

## 2024-11-04 RX ORDER — IPRATROPIUM BROMIDE AND ALBUTEROL SULFATE 2.5; .5 MG/3ML; MG/3ML
SOLUTION RESPIRATORY (INHALATION)
Qty: 720 ML | Refills: 8 | Status: SHIPPED | OUTPATIENT
Start: 2024-11-04

## 2024-11-05 NOTE — H&P
masses,no carotid bruits  Lungs:  CTA, symmetrical  Chest:  RRR, no murmurs  Abd:  Soft, NT, ND, no HSM, no hernias, no bruits  Ext:  No edema, no cyanosis  Psych: reveals appropriate mood, memory and judgment,  Neuro:  Reveals no gross motor or sensory deficits,   Msk:  5/5 strength all 4 extremities, no joint tenderness         Assessment:     78 y.o.female  Last Colonoscopy February72022 with Dr. Woods @ [facility] - extensive diverticulosis    No family history of colon cancer   Yes history of polyps had cecal cancer 2008 s/p right hemicolectomy  Currently undergoing evaluation for iron deficiency anemia     Plan: Colonoscopy      Patient was seen and evaluated by myself today, note was reviewed in detail and there were not significant changes to the patients health or status today.

## 2024-11-12 ENCOUNTER — ANESTHESIA EVENT (OUTPATIENT)
Dept: OPERATING ROOM | Age: 79
End: 2024-11-12
Payer: MEDICARE

## 2024-11-12 ENCOUNTER — OFFICE VISIT (OUTPATIENT)
Dept: CARDIOLOGY | Age: 79
End: 2024-11-12
Payer: MEDICARE

## 2024-11-12 ENCOUNTER — HOSPITAL ENCOUNTER (OUTPATIENT)
Age: 79
Setting detail: OUTPATIENT SURGERY
Discharge: HOME | End: 2024-11-12
Attending: SURGERY | Admitting: SURGERY
Payer: MEDICARE

## 2024-11-12 ENCOUNTER — ANESTHESIA (OUTPATIENT)
Dept: OPERATING ROOM | Age: 79
End: 2024-11-12
Payer: MEDICARE

## 2024-11-12 VITALS
RESPIRATION RATE: 16 BRPM | DIASTOLIC BLOOD PRESSURE: 69 MMHG | HEIGHT: 62 IN | OXYGEN SATURATION: 96 % | TEMPERATURE: 98.2 F | BODY MASS INDEX: 41.22 KG/M2 | HEART RATE: 59 BPM | BODY MASS INDEX: 41.22 KG/M2 | SYSTOLIC BLOOD PRESSURE: 115 MMHG | WEIGHT: 224 LBS | HEART RATE: 59 BPM | HEIGHT: 62 IN | WEIGHT: 224 LBS | SYSTOLIC BLOOD PRESSURE: 107 MMHG | DIASTOLIC BLOOD PRESSURE: 54 MMHG

## 2024-11-12 DIAGNOSIS — I48.0 PAROXYSMAL A-FIB (HCC): Primary | ICD-10-CM

## 2024-11-12 DIAGNOSIS — I51.81 STRESS-INDUCED CARDIOMYOPATHY: ICD-10-CM

## 2024-11-12 LAB — GLUCOSE BLD-MCNC: 88 MG/DL (ref 65–105)

## 2024-11-12 PROCEDURE — 7100000011 HC PHASE II RECOVERY - ADDTL 15 MIN: Performed by: SURGERY

## 2024-11-12 PROCEDURE — 3609027000 HC COLONOSCOPY: Performed by: SURGERY

## 2024-11-12 PROCEDURE — 45378 DIAGNOSTIC COLONOSCOPY: CPT | Performed by: SURGERY

## 2024-11-12 PROCEDURE — 2709999900 HC NON-CHARGEABLE SUPPLY: Performed by: SURGERY

## 2024-11-12 PROCEDURE — 3700000001 HC ADD 15 MINUTES (ANESTHESIA): Performed by: SURGERY

## 2024-11-12 PROCEDURE — 82947 ASSAY GLUCOSE BLOOD QUANT: CPT

## 2024-11-12 PROCEDURE — 6360000002 HC RX W HCPCS: Performed by: NURSE ANESTHETIST, CERTIFIED REGISTERED

## 2024-11-12 PROCEDURE — 3700000000 HC ANESTHESIA ATTENDED CARE: Performed by: SURGERY

## 2024-11-12 PROCEDURE — 7100000010 HC PHASE II RECOVERY - FIRST 15 MIN: Performed by: SURGERY

## 2024-11-12 PROCEDURE — 99213 OFFICE O/P EST LOW 20 MIN: CPT | Performed by: INTERNAL MEDICINE

## 2024-11-12 PROCEDURE — 93005 ELECTROCARDIOGRAM TRACING: CPT | Performed by: INTERNAL MEDICINE

## 2024-11-12 RX ORDER — SODIUM CHLORIDE 0.9 % (FLUSH) 0.9 %
5-40 SYRINGE (ML) INJECTION 2 TIMES DAILY
Status: CANCELLED | OUTPATIENT
Start: 2024-11-12

## 2024-11-12 RX ORDER — PROPOFOL 10 MG/ML
INJECTION, EMULSION INTRAVENOUS
Status: DISCONTINUED | OUTPATIENT
Start: 2024-11-12 | End: 2024-11-12 | Stop reason: SDUPTHER

## 2024-11-12 RX ORDER — SODIUM CHLORIDE, SODIUM LACTATE, POTASSIUM CHLORIDE, CALCIUM CHLORIDE 600; 310; 30; 20 MG/100ML; MG/100ML; MG/100ML; MG/100ML
INJECTION, SOLUTION INTRAVENOUS CONTINUOUS
Status: DISCONTINUED | OUTPATIENT
Start: 2024-11-12 | End: 2024-11-12

## 2024-11-12 RX ADMIN — PROPOFOL 100 MCG/KG/MIN: 10 INJECTION, EMULSION INTRAVENOUS at 10:22

## 2024-11-12 RX ADMIN — PROPOFOL 100 MG: 10 INJECTION, EMULSION INTRAVENOUS at 10:20

## 2024-11-12 ASSESSMENT — PAIN SCALES - GENERAL
PAINLEVEL_OUTOF10: 0

## 2024-11-12 NOTE — ANESTHESIA POSTPROCEDURE EVALUATION
Department of Anesthesiology  Postprocedure Note    Patient: Marah Loyd  MRN: [id number]  YOB: 1945  Date of evaluation: 11/12/2024    Procedure Summary       Date: 11/12/24 Room / Location: [facility]    Anesthesia Start: 1017 Anesthesia Stop: 1041    Procedure: Colonoscopy Diagnosis:       Iron deficiency anemia, unspecified iron deficiency anemia type      History of colon cancer      (Iron deficiency anemia, unspecified iron deficiency anemia type [D50.9])      (History of colon cancer [Z85.038])    Surgeons: Mesfin Delgado MD Responsible Provider: Thanh Hernandez APRN - CRNA    Anesthesia Type: General, TIVA ASA Status: 3            Anesthesia Type: General, TIVA    Marija Phase I: Marija Score: 10    Marija Phase II: Marija Score: 9    Anesthesia Post Evaluation    Patient location during evaluation: bedside  Level of consciousness: sleepy but conscious  Airway patency: patent  Nausea & Vomiting: no nausea and no vomiting  Cardiovascular status: hemodynamically stable  Respiratory status: spontaneous ventilation  Hydration status: stable  Pain management: satisfactory to patient    No notable events documented.

## 2024-11-12 NOTE — PROGRESS NOTES
Cardiology Consultation  [facility]      11/12/24      CC: Atrial fibrillation     HPI:  Marah Loyd  is here for follow up.   She had colonoscopy today   She is doing quite well from cardiac standpoint  She denies any chest pain or discomfort.  No significant dyspnea on exertion.  Denies any orthopnea or PND.  No palpitations dizziness or syncope  She is fairly asymptomatic from cardiac standpoint      Past Medical History:   Diagnosis Date    Abdominal pain, RLQ 11/08/2021    Acute exacerbation of chronic obstructive pulmonary disease (COPD) (HCC) 06/07/2022    Acute respiratory failure with hypoxia 06/07/2022    Atrial fibrillation (HCC)     Diverticulitis     History of iron deficiency anemia     Hyperlipidemia     Kidney stone     MI (myocardial infarction) ([facility])     Pericardial effusion 11/08/2021    Pneumonia of both lungs due to infectious organism 06/07/2022    Prolonged Q-T interval on ECG 05/01/2019    Formatting of this note might be different from the original. on [place] report    Small bowel obstruction (HCC)     Stress disorder, acute     Stress-induced cardiomyopathy     Thyroid goiter     Vitamin B12 deficiency     Vitamin D deficiency        Past Surgical History:   Procedure Laterality Date    CARDIAC CATHETERIZATION      COLON SURGERY  04/2008    COLECTOMY    COLONOSCOPY      COLONOSCOPY  03/13/2017    POLYP,DIVERTICULOSIS    HEMORRHOID SURGERY      BANDING    HERNIA REPAIR  2004    VENTRAL HERNIA    HYSTERECTOMY (CERVIX STATUS UNKNOWN)      JOINT REPLACEMENT      BILATERAL KNEE REPLACEMENT    LAPAROSCOPY N/A 07/11/2019    EXPLORATORY LAPAROSCOPY CONVERTED TO OPEN LYSIS OF ADHESIONS PARTIAL COLECTOMY performed by Mesfin Delgado MD at [facility] OR    THYROIDECTOMY, PARTIAL      goiter removed     UPPER GASTROINTESTINAL ENDOSCOPY         Family History   Problem Relation Age of Onset    Cancer Mother     High Blood Pressure Mother     Cancer Father     Heart Attack Father     No

## 2024-11-12 NOTE — ANESTHESIA PRE PROCEDURE
3.7 08/20/2024 10:48 AM     08/20/2024 10:48 AM    CO2 28 08/20/2024 10:48 AM    BUN 14 08/20/2024 10:48 AM    CREATININE 1.2 08/20/2024 10:48 AM    GFRAA 55 06/10/2022 05:13 AM    LABGLOM 46 08/20/2024 10:48 AM    LABGLOM 58 09/17/2023 04:54 AM    GLUCOSE 81 08/20/2024 10:48 AM    CALCIUM 8.6 08/20/2024 10:48 AM    BILITOT 0.3 05/12/2024 05:25 AM    ALKPHOS 64 05/12/2024 05:25 AM    AST 26 05/12/2024 05:25 AM    ALT 27 05/12/2024 05:25 AM       POC Tests: No results for input(s): \"POCGLU\", \"POCNA\", \"POCK\", \"POCCL\", \"POCBUN\", \"POCHEMO\", \"POCHCT\" in the last 72 hours.    Coags:   Lab Results   Component Value Date/Time    PROTIME 16.5 05/08/2024 12:46 PM    INR 1.4 05/08/2024 12:46 PM    APTT 31.7 05/08/2024 12:46 PM       HCG (If Applicable): No results found for: \"PREGTESTUR\", \"PREGSERUM\", \"HCG\", \"HCGQUANT\"     ABGs: No results found for: \"PHART\", \"PO2ART\", \"YAA8UMR\", \"VSX2GKY\", \"BEART\", \"Z1YCVRKX\"     Type & Screen (If Applicable):  No results found for: \"ABORH\", \"LABANTI\"    Drug/Infectious Status (If Applicable):  No results found for: \"HIV\", \"HEPCAB\"    COVID-19 Screening (If Applicable):   Lab Results   Component Value Date/Time    COVID19 Not Detected 05/08/2024 12:55 PM           Anesthesia Evaluation  Patient summary reviewed  Airway: Mallampati: III  TM distance: >3 FB     Mouth opening: > = 3 FB   Dental:          Pulmonary:   (+) pneumonia:  COPD:          asthma:                            Cardiovascular:    (+) past MI:, CHF:      ECG reviewed      Echocardiogram reviewed                  Neuro/Psych:   (+) neuromuscular disease:, psychiatric history:            GI/Hepatic/Renal:   (+) hiatal hernia, GERD:          Endo/Other:    (+) DiabetesType II DM.                 Abdominal:             Vascular:          Other Findings:         Anesthesia Plan      general and TIVA     ASA 3       Induction: intravenous.      Anesthetic plan and risks discussed with patient.      Plan discussed with

## 2024-11-12 NOTE — OP NOTE
COLONOSCOPY PROCEDURE NOTE:      Pre op diagnosis:       78 y.o.female  Last Colonoscopy February72022 with Dr. Woods @ [facility] - extensive diverticulosis    No family history of colon cancer   Yes history of polyps had cecal cancer 2008 s/p right hemicolectomy  Currently undergoing evaluation for iron deficiency anemia          Operative Procedure:    1.  Colonoscopy     Surgeon:    Dr. Mesfin Delgado     Anesthesia:    IV sedation per CRNA    EBL:  minimal    Procedure:    Patient was taken to the endoscopy suite and placed in a left lateral decubitus position.  They were given IV sedation as mentioned above.  A digital rectal exam was performed.  There were no masses and anal tone was normal.  The colonoscope was inserted into the rectum and carefully manipulated up through the sigmoid colon, transverse colon, right colon and into the cecum.  The cecum was identified by the ileal cecal valve and transabdominal illumination.  Then the scope was slowly withdrawn.  The scope was retroflexed in the rectum and the dentate line was examined.  The scope was removed.  The patient tolerated the procedure well.  The following findings were noted.        Final Diagnosis:    Severe diverticulosis with some inflammation and small blood clot in a diverticulum at 50 cm  Anastomosis , ileal colic looked normal at about 100cm    Plan:    Repeat CS in 5 years due to hx of colon cancer, but look at risks / rewards at age 84.

## 2024-11-12 NOTE — DISCHARGE INSTRUCTIONS
Repeat Colonoscopy in 5 years due to history of colon cancer, but look at risks / rewards at age 84.    DISCHARGE INSTRUCTIONS FOLLOWING COLONOSCOPY    Activity  You have received sedation.  Your judgement and coordination may be impaired.  Do not drive or operate any machinery today.  Do not make personal, medical, legal or business decisions today.  Do not consume alcohol, tranquilizers or sleeping medications today unless otherwise instructed by your physician.  Rest today.  You may resume normal activity tomorrow.    Because air is put into your colon during this procedure, it is normal to pass large amounts of air from your rectum.  Feelings of bloating, gas or cramping may persist for 24 hours.  You may not have a bowel movement for 1-3 days after this procedure.    Diet  Begin with sips of clear liquids and if no nausea, you may progress to your normal diet.    Medications  You may resume your usual medications, unless otherwise instructed.    Follow-Up  As instructed.    CALL YOUR PHYSICIAN if you experience any of the following:  Bleeding from your rectum (a teaspoonful or more)      - If you had a biopsy taken today, a small amount of bleeding may occur.  Severe abdominal pain/cramping and/or distention that is not relieved by passing gas.  Persistent nausea or vomiting.  Signs of infection including fever, chills, redness or swelling @ the IV site.      If you have questions or problems call [phone] ([facility]) or after business hours call [phone] ([facility])

## 2024-11-22 ENCOUNTER — TELEPHONE (OUTPATIENT)
Dept: SURGERY | Age: 79
End: 2024-11-22

## 2024-11-22 NOTE — TELEPHONE ENCOUNTER
Letter created and mailed to patient with results from recent CS at [facility] with Dr. Delgado on 11/12/2024. Updated history, health maintenance, and recall. Forwarded results to PCP.

## 2024-12-03 ENCOUNTER — OFFICE VISIT (OUTPATIENT)
Dept: PULMONOLOGY | Age: 79
End: 2024-12-03
Payer: MEDICARE

## 2024-12-03 VITALS
HEART RATE: 67 BPM | BODY MASS INDEX: 42.65 KG/M2 | TEMPERATURE: 97.1 F | HEIGHT: 62 IN | SYSTOLIC BLOOD PRESSURE: 138 MMHG | WEIGHT: 231.8 LBS | DIASTOLIC BLOOD PRESSURE: 60 MMHG | OXYGEN SATURATION: 94 % | RESPIRATION RATE: 22 BRPM

## 2024-12-03 DIAGNOSIS — J45.50 SEVERE PERSISTENT STEROID-DEPENDENT ASTHMA WITHOUT COMPLICATION: Primary | ICD-10-CM

## 2024-12-03 DIAGNOSIS — J96.11 CHRONIC RESPIRATORY FAILURE WITH HYPOXIA: ICD-10-CM

## 2024-12-03 DIAGNOSIS — G47.33 OSA (OBSTRUCTIVE SLEEP APNEA): ICD-10-CM

## 2024-12-03 PROCEDURE — 1123F ACP DISCUSS/DSCN MKR DOCD: CPT | Performed by: INTERNAL MEDICINE

## 2024-12-03 PROCEDURE — G8427 DOCREV CUR MEDS BY ELIG CLIN: HCPCS | Performed by: INTERNAL MEDICINE

## 2024-12-03 PROCEDURE — 99214 OFFICE O/P EST MOD 30 MIN: CPT | Performed by: INTERNAL MEDICINE

## 2024-12-03 PROCEDURE — 1090F PRES/ABSN URINE INCON ASSESS: CPT | Performed by: INTERNAL MEDICINE

## 2024-12-03 PROCEDURE — 99213 OFFICE O/P EST LOW 20 MIN: CPT | Performed by: INTERNAL MEDICINE

## 2024-12-03 PROCEDURE — G8484 FLU IMMUNIZE NO ADMIN: HCPCS | Performed by: INTERNAL MEDICINE

## 2024-12-03 PROCEDURE — G8400 PT W/DXA NO RESULTS DOC: HCPCS | Performed by: INTERNAL MEDICINE

## 2024-12-03 PROCEDURE — G8417 CALC BMI ABV UP PARAM F/U: HCPCS | Performed by: INTERNAL MEDICINE

## 2024-12-03 PROCEDURE — 1159F MED LIST DOCD IN RCRD: CPT | Performed by: INTERNAL MEDICINE

## 2024-12-03 PROCEDURE — 1036F TOBACCO NON-USER: CPT | Performed by: INTERNAL MEDICINE

## 2024-12-03 NOTE — PROGRESS NOTES
Immunization records reviewed.  Recommended influenza vaccination in the fall annually.  Recommendations were given regarding pneumococcal, RSV and COVID-19 vaccination.    All the questions that the patient had were answered to her satisfaction.  Return in about 4 months (around 4/3/2025).  Thank you for having us involved in the care of your patient.  Please call us if you have any questions or concerns.    Rinku Restrepo MD  Pulmonary and Critical Care Medicine         The patient (or guardian, if applicable) and other individuals in attendance with the patient were advised that Artificial Intelligence will be utilized during this visit to record, process the conversation to generate a clinical note and to support improvement of the AI technology. The patient (or guardian, if applicable) and other individuals in attendance at the appointment consented to the use of AI, including the recording.      An electronic signature was used to authenticate this note.    While intent was to generate a document that actually reflects the content of the visit, the document can still have some errors including those of syntax and sound-a-like substitutions which may escape proof reading.  In such instances, actual meaning can be extrapolated by contextual diversion.

## 2024-12-04 PROBLEM — J44.1 ASTHMA EXACERBATION WITH COPD (CHRONIC OBSTRUCTIVE PULMONARY DISEASE) (HCC): Status: RESOLVED | Noted: 2023-09-16 | Resolved: 2024-12-04

## 2024-12-04 PROBLEM — J40 BRONCHITIS: Status: RESOLVED | Noted: 2023-09-17 | Resolved: 2024-12-04

## 2024-12-04 PROBLEM — J45.909 STEROID-DEPENDENT ASTHMA: Status: ACTIVE | Noted: 2024-12-04

## 2024-12-04 PROBLEM — J44.1 COPD EXACERBATION (HCC): Status: RESOLVED | Noted: 2024-05-08 | Resolved: 2024-12-04

## 2024-12-04 PROBLEM — J96.11 CHRONIC RESPIRATORY FAILURE WITH HYPOXIA: Status: ACTIVE | Noted: 2024-12-04

## 2024-12-04 PROBLEM — J44.0 COPD (CHRONIC OBSTRUCTIVE PULMONARY DISEASE) WITH ACUTE BRONCHITIS (HCC): Status: RESOLVED | Noted: 2018-05-04 | Resolved: 2024-12-04

## 2024-12-04 PROBLEM — J45.41 ASTHMATIC BRONCHITIS, MODERATE PERSISTENT, WITH ACUTE EXACERBATION: Status: RESOLVED | Noted: 2018-03-28 | Resolved: 2024-12-04

## 2024-12-04 PROBLEM — R09.02 HYPOXIA: Status: RESOLVED | Noted: 2024-05-09 | Resolved: 2024-12-04

## 2024-12-04 PROBLEM — J20.9 COPD (CHRONIC OBSTRUCTIVE PULMONARY DISEASE) WITH ACUTE BRONCHITIS (HCC): Status: RESOLVED | Noted: 2018-05-04 | Resolved: 2024-12-04

## 2024-12-04 PROBLEM — J45.51 SEVERE PERSISTENT ASTHMA WITH ACUTE EXACERBATION: Status: RESOLVED | Noted: 2024-05-09 | Resolved: 2024-12-04

## 2024-12-12 ENCOUNTER — TELEPHONE (OUTPATIENT)
Dept: SLEEP CENTER | Age: 79
End: 2024-12-12

## 2024-12-12 NOTE — TELEPHONE ENCOUNTER
I called the patient and patient does not want to do the sleep study, does not feel she needs to do this.

## 2024-12-24 ENCOUNTER — HOSPITAL ENCOUNTER (INPATIENT)
Age: 79
LOS: 3 days | Discharge: HOME | DRG: 190 | End: 2024-12-27
Attending: EMERGENCY MEDICINE | Admitting: INTERNAL MEDICINE
Payer: MEDICARE

## 2024-12-24 ENCOUNTER — APPOINTMENT (OUTPATIENT)
Dept: GENERAL RADIOLOGY | Age: 79
DRG: 190 | End: 2024-12-24
Payer: MEDICARE

## 2024-12-24 ENCOUNTER — APPOINTMENT (OUTPATIENT)
Dept: CT IMAGING | Age: 79
DRG: 190 | End: 2024-12-24
Payer: MEDICARE

## 2024-12-24 DIAGNOSIS — J18.9 PNEUMONIA DUE TO INFECTIOUS ORGANISM, UNSPECIFIED LATERALITY, UNSPECIFIED PART OF LUNG: Primary | ICD-10-CM

## 2024-12-24 DIAGNOSIS — J44.1 COPD EXACERBATION (HCC): ICD-10-CM

## 2024-12-24 LAB
ALBUMIN SERPL-MCNC: 4.1 G/DL (ref 3.5–5.2)
ALBUMIN/GLOB SERPL: 1.5 {RATIO} (ref 1–2.5)
ALP SERPL-CCNC: 97 U/L (ref 35–104)
ALT SERPL-CCNC: 15 U/L (ref 5–33)
ANION GAP SERPL CALCULATED.3IONS-SCNC: 12 MMOL/L (ref 9–17)
AST SERPL-CCNC: 21 U/L
BASOPHILS # BLD: 0.05 K/UL (ref 0–0.2)
BASOPHILS NFR BLD: 1 % (ref 0–2)
BILIRUB SERPL-MCNC: 0.4 MG/DL (ref 0.3–1.2)
BNP SERPL-MCNC: 423 PG/ML
BUN SERPL-MCNC: 21 MG/DL (ref 8–23)
BUN/CREAT SERPL: 16 (ref 9–20)
CALCIUM SERPL-MCNC: 8.5 MG/DL (ref 8.6–10.4)
CHLORIDE SERPL-SCNC: 100 MMOL/L (ref 98–107)
CO2 SERPL-SCNC: 32 MMOL/L (ref 20–31)
CREAT SERPL-MCNC: 1.3 MG/DL (ref 0.5–0.9)
EOSINOPHIL # BLD: 0.6 K/UL (ref 0–0.44)
EOSINOPHILS RELATIVE PERCENT: 7 % (ref 1–4)
ERYTHROCYTE [DISTWIDTH] IN BLOOD BY AUTOMATED COUNT: 15.6 % (ref 11.8–14.4)
FLUAV AG SPEC QL: NEGATIVE
FLUBV AG SPEC QL: NEGATIVE
GFR, ESTIMATED: 42 ML/MIN/1.73M2
GLUCOSE BLD-MCNC: 151 MG/DL (ref 65–105)
GLUCOSE SERPL-MCNC: 111 MG/DL (ref 70–99)
HCT VFR BLD AUTO: 43.6 % (ref 36.3–47.1)
HGB BLD-MCNC: 13.6 G/DL (ref 11.9–15.1)
IMM GRANULOCYTES # BLD AUTO: 0.03 K/UL (ref 0–0.3)
IMM GRANULOCYTES NFR BLD: 0 %
LACTATE BLDV-SCNC: 1.5 MMOL/L (ref 0.5–1.9)
LACTATE BLDV-SCNC: 1.7 MMOL/L (ref 0.5–1.9)
LYMPHOCYTES NFR BLD: 2.56 K/UL (ref 1.1–3.7)
LYMPHOCYTES RELATIVE PERCENT: 29 % (ref 24–43)
MCH RBC QN AUTO: 29.6 PG (ref 25.2–33.5)
MCHC RBC AUTO-ENTMCNC: 31.2 G/DL (ref 25.2–33.5)
MCV RBC AUTO: 94.8 FL (ref 82.6–102.9)
MONOCYTES NFR BLD: 0.56 K/UL (ref 0.1–1.2)
MONOCYTES NFR BLD: 6 % (ref 3–12)
NEUTROPHILS NFR BLD: 57 % (ref 36–65)
NEUTS SEG NFR BLD: 4.94 K/UL (ref 1.5–8.1)
NRBC BLD-RTO: 0 PER 100 WBC
PLATELET # BLD AUTO: 228 K/UL (ref 138–453)
PMV BLD AUTO: 9.7 FL (ref 8.1–13.5)
POTASSIUM SERPL-SCNC: 3.7 MMOL/L (ref 3.7–5.3)
PROT SERPL-MCNC: 6.9 G/DL (ref 6.4–8.3)
RBC # BLD AUTO: 4.6 M/UL (ref 3.95–5.11)
RBC # BLD: ABNORMAL 10*6/UL
SARS-COV-2 RDRP RESP QL NAA+PROBE: NOT DETECTED
SODIUM SERPL-SCNC: 144 MMOL/L (ref 135–144)
SPECIMEN DESCRIPTION: NORMAL
WBC OTHER # BLD: 8.7 K/UL (ref 3.5–11.3)

## 2024-12-24 PROCEDURE — 6370000000 HC RX 637 (ALT 250 FOR IP): Performed by: INTERNAL MEDICINE

## 2024-12-24 PROCEDURE — 36415 COLL VENOUS BLD VENIPUNCTURE: CPT

## 2024-12-24 PROCEDURE — 82947 ASSAY GLUCOSE BLOOD QUANT: CPT

## 2024-12-24 PROCEDURE — 96374 THER/PROPH/DIAG INJ IV PUSH: CPT

## 2024-12-24 PROCEDURE — 6360000002 HC RX W HCPCS: Performed by: INTERNAL MEDICINE

## 2024-12-24 PROCEDURE — 87635 SARS-COV-2 COVID-19 AMP PRB: CPT

## 2024-12-24 PROCEDURE — 71260 CT THORAX DX C+: CPT

## 2024-12-24 PROCEDURE — 85025 COMPLETE CBC W/AUTO DIFF WBC: CPT

## 2024-12-24 PROCEDURE — 6370000000 HC RX 637 (ALT 250 FOR IP): Performed by: EMERGENCY MEDICINE

## 2024-12-24 PROCEDURE — 6360000002 HC RX W HCPCS: Performed by: EMERGENCY MEDICINE

## 2024-12-24 PROCEDURE — 2060000000 HC ICU INTERMEDIATE R&B

## 2024-12-24 PROCEDURE — 83880 ASSAY OF NATRIURETIC PEPTIDE: CPT

## 2024-12-24 PROCEDURE — 93005 ELECTROCARDIOGRAM TRACING: CPT | Performed by: EMERGENCY MEDICINE

## 2024-12-24 PROCEDURE — 71045 X-RAY EXAM CHEST 1 VIEW: CPT

## 2024-12-24 PROCEDURE — 94760 N-INVAS EAR/PLS OXIMETRY 1: CPT

## 2024-12-24 PROCEDURE — 87804 INFLUENZA ASSAY W/OPTIC: CPT

## 2024-12-24 PROCEDURE — 80053 COMPREHEN METABOLIC PANEL: CPT

## 2024-12-24 PROCEDURE — 2700000000 HC OXYGEN THERAPY PER DAY

## 2024-12-24 PROCEDURE — 94640 AIRWAY INHALATION TREATMENT: CPT

## 2024-12-24 PROCEDURE — 2580000003 HC RX 258: Performed by: INTERNAL MEDICINE

## 2024-12-24 PROCEDURE — 2580000003 HC RX 258: Performed by: EMERGENCY MEDICINE

## 2024-12-24 PROCEDURE — 99222 1ST HOSP IP/OBS MODERATE 55: CPT | Performed by: INTERNAL MEDICINE

## 2024-12-24 PROCEDURE — 94669 MECHANICAL CHEST WALL OSCILL: CPT

## 2024-12-24 PROCEDURE — 2500000003 HC RX 250 WO HCPCS: Performed by: INTERNAL MEDICINE

## 2024-12-24 PROCEDURE — 87040 BLOOD CULTURE FOR BACTERIA: CPT

## 2024-12-24 PROCEDURE — 99285 EMERGENCY DEPT VISIT HI MDM: CPT

## 2024-12-24 PROCEDURE — 6360000004 HC RX CONTRAST MEDICATION: Performed by: EMERGENCY MEDICINE

## 2024-12-24 PROCEDURE — 83605 ASSAY OF LACTIC ACID: CPT

## 2024-12-24 RX ORDER — FERROUS SULFATE 325(65) MG
325 TABLET ORAL
Status: DISCONTINUED | OUTPATIENT
Start: 2024-12-25 | End: 2024-12-27 | Stop reason: HOSPADM

## 2024-12-24 RX ORDER — SODIUM CHLORIDE FOR INHALATION 0.9 %
3 VIAL, NEBULIZER (ML) INHALATION
Status: DISCONTINUED | OUTPATIENT
Start: 2024-12-24 | End: 2024-12-26

## 2024-12-24 RX ORDER — DEXTROSE MONOHYDRATE 100 MG/ML
INJECTION, SOLUTION INTRAVENOUS CONTINUOUS PRN
Status: DISCONTINUED | OUTPATIENT
Start: 2024-12-24 | End: 2024-12-27 | Stop reason: HOSPADM

## 2024-12-24 RX ORDER — ALBUTEROL SULFATE 0.83 MG/ML
2.5 SOLUTION RESPIRATORY (INHALATION)
Status: DISCONTINUED | OUTPATIENT
Start: 2024-12-24 | End: 2024-12-27 | Stop reason: HOSPADM

## 2024-12-24 RX ORDER — GLUCAGON 1 MG/ML
1 KIT INJECTION PRN
Status: DISCONTINUED | OUTPATIENT
Start: 2024-12-24 | End: 2024-12-27 | Stop reason: HOSPADM

## 2024-12-24 RX ORDER — IPRATROPIUM BROMIDE AND ALBUTEROL SULFATE 2.5; .5 MG/3ML; MG/3ML
1 SOLUTION RESPIRATORY (INHALATION)
Status: DISCONTINUED | OUTPATIENT
Start: 2024-12-24 | End: 2024-12-26

## 2024-12-24 RX ORDER — ASPIRIN 81 MG/1
81 TABLET ORAL DAILY
Status: DISCONTINUED | OUTPATIENT
Start: 2024-12-24 | End: 2024-12-27 | Stop reason: HOSPADM

## 2024-12-24 RX ORDER — POTASSIUM CHLORIDE 1500 MG/1
40 TABLET, EXTENDED RELEASE ORAL PRN
Status: DISCONTINUED | OUTPATIENT
Start: 2024-12-24 | End: 2024-12-27 | Stop reason: HOSPADM

## 2024-12-24 RX ORDER — POTASSIUM CHLORIDE 7.45 MG/ML
10 INJECTION INTRAVENOUS PRN
Status: DISCONTINUED | OUTPATIENT
Start: 2024-12-24 | End: 2024-12-27 | Stop reason: HOSPADM

## 2024-12-24 RX ORDER — POLYETHYLENE GLYCOL 3350 17 G/17G
17 POWDER, FOR SOLUTION ORAL DAILY PRN
Status: DISCONTINUED | OUTPATIENT
Start: 2024-12-24 | End: 2024-12-27 | Stop reason: HOSPADM

## 2024-12-24 RX ORDER — AMOXICILLIN 500 MG/1
CAPSULE ORAL
Status: ON HOLD | COMMUNITY
Start: 2024-10-14 | End: 2024-12-27 | Stop reason: HOSPADM

## 2024-12-24 RX ORDER — BUDESONIDE 0.5 MG/2ML
0.5 INHALANT ORAL
Status: DISCONTINUED | OUTPATIENT
Start: 2024-12-24 | End: 2024-12-27 | Stop reason: HOSPADM

## 2024-12-24 RX ORDER — FUROSEMIDE 40 MG/1
40 TABLET ORAL DAILY
Status: DISCONTINUED | OUTPATIENT
Start: 2024-12-25 | End: 2024-12-26

## 2024-12-24 RX ORDER — FUROSEMIDE 10 MG/ML
40 INJECTION INTRAMUSCULAR; INTRAVENOUS ONCE
Status: DISCONTINUED | OUTPATIENT
Start: 2024-12-24 | End: 2024-12-27 | Stop reason: HOSPADM

## 2024-12-24 RX ORDER — MAGNESIUM SULFATE IN WATER 40 MG/ML
2000 INJECTION, SOLUTION INTRAVENOUS PRN
Status: DISCONTINUED | OUTPATIENT
Start: 2024-12-24 | End: 2024-12-27 | Stop reason: HOSPADM

## 2024-12-24 RX ORDER — PANTOPRAZOLE SODIUM 40 MG/1
40 TABLET, DELAYED RELEASE ORAL
Status: DISCONTINUED | OUTPATIENT
Start: 2024-12-25 | End: 2024-12-27 | Stop reason: HOSPADM

## 2024-12-24 RX ORDER — ONDANSETRON 2 MG/ML
4 INJECTION INTRAMUSCULAR; INTRAVENOUS EVERY 6 HOURS PRN
Status: DISCONTINUED | OUTPATIENT
Start: 2024-12-24 | End: 2024-12-27 | Stop reason: HOSPADM

## 2024-12-24 RX ORDER — BUDESONIDE AND FORMOTEROL FUMARATE DIHYDRATE 160; 4.5 UG/1; UG/1
2 AEROSOL RESPIRATORY (INHALATION) 2 TIMES DAILY
Status: DISCONTINUED | OUTPATIENT
Start: 2024-12-24 | End: 2024-12-24 | Stop reason: SDUPTHER

## 2024-12-24 RX ORDER — LEVOTHYROXINE SODIUM 25 UG/1
50 TABLET ORAL DAILY
Status: DISCONTINUED | OUTPATIENT
Start: 2024-12-25 | End: 2024-12-27 | Stop reason: HOSPADM

## 2024-12-24 RX ORDER — FLUTICASONE PROPIONATE 50 MCG
2 SPRAY, SUSPENSION (ML) NASAL DAILY PRN
Status: DISCONTINUED | OUTPATIENT
Start: 2024-12-24 | End: 2024-12-27 | Stop reason: HOSPADM

## 2024-12-24 RX ORDER — MONTELUKAST SODIUM 10 MG/1
10 TABLET ORAL NIGHTLY
Status: DISCONTINUED | OUTPATIENT
Start: 2024-12-24 | End: 2024-12-27 | Stop reason: HOSPADM

## 2024-12-24 RX ORDER — SODIUM CHLORIDE 0.9 % (FLUSH) 0.9 %
10 SYRINGE (ML) INJECTION EVERY 12 HOURS SCHEDULED
Status: DISCONTINUED | OUTPATIENT
Start: 2024-12-24 | End: 2024-12-27 | Stop reason: HOSPADM

## 2024-12-24 RX ORDER — IPRATROPIUM BROMIDE AND ALBUTEROL SULFATE 2.5; .5 MG/3ML; MG/3ML
1 SOLUTION RESPIRATORY (INHALATION)
Status: DISCONTINUED | OUTPATIENT
Start: 2024-12-24 | End: 2024-12-24 | Stop reason: SDUPTHER

## 2024-12-24 RX ORDER — IOPAMIDOL 755 MG/ML
75 INJECTION, SOLUTION INTRAVASCULAR
Status: COMPLETED | OUTPATIENT
Start: 2024-12-24 | End: 2024-12-24

## 2024-12-24 RX ORDER — SODIUM CHLORIDE 9 MG/ML
INJECTION, SOLUTION INTRAVENOUS PRN
Status: DISCONTINUED | OUTPATIENT
Start: 2024-12-24 | End: 2024-12-27 | Stop reason: HOSPADM

## 2024-12-24 RX ORDER — ACETAMINOPHEN 325 MG/1
650 TABLET ORAL EVERY 4 HOURS PRN
Status: DISCONTINUED | OUTPATIENT
Start: 2024-12-24 | End: 2024-12-27 | Stop reason: HOSPADM

## 2024-12-24 RX ORDER — GLIPIZIDE 5 MG/1
5 TABLET ORAL
Status: DISCONTINUED | OUTPATIENT
Start: 2024-12-25 | End: 2024-12-27 | Stop reason: HOSPADM

## 2024-12-24 RX ORDER — METHYLPREDNISOLONE SODIUM SUCCINATE 125 MG/2ML
125 INJECTION INTRAMUSCULAR; INTRAVENOUS EVERY 6 HOURS
Status: DISCONTINUED | OUTPATIENT
Start: 2024-12-24 | End: 2024-12-25

## 2024-12-24 RX ORDER — CETIRIZINE HYDROCHLORIDE 5 MG/1
5 TABLET ORAL DAILY
Status: DISCONTINUED | OUTPATIENT
Start: 2024-12-24 | End: 2024-12-27 | Stop reason: HOSPADM

## 2024-12-24 RX ORDER — LORATADINE 10 MG/1
10 TABLET ORAL DAILY
COMMUNITY
Start: 2024-10-19

## 2024-12-24 RX ORDER — SODIUM CHLORIDE 0.9 % (FLUSH) 0.9 %
10 SYRINGE (ML) INJECTION PRN
Status: DISCONTINUED | OUTPATIENT
Start: 2024-12-24 | End: 2024-12-27 | Stop reason: HOSPADM

## 2024-12-24 RX ORDER — AMIODARONE HYDROCHLORIDE 200 MG/1
200 TABLET ORAL 2 TIMES DAILY
Status: DISCONTINUED | OUTPATIENT
Start: 2024-12-24 | End: 2024-12-27 | Stop reason: HOSPADM

## 2024-12-24 RX ADMIN — AMIODARONE HYDROCHLORIDE 200 MG: 200 TABLET ORAL at 20:38

## 2024-12-24 RX ADMIN — AMIODARONE HYDROCHLORIDE 200 MG: 200 TABLET ORAL at 14:27

## 2024-12-24 RX ADMIN — METHYLPREDNISOLONE SODIUM SUCCINATE 125 MG: 125 INJECTION INTRAMUSCULAR; INTRAVENOUS at 14:27

## 2024-12-24 RX ADMIN — BUDESONIDE INHALATION 500 MCG: 0.5 SUSPENSION RESPIRATORY (INHALATION) at 20:41

## 2024-12-24 RX ADMIN — SODIUM CHLORIDE, PRESERVATIVE FREE 10 ML: 5 INJECTION INTRAVENOUS at 20:33

## 2024-12-24 RX ADMIN — CEFTRIAXONE 1000 MG: 1 INJECTION, POWDER, FOR SOLUTION INTRAMUSCULAR; INTRAVENOUS at 12:09

## 2024-12-24 RX ADMIN — DOXYCYCLINE 100 MG: 100 INJECTION, POWDER, LYOPHILIZED, FOR SOLUTION INTRAVENOUS at 15:52

## 2024-12-24 RX ADMIN — IOPAMIDOL 75 ML: 755 INJECTION, SOLUTION INTRAVENOUS at 09:40

## 2024-12-24 RX ADMIN — IPRATROPIUM BROMIDE AND ALBUTEROL SULFATE 1 DOSE: .5; 2.5 SOLUTION RESPIRATORY (INHALATION) at 20:41

## 2024-12-24 RX ADMIN — METHYLPREDNISOLONE SODIUM SUCCINATE 125 MG: 125 INJECTION INTRAMUSCULAR; INTRAVENOUS at 20:33

## 2024-12-24 RX ADMIN — IPRATROPIUM BROMIDE AND ALBUTEROL SULFATE 1 DOSE: .5; 3 SOLUTION RESPIRATORY (INHALATION) at 08:33

## 2024-12-24 RX ADMIN — EMPAGLIFLOZIN 10 MG: 10 TABLET, FILM COATED ORAL at 14:27

## 2024-12-24 RX ADMIN — ASPIRIN 81 MG: 81 TABLET, COATED ORAL at 14:27

## 2024-12-24 RX ADMIN — APIXABAN 5 MG: 5 TABLET, FILM COATED ORAL at 14:27

## 2024-12-24 RX ADMIN — MONTELUKAST 10 MG: 10 TABLET, FILM COATED ORAL at 20:38

## 2024-12-24 RX ADMIN — IPRATROPIUM BROMIDE AND ALBUTEROL SULFATE 1 DOSE: .5; 2.5 SOLUTION RESPIRATORY (INHALATION) at 16:14

## 2024-12-24 RX ADMIN — CETIRIZINE HYDROCHLORIDE 5 MG: 5 TABLET, FILM COATED ORAL at 14:27

## 2024-12-24 RX ADMIN — APIXABAN 5 MG: 5 TABLET, FILM COATED ORAL at 20:38

## 2024-12-24 RX ADMIN — METHYLPREDNISOLONE SODIUM SUCCINATE 125 MG: 125 INJECTION INTRAMUSCULAR; INTRAVENOUS at 08:29

## 2024-12-24 ASSESSMENT — ENCOUNTER SYMPTOMS
EYE PAIN: 0
WHEEZING: 1
CONSTIPATION: 0
COUGH: 1
CHEST TIGHTNESS: 1
BLOOD IN STOOL: 0
ABDOMINAL PAIN: 0
SHORTNESS OF BREATH: 1
VOMITING: 0
NAUSEA: 0
BACK PAIN: 0
DIARRHEA: 0

## 2024-12-24 ASSESSMENT — PAIN SCALES - GENERAL: PAINLEVEL_OUTOF10: 3

## 2024-12-24 ASSESSMENT — PAIN DESCRIPTION - DESCRIPTORS: DESCRIPTORS: ACHING

## 2024-12-24 ASSESSMENT — PAIN DESCRIPTION - PAIN TYPE: TYPE: ACUTE PAIN

## 2024-12-24 ASSESSMENT — PAIN DESCRIPTION - LOCATION: LOCATION: CHEST

## 2024-12-24 NOTE — ED NOTES
ED Report    Presents to ED from: Home    Chief Complaint   Patient presents with    Shortness of Breath    Cough     1. Pneumonia due to infectious organism, unspecified laterality, unspecified part of lung    2. COPD exacerbation (HCC)      Present Condition: STABLE  Pertinent Event(s): Pt to ER 3 with a cough she has had for the past 2 weeks. Pt states over the past few days, the cough has made her feel SOB. Pt states she is coughing up yellowish phlegm    LOC:  A+O x 4  Code Status: FULL    Vital Signs   Vitals:    12/24/24 0928 12/24/24 0930 12/24/24 1045 12/24/24 1115   BP: (!) 163/70      Pulse: 73 73 74 71   Resp: 18 14 16 15   Temp:       TempSrc:       SpO2: 94% 94% 97% 94%   Weight:       Height:         Oxygen Baseline: Room Air (Pt states she wears 2L as needed at night)       Current Oxygen Needs: Nasal Cannula at 3L/min  Mobility: Independent       Mobility Aide: Independent    LDAs:   Peripheral IV 12/24/24 Left Antecubital (Active)       Wound 08/20/19 #3 most superior wound (Active)     Abnormal ED Labs:    Labs Reviewed   CBC WITH AUTO DIFFERENTIAL - Abnormal; Notable for the following components:       Result Value    RDW 15.6 (*)     Eosinophils % 7 (*)     Eosinophils Absolute 0.60 (*)     All other components within normal limits   COMPREHENSIVE METABOLIC PANEL W/ REFLEX TO MG FOR LOW K - Abnormal; Notable for the following components:    CO2 32 (*)     Glucose 111 (*)     Creatinine 1.3 (*)     Est, Glom Filt Rate 42 (*)     Calcium 8.5 (*)     All other components within normal limits   BRAIN NATRIURETIC PEPTIDE - Abnormal; Notable for the following components:    NT Pro- (*)     All other components within normal limits   COVID-19, RAPID   CULTURE, BLOOD 1   CULTURE, BLOOD 1   RAPID INFLUENZA A/B ANTIGENS   LACTATE, SEPSIS   LACTATE, SEPSIS     Imaging:    CT CHEST W CONTRAST   Final Result   Linear infiltrate/consolidation in the left upper lobe medially that may   represent

## 2024-12-24 NOTE — ED PROVIDER NOTES
[facility]  eMERGENCY dEPARTMENT eNCOUnter      Pt Name: Marah Loyd  MRN: [id number]  Birthdate 1945  Date of evaluation: 12/24/2024      CHIEF COMPLAINT       Chief Complaint   Patient presents with    Shortness of Breath    Cough         HISTORY OF PRESENT ILLNESS    Marha Loyd is a 79 y.o. female who presents with a chief complaint of cough and shortness of breath she has a history of A-fib COPD and mild CHF she said over the last week or so she has had increasing cough and shortness of breath shortness of breath with activity normally she just uses O2 on a as needed basis been using more frequently 2 L at home cough been intermittently productive no fevers chills no nausea vomiting she has had some leg swelling and is on Lasix it is about normal for her.  Patient does have some chest tightness and pain with coughing there is been no recent travel or immobilization    Patient is on aspirin and Eliquis  REVIEW OF SYSTEMS         Review of Systems   Constitutional:  Positive for fatigue. Negative for chills and fever.   HENT:  Negative for congestion and ear pain.    Eyes:  Negative for pain and visual disturbance.   Respiratory:  Positive for cough, chest tightness, shortness of breath and wheezing.    Cardiovascular:  Positive for chest pain and leg swelling. Negative for palpitations.   Gastrointestinal:  Negative for abdominal pain, blood in stool, constipation, diarrhea, nausea and vomiting.   Endocrine: Negative for polydipsia and polyuria.   Genitourinary:  Negative for difficulty urinating, dysuria and frequency.   Musculoskeletal:  Negative for back pain, joint swelling, myalgias, neck pain and neck stiffness.   Skin:  Negative for rash.   Neurological:  Negative for dizziness, weakness and headaches.   Hematological:  Negative for adenopathy. Does not bruise/bleed easily.   Psychiatric/Behavioral:  Negative for confusion, self-injury and suicidal ideas.          PAST MEDICAL

## 2024-12-24 NOTE — PROGRESS NOTES
rounding in PCU.    Assessment:  visited patient is ED while rounding. Patient's daughter was present.  spoke with patient about breathing issues and then about family. Patient has good support.    Intervention: Engaged in conversation and active listening. Prayed with Patient.     Outcome: Patient expressed appreciation for visit and offer of continued prayer.    Plan: Chaplains are available on site or on call 24/7 for spiritual and emotional support.    Electronically signed by Brannon Gallagher on 12/24/2024 at 10:35 AM

## 2024-12-24 NOTE — H&P
HOSPITALIST ADMISSION H&P      REASON FOR ADMISSION:  Pneumonia--COPD exacerbation  ESTIMATED LENGTH OF STAY:   > 2 midnights----2-4 days    ATTENDING/ADMITTING PHYSICIAN: Mesfin Acharya MD MD  PCP: Peter Ceron MD    HISTORY OF PRESENT ILLNESS:      The patient is a 79 y.o. female patient of Peter Ceron MD who presents with increasing shortness of breath---COLLADO---fatigue---weakness for c. 4 days before seeking medical attention. On supplemental oxygen for nocturnal hypoxia, but has been using her oxygen \"more frequently.\"   COLLADO with minimal activity. CT chest--12.24.2024---linear infiltrate-consolidation MIRELA medially--atelectasis--vs--PNA----CXR---12.24.2024--triangular opacity left superior hilar region--bibasilar parenchymal opacities---likely atelectasis.  Started on Rocephin--doxycycline [erythromycin allergy]---steroids---med nebs---supplemental oxygen---continuously at this time    Lactic acid---1.5    CKD---Stage 3b    DM2    PAF--currently sinus tachycardia     CHF---chronic combined systolic-diastolic----ProBNP ~ 423       See below for additional PMH.    Patient corq-pavuctynrg-qqodimhy-available records reviewed, including, but not limited to,  ER reports--labs--imaging--office records---personal notes.       Past Medical History:   Diagnosis Date    Abdominal pain, RLQ 11/08/2021    Acute exacerbation of chronic obstructive pulmonary disease (COPD) (HCC) 06/07/2022    Acute respiratory failure with hypoxia 06/07/2022    Atrial fibrillation (HCC)     Diverticulitis     History of iron deficiency anemia     Hyperlipidemia     Kidney stone     MI (myocardial infarction) ([facility])     Pericardial effusion 11/08/2021    Pneumonia of both lungs due to infectious organism 06/07/2022    Prolonged Q-T interval on ECG 05/01/2019    Formatting of this note might be different from the original. on [place] report    Small bowel obstruction (HCC)     Stress disorder, acute     Stress-induced

## 2024-12-25 ENCOUNTER — APPOINTMENT (OUTPATIENT)
Dept: GENERAL RADIOLOGY | Age: 79
DRG: 190 | End: 2024-12-25
Payer: MEDICARE

## 2024-12-25 LAB
ANION GAP SERPL CALCULATED.3IONS-SCNC: 9 MMOL/L (ref 9–17)
BASOPHILS # BLD: <0.03 K/UL (ref 0–0.2)
BASOPHILS NFR BLD: 0 % (ref 0–2)
BUN SERPL-MCNC: 20 MG/DL (ref 8–23)
BUN/CREAT SERPL: 18 (ref 9–20)
CALCIUM SERPL-MCNC: 8.6 MG/DL (ref 8.6–10.4)
CHLORIDE SERPL-SCNC: 103 MMOL/L (ref 98–107)
CO2 SERPL-SCNC: 31 MMOL/L (ref 20–31)
CREAT SERPL-MCNC: 1.1 MG/DL (ref 0.5–0.9)
EKG ATRIAL RATE: 73 BPM
EKG P AXIS: 57 DEGREES
EKG P-R INTERVAL: 186 MS
EKG Q-T INTERVAL: 336 MS
EKG QRS DURATION: 94 MS
EKG QTC CALCULATION (BAZETT): 370 MS
EKG R AXIS: 0 DEGREES
EKG T AXIS: 52 DEGREES
EKG VENTRICULAR RATE: 73 BPM
EOSINOPHIL # BLD: <0.03 K/UL (ref 0–0.44)
EOSINOPHILS RELATIVE PERCENT: 0 % (ref 1–4)
ERYTHROCYTE [DISTWIDTH] IN BLOOD BY AUTOMATED COUNT: 15.1 % (ref 11.8–14.4)
GFR, ESTIMATED: 51 ML/MIN/1.73M2
GLUCOSE BLD-MCNC: 106 MG/DL (ref 65–105)
GLUCOSE BLD-MCNC: 153 MG/DL (ref 65–105)
GLUCOSE BLD-MCNC: 190 MG/DL (ref 65–105)
GLUCOSE BLD-MCNC: 98 MG/DL (ref 65–105)
GLUCOSE SERPL-MCNC: 140 MG/DL (ref 70–99)
HCT VFR BLD AUTO: 42.5 % (ref 36.3–47.1)
HGB BLD-MCNC: 13.1 G/DL (ref 11.9–15.1)
IMM GRANULOCYTES # BLD AUTO: 0.03 K/UL (ref 0–0.3)
IMM GRANULOCYTES NFR BLD: 1 %
LYMPHOCYTES NFR BLD: 0.96 K/UL (ref 1.1–3.7)
LYMPHOCYTES RELATIVE PERCENT: 17 % (ref 24–43)
MAGNESIUM SERPL-MCNC: 2.5 MG/DL (ref 1.6–2.6)
MCH RBC QN AUTO: 29.2 PG (ref 25.2–33.5)
MCHC RBC AUTO-ENTMCNC: 30.8 G/DL (ref 25.2–33.5)
MCV RBC AUTO: 94.7 FL (ref 82.6–102.9)
MONOCYTES NFR BLD: 0 % (ref 3–12)
MONOCYTES NFR BLD: <0.03 K/UL (ref 0.1–1.2)
NEUTROPHILS NFR BLD: 82 % (ref 36–65)
NEUTS SEG NFR BLD: 4.74 K/UL (ref 1.5–8.1)
PLATELET # BLD AUTO: 225 K/UL (ref 138–453)
PMV BLD AUTO: 10.3 FL (ref 8.1–13.5)
POTASSIUM SERPL-SCNC: 3.7 MMOL/L (ref 3.7–5.3)
RBC # BLD AUTO: 4.49 M/UL (ref 3.95–5.11)
RBC # BLD: ABNORMAL 10*6/UL
SODIUM SERPL-SCNC: 143 MMOL/L (ref 135–144)
WBC OTHER # BLD: 5.8 K/UL (ref 3.5–11.3)

## 2024-12-25 PROCEDURE — 2500000003 HC RX 250 WO HCPCS

## 2024-12-25 PROCEDURE — 2700000000 HC OXYGEN THERAPY PER DAY

## 2024-12-25 PROCEDURE — 36415 COLL VENOUS BLD VENIPUNCTURE: CPT

## 2024-12-25 PROCEDURE — 94669 MECHANICAL CHEST WALL OSCILL: CPT

## 2024-12-25 PROCEDURE — 2500000003 HC RX 250 WO HCPCS: Performed by: INTERNAL MEDICINE

## 2024-12-25 PROCEDURE — 2580000003 HC RX 258: Performed by: INTERNAL MEDICINE

## 2024-12-25 PROCEDURE — 71045 X-RAY EXAM CHEST 1 VIEW: CPT

## 2024-12-25 PROCEDURE — 80048 BASIC METABOLIC PNL TOTAL CA: CPT

## 2024-12-25 PROCEDURE — 6360000002 HC RX W HCPCS: Performed by: FAMILY MEDICINE

## 2024-12-25 PROCEDURE — 6370000000 HC RX 637 (ALT 250 FOR IP)

## 2024-12-25 PROCEDURE — 94761 N-INVAS EAR/PLS OXIMETRY MLT: CPT

## 2024-12-25 PROCEDURE — 2060000000 HC ICU INTERMEDIATE R&B

## 2024-12-25 PROCEDURE — 6370000000 HC RX 637 (ALT 250 FOR IP): Performed by: INTERNAL MEDICINE

## 2024-12-25 PROCEDURE — 6360000002 HC RX W HCPCS: Performed by: INTERNAL MEDICINE

## 2024-12-25 PROCEDURE — 83735 ASSAY OF MAGNESIUM: CPT

## 2024-12-25 PROCEDURE — 85025 COMPLETE CBC W/AUTO DIFF WBC: CPT

## 2024-12-25 PROCEDURE — 99232 SBSQ HOSP IP/OBS MODERATE 35: CPT | Performed by: FAMILY MEDICINE

## 2024-12-25 PROCEDURE — 94640 AIRWAY INHALATION TREATMENT: CPT

## 2024-12-25 RX ORDER — DOXYCYCLINE 50 MG/1
100 CAPSULE ORAL EVERY 12 HOURS SCHEDULED
Status: DISCONTINUED | OUTPATIENT
Start: 2024-12-25 | End: 2024-12-27 | Stop reason: HOSPADM

## 2024-12-25 RX ORDER — METHYLPREDNISOLONE SODIUM SUCCINATE 125 MG/2ML
80 INJECTION INTRAMUSCULAR; INTRAVENOUS EVERY 8 HOURS
Status: DISCONTINUED | OUTPATIENT
Start: 2024-12-25 | End: 2024-12-26

## 2024-12-25 RX ORDER — BENZONATATE 100 MG/1
100 CAPSULE ORAL 3 TIMES DAILY PRN
Status: DISCONTINUED | OUTPATIENT
Start: 2024-12-25 | End: 2024-12-27 | Stop reason: HOSPADM

## 2024-12-25 RX ORDER — GUAIFENESIN 200 MG/10ML
200 LIQUID ORAL EVERY 4 HOURS PRN
Status: DISCONTINUED | OUTPATIENT
Start: 2024-12-25 | End: 2024-12-27 | Stop reason: HOSPADM

## 2024-12-25 RX ADMIN — EMPAGLIFLOZIN 10 MG: 10 TABLET, FILM COATED ORAL at 08:36

## 2024-12-25 RX ADMIN — IPRATROPIUM BROMIDE AND ALBUTEROL SULFATE 1 DOSE: .5; 2.5 SOLUTION RESPIRATORY (INHALATION) at 07:16

## 2024-12-25 RX ADMIN — BUDESONIDE INHALATION 500 MCG: 0.5 SUSPENSION RESPIRATORY (INHALATION) at 19:12

## 2024-12-25 RX ADMIN — IPRATROPIUM BROMIDE AND ALBUTEROL SULFATE 1 DOSE: .5; 2.5 SOLUTION RESPIRATORY (INHALATION) at 15:20

## 2024-12-25 RX ADMIN — BENZONATATE 100 MG: 100 CAPSULE ORAL at 23:55

## 2024-12-25 RX ADMIN — METHYLPREDNISOLONE SODIUM SUCCINATE 125 MG: 125 INJECTION INTRAMUSCULAR; INTRAVENOUS at 02:01

## 2024-12-25 RX ADMIN — IPRATROPIUM BROMIDE AND ALBUTEROL SULFATE 1 DOSE: .5; 2.5 SOLUTION RESPIRATORY (INHALATION) at 19:12

## 2024-12-25 RX ADMIN — APIXABAN 5 MG: 5 TABLET, FILM COATED ORAL at 08:36

## 2024-12-25 RX ADMIN — DOXYCYCLINE 100 MG: 100 INJECTION, POWDER, LYOPHILIZED, FOR SOLUTION INTRAVENOUS at 03:53

## 2024-12-25 RX ADMIN — AMIODARONE HYDROCHLORIDE 200 MG: 200 TABLET ORAL at 08:36

## 2024-12-25 RX ADMIN — AMIODARONE HYDROCHLORIDE 200 MG: 200 TABLET ORAL at 21:15

## 2024-12-25 RX ADMIN — CETIRIZINE HYDROCHLORIDE 5 MG: 5 TABLET, FILM COATED ORAL at 08:36

## 2024-12-25 RX ADMIN — METHYLPREDNISOLONE SODIUM SUCCINATE 125 MG: 125 INJECTION INTRAMUSCULAR; INTRAVENOUS at 08:35

## 2024-12-25 RX ADMIN — MONTELUKAST 10 MG: 10 TABLET, FILM COATED ORAL at 21:15

## 2024-12-25 RX ADMIN — IPRATROPIUM BROMIDE AND ALBUTEROL SULFATE 1 DOSE: .5; 2.5 SOLUTION RESPIRATORY (INHALATION) at 12:00

## 2024-12-25 RX ADMIN — DOXYCYCLINE 100 MG: 50 CAPSULE ORAL at 18:33

## 2024-12-25 RX ADMIN — METHYLPREDNISOLONE SODIUM SUCCINATE 80 MG: 125 INJECTION INTRAMUSCULAR; INTRAVENOUS at 16:31

## 2024-12-25 RX ADMIN — GLIPIZIDE 5 MG: 5 TABLET ORAL at 08:36

## 2024-12-25 RX ADMIN — METHYLPREDNISOLONE SODIUM SUCCINATE 80 MG: 125 INJECTION INTRAMUSCULAR; INTRAVENOUS at 23:55

## 2024-12-25 RX ADMIN — POLYETHYLENE GLYCOL 3350 17 G: 17 POWDER, FOR SOLUTION ORAL at 09:52

## 2024-12-25 RX ADMIN — WATER 1000 MG: 1 INJECTION INTRAMUSCULAR; INTRAVENOUS; SUBCUTANEOUS at 08:35

## 2024-12-25 RX ADMIN — PANTOPRAZOLE SODIUM 40 MG: 40 TABLET, DELAYED RELEASE ORAL at 05:47

## 2024-12-25 RX ADMIN — BUDESONIDE INHALATION 500 MCG: 0.5 SUSPENSION RESPIRATORY (INHALATION) at 07:16

## 2024-12-25 RX ADMIN — APIXABAN 5 MG: 5 TABLET, FILM COATED ORAL at 21:15

## 2024-12-25 RX ADMIN — SODIUM CHLORIDE, PRESERVATIVE FREE 10 ML: 5 INJECTION INTRAVENOUS at 08:35

## 2024-12-25 RX ADMIN — FERROUS SULFATE TAB 325 MG (65 MG ELEMENTAL FE) 325 MG: 325 (65 FE) TAB at 08:36

## 2024-12-25 RX ADMIN — GUAIFENESIN 200 MG: 200 SOLUTION ORAL at 22:13

## 2024-12-25 RX ADMIN — ASPIRIN 81 MG: 81 TABLET, COATED ORAL at 08:36

## 2024-12-25 RX ADMIN — SODIUM CHLORIDE, PRESERVATIVE FREE 10 ML: 5 INJECTION INTRAVENOUS at 21:16

## 2024-12-25 RX ADMIN — LEVOTHYROXINE SODIUM 50 MCG: 0.03 TABLET ORAL at 05:47

## 2024-12-25 RX ADMIN — FUROSEMIDE 40 MG: 40 TABLET ORAL at 08:36

## 2024-12-25 NOTE — PLAN OF CARE
Problem: Chronic Conditions and Co-morbidities  Goal: Patient's chronic conditions and co-morbidity symptoms are monitored and maintained or improved  12/24/2024 2054 by Madhavi Ding RN  Outcome: Progressing  12/24/2024 1325 by Rosanne Eden RN  Outcome: Progressing     Problem: Discharge Planning  Goal: Discharge to home or other facility with appropriate resources  12/24/2024 2054 by Madhavi Ding RN  Outcome: Progressing  12/24/2024 1325 by Rosanne Eden RN  Outcome: Progressing     Problem: Pain  Goal: Verbalizes/displays adequate comfort level or baseline comfort level  12/24/2024 2054 by Madhavi Ding RN  Outcome: Progressing  12/24/2024 1325 by Rosanne Eden RN  Outcome: Progressing     Problem: ABCDS Injury Assessment  Goal: Absence of physical injury  12/24/2024 2054 by Madhavi Ding RN  Outcome: Progressing  12/24/2024 1325 by Rosanne Eden RN  Outcome: Progressing     Problem: Respiratory - Adult  Goal: Achieves optimal ventilation and oxygenation  Outcome: Progressing

## 2024-12-25 NOTE — PLAN OF CARE
Problem: Chronic Conditions and Co-morbidities  Goal: Patient's chronic conditions and co-morbidity symptoms are monitored and maintained or improved  Outcome: Progressing  Flowsheets (Taken 12/25/2024 0835)  Care Plan - Patient's Chronic Conditions and Co-Morbidity Symptoms are Monitored and Maintained or Improved:   Monitor and assess patient's chronic conditions and comorbid symptoms for stability, deterioration, or improvement   Collaborate with multidisciplinary team to address chronic and comorbid conditions and prevent exacerbation or deterioration   Update acute care plan with appropriate goals if chronic or comorbid symptoms are exacerbated and prevent overall improvement and discharge     Problem: Discharge Planning  Goal: Discharge to home or other facility with appropriate resources  Outcome: Progressing  Flowsheets (Taken 12/25/2024 0835)  Discharge to home or other facility with appropriate resources:   Identify barriers to discharge with patient and caregiver   Arrange for needed discharge resources and transportation as appropriate   Identify discharge learning needs (meds, wound care, etc)   Refer to discharge planning if patient needs post-hospital services based on physician order or complex needs related to functional status, cognitive ability or social support system  Note: Discharge back to home when appropriate     Problem: Pain  Goal: Verbalizes/displays adequate comfort level or baseline comfort level  Outcome: Progressing  Flowsheets (Taken 12/25/2024 1223)  Verbalizes/displays adequate comfort level or baseline comfort level:   Encourage patient to monitor pain and request assistance   Assess pain using appropriate pain scale   Administer analgesics based on type and severity of pain and evaluate response   Implement non-pharmacological measures as appropriate and evaluate response  Note: No c/o pain at this it     Problem: ABCDS Injury Assessment  Goal: Absence of physical

## 2024-12-25 NOTE — PROGRESS NOTES
Hospitalist Progress Note  12/25/2024 9:32 AM  Subjective:   Admit Date: 12/24/2024  PCP: Peter Ceron MD    Interval History: Patient still with cough ,shortness of breath is improved some, no wheezing.Wbc count is normal.Remains afebrile.Blood sugars stable    Diet: ADULT DIET; Regular; Low Fat/Low Chol/High Fiber/2 gm Na  Medications:   Scheduled Meds:   doxycycline monohydrate  100 mg Oral 2 times per day    methylPREDNISolone sodium succ  125 mg IntraVENous Q6H    sodium chloride flush  10 mL IntraVENous 2 times per day    ipratropium 0.5 mg-albuterol 2.5 mg  1 Dose Inhalation Q4H WA RT    amiodarone  200 mg Oral BID    aspirin EC  81 mg Oral Daily    apixaban  5 mg Oral BID    empagliflozin  10 mg Oral Daily    ferrous sulfate  325 mg Oral Daily with breakfast    glipiZIDE  5 mg Oral QAM AC    levothyroxine  50 mcg Oral Daily    cetirizine  5 mg Oral Daily    montelukast  10 mg Oral Nightly    pantoprazole  40 mg Oral QAM AC    budesonide  0.5 mg Nebulization BID RT    furosemide  40 mg IntraVENous Once    furosemide  40 mg Oral Daily    cefTRIAXone (ROCEPHIN) 1,000 mg in sterile water 10 mL IV syringe  1,000 mg IntraVENous Q24H     Continuous Infusions:   sodium chloride      dextrose       CBC:   Recent Labs     12/24/24  0835 12/25/24  0531   WBC 8.7 5.8   HGB 13.6 13.1    225     BMP:    Recent Labs     12/24/24  0835 12/25/24  0531    143   K 3.7 3.7    103   CO2 32* 31   BUN 21 20   CREATININE 1.3* 1.1*   GLUCOSE 111* 140*     Hepatic:   Recent Labs     12/24/24  0835   AST 21   ALT 15   BILITOT 0.4   ALKPHOS 97     Troponin: No results for input(s): \"TROPONINI\" in the last 72 hours.  BNP: No results for input(s): \"BNP\" in the last 72 hours.  Lipids: No results for input(s): \"CHOL\", \"HDL\" in the last 72 hours.    Invalid input(s): \"LDLCALCU\"  INR: No results for input(s): \"INR\" in the last 72 hours.    Objective:   Vitals: BP (!) 116/45   Pulse 69   Temp 97.8 °F (36.6 °C)  (Temporal)   Resp 18   Ht 1.575 m (5' 2\")   Wt 102.2 kg (225 lb 4.8 oz)   SpO2 92%   BMI 41.21 kg/m²   General appearance: alert and cooperative with exam  HEENT: Eye: Normal external eye, conjunctiva, lids cornea, ARGELIA.  Neck: no adenopathy, no carotid bruit, no JVD, supple, symmetrical, trachea midline, and thyroid not enlarged, symmetric, no tenderness/mass/nodules  Lungs: Scattered Rhonchi  Heart: regular rate and rhythm, S1, S2 normal, no murmur, click, rub or gallop  Abdomen: soft, non-tender; bowel sounds normal; no masses,  no organomegaly  Extremities: extremities normal, atraumatic, no cyanosis or edema  Neurologic: Mental status: Alert, oriented, thought content appropriate    Assessment and Plan:   Pneumonia MIRELA  COPD exacerbation  DM type 2  Plan:Continue IV rocephin and Doxycycline,wean down solumderol.Labs in am.  Patient Active Problem List:     Anxiety     Depression     H/O colon cancer, stage II     High risk medication use     Hx of abnormal mammogram     Hyperlipidemia     Morbid obesity with BMI of 40.0-44.9, adult     Neuroendocrine tumor     OA (osteoarthritis)     Status post subtotal thyroidectomy     Thyroid goiter     Lipoma of abdominal wall     Lymphadenopathy, axillary     A-fib (HCC)     Mild pulmonary hypertension (HCC)     COPD exacerbation (HCC)     Eosinophilic asthma     Chronic combined systolic and diastolic CHF (congestive heart failure) (HCC)     GERD (gastroesophageal reflux disease)     Stress-induced cardiomyopathy     Insomnia     Long term (current) use of anticoagulants     Secondary hypercoagulable state (HCC)     Stage 3b chronic kidney disease (HCC)     Type 2 diabetes mellitus with stage 3b chronic kidney disease, without long-term current use of insulin (HCC)     Noncompliance with medication regimen     Noncompliance with diagnostic testing     Diverticulosis     Hiatal hernia     Urinary urgency     Steroid-dependent asthma     Chronic respiratory failure

## 2024-12-25 NOTE — PLAN OF CARE
Problem: Respiratory - Adult  Goal: Achieves optimal ventilation and oxygenation  12/25/2024 0718 by Shannon Lynn RCP  Outcome: Progressing

## 2024-12-26 LAB
ANION GAP SERPL CALCULATED.3IONS-SCNC: 11 MMOL/L (ref 9–17)
BASOPHILS # BLD: <0.03 K/UL (ref 0–0.2)
BASOPHILS NFR BLD: 0 % (ref 0–2)
BUN SERPL-MCNC: 36 MG/DL (ref 8–23)
BUN/CREAT SERPL: 28 (ref 9–20)
CALCIUM SERPL-MCNC: 8.7 MG/DL (ref 8.6–10.4)
CHLORIDE SERPL-SCNC: 104 MMOL/L (ref 98–107)
CO2 SERPL-SCNC: 30 MMOL/L (ref 20–31)
CREAT SERPL-MCNC: 1.3 MG/DL (ref 0.5–0.9)
EOSINOPHIL # BLD: <0.03 K/UL (ref 0–0.44)
EOSINOPHILS RELATIVE PERCENT: 0 % (ref 1–4)
ERYTHROCYTE [DISTWIDTH] IN BLOOD BY AUTOMATED COUNT: 15.7 % (ref 11.8–14.4)
GFR, ESTIMATED: 42 ML/MIN/1.73M2
GLUCOSE BLD-MCNC: 100 MG/DL (ref 65–105)
GLUCOSE BLD-MCNC: 103 MG/DL (ref 65–105)
GLUCOSE BLD-MCNC: 130 MG/DL (ref 65–105)
GLUCOSE SERPL-MCNC: 148 MG/DL (ref 70–99)
HCT VFR BLD AUTO: 40.5 % (ref 36.3–47.1)
HGB BLD-MCNC: 12.5 G/DL (ref 11.9–15.1)
IMM GRANULOCYTES # BLD AUTO: 0.04 K/UL (ref 0–0.3)
IMM GRANULOCYTES NFR BLD: 0 %
LYMPHOCYTES NFR BLD: 0.72 K/UL (ref 1.1–3.7)
LYMPHOCYTES RELATIVE PERCENT: 7 % (ref 24–43)
MCH RBC QN AUTO: 29.1 PG (ref 25.2–33.5)
MCHC RBC AUTO-ENTMCNC: 30.9 G/DL (ref 25.2–33.5)
MCV RBC AUTO: 94.4 FL (ref 82.6–102.9)
MONOCYTES NFR BLD: 0.16 K/UL (ref 0.1–1.2)
MONOCYTES NFR BLD: 2 % (ref 3–12)
NEUTROPHILS NFR BLD: 92 % (ref 36–65)
NEUTS SEG NFR BLD: 10.09 K/UL (ref 1.5–8.1)
PLATELET # BLD AUTO: 237 K/UL (ref 138–453)
PMV BLD AUTO: 10.1 FL (ref 8.1–13.5)
POTASSIUM SERPL-SCNC: 3.9 MMOL/L (ref 3.7–5.3)
RBC # BLD AUTO: 4.29 M/UL (ref 3.95–5.11)
RBC # BLD: ABNORMAL 10*6/UL
SODIUM SERPL-SCNC: 145 MMOL/L (ref 135–144)
WBC OTHER # BLD: 11 K/UL (ref 3.5–11.3)

## 2024-12-26 PROCEDURE — 6360000002 HC RX W HCPCS: Performed by: FAMILY MEDICINE

## 2024-12-26 PROCEDURE — 94761 N-INVAS EAR/PLS OXIMETRY MLT: CPT

## 2024-12-26 PROCEDURE — 94640 AIRWAY INHALATION TREATMENT: CPT

## 2024-12-26 PROCEDURE — 82947 ASSAY GLUCOSE BLOOD QUANT: CPT

## 2024-12-26 PROCEDURE — 2060000000 HC ICU INTERMEDIATE R&B

## 2024-12-26 PROCEDURE — 2700000000 HC OXYGEN THERAPY PER DAY

## 2024-12-26 PROCEDURE — 6370000000 HC RX 637 (ALT 250 FOR IP)

## 2024-12-26 PROCEDURE — 6370000000 HC RX 637 (ALT 250 FOR IP): Performed by: INTERNAL MEDICINE

## 2024-12-26 PROCEDURE — 85025 COMPLETE CBC W/AUTO DIFF WBC: CPT

## 2024-12-26 PROCEDURE — 36415 COLL VENOUS BLD VENIPUNCTURE: CPT

## 2024-12-26 PROCEDURE — 80048 BASIC METABOLIC PNL TOTAL CA: CPT

## 2024-12-26 PROCEDURE — 6370000000 HC RX 637 (ALT 250 FOR IP): Performed by: FAMILY MEDICINE

## 2024-12-26 PROCEDURE — 2500000003 HC RX 250 WO HCPCS: Performed by: INTERNAL MEDICINE

## 2024-12-26 PROCEDURE — 6360000002 HC RX W HCPCS: Performed by: INTERNAL MEDICINE

## 2024-12-26 PROCEDURE — 99231 SBSQ HOSP IP/OBS SF/LOW 25: CPT | Performed by: FAMILY MEDICINE

## 2024-12-26 RX ORDER — FUROSEMIDE 20 MG/1
20 TABLET ORAL DAILY
Status: DISCONTINUED | OUTPATIENT
Start: 2024-12-27 | End: 2024-12-27 | Stop reason: HOSPADM

## 2024-12-26 RX ORDER — METHYLPREDNISOLONE SODIUM SUCCINATE 125 MG/2ML
60 INJECTION INTRAMUSCULAR; INTRAVENOUS EVERY 12 HOURS
Status: DISCONTINUED | OUTPATIENT
Start: 2024-12-26 | End: 2024-12-27 | Stop reason: HOSPADM

## 2024-12-26 RX ORDER — IPRATROPIUM BROMIDE AND ALBUTEROL SULFATE 2.5; .5 MG/3ML; MG/3ML
1 SOLUTION RESPIRATORY (INHALATION)
Status: DISCONTINUED | OUTPATIENT
Start: 2024-12-26 | End: 2024-12-27 | Stop reason: HOSPADM

## 2024-12-26 RX ORDER — TRAZODONE HYDROCHLORIDE 50 MG/1
25 TABLET, FILM COATED ORAL NIGHTLY PRN
Status: DISCONTINUED | OUTPATIENT
Start: 2024-12-26 | End: 2024-12-27 | Stop reason: HOSPADM

## 2024-12-26 RX ADMIN — SODIUM CHLORIDE, PRESERVATIVE FREE 10 ML: 5 INJECTION INTRAVENOUS at 20:57

## 2024-12-26 RX ADMIN — WATER 1000 MG: 1 INJECTION INTRAMUSCULAR; INTRAVENOUS; SUBCUTANEOUS at 08:18

## 2024-12-26 RX ADMIN — DOXYCYCLINE 100 MG: 50 CAPSULE ORAL at 08:16

## 2024-12-26 RX ADMIN — AMIODARONE HYDROCHLORIDE 200 MG: 200 TABLET ORAL at 08:17

## 2024-12-26 RX ADMIN — ALBUTEROL SULFATE 2.5 MG: 2.5 SOLUTION RESPIRATORY (INHALATION) at 03:39

## 2024-12-26 RX ADMIN — ASPIRIN 81 MG: 81 TABLET, COATED ORAL at 08:17

## 2024-12-26 RX ADMIN — IPRATROPIUM BROMIDE AND ALBUTEROL SULFATE 1 DOSE: .5; 2.5 SOLUTION RESPIRATORY (INHALATION) at 23:05

## 2024-12-26 RX ADMIN — IPRATROPIUM BROMIDE AND ALBUTEROL SULFATE 1 DOSE: .5; 2.5 SOLUTION RESPIRATORY (INHALATION) at 07:53

## 2024-12-26 RX ADMIN — DOXYCYCLINE 100 MG: 50 CAPSULE ORAL at 20:56

## 2024-12-26 RX ADMIN — APIXABAN 5 MG: 5 TABLET, FILM COATED ORAL at 08:17

## 2024-12-26 RX ADMIN — METHYLPREDNISOLONE SODIUM SUCCINATE 80 MG: 125 INJECTION INTRAMUSCULAR; INTRAVENOUS at 08:16

## 2024-12-26 RX ADMIN — GLIPIZIDE 5 MG: 5 TABLET ORAL at 06:54

## 2024-12-26 RX ADMIN — BUDESONIDE INHALATION 500 MCG: 0.5 SUSPENSION RESPIRATORY (INHALATION) at 07:53

## 2024-12-26 RX ADMIN — CETIRIZINE HYDROCHLORIDE 5 MG: 5 TABLET, FILM COATED ORAL at 08:17

## 2024-12-26 RX ADMIN — LEVOTHYROXINE SODIUM 50 MCG: 0.03 TABLET ORAL at 06:54

## 2024-12-26 RX ADMIN — EMPAGLIFLOZIN 10 MG: 10 TABLET, FILM COATED ORAL at 08:17

## 2024-12-26 RX ADMIN — APIXABAN 5 MG: 5 TABLET, FILM COATED ORAL at 20:56

## 2024-12-26 RX ADMIN — AMIODARONE HYDROCHLORIDE 200 MG: 200 TABLET ORAL at 20:56

## 2024-12-26 RX ADMIN — TRAZODONE HYDROCHLORIDE 25 MG: 50 TABLET ORAL at 20:56

## 2024-12-26 RX ADMIN — BUDESONIDE INHALATION 500 MCG: 0.5 SUSPENSION RESPIRATORY (INHALATION) at 19:35

## 2024-12-26 RX ADMIN — PANTOPRAZOLE SODIUM 40 MG: 40 TABLET, DELAYED RELEASE ORAL at 06:54

## 2024-12-26 RX ADMIN — SODIUM CHLORIDE, PRESERVATIVE FREE 10 ML: 5 INJECTION INTRAVENOUS at 08:17

## 2024-12-26 RX ADMIN — IPRATROPIUM BROMIDE AND ALBUTEROL SULFATE 1 DOSE: .5; 2.5 SOLUTION RESPIRATORY (INHALATION) at 11:36

## 2024-12-26 RX ADMIN — METHYLPREDNISOLONE SODIUM SUCCINATE 60 MG: 125 INJECTION INTRAMUSCULAR; INTRAVENOUS at 20:56

## 2024-12-26 RX ADMIN — MONTELUKAST 10 MG: 10 TABLET, FILM COATED ORAL at 20:56

## 2024-12-26 RX ADMIN — IPRATROPIUM BROMIDE AND ALBUTEROL SULFATE 1 DOSE: .5; 2.5 SOLUTION RESPIRATORY (INHALATION) at 16:05

## 2024-12-26 RX ADMIN — FUROSEMIDE 40 MG: 40 TABLET ORAL at 08:17

## 2024-12-26 RX ADMIN — FERROUS SULFATE TAB 325 MG (65 MG ELEMENTAL FE) 325 MG: 325 (65 FE) TAB at 08:17

## 2024-12-26 RX ADMIN — ALBUTEROL SULFATE 2.5 MG: 2.5 SOLUTION RESPIRATORY (INHALATION) at 00:08

## 2024-12-26 RX ADMIN — IPRATROPIUM BROMIDE AND ALBUTEROL SULFATE 1 DOSE: .5; 2.5 SOLUTION RESPIRATORY (INHALATION) at 19:35

## 2024-12-26 NOTE — PROGRESS NOTES
[facility] ([facility]) [J44.1]  Pneumonia due to infectious organism, unspecified laterality, unspecified part of lung [J18.9] .   Vitals:    12/26/24 1137   BP:    Pulse: 72   Resp:    Temp:    SpO2: 95%   . Patients home meds are   Prior to Admission medications    Medication Sig Start Date End Date Taking? Authorizing Provider   Ascorbic Acid 500 MG CAPS Take 1 Capful by mouth daily 10/3/24 1/1/25 Yes Wicho Diego MD   loratadine (CLARITIN) 10 MG tablet Take 1 tablet by mouth daily 10/19/24  Yes Wicho Diego MD   ipratropium 0.5 mg-albuterol 2.5 mg (DUONEB) 0.5-2.5 (3) MG/3ML SOLN nebulizer solution USE 1 VIAL VIA NEBULIZER INTO  THE LUNGS EVERY 4 HOURS 11/4/24  Yes Nikolas Ji APRN - CNP   ELIQUIS 5 MG TABS tablet TAKE 1 TABLET BY MOUTH TWICE  DAILY 10/30/24  Yes Becka Soto APRN - CNP   ferrous sulfate (IRON 325) 325 (65 Fe) MG tablet Take 1 tablet by mouth daily (with breakfast)   Yes ProviderWicho MD   empagliflozin (JARDIANCE) 10 MG tablet Take 1 tablet by mouth daily 9/23/24  Yes Enriqueta Medina APRN - CNP   furosemide (LASIX) 40 MG tablet TAKE 1 TABLET BY MOUTH DAILY  Patient taking differently: Take 1 tablet by mouth as needed 9/12/24  Yes Enriqueta Medina APRN - CNP   amiodarone (CORDARONE) 200 MG tablet TAKE 1 TABLET BY MOUTH TWICE  DAILY 8/14/24  Yes Claudia Hearn APRN - CNP   glimepiride (AMARYL) 2 MG tablet Take 1 tablet by mouth every morning (before breakfast) 5/13/24  Yes Mesfin Acharya MD   albuterol sulfate HFA (VENTOLIN HFA) 108 (90 Base) MCG/ACT inhaler Inhale 2 puffs into the lungs every 6 hours as needed for Wheezing 5/10/24  Yes Nikolas Ji APRN - CNP   tiotropium (SPIRIVA RESPIMAT) 1.25 MCG/ACT AERS inhaler Inhale 2 puffs into the lungs daily 5/1/24  Yes Nikolas Ji, APRN - CNP   budesonide-formoterol (SYMBICORT) 160-4.5 MCG/ACT AERO Inhale 2 puffs into the lungs 2 times daily

## 2024-12-26 NOTE — PROGRESS NOTES
Hospitalist Progress Note  12/26/2024 9:36 AM  Subjective:   Admit Date: 12/24/2024  PCP: Peter Ceron MD    Interval History: Patient has cough ,shortness of breath last night is improved sometoday no wheezing.Wbc count is normal.Slight bump in creatinine.Remains afebrile.Blood sugars stable    Diet: ADULT DIET; Regular; Low Fat/Low Chol/High Fiber/2 gm Na  Medications:   Scheduled Meds:   methylPREDNISolone sodium succ  60 mg IntraVENous Q12H    doxycycline monohydrate  100 mg Oral 2 times per day    sodium chloride flush  10 mL IntraVENous 2 times per day    ipratropium 0.5 mg-albuterol 2.5 mg  1 Dose Inhalation Q4H WA RT    amiodarone  200 mg Oral BID    aspirin EC  81 mg Oral Daily    apixaban  5 mg Oral BID    empagliflozin  10 mg Oral Daily    ferrous sulfate  325 mg Oral Daily with breakfast    glipiZIDE  5 mg Oral QAM AC    levothyroxine  50 mcg Oral Daily    cetirizine  5 mg Oral Daily    montelukast  10 mg Oral Nightly    pantoprazole  40 mg Oral QAM AC    budesonide  0.5 mg Nebulization BID RT    furosemide  40 mg IntraVENous Once    furosemide  40 mg Oral Daily    cefTRIAXone (ROCEPHIN) 1,000 mg in sterile water 10 mL IV syringe  1,000 mg IntraVENous Q24H     Continuous Infusions:   sodium chloride      dextrose       CBC:   Recent Labs     12/24/24  0835 12/25/24  0531 12/26/24  0501   WBC 8.7 5.8 11.0   HGB 13.6 13.1 12.5    225 237     BMP:    Recent Labs     12/24/24  0835 12/25/24  0531 12/26/24  0501    143 145*   K 3.7 3.7 3.9    103 104   CO2 32* 31 30   BUN 21 20 36*   CREATININE 1.3* 1.1* 1.3*   GLUCOSE 111* 140* 148*     Hepatic:   Recent Labs     12/24/24  0835   AST 21   ALT 15   BILITOT 0.4   ALKPHOS 97     Troponin: No results for input(s): \"TROPONINI\" in the last 72 hours.  BNP: No results for input(s): \"BNP\" in the last 72 hours.  Lipids: No results for input(s): \"CHOL\", \"HDL\" in the last 72 hours.    Invalid input(s): \"LDLCALCU\"  INR: No results for input(s):

## 2024-12-26 NOTE — PLAN OF CARE
Problem: Chronic Conditions and Co-morbidities  Goal: Patient's chronic conditions and co-morbidity symptoms are monitored and maintained or improved  Outcome: Progressing     Problem: Discharge Planning  Goal: Discharge to home or other facility with appropriate resources  Outcome: Progressing     Problem: Pain  Goal: Verbalizes/displays adequate comfort level or baseline comfort level  Outcome: Progressing     Problem: ABCDS Injury Assessment  Goal: Absence of physical injury  Outcome: Progressing     Problem: Respiratory - Adult  Goal: Achieves optimal ventilation and oxygenation  Outcome: Progressing     Problem: Gastrointestinal - Adult  Goal: Minimal or absence of nausea and vomiting  Outcome: Progressing  Goal: Maintains or returns to baseline bowel function  Outcome: Progressing  Goal: Maintains adequate nutritional intake  Outcome: Progressing

## 2024-12-26 NOTE — CARE COORDINATION
Case Management Assessment  Initial Evaluation    Date/Time of Evaluation: 12/26/2024 9:31 AM  Assessment Completed by: Maty Hawley RN    If patient is discharged prior to next notation, then this note serves as note for discharge by case management.    Patient Name: Marah Loyd                   YOB: 1945  Diagnosis: COPD exacerbation (HCC) [J44.1]  Pneumonia due to infectious organism, unspecified laterality, unspecified part of lung [J18.9]                   Date / Time: 12/24/2024  8:21 AM    Patient Admission Status: Inpatient   Readmission Risk (Low < 19, Mod (19-27), High > 27): Readmission Risk Score: 15    Current PCP: Peter Ceron MD  PCP verified by CM? Yes    Chart Reviewed: Yes      History Provided by: Patient  Patient Orientation: Alert and Oriented    Patient Cognition: Alert    Hospitalization in the last 30 days (Readmission):  No    If yes, Readmission Assessment in CM Navigator will be completed.    Advance Directives:      Code Status: Full Code   Patient's Primary Decision Maker is:        Discharge Planning:    Patient lives with: Spouse/Significant Other Type of Home: House  Primary Care Giver: Self  Patient Support Systems include: Spouse/Significant Other, Family Members   Current Financial resources: Medicare  Current community resources: None  Current services prior to admission: Oxygen Therapy            Current DME: Oxygen Therapy (Comment)            Type of Home Care services:  None    ADLS  Prior functional level: Independent in ADLs/IADLs  Current functional level: Independent in ADLs/IADLs    PT AM-PAC:   /24  OT AM-PAC:   /24    Family can provide assistance at DC: Yes  Would you like Case Management to discuss the discharge plan with any other family members/significant others, and if so, who? No  Plans to Return to Present Housing: Yes  Other Identified Issues/Barriers to RETURNING to current housing: yes  Potential Assistance needed at

## 2024-12-27 ENCOUNTER — APPOINTMENT (OUTPATIENT)
Dept: GENERAL RADIOLOGY | Age: 79
DRG: 190 | End: 2024-12-27
Payer: MEDICARE

## 2024-12-27 VITALS
BODY MASS INDEX: 41.62 KG/M2 | HEART RATE: 85 BPM | SYSTOLIC BLOOD PRESSURE: 96 MMHG | WEIGHT: 226.2 LBS | HEIGHT: 62 IN | DIASTOLIC BLOOD PRESSURE: 75 MMHG | TEMPERATURE: 97.8 F | OXYGEN SATURATION: 93 % | RESPIRATION RATE: 18 BRPM

## 2024-12-27 LAB
ANION GAP SERPL CALCULATED.3IONS-SCNC: 10 MMOL/L (ref 9–17)
BASOPHILS # BLD: <0.03 K/UL (ref 0–0.2)
BASOPHILS NFR BLD: 0 % (ref 0–2)
BUN SERPL-MCNC: 37 MG/DL (ref 8–23)
BUN/CREAT SERPL: 28 (ref 9–20)
CALCIUM SERPL-MCNC: 8.1 MG/DL (ref 8.6–10.4)
CHLORIDE SERPL-SCNC: 105 MMOL/L (ref 98–107)
CO2 SERPL-SCNC: 32 MMOL/L (ref 20–31)
CREAT SERPL-MCNC: 1.3 MG/DL (ref 0.5–0.9)
EOSINOPHIL # BLD: <0.03 K/UL (ref 0–0.44)
EOSINOPHILS RELATIVE PERCENT: 0 % (ref 1–4)
ERYTHROCYTE [DISTWIDTH] IN BLOOD BY AUTOMATED COUNT: 15.8 % (ref 11.8–14.4)
GFR, ESTIMATED: 42 ML/MIN/1.73M2
GLUCOSE BLD-MCNC: 58 MG/DL (ref 65–105)
GLUCOSE BLD-MCNC: 94 MG/DL (ref 65–105)
GLUCOSE SERPL-MCNC: 125 MG/DL (ref 70–99)
HCT VFR BLD AUTO: 38.7 % (ref 36.3–47.1)
HGB BLD-MCNC: 12 G/DL (ref 11.9–15.1)
IMM GRANULOCYTES # BLD AUTO: 0.08 K/UL (ref 0–0.3)
IMM GRANULOCYTES NFR BLD: 1 %
LYMPHOCYTES NFR BLD: 0.73 K/UL (ref 1.1–3.7)
LYMPHOCYTES RELATIVE PERCENT: 8 % (ref 24–43)
MCH RBC QN AUTO: 29.1 PG (ref 25.2–33.5)
MCHC RBC AUTO-ENTMCNC: 31 G/DL (ref 25.2–33.5)
MCV RBC AUTO: 93.9 FL (ref 82.6–102.9)
MONOCYTES NFR BLD: 0.19 K/UL (ref 0.1–1.2)
MONOCYTES NFR BLD: 2 % (ref 3–12)
NEUTROPHILS NFR BLD: 89 % (ref 36–65)
NEUTS SEG NFR BLD: 7.73 K/UL (ref 1.5–8.1)
NRBC BLD-RTO: 0 PER 100 WBC
PLATELET # BLD AUTO: 214 K/UL (ref 138–453)
PMV BLD AUTO: 10.1 FL (ref 8.1–13.5)
POTASSIUM SERPL-SCNC: 3.9 MMOL/L (ref 3.7–5.3)
RBC # BLD AUTO: 4.12 M/UL (ref 3.95–5.11)
RBC # BLD: ABNORMAL 10*6/UL
SODIUM SERPL-SCNC: 147 MMOL/L (ref 135–144)
WBC OTHER # BLD: 8.7 K/UL (ref 3.5–11.3)

## 2024-12-27 PROCEDURE — 2700000000 HC OXYGEN THERAPY PER DAY

## 2024-12-27 PROCEDURE — 6370000000 HC RX 637 (ALT 250 FOR IP): Performed by: FAMILY MEDICINE

## 2024-12-27 PROCEDURE — 71046 X-RAY EXAM CHEST 2 VIEWS: CPT

## 2024-12-27 PROCEDURE — 80048 BASIC METABOLIC PNL TOTAL CA: CPT

## 2024-12-27 PROCEDURE — 36415 COLL VENOUS BLD VENIPUNCTURE: CPT

## 2024-12-27 PROCEDURE — 6360000002 HC RX W HCPCS: Performed by: INTERNAL MEDICINE

## 2024-12-27 PROCEDURE — 2500000003 HC RX 250 WO HCPCS: Performed by: INTERNAL MEDICINE

## 2024-12-27 PROCEDURE — 94761 N-INVAS EAR/PLS OXIMETRY MLT: CPT

## 2024-12-27 PROCEDURE — 6360000002 HC RX W HCPCS: Performed by: FAMILY MEDICINE

## 2024-12-27 PROCEDURE — 99238 HOSP IP/OBS DSCHRG MGMT 30/<: CPT | Performed by: FAMILY MEDICINE

## 2024-12-27 PROCEDURE — 85025 COMPLETE CBC W/AUTO DIFF WBC: CPT

## 2024-12-27 PROCEDURE — 6370000000 HC RX 637 (ALT 250 FOR IP): Performed by: INTERNAL MEDICINE

## 2024-12-27 PROCEDURE — 82947 ASSAY GLUCOSE BLOOD QUANT: CPT

## 2024-12-27 PROCEDURE — 94640 AIRWAY INHALATION TREATMENT: CPT

## 2024-12-27 RX ORDER — FUROSEMIDE 40 MG/1
40 TABLET ORAL AS NEEDED
Qty: 1 TABLET | Refills: 0
Start: 2024-12-27

## 2024-12-27 RX ORDER — DOXYCYCLINE 100 MG/1
100 CAPSULE ORAL EVERY 12 HOURS SCHEDULED
Qty: 2 CAPSULE | Refills: 0 | Status: SHIPPED | OUTPATIENT
Start: 2024-12-27 | End: 2024-12-28

## 2024-12-27 RX ORDER — ACETAMINOPHEN 500 MG
1 TABLET ORAL 3 TIMES DAILY
COMMUNITY
Start: 2024-12-27 | End: 2025-01-06

## 2024-12-27 RX ORDER — PREDNISONE 20 MG/1
20 TABLET ORAL 2 TIMES DAILY
Qty: 5 TABLET | Refills: 0 | Status: SHIPPED | OUTPATIENT
Start: 2024-12-27 | End: 2025-01-01

## 2024-12-27 RX ORDER — CEFDINIR 300 MG/1
300 CAPSULE ORAL 2 TIMES DAILY
Qty: 2 CAPSULE | Refills: 0 | Status: SHIPPED | OUTPATIENT
Start: 2024-12-27 | End: 2024-12-28

## 2024-12-27 RX ADMIN — DOXYCYCLINE 100 MG: 50 CAPSULE ORAL at 08:32

## 2024-12-27 RX ADMIN — CETIRIZINE HYDROCHLORIDE 5 MG: 5 TABLET, FILM COATED ORAL at 08:33

## 2024-12-27 RX ADMIN — SODIUM CHLORIDE, PRESERVATIVE FREE 10 ML: 5 INJECTION INTRAVENOUS at 08:32

## 2024-12-27 RX ADMIN — IPRATROPIUM BROMIDE AND ALBUTEROL SULFATE 1 DOSE: .5; 2.5 SOLUTION RESPIRATORY (INHALATION) at 03:32

## 2024-12-27 RX ADMIN — AMIODARONE HYDROCHLORIDE 200 MG: 200 TABLET ORAL at 08:33

## 2024-12-27 RX ADMIN — ASPIRIN 81 MG: 81 TABLET, COATED ORAL at 08:33

## 2024-12-27 RX ADMIN — BUDESONIDE INHALATION 500 MCG: 0.5 SUSPENSION RESPIRATORY (INHALATION) at 08:25

## 2024-12-27 RX ADMIN — FUROSEMIDE 20 MG: 20 TABLET ORAL at 08:33

## 2024-12-27 RX ADMIN — APIXABAN 5 MG: 5 TABLET, FILM COATED ORAL at 08:33

## 2024-12-27 RX ADMIN — LEVOTHYROXINE SODIUM 50 MCG: 0.03 TABLET ORAL at 05:19

## 2024-12-27 RX ADMIN — EMPAGLIFLOZIN 10 MG: 10 TABLET, FILM COATED ORAL at 08:33

## 2024-12-27 RX ADMIN — PANTOPRAZOLE SODIUM 40 MG: 40 TABLET, DELAYED RELEASE ORAL at 05:19

## 2024-12-27 RX ADMIN — WATER 1000 MG: 1 INJECTION INTRAMUSCULAR; INTRAVENOUS; SUBCUTANEOUS at 08:30

## 2024-12-27 RX ADMIN — METHYLPREDNISOLONE SODIUM SUCCINATE 60 MG: 125 INJECTION INTRAMUSCULAR; INTRAVENOUS at 08:30

## 2024-12-27 RX ADMIN — GLIPIZIDE 5 MG: 5 TABLET ORAL at 05:19

## 2024-12-27 RX ADMIN — IPRATROPIUM BROMIDE AND ALBUTEROL SULFATE 1 DOSE: .5; 2.5 SOLUTION RESPIRATORY (INHALATION) at 12:45

## 2024-12-27 RX ADMIN — IPRATROPIUM BROMIDE AND ALBUTEROL SULFATE 1 DOSE: .5; 2.5 SOLUTION RESPIRATORY (INHALATION) at 08:25

## 2024-12-27 RX ADMIN — FERROUS SULFATE TAB 325 MG (65 MG ELEMENTAL FE) 325 MG: 325 (65 FE) TAB at 08:33

## 2024-12-27 NOTE — PLAN OF CARE
Problem: Chronic Conditions and Co-morbidities  Goal: Patient's chronic conditions and co-morbidity symptoms are monitored and maintained or improved  12/27/2024 1109 by Supriya Thompson RN  Outcome: Progressing  Flowsheets (Taken 12/27/2024 0840)  Care Plan - Patient's Chronic Conditions and Co-Morbidity Symptoms are Monitored and Maintained or Improved:   Monitor and assess patient's chronic conditions and comorbid symptoms for stability, deterioration, or improvement   Collaborate with multidisciplinary team to address chronic and comorbid conditions and prevent exacerbation or deterioration   Update acute care plan with appropriate goals if chronic or comorbid symptoms are exacerbated and prevent overall improvement and discharge  12/27/2024 0258 by Amber Foster RN  Outcome: Progressing     Problem: Discharge Planning  Goal: Discharge to home or other facility with appropriate resources  12/27/2024 1109 by Supriya Thompson, RN  Outcome: Progressing  Flowsheets (Taken 12/27/2024 0840)  Discharge to home or other facility with appropriate resources:   Identify barriers to discharge with patient and caregiver   Arrange for needed discharge resources and transportation as appropriate   Identify discharge learning needs (meds, wound care, etc)   Refer to discharge planning if patient needs post-hospital services based on physician order or complex needs related to functional status, cognitive ability or social support system  Note: Plan for discharge back to home today  12/27/2024 0258 by Amber Foster, RN  Outcome: Progressing     Problem: Pain  Goal: Verbalizes/displays adequate comfort level or baseline comfort level  12/27/2024 1109 by Supriya Thompson RN  Outcome: Progressing  Flowsheets (Taken 12/25/2024 1223)  Verbalizes/displays adequate comfort level or baseline comfort level:   Encourage patient to monitor pain and request assistance   Assess pain using appropriate pain scale

## 2024-12-27 NOTE — DISCHARGE INSTRUCTIONS
Follow up with Dr. Ceron in one week    Oxygen to keep POX greater than greater than or equal to 90 and/or less than or equal to 95%

## 2024-12-27 NOTE — PLAN OF CARE
Problem: Respiratory - Adult  Goal: Achieves optimal ventilation and oxygenation  Outcome: Progressing     Problem: Cardiovascular - Adult  Goal: Maintains optimal cardiac output and hemodynamic stability  Outcome: Progressing     Problem: Safety - Adult  Goal: Free from fall injury  Outcome: Progressing

## 2024-12-27 NOTE — PROGRESS NOTES
Spiritual Health History and Assessment/Progress Note  [facility]    (P) Spiritual/Emotional Needs,  ,  , Initial Encounter    Name: Marha Loyd MRN: [id number]    Age: 79 y.o.     Sex: female   Language: English   Islam: None   Pneumonia due to infectious organism, unspecified laterality, unspecified part of lung     Date: 12/27/2024            Total Time Calculated: (P) 31 min              Spiritual Assessment began in [facility]  PROGRESSIVE CARE        Referral/Consult From: (P) Rounding   Encounter Overview/Reason: (P) Spiritual/Emotional Needs  Service Provided For: (P) Patient    Patient was preparing to be discharged, and looking forward to going home to take care of her  (Kamaljit).  Patient engaged in conversation about her family (3 daughters and grand kids), her Buddhist, and being the care taker for her .  Patient was emotional but has a positive attitude with hopes for better health in the next year.    Heidy, Belief, Meaning:   Patient identifies as spiritual, is connected with a heidy tradition or spiritual practice, and has beliefs or practices that help with coping during difficult times  Family/Friends No family/friends present      Importance and Influence:  Patient has spiritual/personal beliefs that influence decisions regarding their health  Family/Friends No family/friends present    Community:  Patient is connected with a spiritual community and feels well-supported. Support system includes: Spouse/Partner, Children, Heidy Community, Friends, and Extended family  Family/Friends No family/friends present    Assessment and Plan of Care:     Patient Interventions include: Facilitated expression of thoughts and feelings, Engaged in theological reflection, Affirmed coping skills/support systems, and Provided sacramental/Taoism ritual  Family/Friends Interventions include: No family/friends present    Patient Plan of Care: Spiritual Care available upon further

## 2024-12-27 NOTE — DISCHARGE SUMMARY
Hospitalist Discharge Summary    Marah Loyd  :  1945  MRN:  [id number]    Admit date:  2024  Discharge date:  24    Admitting Physician:  Mesfin Acharya MD    Discharge Diagnoses:   Patient Active Problem List   Diagnosis    Anxiety    Depression    H/O colon cancer, stage II    High risk medication use    Hx of abnormal mammogram    Hyperlipidemia    Morbid obesity with BMI of 40.0-44.9, adult    Neuroendocrine tumor    OA (osteoarthritis)    Status post subtotal thyroidectomy    Thyroid goiter    Lipoma of abdominal wall    Lymphadenopathy, axillary    A-fib (HCC)    Mild pulmonary hypertension (HCC)    COPD exacerbation (HCC)    Eosinophilic asthma    Chronic combined systolic and diastolic CHF (congestive heart failure) (HCC)    GERD (gastroesophageal reflux disease)    Stress-induced cardiomyopathy    Insomnia    Long term (current) use of anticoagulants    Secondary hypercoagulable state (HCC)    Stage 3b chronic kidney disease (HCC)    Type 2 diabetes mellitus with stage 3b chronic kidney disease, without long-term current use of insulin (HCC)    Noncompliance with medication regimen    Noncompliance with diagnostic testing    Diverticulosis    Hiatal hernia    Urinary urgency    Steroid-dependent asthma    Chronic respiratory failure with hypoxia    Pneumonia due to infectious organism, unspecified laterality, unspecified part of lung        Admission Condition:  fair      Discharged Condition:  fair    Hospital Course/Treatments   pt was admitted with cough and sob, pt was placed on antibiotics, pt was palced on steroids, pt did better, pt will be d/c home with following meds. Pt will follow up with following meds    Discharge Medications:         Medication List        START taking these medications      cefdinir 300 MG capsule  Commonly known as: OMNICEF  Take 1 capsule by mouth 2 times daily for 1 day     doxycycline monohydrate 100 MG capsule  Commonly known as: MONODOX  Take 1

## 2024-12-27 NOTE — DISCHARGE INSTR - DIET

## 2024-12-27 NOTE — PROGRESS NOTES
Discharge instructions reviewed with patient. Instructed to call PCP for follow up appointment. Medications reviewed and instructed on next doses due. Instructed to  scripts at [facility] pharmacy and to  probiotic at any pharmacy. Side effects of new medications reviewed. Information provided on pneumonia. Denies any further questions before discharge.

## 2024-12-29 ENCOUNTER — FOLLOWUP TELEPHONE ENCOUNTER (OUTPATIENT)
Dept: INPATIENT UNIT | Age: 79
End: 2024-12-29

## 2024-12-29 LAB
MICROORGANISM SPEC CULT: NORMAL
MICROORGANISM SPEC CULT: NORMAL
SERVICE CMNT-IMP: NORMAL
SERVICE CMNT-IMP: NORMAL
SPECIMEN DESCRIPTION: NORMAL
SPECIMEN DESCRIPTION: NORMAL

## 2025-01-10 RX ORDER — ALBUTEROL SULFATE 90 UG/1
AEROSOL, METERED RESPIRATORY (INHALATION)
Qty: 72 G | Refills: 3 | Status: SHIPPED | OUTPATIENT
Start: 2025-01-10

## 2025-03-03 ENCOUNTER — APPOINTMENT (OUTPATIENT)
Dept: GENERAL RADIOLOGY | Age: 80
End: 2025-03-03
Payer: MEDICARE

## 2025-03-03 ENCOUNTER — HOSPITAL ENCOUNTER (EMERGENCY)
Age: 80
Discharge: HOME | End: 2025-03-03
Attending: EMERGENCY MEDICINE
Payer: MEDICARE

## 2025-03-03 VITALS
OXYGEN SATURATION: 92 % | DIASTOLIC BLOOD PRESSURE: 72 MMHG | BODY MASS INDEX: 41.15 KG/M2 | HEART RATE: 100 BPM | RESPIRATION RATE: 20 BRPM | WEIGHT: 225 LBS | TEMPERATURE: 101.1 F | SYSTOLIC BLOOD PRESSURE: 168 MMHG

## 2025-03-03 DIAGNOSIS — J11.1 INFLUENZA WITH RESPIRATORY MANIFESTATION OTHER THAN PNEUMONIA: Primary | ICD-10-CM

## 2025-03-03 LAB
ALBUMIN SERPL-MCNC: 4 G/DL (ref 3.5–5.2)
ALBUMIN/GLOB SERPL: 1.6 {RATIO} (ref 1–2.5)
ALP SERPL-CCNC: 88 U/L (ref 35–104)
ALT SERPL-CCNC: 15 U/L (ref 5–33)
ANION GAP SERPL CALCULATED.3IONS-SCNC: 11 MMOL/L (ref 9–17)
AST SERPL-CCNC: 23 U/L
BASOPHILS # BLD: 0.03 K/UL (ref 0–0.2)
BASOPHILS NFR BLD: 0 % (ref 0–2)
BILIRUB SERPL-MCNC: 0.9 MG/DL (ref 0.3–1.2)
BUN SERPL-MCNC: 16 MG/DL (ref 8–23)
BUN/CREAT SERPL: 12 (ref 9–20)
CALCIUM SERPL-MCNC: 8.2 MG/DL (ref 8.6–10.4)
CHLORIDE SERPL-SCNC: 96 MMOL/L (ref 98–107)
CO2 SERPL-SCNC: 34 MMOL/L (ref 20–31)
CREAT SERPL-MCNC: 1.3 MG/DL (ref 0.5–0.9)
EOSINOPHIL # BLD: 0.12 K/UL (ref 0–0.44)
EOSINOPHILS RELATIVE PERCENT: 1 % (ref 1–4)
ERYTHROCYTE [DISTWIDTH] IN BLOOD BY AUTOMATED COUNT: 15.2 % (ref 11.8–14.4)
FLUAV AG SPEC QL: POSITIVE
FLUBV AG SPEC QL: NEGATIVE
GFR, ESTIMATED: 42 ML/MIN/1.73M2
GLUCOSE SERPL-MCNC: 100 MG/DL (ref 70–99)
HCT VFR BLD AUTO: 44.9 % (ref 36.3–47.1)
HGB BLD-MCNC: 14 G/DL (ref 11.9–15.1)
IMM GRANULOCYTES # BLD AUTO: 0.04 K/UL (ref 0–0.3)
IMM GRANULOCYTES NFR BLD: 0 %
LACTATE BLDV-SCNC: 2.3 MMOL/L (ref 0.5–1.9)
LYMPHOCYTES NFR BLD: 2.11 K/UL (ref 1.1–3.7)
LYMPHOCYTES RELATIVE PERCENT: 19 % (ref 24–43)
MCH RBC QN AUTO: 29.4 PG (ref 25.2–33.5)
MCHC RBC AUTO-ENTMCNC: 31.2 G/DL (ref 25.2–33.5)
MCV RBC AUTO: 94.3 FL (ref 82.6–102.9)
MONOCYTES NFR BLD: 0.51 K/UL (ref 0.1–1.2)
MONOCYTES NFR BLD: 5 % (ref 3–12)
NEUTROPHILS NFR BLD: 75 % (ref 36–65)
NEUTS SEG NFR BLD: 8.08 K/UL (ref 1.5–8.1)
NRBC BLD-RTO: 0 PER 100 WBC
PLATELET # BLD AUTO: 278 K/UL (ref 138–453)
PMV BLD AUTO: 9.7 FL (ref 8.1–13.5)
POTASSIUM SERPL-SCNC: 3.2 MMOL/L (ref 3.7–5.3)
PROT SERPL-MCNC: 6.5 G/DL (ref 6.4–8.3)
RBC # BLD AUTO: 4.76 M/UL (ref 3.95–5.11)
RBC # BLD: ABNORMAL 10*6/UL
SARS-COV-2 RDRP RESP QL NAA+PROBE: NOT DETECTED
SODIUM SERPL-SCNC: 141 MMOL/L (ref 135–144)
SPECIMEN DESCRIPTION: NORMAL
TROPONIN I SERPL HS-MCNC: 23 NG/L (ref 0–14)
WBC OTHER # BLD: 10.9 K/UL (ref 3.5–11.3)

## 2025-03-03 PROCEDURE — 6370000000 HC RX 637 (ALT 250 FOR IP): Performed by: EMERGENCY MEDICINE

## 2025-03-03 PROCEDURE — 87635 SARS-COV-2 COVID-19 AMP PRB: CPT

## 2025-03-03 PROCEDURE — 71045 X-RAY EXAM CHEST 1 VIEW: CPT

## 2025-03-03 PROCEDURE — 36415 COLL VENOUS BLD VENIPUNCTURE: CPT

## 2025-03-03 PROCEDURE — 80053 COMPREHEN METABOLIC PANEL: CPT

## 2025-03-03 PROCEDURE — 94640 AIRWAY INHALATION TREATMENT: CPT

## 2025-03-03 PROCEDURE — 99285 EMERGENCY DEPT VISIT HI MDM: CPT

## 2025-03-03 PROCEDURE — 87040 BLOOD CULTURE FOR BACTERIA: CPT

## 2025-03-03 PROCEDURE — 84145 PROCALCITONIN (PCT): CPT

## 2025-03-03 PROCEDURE — 87804 INFLUENZA ASSAY W/OPTIC: CPT

## 2025-03-03 PROCEDURE — 85025 COMPLETE CBC W/AUTO DIFF WBC: CPT

## 2025-03-03 PROCEDURE — 84484 ASSAY OF TROPONIN QUANT: CPT

## 2025-03-03 PROCEDURE — 93005 ELECTROCARDIOGRAM TRACING: CPT | Performed by: EMERGENCY MEDICINE

## 2025-03-03 PROCEDURE — 83605 ASSAY OF LACTIC ACID: CPT

## 2025-03-03 RX ORDER — IPRATROPIUM BROMIDE AND ALBUTEROL SULFATE 2.5; .5 MG/3ML; MG/3ML
1 SOLUTION RESPIRATORY (INHALATION) ONCE
Status: COMPLETED | OUTPATIENT
Start: 2025-03-03 | End: 2025-03-03

## 2025-03-03 RX ORDER — BENZONATATE 100 MG/1
100 CAPSULE ORAL 3 TIMES DAILY PRN
Qty: 30 CAPSULE | Refills: 0 | Status: SHIPPED | OUTPATIENT
Start: 2025-03-03 | End: 2025-03-13

## 2025-03-03 RX ORDER — BENZONATATE 100 MG/1
100 CAPSULE ORAL ONCE
Status: COMPLETED | OUTPATIENT
Start: 2025-03-03 | End: 2025-03-03

## 2025-03-03 RX ORDER — ACETAMINOPHEN 500 MG
1000 TABLET ORAL ONCE
Status: COMPLETED | OUTPATIENT
Start: 2025-03-03 | End: 2025-03-03

## 2025-03-03 RX ADMIN — IPRATROPIUM BROMIDE AND ALBUTEROL SULFATE 1 DOSE: .5; 2.5 SOLUTION RESPIRATORY (INHALATION) at 20:37

## 2025-03-03 RX ADMIN — BENZONATATE 100 MG: 100 CAPSULE ORAL at 20:50

## 2025-03-03 RX ADMIN — ACETAMINOPHEN 1000 MG: 500 TABLET ORAL at 19:36

## 2025-03-03 ASSESSMENT — PAIN - FUNCTIONAL ASSESSMENT: PAIN_FUNCTIONAL_ASSESSMENT: 0-10

## 2025-03-03 ASSESSMENT — PAIN SCALES - GENERAL: PAINLEVEL_OUTOF10: 2

## 2025-03-04 LAB
EKG Q-T INTERVAL: 288 MS
EKG QRS DURATION: 100 MS
EKG QTC CALCULATION (BAZETT): 385 MS
EKG R AXIS: -80 DEGREES
EKG T AXIS: 87 DEGREES
EKG VENTRICULAR RATE: 108 BPM

## 2025-03-04 NOTE — ED PROVIDER NOTES
Stress disorder, acute, Stress-induced cardiomyopathy, Thyroid goiter, Vitamin B12 deficiency, and Vitamin D deficiency.    SURGICAL HISTORY:      has a past surgical history that includes Colonoscopy; Colon surgery (2008); Thyroidectomy, partial; Upper gastrointestinal endoscopy; joint replacement; hernia repair (); Hemorrhoid surgery; Colonoscopy (2017); Hysterectomy; laparoscopy (N/A, 2019); Cardiac catheterization; and Colonoscopy (N/A, 2024).    FAMILY HISTORY:   She indicated that her mother is . She indicated that the status of her father is unknown. She indicated that three of her eight sisters are alive. She indicated that her daughter is alive. She indicated that the status of her neg hx is unknown.     family history includes Cancer in her father and mother; Heart Attack in her father; High Blood Pressure in her mother; Lung Cancer in her brother; Mult Sclerosis in her daughter; No Known Problems in her brother, brother, sister, sister, sister, sister, sister, sister, sister, and sister.    SOCIAL HISTORY:     reports that she has never smoked. She has never used smokeless tobacco. She reports that she does not drink alcohol and does not use drugs.    IMMUNIZATION HISTORY:      Immunization History   Administered Date(s) Administered    COVID-19, MODERNA BLUE border, Primary or Immunocompromised, (age 12y+), IM, 100 mcg/0.5mL 02/10/2021, 2021    Influenza A (C4J3-63) Vaccine PF IM 2009    Influenza A ([id number]),all Formulations 2009    Influenza Vaccine, unspecified formulation 2013, 2014, 10/07/2015, 10/12/2016, 2017    Influenza Virus Vaccine 10/12/2016, 2018, 10/15/2019    Influenza Whole 10/15/2012    Influenza, FLUAD, (age 65 y+), IM, Quadv, 0.5mL 10/06/2020    Influenza, FLUAD, (age 65 y+), IM, Trivalent PF, 0.5mL 2018, 10/15/2019    Influenza, FLUZONE High Dose, (age 65 y+), IM, Trivalent PF, 0.5mL 10/07/2015    
ALT 15 5 - 33 U/L    AST 23 <32 U/L   Lactate, Sepsis   Result Value Ref Range    Lactic Acid, Sepsis 2.3 (H) 0.5 - 1.9 mmol/L   Troponin   Result Value Ref Range    Troponin, High Sensitivity 23 (H) 0 - 14 ng/L   EKG 12 Lead   Result Value Ref Range    Ventricular Rate 108 BPM    QRS Duration 100 ms    Q-T Interval 288 ms    QTc Calculation (Bazett) 385 ms    R Axis -80 degrees    T Axis 87 degrees       RADIOLOGY:  XR CHEST PORTABLE   Final Result   No acute process.             RECENT VITALS:  BP: (!) 172/65, Temp: (!) 101.1 °F (38.4 °C), Pulse: 100, Respirations: 20     ED Course     The patient was given the following medications:  Orders Placed This Encounter   Medications    acetaminophen (TYLENOL) tablet 1,000 mg    ipratropium 0.5 mg-albuterol 2.5 mg (DUONEB) nebulizer solution 1 Dose     Order Specific Question:   Initiate RT Bronchodilator Protocol     Answer:   No    benzonatate (TESSALON) capsule 100 mg    benzonatate (TESSALON PERLES) 100 MG capsule     Sig: Take 1 capsule by mouth 3 times daily as needed for Cough     Dispense:  30 capsule     Refill:  0       Medical Decision Making           []    EMERGENCY DEPARTMENT COURSE:   Vitals:    Vitals:    03/03/25 1910   BP: (!) 172/65   Pulse: 100   Resp: 20   Temp: (!) 101.1 °F (38.4 °C)   TempSrc: Tympanic   SpO2: 95%   Weight: 102.1 kg (225 lb)     -------------------------  BP: (!) 172/65, Temp: (!) 101.1 °F (38.4 °C), Pulse: 100, Respirations: 20      RE-EVALUATION:  Chest x-ray is negative laboratory results show positive influenza at this time I do feel the patient stable to be discharged we will place her on Tessalon Perles for cough she will continue aerosols at home increase her oxygen at home follow-up as directed return if worse    CONSULTS:      PROCEDURES:  None    FINAL IMPRESSION      1. Influenza with respiratory manifestation other than pneumonia          DISPOSITION/PLAN   DISPOSITION Decision To Discharge 03/03/2025 08:38:06 PM

## 2025-03-05 LAB — PROCALCITONIN SERPL-MCNC: 0.07 NG/ML (ref 0–0.09)

## 2025-03-17 ENCOUNTER — HOSPITAL ENCOUNTER (INPATIENT)
Age: 80
LOS: 4 days | Discharge: NURSING, REHAB OR LONG-TERM CARE | DRG: 481 | End: 2025-03-21
Attending: EMERGENCY MEDICINE | Admitting: INTERNAL MEDICINE
Payer: MEDICARE

## 2025-03-17 ENCOUNTER — APPOINTMENT (OUTPATIENT)
Dept: GENERAL RADIOLOGY | Age: 80
DRG: 481 | End: 2025-03-17
Payer: MEDICARE

## 2025-03-17 DIAGNOSIS — S43.015A ANTERIOR DISLOCATION OF LEFT SHOULDER, INITIAL ENCOUNTER: ICD-10-CM

## 2025-03-17 DIAGNOSIS — S72.442A CLOSED DISPLACED FRACTURE OF DISTAL EPIPHYSIS OF LEFT FEMUR, INITIAL ENCOUNTER (HCC): Primary | ICD-10-CM

## 2025-03-17 LAB
ALBUMIN SERPL-MCNC: 3.3 G/DL (ref 3.5–5.2)
ALBUMIN/GLOB SERPL: 1.8 {RATIO} (ref 1–2.5)
ALP SERPL-CCNC: 57 U/L (ref 35–104)
ALT SERPL-CCNC: 30 U/L (ref 5–33)
ANION GAP SERPL CALCULATED.3IONS-SCNC: 8 MMOL/L (ref 9–17)
AST SERPL-CCNC: 48 U/L
BASOPHILS # BLD: 0 K/UL (ref 0–0.2)
BASOPHILS NFR BLD: 0 % (ref 0–1)
BILIRUB SERPL-MCNC: 0.8 MG/DL (ref 0.3–1.2)
BUN SERPL-MCNC: 27 MG/DL (ref 8–23)
BUN/CREAT SERPL: 23 (ref 9–20)
CALCIUM SERPL-MCNC: 8.2 MG/DL (ref 8.6–10.4)
CHLORIDE SERPL-SCNC: 102 MMOL/L (ref 98–107)
CO2 SERPL-SCNC: 34 MMOL/L (ref 20–31)
CREAT SERPL-MCNC: 1.2 MG/DL (ref 0.5–0.9)
EOSINOPHIL # BLD: 0 K/UL (ref 0–0.4)
EOSINOPHILS RELATIVE PERCENT: 0 % (ref 1–7)
ERYTHROCYTE [DISTWIDTH] IN BLOOD BY AUTOMATED COUNT: 15.8 % (ref 11.8–14.4)
GFR, ESTIMATED: 46 ML/MIN/1.73M2
GLUCOSE BLD-MCNC: 76 MG/DL (ref 65–105)
GLUCOSE BLD-MCNC: 82 MG/DL (ref 65–105)
GLUCOSE BLD-MCNC: 91 MG/DL (ref 65–105)
GLUCOSE SERPL-MCNC: 118 MG/DL (ref 70–99)
HCT VFR BLD AUTO: 38.2 % (ref 36.3–47.1)
HGB BLD-MCNC: 11.9 G/DL (ref 11.9–15.1)
IMM GRANULOCYTES # BLD AUTO: 0 K/UL (ref 0–0.3)
IMM GRANULOCYTES NFR BLD: 0 %
INR PPP: 1.4
LACTATE BLDV-SCNC: 1.7 MMOL/L (ref 0.5–2.2)
LYMPHOCYTES NFR BLD: 0.75 K/UL (ref 1–4.8)
LYMPHOCYTES RELATIVE PERCENT: 3 % (ref 16–46)
MAGNESIUM SERPL-MCNC: 2.2 MG/DL (ref 1.6–2.6)
MAGNESIUM SERPL-MCNC: 2.3 MG/DL (ref 1.6–2.6)
MCH RBC QN AUTO: 29.4 PG (ref 25.2–33.5)
MCHC RBC AUTO-ENTMCNC: 31.2 G/DL (ref 25.2–33.5)
MCV RBC AUTO: 94.3 FL (ref 82.6–102.9)
MONOCYTES NFR BLD: 0.75 K/UL (ref 0.1–1.2)
MONOCYTES NFR BLD: 3 % (ref 4–11)
MORPHOLOGY: ABNORMAL
MORPHOLOGY: ABNORMAL
NEUTROPHILS NFR BLD: 94 % (ref 43–77)
NEUTS SEG NFR BLD: 23.5 K/UL (ref 1.5–8.1)
NRBC BLD-RTO: 0 PER 100 WBC
PARTIAL THROMBOPLASTIN TIME: 23.6 SEC (ref 23.9–33.8)
PLATELET # BLD AUTO: 219 K/UL (ref 138–453)
PMV BLD AUTO: 10.1 FL (ref 8.1–13.5)
POTASSIUM SERPL-SCNC: 3.5 MMOL/L (ref 3.7–5.3)
PROT SERPL-MCNC: 5.1 G/DL (ref 6.4–8.3)
PROTHROMBIN TIME: 16.7 SEC (ref 11.5–14.2)
RBC # BLD AUTO: 4.05 M/UL (ref 3.95–5.11)
SODIUM SERPL-SCNC: 144 MMOL/L (ref 135–144)
WBC OTHER # BLD: 25 K/UL (ref 3.5–11.3)

## 2025-03-17 PROCEDURE — 2060000000 HC ICU INTERMEDIATE R&B

## 2025-03-17 PROCEDURE — 96361 HYDRATE IV INFUSION ADD-ON: CPT

## 2025-03-17 PROCEDURE — 6360000002 HC RX W HCPCS: Performed by: INTERNAL MEDICINE

## 2025-03-17 PROCEDURE — 85730 THROMBOPLASTIN TIME PARTIAL: CPT

## 2025-03-17 PROCEDURE — 2580000003 HC RX 258: Performed by: EMERGENCY MEDICINE

## 2025-03-17 PROCEDURE — 2580000003 HC RX 258: Performed by: INTERNAL MEDICINE

## 2025-03-17 PROCEDURE — 96372 THER/PROPH/DIAG INJ SC/IM: CPT

## 2025-03-17 PROCEDURE — 93005 ELECTROCARDIOGRAM TRACING: CPT | Performed by: EMERGENCY MEDICINE

## 2025-03-17 PROCEDURE — 36415 COLL VENOUS BLD VENIPUNCTURE: CPT

## 2025-03-17 PROCEDURE — 71101 X-RAY EXAM UNILAT RIBS/CHEST: CPT

## 2025-03-17 PROCEDURE — 85610 PROTHROMBIN TIME: CPT

## 2025-03-17 PROCEDURE — 2500000003 HC RX 250 WO HCPCS: Performed by: EMERGENCY MEDICINE

## 2025-03-17 PROCEDURE — 99285 EMERGENCY DEPT VISIT HI MDM: CPT

## 2025-03-17 PROCEDURE — 6370000000 HC RX 637 (ALT 250 FOR IP): Performed by: INTERNAL MEDICINE

## 2025-03-17 PROCEDURE — 96374 THER/PROPH/DIAG INJ IV PUSH: CPT

## 2025-03-17 PROCEDURE — 83735 ASSAY OF MAGNESIUM: CPT

## 2025-03-17 PROCEDURE — 94761 N-INVAS EAR/PLS OXIMETRY MLT: CPT

## 2025-03-17 PROCEDURE — 73562 X-RAY EXAM OF KNEE 3: CPT

## 2025-03-17 PROCEDURE — 80053 COMPREHEN METABOLIC PANEL: CPT

## 2025-03-17 PROCEDURE — 2700000000 HC OXYGEN THERAPY PER DAY

## 2025-03-17 PROCEDURE — 87040 BLOOD CULTURE FOR BACTERIA: CPT

## 2025-03-17 PROCEDURE — 83605 ASSAY OF LACTIC ACID: CPT

## 2025-03-17 PROCEDURE — 73030 X-RAY EXAM OF SHOULDER: CPT

## 2025-03-17 PROCEDURE — 82947 ASSAY GLUCOSE BLOOD QUANT: CPT

## 2025-03-17 PROCEDURE — 6360000002 HC RX W HCPCS: Performed by: EMERGENCY MEDICINE

## 2025-03-17 PROCEDURE — 85025 COMPLETE CBC W/AUTO DIFF WBC: CPT

## 2025-03-17 PROCEDURE — 99222 1ST HOSP IP/OBS MODERATE 55: CPT | Performed by: INTERNAL MEDICINE

## 2025-03-17 PROCEDURE — 94640 AIRWAY INHALATION TREATMENT: CPT

## 2025-03-17 RX ORDER — FENTANYL CITRATE 0.05 MG/ML
50 INJECTION, SOLUTION INTRAMUSCULAR; INTRAVENOUS ONCE
Status: COMPLETED | OUTPATIENT
Start: 2025-03-17 | End: 2025-03-17

## 2025-03-17 RX ORDER — ACETAMINOPHEN 650 MG/1
650 SUPPOSITORY RECTAL EVERY 4 HOURS PRN
Status: DISCONTINUED | OUTPATIENT
Start: 2025-03-17 | End: 2025-03-21 | Stop reason: HOSPADM

## 2025-03-17 RX ORDER — GLIPIZIDE 5 MG/1
5 TABLET ORAL
Status: DISCONTINUED | OUTPATIENT
Start: 2025-03-17 | End: 2025-03-21

## 2025-03-17 RX ORDER — SODIUM CHLORIDE 0.9 % (FLUSH) 0.9 %
10 SYRINGE (ML) INJECTION EVERY 12 HOURS SCHEDULED
Status: DISCONTINUED | OUTPATIENT
Start: 2025-03-17 | End: 2025-03-21 | Stop reason: HOSPADM

## 2025-03-17 RX ORDER — CETIRIZINE HYDROCHLORIDE 5 MG/1
10 TABLET ORAL DAILY
Status: DISCONTINUED | OUTPATIENT
Start: 2025-03-17 | End: 2025-03-21 | Stop reason: HOSPADM

## 2025-03-17 RX ORDER — FUROSEMIDE 40 MG/1
40 TABLET ORAL AS NEEDED
Status: DISCONTINUED | OUTPATIENT
Start: 2025-03-17 | End: 2025-03-17

## 2025-03-17 RX ORDER — SODIUM CHLORIDE FOR INHALATION 0.9 %
3 VIAL, NEBULIZER (ML) INHALATION
Status: DISCONTINUED | OUTPATIENT
Start: 2025-03-17 | End: 2025-03-17

## 2025-03-17 RX ORDER — LEVOTHYROXINE SODIUM 25 UG/1
50 TABLET ORAL DAILY
Status: DISCONTINUED | OUTPATIENT
Start: 2025-03-17 | End: 2025-03-21 | Stop reason: HOSPADM

## 2025-03-17 RX ORDER — POTASSIUM CHLORIDE 7.45 MG/ML
10 INJECTION INTRAVENOUS PRN
Status: DISCONTINUED | OUTPATIENT
Start: 2025-03-17 | End: 2025-03-21 | Stop reason: HOSPADM

## 2025-03-17 RX ORDER — SODIUM CHLORIDE 0.9 % (FLUSH) 0.9 %
10 SYRINGE (ML) INJECTION PRN
Status: DISCONTINUED | OUTPATIENT
Start: 2025-03-17 | End: 2025-03-21 | Stop reason: HOSPADM

## 2025-03-17 RX ORDER — MAGNESIUM SULFATE IN WATER 40 MG/ML
2000 INJECTION, SOLUTION INTRAVENOUS PRN
Status: DISCONTINUED | OUTPATIENT
Start: 2025-03-17 | End: 2025-03-21 | Stop reason: HOSPADM

## 2025-03-17 RX ORDER — CODEINE PHOSPHATE AND GUAIFENESIN 10; 100 MG/5ML; MG/5ML
SOLUTION ORAL
COMMUNITY
Start: 2025-03-13 | End: 2025-03-27 | Stop reason: ALTCHOICE

## 2025-03-17 RX ORDER — BUDESONIDE AND FORMOTEROL FUMARATE DIHYDRATE 160; 4.5 UG/1; UG/1
2 AEROSOL RESPIRATORY (INHALATION) 2 TIMES DAILY
Status: DISCONTINUED | OUTPATIENT
Start: 2025-03-17 | End: 2025-03-17 | Stop reason: CLARIF

## 2025-03-17 RX ORDER — KETOROLAC TROMETHAMINE 30 MG/ML
30 INJECTION, SOLUTION INTRAMUSCULAR; INTRAVENOUS ONCE
Status: COMPLETED | OUTPATIENT
Start: 2025-03-17 | End: 2025-03-17

## 2025-03-17 RX ORDER — ONDANSETRON 2 MG/ML
4 INJECTION INTRAMUSCULAR; INTRAVENOUS EVERY 6 HOURS PRN
Status: DISCONTINUED | OUTPATIENT
Start: 2025-03-17 | End: 2025-03-21 | Stop reason: HOSPADM

## 2025-03-17 RX ORDER — PREDNISONE 5 MG/1
10 TABLET ORAL DAILY
Status: DISCONTINUED | OUTPATIENT
Start: 2025-03-17 | End: 2025-03-21 | Stop reason: HOSPADM

## 2025-03-17 RX ORDER — ACETAMINOPHEN 325 MG/1
650 TABLET ORAL EVERY 4 HOURS PRN
Status: DISCONTINUED | OUTPATIENT
Start: 2025-03-17 | End: 2025-03-21 | Stop reason: HOSPADM

## 2025-03-17 RX ORDER — MIDAZOLAM HYDROCHLORIDE 1 MG/ML
2 INJECTION, SOLUTION INTRAMUSCULAR; INTRAVENOUS ONCE
Status: COMPLETED | OUTPATIENT
Start: 2025-03-17 | End: 2025-03-17

## 2025-03-17 RX ORDER — DEXTROSE MONOHYDRATE 100 MG/ML
INJECTION, SOLUTION INTRAVENOUS CONTINUOUS PRN
Status: DISCONTINUED | OUTPATIENT
Start: 2025-03-17 | End: 2025-03-21 | Stop reason: HOSPADM

## 2025-03-17 RX ORDER — IPRATROPIUM BROMIDE AND ALBUTEROL SULFATE 2.5; .5 MG/3ML; MG/3ML
1 SOLUTION RESPIRATORY (INHALATION)
Status: DISCONTINUED | OUTPATIENT
Start: 2025-03-17 | End: 2025-03-21 | Stop reason: HOSPADM

## 2025-03-17 RX ORDER — FENTANYL CITRATE 0.05 MG/ML
25 INJECTION, SOLUTION INTRAMUSCULAR; INTRAVENOUS ONCE
Refills: 0 | Status: COMPLETED | OUTPATIENT
Start: 2025-03-17 | End: 2025-03-17

## 2025-03-17 RX ORDER — FLUTICASONE PROPIONATE 50 MCG
2 SPRAY, SUSPENSION (ML) NASAL DAILY PRN
Status: DISCONTINUED | OUTPATIENT
Start: 2025-03-17 | End: 2025-03-21 | Stop reason: HOSPADM

## 2025-03-17 RX ORDER — BUDESONIDE 0.5 MG/2ML
0.5 INHALANT ORAL
Status: DISCONTINUED | OUTPATIENT
Start: 2025-03-17 | End: 2025-03-21 | Stop reason: HOSPADM

## 2025-03-17 RX ORDER — IPRATROPIUM BROMIDE AND ALBUTEROL SULFATE 2.5; .5 MG/3ML; MG/3ML
1 SOLUTION RESPIRATORY (INHALATION)
Status: DISCONTINUED | OUTPATIENT
Start: 2025-03-17 | End: 2025-03-17

## 2025-03-17 RX ORDER — POTASSIUM CHLORIDE 1500 MG/1
40 TABLET, EXTENDED RELEASE ORAL PRN
Status: DISCONTINUED | OUTPATIENT
Start: 2025-03-17 | End: 2025-03-21 | Stop reason: HOSPADM

## 2025-03-17 RX ORDER — SODIUM CHLORIDE 9 MG/ML
INJECTION, SOLUTION INTRAVENOUS PRN
Status: DISCONTINUED | OUTPATIENT
Start: 2025-03-17 | End: 2025-03-21 | Stop reason: HOSPADM

## 2025-03-17 RX ORDER — SODIUM CHLORIDE 9 MG/ML
INJECTION, SOLUTION INTRAVENOUS CONTINUOUS
Status: DISCONTINUED | OUTPATIENT
Start: 2025-03-18 | End: 2025-03-19

## 2025-03-17 RX ORDER — MONTELUKAST SODIUM 10 MG/1
10 TABLET ORAL NIGHTLY
Status: DISCONTINUED | OUTPATIENT
Start: 2025-03-17 | End: 2025-03-21 | Stop reason: HOSPADM

## 2025-03-17 RX ORDER — PANTOPRAZOLE SODIUM 40 MG/1
40 TABLET, DELAYED RELEASE ORAL
Status: DISCONTINUED | OUTPATIENT
Start: 2025-03-18 | End: 2025-03-21 | Stop reason: HOSPADM

## 2025-03-17 RX ORDER — 0.9 % SODIUM CHLORIDE 0.9 %
1000 INTRAVENOUS SOLUTION INTRAVENOUS ONCE
Status: DISCONTINUED | OUTPATIENT
Start: 2025-03-17 | End: 2025-03-21 | Stop reason: HOSPADM

## 2025-03-17 RX ORDER — FUROSEMIDE 40 MG/1
40 TABLET ORAL DAILY
Status: DISCONTINUED | OUTPATIENT
Start: 2025-03-17 | End: 2025-03-21 | Stop reason: HOSPADM

## 2025-03-17 RX ORDER — PREDNISONE 10 MG/1
TABLET ORAL
Status: ON HOLD | COMMUNITY
Start: 2025-03-09 | End: 2025-03-21 | Stop reason: HOSPADM

## 2025-03-17 RX ORDER — OXYCODONE AND ACETAMINOPHEN 5; 325 MG/1; MG/1
1 TABLET ORAL EVERY 4 HOURS PRN
Refills: 0 | Status: DISCONTINUED | OUTPATIENT
Start: 2025-03-17 | End: 2025-03-21 | Stop reason: HOSPADM

## 2025-03-17 RX ORDER — SODIUM CHLORIDE 9 MG/ML
INJECTION, SOLUTION INTRAVENOUS CONTINUOUS
Status: ACTIVE | OUTPATIENT
Start: 2025-03-17 | End: 2025-03-18

## 2025-03-17 RX ORDER — AMIODARONE HYDROCHLORIDE 200 MG/1
200 TABLET ORAL 2 TIMES DAILY
Status: DISCONTINUED | OUTPATIENT
Start: 2025-03-17 | End: 2025-03-21 | Stop reason: HOSPADM

## 2025-03-17 RX ORDER — POLYETHYLENE GLYCOL 3350 17 G/17G
17 POWDER, FOR SOLUTION ORAL DAILY PRN
Status: DISCONTINUED | OUTPATIENT
Start: 2025-03-17 | End: 2025-03-21 | Stop reason: HOSPADM

## 2025-03-17 RX ORDER — FERROUS SULFATE 325(65) MG
325 TABLET ORAL
Status: DISCONTINUED | OUTPATIENT
Start: 2025-03-18 | End: 2025-03-21 | Stop reason: HOSPADM

## 2025-03-17 RX ORDER — ALBUTEROL SULFATE 0.83 MG/ML
2.5 SOLUTION RESPIRATORY (INHALATION)
Status: DISCONTINUED | OUTPATIENT
Start: 2025-03-17 | End: 2025-03-21 | Stop reason: HOSPADM

## 2025-03-17 RX ORDER — 0.9 % SODIUM CHLORIDE 0.9 %
1000 INTRAVENOUS SOLUTION INTRAVENOUS ONCE
Status: COMPLETED | OUTPATIENT
Start: 2025-03-17 | End: 2025-03-17

## 2025-03-17 RX ORDER — GLUCAGON 1 MG/ML
1 KIT INJECTION PRN
Status: DISCONTINUED | OUTPATIENT
Start: 2025-03-17 | End: 2025-03-21 | Stop reason: HOSPADM

## 2025-03-17 RX ADMIN — ONDANSETRON 4 MG: 2 INJECTION, SOLUTION INTRAMUSCULAR; INTRAVENOUS at 13:42

## 2025-03-17 RX ADMIN — FENTANYL CITRATE 50 MCG: 0.05 INJECTION, SOLUTION INTRAMUSCULAR; INTRAVENOUS at 10:12

## 2025-03-17 RX ADMIN — KETOROLAC TROMETHAMINE 30 MG: 30 INJECTION, SOLUTION INTRAMUSCULAR at 08:09

## 2025-03-17 RX ADMIN — BUDESONIDE INHALATION 500 MCG: 0.5 SUSPENSION RESPIRATORY (INHALATION) at 19:45

## 2025-03-17 RX ADMIN — MIDAZOLAM HYDROCHLORIDE 2 MG: 1 INJECTION, SOLUTION INTRAMUSCULAR; INTRAVENOUS at 10:21

## 2025-03-17 RX ADMIN — FUROSEMIDE 40 MG: 40 TABLET ORAL at 17:18

## 2025-03-17 RX ADMIN — POTASSIUM BICARBONATE 40 MEQ: 782 TABLET, EFFERVESCENT ORAL at 21:45

## 2025-03-17 RX ADMIN — CETIRIZINE HYDROCHLORIDE 10 MG: 5 TABLET, FILM COATED ORAL at 15:10

## 2025-03-17 RX ADMIN — IPRATROPIUM BROMIDE AND ALBUTEROL SULFATE 1 DOSE: 2.5; .5 SOLUTION RESPIRATORY (INHALATION) at 19:45

## 2025-03-17 RX ADMIN — PREDNISONE 10 MG: 5 TABLET ORAL at 15:10

## 2025-03-17 RX ADMIN — AMIODARONE HYDROCHLORIDE 200 MG: 200 TABLET ORAL at 21:30

## 2025-03-17 RX ADMIN — Medication 74.9 MG: at 10:46

## 2025-03-17 RX ADMIN — SODIUM CHLORIDE 1000 ML: 0.9 INJECTION, SOLUTION INTRAVENOUS at 09:24

## 2025-03-17 RX ADMIN — GLIPIZIDE 5 MG: 5 TABLET ORAL at 15:11

## 2025-03-17 RX ADMIN — EMPAGLIFLOZIN 10 MG: 10 TABLET, FILM COATED ORAL at 15:11

## 2025-03-17 RX ADMIN — SODIUM CHLORIDE: 0.9 INJECTION, SOLUTION INTRAVENOUS at 14:15

## 2025-03-17 RX ADMIN — MONTELUKAST 10 MG: 10 TABLET, FILM COATED ORAL at 21:30

## 2025-03-17 RX ADMIN — LEVOTHYROXINE SODIUM 50 MCG: 0.03 TABLET ORAL at 15:10

## 2025-03-17 RX ADMIN — IPRATROPIUM BROMIDE AND ALBUTEROL SULFATE 1 DOSE: 2.5; .5 SOLUTION RESPIRATORY (INHALATION) at 16:10

## 2025-03-17 RX ADMIN — FENTANYL CITRATE 25 MCG: 0.05 INJECTION, SOLUTION INTRAMUSCULAR; INTRAVENOUS at 10:45

## 2025-03-17 RX ADMIN — AMIODARONE HYDROCHLORIDE 200 MG: 200 TABLET ORAL at 15:11

## 2025-03-17 RX ADMIN — MIDAZOLAM HYDROCHLORIDE 2 MG: 1 INJECTION, SOLUTION INTRAMUSCULAR; INTRAVENOUS at 10:27

## 2025-03-17 ASSESSMENT — PAIN SCALES - GENERAL
PAINLEVEL_OUTOF10: 4
PAINLEVEL_OUTOF10: 8

## 2025-03-17 ASSESSMENT — PAIN DESCRIPTION - PAIN TYPE: TYPE: ACUTE PAIN

## 2025-03-17 ASSESSMENT — PAIN DESCRIPTION - ONSET: ONSET: ON-GOING

## 2025-03-17 ASSESSMENT — PAIN DESCRIPTION - LOCATION: LOCATION: SHOULDER;KNEE;RIB CAGE

## 2025-03-17 ASSESSMENT — PAIN DESCRIPTION - FREQUENCY: FREQUENCY: CONTINUOUS

## 2025-03-17 ASSESSMENT — PAIN DESCRIPTION - DESCRIPTORS: DESCRIPTORS: ACHING

## 2025-03-17 ASSESSMENT — PAIN - FUNCTIONAL ASSESSMENT: PAIN_FUNCTIONAL_ASSESSMENT: PREVENTS OR INTERFERES SOME ACTIVE ACTIVITIES AND ADLS

## 2025-03-17 ASSESSMENT — PAIN DESCRIPTION - ORIENTATION: ORIENTATION: LEFT

## 2025-03-17 NOTE — ED PROVIDER NOTES
[facility] Emergency Department  [address] [facility] [address]  Phone: [phone]        [facility] EMERGENCY DEPARTMENT  EMERGENCY DEPARTMENT ENCOUNTER      Pt Name: Marah Loyd  MRN: [id number]  Birthdate 1945  Date of evaluation: 3/17/2025  Provider: Kevin Ly DO    CHIEF COMPLAINT       Chief Complaint   Patient presents with    Fall    Knee Injury    Shoulder Injury    Rib Injury         HISTORY OF PRESENT ILLNESS   (Location/Symptom, Timing/Onset,Context/Setting, Quality, Duration, Modifying Factors, Severity)  Note limiting factors.   Marah Loyd is a 79 y.o. female who presents to the emergency department for the evaluation of a fall and injuries.  She is reporting pain in her left shoulder, left knee and left ribs.  She did not hit her head or lose consciousness.  She tripped over her oxygen cord, she caught herself on the sink and she sort of fell onto her left side.  She is not having any numbness or weakness in her arms or legs.  No pain in her back or stomach.  No other complaints.    Nursing Notes were reviewed.    REVIEW OF SYSTEMS    (2-9systems for level 4, 10 or more for level 5)     Review of Systems    Except asnoted above the remainder of the review of systems was reviewed and negative.       PAST MEDICAL HISTORY     Past Medical History:   Diagnosis Date    Abdominal pain, RLQ 11/08/2021    Acute exacerbation of chronic obstructive pulmonary disease (COPD) ([facility]) 06/07/2022    Acute respiratory failure with hypoxia ([facility]) 06/07/2022    Atrial fibrillation (HCC)     Diverticulitis     History of iron deficiency anemia     Hyperlipidemia     Kidney stone     MI (myocardial infarction) ([facility])     Pericardial effusion 11/08/2021    Pneumonia of both lungs due to infectious organism 06/07/2022    Prolonged Q-T interval on ECG 05/01/2019    Formatting of this note might be different from the original. on [place] report    Small bowel obstruction (HCC)      Stress disorder, acute     Stress-induced cardiomyopathy     Thyroid goiter     Vitamin B12 deficiency     Vitamin D deficiency          SURGICAL HISTORY       Past Surgical History:   Procedure Laterality Date    CARDIAC CATHETERIZATION      COLON SURGERY  04/2008    COLECTOMY    COLONOSCOPY      COLONOSCOPY  03/13/2017    POLYP,DIVERTICULOSIS    COLONOSCOPY N/A 11/12/2024    severe diverticulosis with some inflammation and small blood clot, anastomosis looked normal; Dr. Colunga at [facility]    HEMORRHOID SURGERY      BANDING    HERNIA REPAIR  2004    VENTRAL HERNIA    HYSTERECTOMY (CERVIX STATUS UNKNOWN)      JOINT REPLACEMENT      BILATERAL KNEE REPLACEMENT    LAPAROSCOPY N/A 07/11/2019    EXPLORATORY LAPAROSCOPY CONVERTED TO OPEN LYSIS OF ADHESIONS PARTIAL COLECTOMY performed by Mesfin Colunga MD at [facility] OR    THYROIDECTOMY, PARTIAL      goiter removed     UPPER GASTROINTESTINAL ENDOSCOPY           CURRENT MEDICATIONS     Previous Medications    AMIODARONE (CORDARONE) 200 MG TABLET    TAKE 1 TABLET BY MOUTH TWICE  DAILY    ASPIRIN EC 81 MG EC TABLET    Take 1 tablet by mouth daily    BUDESONIDE-FORMOTEROL (SYMBICORT) 160-4.5 MCG/ACT AERO    Inhale 2 puffs into the lungs 2 times daily    CETIRIZINE (ZYRTEC) 10 MG TABLET    Take 1 tablet by mouth daily    ELIQUIS 5 MG TABS TABLET    TAKE 1 TABLET BY MOUTH TWICE  DAILY    EMPAGLIFLOZIN (JARDIANCE) 10 MG TABLET    Take 1 tablet by mouth daily    FERROUS SULFATE (IRON 325) 325 (65 FE) MG TABLET    Take 1 tablet by mouth daily (with breakfast)    FLUTICASONE (FLONASE) 50 MCG/ACT NASAL SPRAY    2 sprays by Nasal route daily    FUROSEMIDE (LASIX) 40 MG TABLET    Take 1 tablet by mouth as needed (water gain)    GLIMEPIRIDE (AMARYL) 2 MG TABLET    Take 1 tablet by mouth every morning (before breakfast)    GUAIFENESIN-CODEINE (GUAIFENESIN AC) 100-10 MG/5ML LIQUID    Take 5-10 mLs by mouth 4 (four) times daily as needed for up to 3 days. No driving if makes drowsy    IPRATROPIUM 0.5

## 2025-03-17 NOTE — ED NOTES
definitive left-sided rib fracture.         XR KNEE LEFT (3 VIEWS)   Final Result   Status post right total knee arthroplasty with comminuted fracture of the   distal femur.  No definitive hardware complication at this time.           Medications Administered         ketorolac (TORADOL) injection 30 mg Admin Date  03/17/2025 Action  Given Dose  30 mg Route  [employer] Documented By  Beka Warner, RN          Medication Reconciliation Completed: Yes      Consults:    [] Hospitalist  Completed     [] yes     [] no    [] Cardiology  Completed     [] yes     [] no    [] General Surgery  Completed     [] yes     [] no                 [x] Orthopedics  Completed     [x] yes     [] no    [] OB/GYN   Completed     [] yes     [] no    [] Oncology   Completed     [] yes     [] no    [] Neurology   Completed     [] yes     [] no    [] Podiatry   Completed     [] yes     [] no    [] Urology   Completed     [] yes     [] no         Electronically signed by Beka Warner RN on 3/17/2025 at 9:23 AM

## 2025-03-17 NOTE — H&P
HOSPITALIST ADMISSION H&P      REASON FOR ADMISSION:    Distal left femur fracture----dislocation left shoulder----fall  ESTIMATED LENGTH OF STAY:   > 2 midnights---2-3 days    ATTENDING/ADMITTING PHYSICIAN: Mesfin Acharya MD MD  PCP: Peter Ceron MD    HISTORY OF PRESENT ILLNESS:      The patient is a 79 y.o. female patient of Peter Ceron MD who presents with a fall at home, tripping over her oxygen tubing, no LOC --> fracture periprosthetic distal left femur and a dislocated left shoulder, reduced in the ER.  Admitted for further evaluation and treatment by Orthopedics.  Prior left TKA.  Rheumatoid arthritis--on Prednisone.  On Eliquis---held.    PAF history---CHF--ASCVD---pulmonary HTN    CKD typically Stage 3b, currenty Stage 3a    Diabetes Mellitus Type 2      See below for additional PMH.    Patient eyua-tibtehezld-rfnurhxg-available records reviewed, including, but not limited to,  ER reports--labs--imaging---office records---personal notes.    Dr. Navarrete, Orthopedics, contacted by ER physician.       Past Medical History:   Diagnosis Date    Abdominal pain, RLQ 11/08/2021    Acute exacerbation of chronic obstructive pulmonary disease (COPD) (HCC) 06/07/2022    Acute respiratory failure with hypoxia (HCC) 06/07/2022    Atrial fibrillation (HCC)     Diverticulitis     History of iron deficiency anemia     Hyperlipidemia     Kidney stone     MI (myocardial infarction) (HCC)     Pericardial effusion 11/08/2021    Pneumonia of both lungs due to infectious organism 06/07/2022    Prolonged Q-T interval on ECG 05/01/2019    Formatting of this note might be different from the original. on [place] report    Small bowel obstruction (HCC)     Stress disorder, acute     Stress-induced cardiomyopathy     Thyroid goiter     Vitamin B12 deficiency     Vitamin D deficiency            Past Surgical History:   Procedure Laterality Date    CARDIAC CATHETERIZATION      COLON SURGERY  04/2008    COLECTOMY     Hold Eliquis and ASA in anticipation of surgery   7.     Diabetes Mellitus Type 2---diabetic admission----orders   8.     See orders     Note that over 55 minutes was spent in evaluation of the patient, review of the chart and pertinent records, discussion with family/staff, etc    Mesfin Acharya MD MD  11:38 AM  3/17/2025

## 2025-03-17 NOTE — PROGRESS NOTES
03/17/25 1424   Encounter Summary   Encounter Overview/Reason Spiritual/Emotional Needs   Service Provided For Patient   Referral/Consult From Rounding   Support System Taoist/heidy community;Spouse;Children   Last Encounter  03/17/25   Complexity of Encounter Low   Begin Time 1416   End Time  1426   Total Time Calculated 10 min   Spiritual/Emotional needs   Type Spiritual Support   Assessment/Intervention/Outcome   Assessment Calm   Intervention Active listening;Discussed meaning/purpose;Prayer (assurance of)/Hundred   Outcome Acceptance;Engaged in conversation;Expressed Gratitude     Spiritual Health History and Assessment/Progress Note  [facility]    (P) Spiritual/Emotional Needs,  ,  ,      Name: Marah Loyd MRN: [id number]    Age: 79 y.o.     Sex: female   Language: English   Pentecostal: None   Closed displaced fracture of distal epiphysis of left femur, initial encounter ([facility])     Date: 3/17/2025            Total Time Calculated: (P) 10 min              Spiritual Assessment began in [facility]  PROGRESSIVE CARE        Referral/Consult From: (P) Rounding   Encounter Overview/Reason: (P) Spiritual/Emotional Needs  Service Provided For: (P) Patient     rounding on PCU    Patient is resting in with a fracture from a fall at home. She expects surgery on Wednesday.  She lives at home with her . He children are attentive and check in frequently. A son-in-law is her  and had visited her.      Heidy, Belief, Meaning:   Patient identifies as spiritual, is connected with a heidy tradition or spiritual practice, and has beliefs or practices that help with coping during difficult times  Family/Friends No family/friends present      Importance and Influence:  Patient has spiritual/personal beliefs that influence decisions regarding their health  Family/Friends No family/friends present    Community:  Patient is connected with a spiritual community  Family/Friends No family/friends

## 2025-03-17 NOTE — PROGRESS NOTES
Salma Loyd, PPatient Assessment complete. Anterior dislocation of left shoulder, initial encounter [S43.015A]  Closed displaced fracture of distal epiphysis of left femur, initial encounter ([facility]) [S72.445U] .   Vitals:    03/17/25 1252   BP: 105/69   Pulse: 77   Resp:    Temp: 97.8 °F (36.6 °C)   SpO2: 98%   . Patients home meds are   Prior to Admission medications    Medication Sig Start Date End Date Taking? Authorizing Provider   OXYGEN Inhale 2 L/min into the lungs continuous 3/14/25 3/14/26 Yes ProviderWicho MD   predniSONE (DELTASONE) 10 MG tablet Take by mouth 3/9/25 3/19/25 Yes ProviderWicho MD   guaiFENesin-codeine (GUAIFENESIN AC) 100-10 MG/5ML liquid Take 5-10 mLs by mouth 4 (four) times daily as needed for up to 3 days. No driving if makes drowsy 3/13/25  Yes ProviderWciho MD   VENTOLIN  (90 Base) MCG/ACT inhaler USE 2 INHALATIONS BY MOUTH EVERY 6 HOURS AS NEEDED FOR WHEEZING 1/10/25  Yes Rinku Restrepo MD   furosemide (LASIX) 40 MG tablet Take 1 tablet by mouth as needed (water gain) 12/27/24  Yes Taiwo Knox MD   ipratropium 0.5 mg-albuterol 2.5 mg (DUONEB) 0.5-2.5 (3) MG/3ML SOLN nebulizer solution USE 1 VIAL VIA NEBULIZER INTO  THE LUNGS EVERY 4 HOURS 11/4/24  Yes Nikolas Ji APRN - CNP   ELIQUIS 5 MG TABS tablet TAKE 1 TABLET BY MOUTH TWICE  DAILY 10/30/24  Yes Becka Soto APRN - CNP   ferrous sulfate (IRON 325) 325 (65 Fe) MG tablet Take 1 tablet by mouth daily (with breakfast)   Yes ProviderWicho MD   empagliflozin (JARDIANCE) 10 MG tablet Take 1 tablet by mouth daily 9/23/24  Yes Enriqueta Medina APRN - CNP   amiodarone (CORDARONE) 200 MG tablet TAKE 1 TABLET BY MOUTH TWICE  DAILY 8/14/24  Yes Woollard, Claudia J, APRN - CNP   glimepiride (AMARYL) 2 MG tablet Take 1 tablet by mouth every morning (before breakfast) 5/13/24  Yes Mesfin Acharya MD   tiotropium (SPIRIVA RESPIMAT) 1.25 MCG/ACT AERS inhaler Inhale 2 puffs into the  489 [phone]   65 277 292 306 321 336 351 366 381 65 363 392 421 449 478 507 535 564 594   70 269 284 299 314 329 344 359 374 70 353 382 410 439 468 496 525 554 583   75 261 274 289 305 319 334 348 364 75 344 372 400 429 458 487 515 544 573   80 253 266 282 296 312 327 342 356 80 335 362 390 419 448 [phone]

## 2025-03-18 ENCOUNTER — ANESTHESIA EVENT (OUTPATIENT)
Dept: OPERATING ROOM | Age: 80
End: 2025-03-18
Payer: MEDICARE

## 2025-03-18 PROBLEM — Z01.810 ENCOUNTER FOR PRE-OPERATIVE CARDIOVASCULAR CLEARANCE: Status: ACTIVE | Noted: 2025-03-18

## 2025-03-18 PROBLEM — I48.0 PAF (PAROXYSMAL ATRIAL FIBRILLATION) (HCC): Status: ACTIVE | Noted: 2021-12-06

## 2025-03-18 PROBLEM — I50.32 CHRONIC HEART FAILURE WITH PRESERVED EJECTION FRACTION (HCC): Status: ACTIVE | Noted: 2025-03-18

## 2025-03-18 LAB
ANION GAP SERPL CALCULATED.3IONS-SCNC: 7 MMOL/L (ref 9–17)
BASOPHILS # BLD: <0.03 K/UL (ref 0–0.2)
BASOPHILS NFR BLD: 0 % (ref 0–2)
BUN SERPL-MCNC: 33 MG/DL (ref 8–23)
BUN/CREAT SERPL: 24 (ref 9–20)
CALCIUM SERPL-MCNC: 7.1 MG/DL (ref 8.6–10.4)
CHLORIDE SERPL-SCNC: 107 MMOL/L (ref 98–107)
CO2 SERPL-SCNC: 30 MMOL/L (ref 20–31)
CREAT SERPL-MCNC: 1.4 MG/DL (ref 0.5–0.9)
EOSINOPHIL # BLD: <0.03 K/UL (ref 0–0.44)
EOSINOPHILS RELATIVE PERCENT: 0 % (ref 1–4)
ERYTHROCYTE [DISTWIDTH] IN BLOOD BY AUTOMATED COUNT: 15.8 % (ref 11.8–14.4)
GFR, ESTIMATED: 38 ML/MIN/1.73M2
GLUCOSE BLD-MCNC: 116 MG/DL (ref 65–105)
GLUCOSE BLD-MCNC: 86 MG/DL (ref 65–105)
GLUCOSE SERPL-MCNC: 80 MG/DL (ref 70–99)
HCT VFR BLD AUTO: 26.6 % (ref 36.3–47.1)
HCT VFR BLD AUTO: 28.1 % (ref 36.3–47.1)
HGB BLD-MCNC: 8.3 G/DL (ref 11.9–15.1)
HGB BLD-MCNC: 8.6 G/DL (ref 11.9–15.1)
IMM GRANULOCYTES # BLD AUTO: 0.15 K/UL (ref 0–0.3)
IMM GRANULOCYTES NFR BLD: 1 %
LYMPHOCYTES NFR BLD: 2.28 K/UL (ref 1.1–3.7)
LYMPHOCYTES RELATIVE PERCENT: 14 % (ref 24–43)
MAGNESIUM SERPL-MCNC: 2.1 MG/DL (ref 1.6–2.6)
MCH RBC QN AUTO: 29.7 PG (ref 25.2–33.5)
MCHC RBC AUTO-ENTMCNC: 31.2 G/DL (ref 25.2–33.5)
MCV RBC AUTO: 95.3 FL (ref 82.6–102.9)
MONOCYTES NFR BLD: 0.97 K/UL (ref 0.1–1.2)
MONOCYTES NFR BLD: 6 % (ref 3–12)
NEUTROPHILS NFR BLD: 79 % (ref 36–65)
NEUTS SEG NFR BLD: 13.15 K/UL (ref 1.5–8.1)
NRBC BLD-RTO: 0.1 PER 100 WBC
PLATELET # BLD AUTO: 207 K/UL (ref 138–453)
PMV BLD AUTO: 10.7 FL (ref 8.1–13.5)
POTASSIUM SERPL-SCNC: 4.4 MMOL/L (ref 3.7–5.3)
RBC # BLD AUTO: 2.79 M/UL (ref 3.95–5.11)
RBC # BLD: ABNORMAL 10*6/UL
SODIUM SERPL-SCNC: 144 MMOL/L (ref 135–144)
WBC OTHER # BLD: 16.6 K/UL (ref 3.5–11.3)

## 2025-03-18 PROCEDURE — 2700000000 HC OXYGEN THERAPY PER DAY

## 2025-03-18 PROCEDURE — 99222 1ST HOSP IP/OBS MODERATE 55: CPT | Performed by: PHYSICIAN ASSISTANT

## 2025-03-18 PROCEDURE — 83735 ASSAY OF MAGNESIUM: CPT

## 2025-03-18 PROCEDURE — 6370000000 HC RX 637 (ALT 250 FOR IP): Performed by: INTERNAL MEDICINE

## 2025-03-18 PROCEDURE — 82947 ASSAY GLUCOSE BLOOD QUANT: CPT

## 2025-03-18 PROCEDURE — 94640 AIRWAY INHALATION TREATMENT: CPT

## 2025-03-18 PROCEDURE — 2580000003 HC RX 258: Performed by: INTERNAL MEDICINE

## 2025-03-18 PROCEDURE — 85025 COMPLETE CBC W/AUTO DIFF WBC: CPT

## 2025-03-18 PROCEDURE — 85014 HEMATOCRIT: CPT

## 2025-03-18 PROCEDURE — 36415 COLL VENOUS BLD VENIPUNCTURE: CPT

## 2025-03-18 PROCEDURE — 85018 HEMOGLOBIN: CPT

## 2025-03-18 PROCEDURE — 2060000000 HC ICU INTERMEDIATE R&B

## 2025-03-18 PROCEDURE — 99231 SBSQ HOSP IP/OBS SF/LOW 25: CPT | Performed by: INTERNAL MEDICINE

## 2025-03-18 PROCEDURE — 94761 N-INVAS EAR/PLS OXIMETRY MLT: CPT

## 2025-03-18 PROCEDURE — 99222 1ST HOSP IP/OBS MODERATE 55: CPT | Performed by: INTERNAL MEDICINE

## 2025-03-18 PROCEDURE — 80048 BASIC METABOLIC PNL TOTAL CA: CPT

## 2025-03-18 PROCEDURE — 2500000003 HC RX 250 WO HCPCS: Performed by: INTERNAL MEDICINE

## 2025-03-18 PROCEDURE — 6360000002 HC RX W HCPCS: Performed by: INTERNAL MEDICINE

## 2025-03-18 RX ADMIN — FUROSEMIDE 40 MG: 40 TABLET ORAL at 09:56

## 2025-03-18 RX ADMIN — IPRATROPIUM BROMIDE AND ALBUTEROL SULFATE 1 DOSE: 2.5; .5 SOLUTION RESPIRATORY (INHALATION) at 12:50

## 2025-03-18 RX ADMIN — SODIUM CHLORIDE: 0.9 INJECTION, SOLUTION INTRAVENOUS at 01:48

## 2025-03-18 RX ADMIN — IPRATROPIUM BROMIDE AND ALBUTEROL SULFATE 1 DOSE: 2.5; .5 SOLUTION RESPIRATORY (INHALATION) at 20:37

## 2025-03-18 RX ADMIN — CETIRIZINE HYDROCHLORIDE 10 MG: 5 TABLET, FILM COATED ORAL at 09:56

## 2025-03-18 RX ADMIN — AMIODARONE HYDROCHLORIDE 200 MG: 200 TABLET ORAL at 23:35

## 2025-03-18 RX ADMIN — OXYCODONE HYDROCHLORIDE AND ACETAMINOPHEN 1 TABLET: 5; 325 TABLET ORAL at 23:43

## 2025-03-18 RX ADMIN — LEVOTHYROXINE SODIUM 50 MCG: 0.03 TABLET ORAL at 09:55

## 2025-03-18 RX ADMIN — EMPAGLIFLOZIN 10 MG: 10 TABLET, FILM COATED ORAL at 09:55

## 2025-03-18 RX ADMIN — BUDESONIDE INHALATION 500 MCG: 0.5 SUSPENSION RESPIRATORY (INHALATION) at 08:16

## 2025-03-18 RX ADMIN — FERROUS SULFATE TAB 325 MG (65 MG ELEMENTAL FE) 325 MG: 325 (65 FE) TAB at 09:55

## 2025-03-18 RX ADMIN — OXYCODONE HYDROCHLORIDE AND ACETAMINOPHEN 1 TABLET: 5; 325 TABLET ORAL at 05:03

## 2025-03-18 RX ADMIN — IPRATROPIUM BROMIDE AND ALBUTEROL SULFATE 1 DOSE: 2.5; .5 SOLUTION RESPIRATORY (INHALATION) at 08:16

## 2025-03-18 RX ADMIN — SODIUM CHLORIDE, PRESERVATIVE FREE 10 ML: 5 INJECTION INTRAVENOUS at 23:35

## 2025-03-18 RX ADMIN — CALCIUM CHLORIDE 2000 MG: 100 INJECTION, SOLUTION INTRAVENOUS at 09:52

## 2025-03-18 RX ADMIN — IPRATROPIUM BROMIDE AND ALBUTEROL SULFATE 1 DOSE: 2.5; .5 SOLUTION RESPIRATORY (INHALATION) at 16:32

## 2025-03-18 RX ADMIN — PREDNISONE 10 MG: 5 TABLET ORAL at 09:55

## 2025-03-18 RX ADMIN — MONTELUKAST 10 MG: 10 TABLET, FILM COATED ORAL at 23:35

## 2025-03-18 RX ADMIN — SODIUM CHLORIDE: 0.9 INJECTION, SOLUTION INTRAVENOUS at 13:25

## 2025-03-18 RX ADMIN — BUDESONIDE INHALATION 500 MCG: 0.5 SUSPENSION RESPIRATORY (INHALATION) at 20:37

## 2025-03-18 RX ADMIN — PANTOPRAZOLE SODIUM 40 MG: 40 TABLET, DELAYED RELEASE ORAL at 05:03

## 2025-03-18 RX ADMIN — AMIODARONE HYDROCHLORIDE 200 MG: 200 TABLET ORAL at 09:54

## 2025-03-18 RX ADMIN — GLIPIZIDE 5 MG: 5 TABLET ORAL at 09:55

## 2025-03-18 RX ADMIN — OXYCODONE HYDROCHLORIDE AND ACETAMINOPHEN 1 TABLET: 5; 325 TABLET ORAL at 11:43

## 2025-03-18 ASSESSMENT — PAIN DESCRIPTION - LOCATION
LOCATION: LEG
LOCATION: HIP
LOCATION: HIP

## 2025-03-18 ASSESSMENT — PAIN DESCRIPTION - ORIENTATION
ORIENTATION: LEFT
ORIENTATION: LEFT

## 2025-03-18 ASSESSMENT — PAIN DESCRIPTION - DESCRIPTORS
DESCRIPTORS: ACHING
DESCRIPTORS: ACHING

## 2025-03-18 ASSESSMENT — PAIN - FUNCTIONAL ASSESSMENT: PAIN_FUNCTIONAL_ASSESSMENT: ACTIVITIES ARE NOT PREVENTED

## 2025-03-18 ASSESSMENT — PAIN SCALES - GENERAL
PAINLEVEL_OUTOF10: 2
PAINLEVEL_OUTOF10: 5
PAINLEVEL_OUTOF10: 4

## 2025-03-18 NOTE — CARE COORDINATION
DISCHARGE BARRIERS       Reason for Referral:  SW completed a Psychosocial Assessment for evaluation of patient's mental health, social status, and functional capacity within the community. Caitie Loyd is a 79 y.o. female admitted due to Closed displaced fracture of distal epiphysis of left femur, initial encounter ([facility]). Patient alone. SW provided supportive listening while patient discussed past medical history and events leading up to hospitalization.       Mental Status:  Alert, oriented, and engaging during assessment.     Decision Making:  Makes own decisions.     Family/Social/Home Environment: lives with their spouse    Employment status: Unemployed, not seeking work     Health Insurance:     Active Insurance as of 3/17/2025       Primary Coverage       Payor Plan Insurance Group Employer/Plan Group    [facility] MEDICARE [facility] MEDICARE COMPLETE [id number]       Payor Plan Address Payor Plan Phone Number Payor Plan Fax Number Effective Dates    [address] [address]   1/1/2023 - None Entered    [facility] [address]         Subscriber Name Subscriber Birth Date Member ID       CAITIE LOYD 1945 [id number]                     Support: Discussed a good social support network     Current Services:  None      Current DMEs:  home 02 through [facility]       PCP: Peter Ceron MD and repots no issues affording medication.      status:   No     ADLs and means of transportation: Independent in ADLs/IADLs in ADLs prior to hospitalization and able to transport self.    Food insecurity or needed financial assistance: Denies any food insecurity or financial concerns at this time.    Income Source: spousal [employer] pension and social security    ACP and Code Status:  SW discussed an Advance Directive which included the patient's choices for care and treatment in the case of a health event that adversely affects decision-making abilities. ANNELIESE provided education and resources. Caitie PIERSON

## 2025-03-18 NOTE — CARE COORDINATION
Case Management Assessment  Initial Evaluation    Date/Time of Evaluation: 3/18/2025 8:53 AM  Assessment Completed by: Maty Hawley RN    If patient is discharged prior to next notation, then this note serves as note for discharge by case management.    Patient Name: Marah Loyd                   YOB: 1945  Diagnosis: Anterior dislocation of left shoulder, initial encounter [S43.015A]  Closed displaced fracture of distal epiphysis of left femur, initial encounter ([facility]) [S72.442A]                   Date / Time: 3/17/2025  7:35 AM    Patient Admission Status: Inpatient   Readmission Risk (Low < 19, Mod (19-27), High > 27): Readmission Risk Score: 24.2    Current PCP: Peter Ceron MD  PCP verified by CM? Yes    Chart Reviewed: Yes      History Provided by: Patient  Patient Orientation: Alert and Oriented    Patient Cognition: Alert    Hospitalization in the last 30 days (Readmission):  No    If yes, Readmission Assessment in  Navigator will be completed.    Advance Directives:      Code Status: Full Code   Patient's Primary Decision Maker is:        Discharge Planning:    Patient lives with: Spouse/Significant Other Type of Home: Skilled Nursing Facility  Primary Care Giver: Self  Patient Support Systems include: Spouse/Significant Other   Current Financial resources: Medicare  Current community resources: None  Current services prior to admission: Durable Medical Equipment, Oxygen Therapy            Current DME: Oxygen Therapy (Comment)            Type of Home Care services:  None    ADLS  Prior functional level: Independent in ADLs/IADLs  Current functional level: Assistance with the following:, Bathing, Dressing, Toileting, Cooking, Housework, Shopping, Mobility    PT AM-PAC:   /24  OT AM-PAC:   /24    Family can provide assistance at DC: Yes  Would you like Case Management to discuss the discharge plan with any other family members/significant others, and if so, who? No  Plans

## 2025-03-18 NOTE — CONSULTS
Orthopedic Consult    Requesting Physician: MD Marck    CHIEF COMPLAINT: Left shoulder pain and left knee pain    HISTORY OF PRESENT ILLNESS:      The patient is a 79 y.o. female  who presents with a history of fall at home.  She tripped over her oxygen tubing.  There is no reported loss of consciousness.  She is laying directly on her left shoulder as well as her left knee.  She was transported to emergency department after being unable to ambulate as well as being in severe pain.  X-rays in ER did confirm a left shoulder anterior dislocation that had been reduced and placed in a sling while in the emergency department.  She states the shoulder feels better but is still pretty sore.  She denies numbness or tingling her left hand.  She does also complain of severe left knee pain.  X-rays of that left knee were also obtained confirming a displaced supracondylar periprosthetic distal femur fracture.  She does have history of rheumatoid arthritis is on prednisone chronically.  She is also on Eliquis which has been held.  After examining the patient reviewing the films and discussing the case with Dr. Navarrete orthopedic attending we are recommending surgical intervention in the form of ORIF of this left periprosthetic supracondylar distal femur fracture.  In light of this patient being on Eliquis would like to proceed with this surgery tomorrow and hold the Eliquis.  Surgical consent to be obtained.  Regular diet today.  N.p.o. after midnight.      Past Medical History:    Past Medical History:   Diagnosis Date    Abdominal pain, RLQ 11/08/2021    Acute exacerbation of chronic obstructive pulmonary disease (COPD) ([facility]) 06/07/2022    Acute respiratory failure with hypoxia ([facility]) 06/07/2022    Atrial fibrillation ([facility])     Diverticulitis     History of iron deficiency anemia     Hyperlipidemia     Kidney stone     MI (myocardial infarction) ([facility])     Pericardial effusion 11/08/2021    Pneumonia of both lungs due to  technical quality. IMPRESSION: There is suggestion of infiltrate in the left carrie and suprahilar regions. Study is technically limited. Workstation ID: [id number] Dictated and Electronically Signed By: Trae Mora MD 3/4/2025 12:34    XR CHEST PORTABLE  Result Date: 3/3/2025  EXAMINATION: ONE XRAY VIEW OF THE CHEST 3/3/2025 7:46 pm COMPARISON: 05/09/2024 HISTORY: ORDERING SYSTEM PROVIDED HISTORY: Sepsis TECHNOLOGIST PROVIDED HISTORY: Sepsis Reason for Exam: Sepsis FINDINGS: The lungs are without acute focal process.  There is no effusion or pneumothorax. The cardiomediastinal silhouette is without acute process. The osseous structures are without acute process.     No acute process.     XR CHEST (2 VW)  Result Date: 2/24/2025  PROCEDURE: XRAY CHEST 2 VIEWS FRONTAL LATERAL DATE OF EXAM:  2/24/2025 14:52 DEMOGRAPHICS: 79 years old Female INDICATION: Chronic cough and SOB. History of asthma and COPD. COMPARISON: Chest x-ray 1/27/2025 TECHNIQUE: PA and lateral views of the chest were obtained. FINDINGS: Cardiac silhouette is enlarged. Mediastinal contours are otherwise within normal limits. Near complete resolution of the opacity in the right upper lobe. The lungs are otherwise clear. There are no pleural effusions or pneumothoraces. Osseous structures are within normal limits for age. IMPRESSION: 1.  Near-complete resolution of the right upper lobe opacity. This dictation was created with voice recognition software.  While attempts have been made to review the dictation as it is transcribed, on occasion the spoken word can be misinterpreted by the technology leading to omissions or inappropriate words, phrases or sentences. Dictated and Electronically Signed By: Julissa Olmstead MD 2/24/2025 14:15      ASSESSMENT:Principal Problem:    Closed displaced fracture of distal epiphysis of left femur, initial encounter ([facility])  Active Problems:    Anterior dislocation of left shoulder  Resolved Problems:    * No

## 2025-03-18 NOTE — PROGRESS NOTES
Hospitalist Progress Note    Patient:  Marah Loyd     YOB: 1945    MRN: [id number]   Admit date: 3/17/2025     Acct: [id number]     PCP: Peter Ceron MD    CC--Interval History: Left distal femur fracture----planned repair--3.19.2025 when necessary equipment set-up has arrived    Dislocation left shoulder--reduced--sling off    Seen by Cardiology---cleared for procedure    CHF--PAF--pulmonary HTN--HTN---stable    Diabetes Mellitus Type 2----BG = 80----postoperatively goal 140-180    See note below    All other ROS negative except noted in HPI    Diet:  ADULT DIET; Regular  Diet NPO    Medications:  Scheduled Meds:   sodium chloride  1,000 mL IntraVENous Once    sodium chloride flush  10 mL IntraVENous 2 times per day    amiodarone  200 mg Oral BID    cetirizine  10 mg Oral Daily    [Held by provider] apixaban  5 mg Oral BID    empagliflozin  10 mg Oral Daily    ferrous sulfate  325 mg Oral Daily with breakfast    glipiZIDE  5 mg Oral QAM AC    montelukast  10 mg Oral Nightly    levothyroxine  50 mcg Oral Daily    pantoprazole  40 mg Oral QAM AC    predniSONE  10 mg Oral Daily    budesonide  0.5 mg Nebulization BID RT    ipratropium 0.5 mg-albuterol 2.5 mg  1 Dose Inhalation 4x Daily RT    furosemide  40 mg Oral Daily     Continuous Infusions:   sodium chloride      sodium chloride 125 mL/hr at 03/18/25 0452    dextrose       PRN Meds:sodium chloride flush, sodium chloride, magnesium sulfate, ondansetron, polyethylene glycol, acetaminophen **OR** acetaminophen, albuterol, potassium chloride **OR** potassium alternative oral replacement **OR** potassium chloride, fluticasone, glucose, dextrose bolus **OR** dextrose bolus, glucagon (rDNA), dextrose, oxyCODONE-acetaminophen, HYDROmorphone    Objective:  Labs:  CBC with Differential:    Lab Results   Component Value Date/Time    WBC 16.6 03/18/2025 05:13 AM    RBC 2.79 03/18/2025 05:13 AM    HGB 8.6 03/18/2025 12:44 PM    HCT 28.1 03/18/2025      hogs, COPD---bronchitis---hypoxia---5.8.2024----on chronic bronchitis, asthma exacerbation---                 severe---eosinophilic obstructive and restrictive lung disease, hypoxia due to bronchitis----                 5.8.2024, Pneumonia---hilar---12.24.2024                 COPD exacerbation due to PNA---12.24.2024  PSH:       colectomy--2008, BERTO--colectomy---2019, colonoscopy--2017, hemorrhoidal                 banding, ventral hernia repair---2004, hysterectomy---cervix unknown, BLE TKA,                  partial thyroidectomy, EGD    Allergies:   erythromycin, fenofibrate, Lipitor--atorvastatin, Lopid--gemfibrozil, Darvocet--propoxyphene      Plan:    Planned repair left distal femur fracture---3.19.2025    Left shoulder dislocation--reduced---physical--OT therapies    CHF---current regimen    DM2----current regimen    See orders    Electronically signed by Mesfin Acharya MD on 3/18/2025 at 12:57 PM    Hospitalist

## 2025-03-18 NOTE — CONSULTS
[facility]  In PatientCardiology Consult             Date:   3/18/2025  Patient name: Marah Loyd  Date of admission:  3/17/2025  7:35 AM  MRN:   [id number]  YOB: 1945      Date:   3/18/2025  Patient name: Marah Loyd  Date of admission:  3/17/2025  7:35 AM  MRN:   [id number]  YOB: 1945    Reason for Admission: Fall with hip fracture    CHIEF COMPLAINT: Hip pain    History Obtained From:  Patient and medical records.    HISTORY OF PRESENT ILLNESS:      Patient is 79 years old female with history of paroxysmal atrial fibrillation, HFpEF, hypertension, normal coronary arteries on cardiac cath in 2022 with preserved LV systolic function presented to the hospital after mechanical fall where she tripped on her oxygen tubing without syncopal episode.  Patient sustained hip fracture and need to undergo orthopedic surgery.  She also had dislocated her shoulder which was reduced in the emergency room cardiology consultation was requested for preop evaluation.    Cardiac wise she is fairly asymptomatic currently without anginal type chest pain.  She has chronic dyspnea on exertion for which she is on home oxygen.  She denies any orthopnea or PND.  No palpitations dizziness or syncope.  No recent A-fib episode.      Past Medical History:   has a past medical history of Abdominal pain, RLQ, Acute exacerbation of chronic obstructive pulmonary disease (COPD) ([facility]), Acute respiratory failure with hypoxia (HCC), Atrial fibrillation (HCC), Diverticulitis, History of iron deficiency anemia, Hyperlipidemia, Kidney stone, MI (myocardial infarction) ([facility]), Pericardial effusion, Pneumonia of both lungs due to infectious organism, Prolonged Q-T interval on ECG, Small bowel obstruction (HCC), Stress disorder, acute, Stress-induced cardiomyopathy, Thyroid goiter, Vitamin B12 deficiency, and Vitamin D deficiency.    Past Surgical History:   has a past surgical history that includes

## 2025-03-18 NOTE — CARE COORDINATION
Post Acute Facility/Agency List     Provided patient with the following list, the list includes the overall star ratings obtained from CMS per the Medicare Web site (www.Medicare.gov):     [] Long Term Acute Care Facilities  [] Acute Inpatient Rehabilitation Facilities  [x] Skilled Nursing Facilities  [] Hospice Facilities  [] Home Care    Provided verbal instructions on how to utilize the QR Code to obtain additional detailed star ratings from www.Medicare.gov     offered to print and provide the detailed list:    []Accepted   [x]Declined    Patient/Family/Responsible party discussed discharge planning, provided with list of SNF/Home health agencies. Chose [facility]. Information called and faxed.

## 2025-03-19 ENCOUNTER — ANESTHESIA (OUTPATIENT)
Dept: OPERATING ROOM | Age: 80
End: 2025-03-19
Payer: MEDICARE

## 2025-03-19 ENCOUNTER — APPOINTMENT (OUTPATIENT)
Dept: GENERAL RADIOLOGY | Age: 80
DRG: 481 | End: 2025-03-19
Payer: MEDICARE

## 2025-03-19 LAB
ALBUMIN SERPL-MCNC: 2.5 G/DL (ref 3.5–5.2)
ANION GAP SERPL CALCULATED.3IONS-SCNC: 5 MMOL/L (ref 9–17)
BASOPHILS # BLD: <0.03 K/UL (ref 0–0.2)
BASOPHILS NFR BLD: 0 % (ref 0–2)
BUN SERPL-MCNC: 34 MG/DL (ref 8–23)
BUN/CREAT SERPL: 26 (ref 9–20)
CALCIUM SERPL-MCNC: 8 MG/DL (ref 8.6–10.4)
CHLORIDE SERPL-SCNC: 105 MMOL/L (ref 98–107)
CO2 SERPL-SCNC: 31 MMOL/L (ref 20–31)
CREAT SERPL-MCNC: 1.3 MG/DL (ref 0.5–0.9)
EKG ATRIAL RATE: 72 BPM
EKG P AXIS: 51 DEGREES
EKG P-R INTERVAL: 174 MS
EKG Q-T INTERVAL: 452 MS
EKG QRS DURATION: 76 MS
EKG QTC CALCULATION (BAZETT): 494 MS
EKG R AXIS: -2 DEGREES
EKG T AXIS: 50 DEGREES
EKG VENTRICULAR RATE: 72 BPM
EOSINOPHIL # BLD: <0.03 K/UL (ref 0–0.44)
EOSINOPHILS RELATIVE PERCENT: 0 % (ref 1–4)
ERYTHROCYTE [DISTWIDTH] IN BLOOD BY AUTOMATED COUNT: 15.7 % (ref 11.8–14.4)
GFR, ESTIMATED: 42 ML/MIN/1.73M2
GLUCOSE BLD-MCNC: 184 MG/DL (ref 65–105)
GLUCOSE BLD-MCNC: 185 MG/DL (ref 65–105)
GLUCOSE BLD-MCNC: 79 MG/DL (ref 65–105)
GLUCOSE BLD-MCNC: 98 MG/DL (ref 65–105)
GLUCOSE SERPL-MCNC: 96 MG/DL (ref 70–99)
HCT VFR BLD AUTO: 23.4 % (ref 36.3–47.1)
HCT VFR BLD AUTO: 31.3 % (ref 36.3–47.1)
HGB BLD-MCNC: 7.1 G/DL (ref 11.9–15.1)
HGB BLD-MCNC: 9.9 G/DL (ref 11.9–15.1)
IMM GRANULOCYTES # BLD AUTO: 0.14 K/UL (ref 0–0.3)
IMM GRANULOCYTES NFR BLD: 1 %
LYMPHOCYTES NFR BLD: 1.95 K/UL (ref 1.1–3.7)
LYMPHOCYTES RELATIVE PERCENT: 13 % (ref 24–43)
MAGNESIUM SERPL-MCNC: 2.1 MG/DL (ref 1.6–2.6)
MCH RBC QN AUTO: 29.1 PG (ref 25.2–33.5)
MCHC RBC AUTO-ENTMCNC: 30.3 G/DL (ref 25.2–33.5)
MCV RBC AUTO: 95.9 FL (ref 82.6–102.9)
MONOCYTES NFR BLD: 0.9 K/UL (ref 0.1–1.2)
MONOCYTES NFR BLD: 6 % (ref 3–12)
NEUTROPHILS NFR BLD: 80 % (ref 36–65)
NEUTS SEG NFR BLD: 12.32 K/UL (ref 1.5–8.1)
NRBC BLD-RTO: 0.1 PER 100 WBC
PLATELET # BLD AUTO: 149 K/UL (ref 138–453)
PMV BLD AUTO: 10.6 FL (ref 8.1–13.5)
POTASSIUM SERPL-SCNC: 3.9 MMOL/L (ref 3.7–5.3)
RBC # BLD AUTO: 2.44 M/UL (ref 3.95–5.11)
RBC # BLD: ABNORMAL 10*6/UL
SODIUM SERPL-SCNC: 141 MMOL/L (ref 135–144)
WBC OTHER # BLD: 15.3 K/UL (ref 3.5–11.3)

## 2025-03-19 PROCEDURE — 2500000003 HC RX 250 WO HCPCS: Performed by: NURSE PRACTITIONER

## 2025-03-19 PROCEDURE — 3700000001 HC ADD 15 MINUTES (ANESTHESIA): Performed by: ORTHOPAEDIC SURGERY

## 2025-03-19 PROCEDURE — 6370000000 HC RX 637 (ALT 250 FOR IP): Performed by: NURSE PRACTITIONER

## 2025-03-19 PROCEDURE — 6360000002 HC RX W HCPCS: Performed by: ORTHOPAEDIC SURGERY

## 2025-03-19 PROCEDURE — 27511 TREATMENT OF THIGH FRACTURE: CPT | Performed by: PHYSICIAN ASSISTANT

## 2025-03-19 PROCEDURE — 0QSC04Z REPOSITION LEFT LOWER FEMUR WITH INTERNAL FIXATION DEVICE, OPEN APPROACH: ICD-10-PCS | Performed by: ORTHOPAEDIC SURGERY

## 2025-03-19 PROCEDURE — 80048 BASIC METABOLIC PNL TOTAL CA: CPT

## 2025-03-19 PROCEDURE — 3600000012 HC SURGERY LEVEL 2 ADDTL 15MIN: Performed by: ORTHOPAEDIC SURGERY

## 2025-03-19 PROCEDURE — 86900 BLOOD TYPING SEROLOGIC ABO: CPT

## 2025-03-19 PROCEDURE — 30233N1 TRANSFUSION OF NONAUTOLOGOUS RED BLOOD CELLS INTO PERIPHERAL VEIN, PERCUTANEOUS APPROACH: ICD-10-PCS | Performed by: INTERNAL MEDICINE

## 2025-03-19 PROCEDURE — 3600000002 HC SURGERY LEVEL 2 BASE: Performed by: ORTHOPAEDIC SURGERY

## 2025-03-19 PROCEDURE — 6360000002 HC RX W HCPCS: Performed by: NURSE PRACTITIONER

## 2025-03-19 PROCEDURE — 7100000001 HC PACU RECOVERY - ADDTL 15 MIN: Performed by: ORTHOPAEDIC SURGERY

## 2025-03-19 PROCEDURE — 85025 COMPLETE CBC W/AUTO DIFF WBC: CPT

## 2025-03-19 PROCEDURE — 86920 COMPATIBILITY TEST SPIN: CPT

## 2025-03-19 PROCEDURE — 76937 US GUIDE VASCULAR ACCESS: CPT

## 2025-03-19 PROCEDURE — 2500000003 HC RX 250 WO HCPCS: Performed by: ORTHOPAEDIC SURGERY

## 2025-03-19 PROCEDURE — 85014 HEMATOCRIT: CPT

## 2025-03-19 PROCEDURE — 85018 HEMOGLOBIN: CPT

## 2025-03-19 PROCEDURE — 2780000010 HC IMPLANT OTHER: Performed by: ORTHOPAEDIC SURGERY

## 2025-03-19 PROCEDURE — 83735 ASSAY OF MAGNESIUM: CPT

## 2025-03-19 PROCEDURE — C1713 ANCHOR/SCREW BN/BN,TIS/BN: HCPCS | Performed by: ORTHOPAEDIC SURGERY

## 2025-03-19 PROCEDURE — 27511 TREATMENT OF THIGH FRACTURE: CPT | Performed by: ORTHOPAEDIC SURGERY

## 2025-03-19 PROCEDURE — 6360000002 HC RX W HCPCS

## 2025-03-19 PROCEDURE — 2500000003 HC RX 250 WO HCPCS: Performed by: NURSE ANESTHETIST, CERTIFIED REGISTERED

## 2025-03-19 PROCEDURE — 36415 COLL VENOUS BLD VENIPUNCTURE: CPT

## 2025-03-19 PROCEDURE — 2060000000 HC ICU INTERMEDIATE R&B

## 2025-03-19 PROCEDURE — 7100000000 HC PACU RECOVERY - FIRST 15 MIN: Performed by: ORTHOPAEDIC SURGERY

## 2025-03-19 PROCEDURE — P9016 RBC LEUKOCYTES REDUCED: HCPCS

## 2025-03-19 PROCEDURE — 2500000003 HC RX 250 WO HCPCS

## 2025-03-19 PROCEDURE — 2580000003 HC RX 258

## 2025-03-19 PROCEDURE — 3700000000 HC ANESTHESIA ATTENDED CARE: Performed by: ORTHOPAEDIC SURGERY

## 2025-03-19 PROCEDURE — 86850 RBC ANTIBODY SCREEN: CPT

## 2025-03-19 PROCEDURE — 94761 N-INVAS EAR/PLS OXIMETRY MLT: CPT

## 2025-03-19 PROCEDURE — 82040 ASSAY OF SERUM ALBUMIN: CPT

## 2025-03-19 PROCEDURE — 94640 AIRWAY INHALATION TREATMENT: CPT

## 2025-03-19 PROCEDURE — 6360000002 HC RX W HCPCS: Performed by: NURSE ANESTHETIST, CERTIFIED REGISTERED

## 2025-03-19 PROCEDURE — 97162 PT EVAL MOD COMPLEX 30 MIN: CPT

## 2025-03-19 PROCEDURE — 2709999900 HC NON-CHARGEABLE SUPPLY: Performed by: ORTHOPAEDIC SURGERY

## 2025-03-19 PROCEDURE — 2700000000 HC OXYGEN THERAPY PER DAY

## 2025-03-19 PROCEDURE — 36430 TRANSFUSION BLD/BLD COMPNT: CPT

## 2025-03-19 PROCEDURE — 97166 OT EVAL MOD COMPLEX 45 MIN: CPT | Performed by: OCCUPATIONAL THERAPIST

## 2025-03-19 PROCEDURE — 86901 BLOOD TYPING SEROLOGIC RH(D): CPT

## 2025-03-19 RX ORDER — PROPOFOL 10 MG/ML
INJECTION, EMULSION INTRAVENOUS
Status: DISCONTINUED | OUTPATIENT
Start: 2025-03-19 | End: 2025-03-19 | Stop reason: SDUPTHER

## 2025-03-19 RX ORDER — TRANEXAMIC ACID 100 MG/ML
INJECTION, SOLUTION INTRAVENOUS PRN
Status: DISCONTINUED | OUTPATIENT
Start: 2025-03-19 | End: 2025-03-19 | Stop reason: ALTCHOICE

## 2025-03-19 RX ORDER — ONDANSETRON 2 MG/ML
INJECTION INTRAMUSCULAR; INTRAVENOUS
Status: DISCONTINUED | OUTPATIENT
Start: 2025-03-19 | End: 2025-03-19 | Stop reason: SDUPTHER

## 2025-03-19 RX ORDER — DOCUSATE SODIUM 100 MG/1
100 CAPSULE, LIQUID FILLED ORAL DAILY
Status: DISCONTINUED | OUTPATIENT
Start: 2025-03-19 | End: 2025-03-21 | Stop reason: HOSPADM

## 2025-03-19 RX ORDER — FENTANYL CITRATE 50 UG/ML
INJECTION, SOLUTION INTRAMUSCULAR; INTRAVENOUS
Status: DISCONTINUED | OUTPATIENT
Start: 2025-03-19 | End: 2025-03-19 | Stop reason: SDUPTHER

## 2025-03-19 RX ORDER — ROCURONIUM BROMIDE 10 MG/ML
INJECTION, SOLUTION INTRAVENOUS
Status: DISCONTINUED | OUTPATIENT
Start: 2025-03-19 | End: 2025-03-19 | Stop reason: SDUPTHER

## 2025-03-19 RX ORDER — SODIUM CHLORIDE 9 MG/ML
INJECTION, SOLUTION INTRAVENOUS PRN
Status: DISCONTINUED | OUTPATIENT
Start: 2025-03-19 | End: 2025-03-21 | Stop reason: HOSPADM

## 2025-03-19 RX ORDER — DEXAMETHASONE SODIUM PHOSPHATE 4 MG/ML
INJECTION, SOLUTION INTRA-ARTICULAR; INTRALESIONAL; INTRAMUSCULAR; INTRAVENOUS; SOFT TISSUE
Status: DISCONTINUED | OUTPATIENT
Start: 2025-03-19 | End: 2025-03-19 | Stop reason: SDUPTHER

## 2025-03-19 RX ORDER — LIDOCAINE HYDROCHLORIDE 10 MG/ML
INJECTION, SOLUTION EPIDURAL; INFILTRATION; INTRACAUDAL; PERINEURAL
Status: DISCONTINUED | OUTPATIENT
Start: 2025-03-19 | End: 2025-03-19 | Stop reason: SDUPTHER

## 2025-03-19 RX ADMIN — PREDNISONE 10 MG: 5 TABLET ORAL at 10:50

## 2025-03-19 RX ADMIN — PROPOFOL 150 MG: 10 INJECTION, EMULSION INTRAVENOUS at 08:19

## 2025-03-19 RX ADMIN — AMIODARONE HYDROCHLORIDE 200 MG: 200 TABLET ORAL at 21:02

## 2025-03-19 RX ADMIN — FUROSEMIDE 40 MG: 40 TABLET ORAL at 10:51

## 2025-03-19 RX ADMIN — FENTANYL CITRATE 50 MCG: 50 INJECTION, SOLUTION INTRAMUSCULAR; INTRAVENOUS at 08:31

## 2025-03-19 RX ADMIN — DEXAMETHASONE SODIUM PHOSPHATE 8 MG: 4 INJECTION INTRA-ARTICULAR; INTRALESIONAL; INTRAMUSCULAR; INTRAVENOUS; SOFT TISSUE at 09:09

## 2025-03-19 RX ADMIN — DOCUSATE SODIUM 100 MG: 100 CAPSULE, LIQUID FILLED ORAL at 10:59

## 2025-03-19 RX ADMIN — BUDESONIDE INHALATION 500 MCG: 0.5 SUSPENSION RESPIRATORY (INHALATION) at 19:42

## 2025-03-19 RX ADMIN — LIDOCAINE HYDROCHLORIDE 50 MG: 10 INJECTION, SOLUTION EPIDURAL; INFILTRATION; INTRACAUDAL; PERINEURAL at 08:19

## 2025-03-19 RX ADMIN — IPRATROPIUM BROMIDE AND ALBUTEROL SULFATE 1 DOSE: 2.5; .5 SOLUTION RESPIRATORY (INHALATION) at 19:42

## 2025-03-19 RX ADMIN — LEVOTHYROXINE SODIUM 50 MCG: 0.03 TABLET ORAL at 10:50

## 2025-03-19 RX ADMIN — HYDROMORPHONE HYDROCHLORIDE 0.5 MG: 1 INJECTION, SOLUTION INTRAMUSCULAR; INTRAVENOUS; SUBCUTANEOUS at 09:07

## 2025-03-19 RX ADMIN — ONDANSETRON 4 MG: 2 INJECTION INTRAMUSCULAR; INTRAVENOUS at 09:06

## 2025-03-19 RX ADMIN — IPRATROPIUM BROMIDE AND ALBUTEROL SULFATE 1 DOSE: 2.5; .5 SOLUTION RESPIRATORY (INHALATION) at 16:08

## 2025-03-19 RX ADMIN — SODIUM CHLORIDE, PRESERVATIVE FREE 10 ML: 5 INJECTION INTRAVENOUS at 21:03

## 2025-03-19 RX ADMIN — ROCURONIUM BROMIDE 20 MG: 10 INJECTION INTRAVENOUS at 08:46

## 2025-03-19 RX ADMIN — SODIUM CHLORIDE, PRESERVATIVE FREE 10 ML: 5 INJECTION INTRAVENOUS at 21:05

## 2025-03-19 RX ADMIN — EMPAGLIFLOZIN 10 MG: 10 TABLET, FILM COATED ORAL at 10:52

## 2025-03-19 RX ADMIN — Medication 2000 MG: at 08:26

## 2025-03-19 RX ADMIN — FERROUS SULFATE TAB 325 MG (65 MG ELEMENTAL FE) 325 MG: 325 (65 FE) TAB at 10:59

## 2025-03-19 RX ADMIN — SODIUM CHLORIDE: 0.9 INJECTION, SOLUTION INTRAVENOUS at 05:32

## 2025-03-19 RX ADMIN — PROPOFOL 140 MCG/KG/MIN: 10 INJECTION, EMULSION INTRAVENOUS at 08:22

## 2025-03-19 RX ADMIN — ROCURONIUM BROMIDE 50 MG: 10 INJECTION INTRAVENOUS at 08:19

## 2025-03-19 RX ADMIN — SODIUM CHLORIDE, PRESERVATIVE FREE 10 ML: 5 INJECTION INTRAVENOUS at 17:14

## 2025-03-19 RX ADMIN — CEFAZOLIN 2000 MG: 10 INJECTION, POWDER, FOR SOLUTION INTRAVENOUS at 17:14

## 2025-03-19 RX ADMIN — CETIRIZINE HYDROCHLORIDE 10 MG: 5 TABLET, FILM COATED ORAL at 10:51

## 2025-03-19 RX ADMIN — AMIODARONE HYDROCHLORIDE 200 MG: 200 TABLET ORAL at 10:51

## 2025-03-19 RX ADMIN — SODIUM CHLORIDE, PRESERVATIVE FREE 10 ML: 5 INJECTION INTRAVENOUS at 12:46

## 2025-03-19 RX ADMIN — FENTANYL CITRATE 50 MCG: 50 INJECTION, SOLUTION INTRAMUSCULAR; INTRAVENOUS at 08:19

## 2025-03-19 RX ADMIN — MONTELUKAST 10 MG: 10 TABLET, FILM COATED ORAL at 21:02

## 2025-03-19 RX ADMIN — SUGAMMADEX 200 MG: 100 INJECTION, SOLUTION INTRAVENOUS at 09:16

## 2025-03-19 ASSESSMENT — PAIN - FUNCTIONAL ASSESSMENT
PAIN_FUNCTIONAL_ASSESSMENT: 0-10

## 2025-03-19 NOTE — ANESTHESIA PROCEDURE NOTES
PICC/Midline:    A midline catheter   was placed using ultrasound guided and Seldinger in the holding area for the following indication(s): intravenous access and intravenous antibiotics .  3/19/2025 7:30 AM  3/19/2025 8:00 AM    Staffing  Performed: Resident/CRNA   Resident/CRNA: Dominik Parker APRN - CRNA  Performed by: Dominik Parker APRN - CRNA  Authorized by: Aaron Rodrigues APRN - CRNA  Sterility preparation included the following: hand hygiene performed prior to procedure, maximum sterile barriers used and sterile technique used to drape from head to toe..  The  cephalic vein was prepped.A 5.5 Fr (size), Chloraprep}double lumen   Advanced 3 cm., Exposed 12 cm., Total 15 cm.    Expiration date: 5/31/2026  Ultrasound    Post-procedure education provided and Patient verbalized understanding of education.    Additional notes:  We discussed the medical necessity of the line and that this is a special IV intended for prolonged insertion duration.  The complications of placing include:  - Bleeding / Hematoma  - Infection   - Phlebitis / Extravasation (malpositioning and medication leak)  - Vessel injury  - Air embolism    Consent signed and procedure completed as documented.      The  recommends:    Flushing each lumen after every use or at least weekly when not in use using a 10mL or larger syringe of sterile saline.     Preanesthetic Checklist  Completed: patient identified, IV checked, site marked, risks and benefits discussed, surgical/procedural consents, equipment checked, pre-op evaluation, timeout performed, oxygen available, monitors applied/VS acknowledged and fire risk safety assessment completed and verbalized

## 2025-03-19 NOTE — OP NOTE
Operative Note      Patient: Marah Loyd  YOB: 1945  MRN: [id number]    Date of Procedure: 3/19/2025    Pre-Op Diagnosis Codes:      * Closed comminuted intra-articular fracture of distal end of left femur, initial encounter ([facility]) [S72.492A]    Post-Op Diagnosis: Same       Procedure: Open treatment left supracondylar femur fracture without intercondylar extension 96124    Surgeon(s):  Phillip Navarrete MD    Assistant:   Physician Assistant: Bairon Aguirre PA-C    The physician assistant was present for the entirety of the procedure and vital for the performance of the procedure.  He assisted with positioning, prepping, draping of the patient before the procedure and instrument manipulation, extremity repositioning  as well as wound closure, dressing application after the procedure. Please note that no intern, resident, or other hospital staff was available to assist during the surgery    Anesthesia: General    Estimated Blood Loss (mL): 200     Complications: None    Specimens:   * No specimens in log *    Implants:  * No implants in log *      Drains:   Urinary Catheter 03/18/25 [place] (Active)   $ Urethral catheter insertion Inserted for procedure 03/18/25 2345   Catheter Indications Perioperative use for selected surgical procedures 03/18/25 2345   Site Assessment No urethral drainage 03/18/25 2345   Urine Color Yellow 03/18/25 2345   Urine Appearance Hazy 03/18/25 2345   Securement Method Securing device (Describe) 03/18/25 2345   Catheter Care  Perineal wipes 03/18/25 2345   Catheter Best Practices  Drainage tube clipped to bed 03/18/25 2345   Status Draining 03/18/25 2345   Output (mL) 350 mL 03/19/25 0454       [REMOVED] External Urinary Catheter (Removed)   Site Assessment Clean,dry & intact;Clean;Dry;Intact 03/18/25 1900   Placement Replaced 03/18/25 1900   Catheter Care Catheter/Wick replaced 03/18/25 1900   Perineal Care Yes 03/18/25 1900   Suction 40 mmgHg continuous 03/18/25 1900

## 2025-03-19 NOTE — DISCHARGE INSTRUCTIONS
DR. NAVARRETE DISCHARGE INSTRUCTIONS  Dr. Phillip Navarrete MD       ACTIVITY / WEIGHTBEARING  INSTRUCTIONS:  Non Weightbearing surgical extremity.   PT/OT     DIET:  Increase Fluid/Water intake, eat foods high in fiber, fruits and vegetables to help to prevent constipation.    WOUND/DRESSING INSTRUCTIONS:  Always ensure you wash your hands before and after caring for your wound/dressing.  Keep your dressing clean and dry. Change dressing as needed using dry gauze.  Can wash around incision.     If prineo (mesh tape dressing) in place, this may be removed after 3 weeks from surgery.   You may shower with prineo dressing if no active drainage coming from incision.   Do not let shower water directly hit the incision. Dry completely without rubbing.     Do not place antibiotic ointment, lotion, creams on surgical incision.  Smoking impairs adequate wound healing.  If smoking, consider quitting and decreasing.  If diabetic, keeping blood glucose levels in acceptable range will limit complications.   May apply ice to surgical incision to help to prevent swelling. Apply 20 minutes at a time, as needed.      MEDICATION INSTRUCTIONS:  Take pain medication as prescribed.  Decrease as tolerable.   See orders regarding resuming your home medications and any new medications.  Continue blood thinner if ordered by your physician.   Wear compression stocking as directed, this will help reduce swelling and blood clots. Wear on during the day, may remove at bedtime. Wear until follow-up unless otherwise directed.     FOLLOW-UP CARE:  If a follow-up appointment was not made for you at discharge, call [phone] ([place] office) or [phone] ([place] office) to schedule an appointment for 8 weeks.    Call Dr Navarrete's office with any concerns.     Signs of infection such as fever, chills, redness, pus, or bad smelling discharge from incision site.     Excessive bleeding, swelling, increasing pain, or discharge around incision site.     The  stitches or staples come apart at the incision site.     Cough shortness of breath, or chest pain. Severe nausea or vomiting.     Pain that you cannot control with the medications you have been given or  pain becomes worse.     Numbness, tingling, or loss of feeling in your leg, knee, or foot.     Pain, burning, urgency, or frequency of urination.      If a medical emergerncy, please call 911 or go to your local emergency room.

## 2025-03-19 NOTE — ADDENDUM NOTE
Addendum  created 03/19/25 1240 by Dominik Parker APRN - CRNA    Child order released for a procedure order, Clinical Note Signed, Intraprocedure Blocks edited, SmartForm saved

## 2025-03-19 NOTE — PROGRESS NOTES
Physical Therapy  Facility/Department: [facility]  PROGRESSIVE CARE  Physical Therapy Initial Assessment    Name: Marah Loyd  : 1945  MRN: [id number]  Date of Service: 3/19/2025    Discharge Recommendations:  Subacute/Skilled Nursing Facility          Patient Diagnosis(es): The primary encounter diagnosis was Closed displaced fracture of distal epiphysis of left femur, initial encounter ([facility]). A diagnosis of Anterior dislocation of left shoulder, initial encounter was also pertinent to this visit.  Past Medical History:  has a past medical history of Abdominal pain, RLQ, Acute exacerbation of chronic obstructive pulmonary disease (COPD) ([facility]), Acute respiratory failure with hypoxia ([facility]), Atrial fibrillation ([facility]), Diverticulitis, History of iron deficiency anemia, Hyperlipidemia, Kidney stone, MI (myocardial infarction) ([facility]), Pericardial effusion, Pneumonia of both lungs due to infectious organism, Prolonged Q-T interval on ECG, Small bowel obstruction ([facility]), Stress disorder, acute, Stress-induced cardiomyopathy, Thyroid goiter, Vitamin B12 deficiency, and Vitamin D deficiency.  Past Surgical History:  has a past surgical history that includes Colonoscopy; Colon surgery (2008); Thyroidectomy, partial; Upper gastrointestinal endoscopy; joint replacement; hernia repair (); Hemorrhoid surgery; Colonoscopy (2017); Hysterectomy; laparoscopy (N/A, 2019); Cardiac catheterization; and Colonoscopy (N/A, 2024).    Assessment  Body Structures, Functions, Activity Limitations Requiring Skilled Therapeutic Intervention: Decreased functional mobility ;Decreased ADL status;Decreased ROM;Decreased strength;Decreased balance;Decreased endurance;Increased pain;Decreased posture  Therapy Prognosis: Fair  Decision Making: Medium Complexity  Activity Tolerance  Activity Tolerance: Patient limited by pain    Plan  Physical Therapy Plan  General Plan:  (BID M-F, QD Sat/Sun/Hol)  Current Treatment

## 2025-03-19 NOTE — PROGRESS NOTES
Occupational Therapy  Facility/Department: [facility]  PROGRESSIVE CARE  Occupational Therapy Initial Assessment    Name: Marah Loyd  : 1945  MRN: [id number]  Date of Service: 3/19/2025    Discharge Recommendations:  Subacute/Skilled Nursing Facility, Long Term Care with OT, Patient would benefit from continued therapy after discharge     Patient Diagnosis(es): The primary encounter diagnosis was Closed displaced fracture of distal epiphysis of left femur, initial encounter ([facility]). A diagnosis of Anterior dislocation of left shoulder, initial encounter was also pertinent to this visit.  Past Medical History:  has a past medical history of Abdominal pain, RLQ, Acute exacerbation of chronic obstructive pulmonary disease (COPD) ([facility]), Acute respiratory failure with hypoxia ([facility]), Atrial fibrillation ([facility]), Diverticulitis, History of iron deficiency anemia, Hyperlipidemia, Kidney stone, MI (myocardial infarction) ([facility]), Pericardial effusion, Pneumonia of both lungs due to infectious organism, Prolonged Q-T interval on ECG, Small bowel obstruction ([facility]), Stress disorder, acute, Stress-induced cardiomyopathy, Thyroid goiter, Vitamin B12 deficiency, and Vitamin D deficiency.  Past Surgical History:  has a past surgical history that includes Colonoscopy; Colon surgery (2008); Thyroidectomy, partial; Upper gastrointestinal endoscopy; joint replacement; hernia repair (); Hemorrhoid surgery; Colonoscopy (2017); Hysterectomy; laparoscopy (N/A, 2019); Cardiac catheterization; and Colonoscopy (N/A, 2024).    Treatment Diagnosis: closed displaced fracture of distal epiphysis of left femur, anterior dislocation of left shoulder      Assessment  Performance deficits / Impairments: Decreased functional mobility ;Decreased ADL status;Decreased ROM;Decreased strength;Decreased balance;Decreased high-level IADLs;Decreased coordination  Treatment Diagnosis: closed displaced fracture of distal epiphysis of

## 2025-03-19 NOTE — ANESTHESIA POSTPROCEDURE EVALUATION
Department of Anesthesiology  Postprocedure Note    Patient: Marah Loyd  MRN: [id number]  YOB: 1945  Date of evaluation: 3/19/2025    Procedure Summary       Date: 03/19/25 Room / Location: [address]    Anesthesia Start: 0818 Anesthesia Stop: 0930    Procedure: Open Reduction Internal Fixation Left Femur (Left) Diagnosis:       Closed comminuted intra-articular fracture of distal end of left femur, initial encounter ([facility])      (Closed comminuted intra-articular fracture of distal end of left femur, initial encounter ([facility]) [S72.492A])    Surgeons: Phillip Navarrete MD Responsible Provider: Aaron Rodrigues APRN - CRNA    Anesthesia Type: General ASA Status: Not recorded            Anesthesia Type: General    Marija Phase I: Marija Score: 8    Marija Phase II:      Anesthesia Post Evaluation    Patient location during evaluation: PACU  Patient participation: complete - patient participated  Level of consciousness: awake  Airway patency: patent  Nausea & Vomiting: no nausea  Cardiovascular status: hemodynamically stable  Respiratory status: face mask  Hydration status: euvolemic  Multimodal analgesia pain management approach  Pain management: adequate and satisfactory to patient    No notable events documented.

## 2025-03-19 NOTE — CONSENT
Informed Consent for Blood Component Transfusion Note    I have discussed with the patient the rationale for blood component transfusion; its benefits in treating or preventing fatigue, organ damage, or death; and its risk which includes mild transfusion reactions, rare risk of blood borne infection, or more serious but rare reactions. I have discussed the alternatives to transfusion, including the risk and consequences of not receiving transfusion. The patient had an opportunity to ask questions and had agreed to proceed with transfusion of blood components.    Electronically signed by JANNETH Ybarra NP on 3/19/25 at 6:13 AM EDT

## 2025-03-19 NOTE — PROGRESS NOTES
Hospitalist Progress Note    Patient:  Marah Loyd     YOB: 1945    MRN: [id number]   Admit date: 3/17/2025     Acct: [id number]     PCP: Peter Ceron MD    CC--Interval History: POD 0 repair distal left femur fracture---3.19.2025---[place]---non-weight bearing---rec'd two units PRBCs    [facility]----dc as soon as safe from a Orthopedic Surgery standpoint    Chronic respiratory failure----on supplemental oxygen    PAF--ASCVD--HTN---controlled Bp 118/45    DM2---BG = 96---potoperatively may be 140-180    See note below        All other ROS negative except noted in HPI    Diet:  ADULT DIET; Regular    Medications:  Scheduled Meds:   [Held by provider] apixaban  5 mg Oral BID    docusate sodium  100 mg Oral Daily    ceFAZolin  2,000 mg IntraVENous Q8H    sodium chloride  1,000 mL IntraVENous Once    sodium chloride flush  10 mL IntraVENous 2 times per day    amiodarone  200 mg Oral BID    cetirizine  10 mg Oral Daily    empagliflozin  10 mg Oral Daily    ferrous sulfate  325 mg Oral Daily with breakfast    glipiZIDE  5 mg Oral QAM AC    montelukast  10 mg Oral Nightly    levothyroxine  50 mcg Oral Daily    pantoprazole  40 mg Oral QAM AC    predniSONE  10 mg Oral Daily    budesonide  0.5 mg Nebulization BID RT    ipratropium 0.5 mg-albuterol 2.5 mg  1 Dose Inhalation 4x Daily RT    furosemide  40 mg Oral Daily     Continuous Infusions:   sodium chloride      sodium chloride      sodium chloride 75 mL/hr at 03/19/25 0532    dextrose       PRN Meds:sodium chloride, sodium chloride flush, sodium chloride, magnesium sulfate, ondansetron, polyethylene glycol, acetaminophen **OR** acetaminophen, albuterol, potassium chloride **OR** potassium alternative oral replacement **OR** potassium chloride, fluticasone, glucose, dextrose bolus **OR** dextrose bolus, glucagon (rDNA), dextrose, oxyCODONE-acetaminophen, HYDROmorphone    Objective:  Labs:  CBC with Differential:    Lab Results   Component Value

## 2025-03-19 NOTE — PLAN OF CARE
Problem: Safety - Adult  Goal: Free from fall injury  3/19/2025 1256 by Denisha Andrews RN  Outcome: Progressing  3/18/2025 2349 by Pebbles Núñez RN  Outcome: Progressing     Problem: Chronic Conditions and Co-morbidities  Goal: Patient's chronic conditions and co-morbidity symptoms are monitored and maintained or improved  3/19/2025 1256 by Denisha Andrews RN  Outcome: Progressing  3/18/2025 2349 by Pebbles Núñez RN  Outcome: Progressing     Problem: Discharge Planning  Goal: Discharge to home or other facility with appropriate resources  3/19/2025 1256 by Denisha Andrews RN  Outcome: Progressing  3/18/2025 2349 by Pebbles Núñez RN  Outcome: Progressing     Problem: Skin/Tissue Integrity  Goal: Skin integrity remains intact  Description: 1.  Monitor for areas of redness and/or skin breakdown  2.  Assess vascular access sites hourly  3.  Every 4-6 hours minimum:  Change oxygen saturation probe site  4.  Every 4-6 hours:  If on nasal continuous positive airway pressure, respiratory therapy assess nares and determine need for appliance change or resting period  3/19/2025 1256 by Denisha Andrews RN  Outcome: Progressing  3/18/2025 2349 by Pebbles Núñez RN  Outcome: Progressing     Problem: ABCDS Injury Assessment  Goal: Absence of physical injury  3/19/2025 1256 by Denisha Andrews RN  Outcome: Progressing  3/18/2025 2349 by Pebbles Núñez RN  Outcome: Progressing     Problem: Pain  Goal: Verbalizes/displays adequate comfort level or baseline comfort level  Outcome: Progressing

## 2025-03-19 NOTE — ANESTHESIA PRE PROCEDURE
(myocardial infarction) (HCC)     Pericardial effusion 11/08/2021    Pneumonia of both lungs due to infectious organism 06/07/2022    Prolonged Q-T interval on ECG 05/01/2019    Formatting of this note might be different from the original. on [place] report    Small bowel obstruction (HCC)     Stress disorder, acute     Stress-induced cardiomyopathy     Thyroid goiter     Vitamin B12 deficiency     Vitamin D deficiency        Past Surgical History:        Procedure Laterality Date    CARDIAC CATHETERIZATION      COLON SURGERY  04/2008    COLECTOMY    COLONOSCOPY      COLONOSCOPY  03/13/2017    POLYP,DIVERTICULOSIS    COLONOSCOPY N/A 11/12/2024    severe diverticulosis with some inflammation and small blood clot, anastomosis looked normal; Dr. Colunga at [facility]    HEMORRHOID SURGERY      BANDING    HERNIA REPAIR  2004    VENTRAL HERNIA    HYSTERECTOMY (CERVIX STATUS UNKNOWN)      JOINT REPLACEMENT      BILATERAL KNEE REPLACEMENT    LAPAROSCOPY N/A 07/11/2019    EXPLORATORY LAPAROSCOPY CONVERTED TO OPEN LYSIS OF ADHESIONS PARTIAL COLECTOMY performed by Mesfin Colunga MD at [facility] OR    THYROIDECTOMY, PARTIAL      goiter removed     UPPER GASTROINTESTINAL ENDOSCOPY         Social History:    Social History     Tobacco Use    Smoking status: Never    Smokeless tobacco: Never    Tobacco comments:     KENNA Loyd RRT 7/10/19   Substance Use Topics    Alcohol use: No                                Counseling given: Not Answered  Tobacco comments: KENNA Loyd RRT 7/10/19      Vital Signs (Current):   Vitals:    03/18/25 2330 03/18/25 2343 03/19/25 0013 03/19/25 0454   BP: (!) 124/44   (!) 118/45   Pulse: 72   73   Resp: 14 18 18 15   Temp: 36.5 °C (97.7 °F)   37.2 °C (98.9 °F)   TempSrc:    Temporal   SpO2: 92%   91%   Weight:    103.6 kg (228 lb 4.8 oz)   Height:                                                  BP Readings from Last 3 Encounters:   03/19/25 (!) 118/45   03/03/25 (!) 168/72   12/27/24 96/75       NPO Status: Time of last

## 2025-03-20 LAB
ABO/RH: NORMAL
ANION GAP SERPL CALCULATED.3IONS-SCNC: 9 MMOL/L (ref 9–17)
ANTIBODY SCREEN: NEGATIVE
ARM BAND NUMBER: NORMAL
BASOPHILS # BLD: <0.03 K/UL (ref 0–0.2)
BASOPHILS NFR BLD: 0 % (ref 0–2)
BLOOD BANK BLOOD PRODUCT EXPIRATION DATE: NORMAL
BLOOD BANK BLOOD PRODUCT EXPIRATION DATE: NORMAL
BLOOD BANK DISPENSE STATUS: NORMAL
BLOOD BANK DISPENSE STATUS: NORMAL
BLOOD BANK ISBT PRODUCT BLOOD TYPE: 600
BLOOD BANK ISBT PRODUCT BLOOD TYPE: 600
BLOOD BANK PRODUCT CODE: NORMAL
BLOOD BANK PRODUCT CODE: NORMAL
BLOOD BANK SAMPLE EXPIRATION: NORMAL
BLOOD BANK UNIT TYPE AND RH: NORMAL
BLOOD BANK UNIT TYPE AND RH: NORMAL
BPU ID: NORMAL
BPU ID: NORMAL
BUN SERPL-MCNC: 31 MG/DL (ref 8–23)
BUN/CREAT SERPL: 26 (ref 9–20)
CALCIUM SERPL-MCNC: 7.6 MG/DL (ref 8.6–10.4)
CHLORIDE SERPL-SCNC: 105 MMOL/L (ref 98–107)
CO2 SERPL-SCNC: 29 MMOL/L (ref 20–31)
COMPONENT: NORMAL
COMPONENT: NORMAL
CREAT SERPL-MCNC: 1.2 MG/DL (ref 0.5–0.9)
CROSSMATCH RESULT: NORMAL
CROSSMATCH RESULT: NORMAL
EOSINOPHIL # BLD: <0.03 K/UL (ref 0–0.44)
EOSINOPHILS RELATIVE PERCENT: 0 % (ref 1–4)
ERYTHROCYTE [DISTWIDTH] IN BLOOD BY AUTOMATED COUNT: 15.8 % (ref 11.8–14.4)
GFR, ESTIMATED: 46 ML/MIN/1.73M2
GLUCOSE BLD-MCNC: 54 MG/DL (ref 65–105)
GLUCOSE BLD-MCNC: 71 MG/DL (ref 65–105)
GLUCOSE BLD-MCNC: 74 MG/DL (ref 65–105)
GLUCOSE BLD-MCNC: 82 MG/DL (ref 65–105)
GLUCOSE BLD-MCNC: 91 MG/DL (ref 65–105)
GLUCOSE SERPL-MCNC: 121 MG/DL (ref 70–99)
HCT VFR BLD AUTO: 26.8 % (ref 36.3–47.1)
HCT VFR BLD AUTO: 31.5 % (ref 36.3–47.1)
HGB BLD-MCNC: 10 G/DL (ref 11.9–15.1)
HGB BLD-MCNC: 8.4 G/DL (ref 11.9–15.1)
IMM GRANULOCYTES # BLD AUTO: 0.18 K/UL (ref 0–0.3)
IMM GRANULOCYTES NFR BLD: 1 %
LYMPHOCYTES NFR BLD: 1.31 K/UL (ref 1.1–3.7)
LYMPHOCYTES RELATIVE PERCENT: 9 % (ref 24–43)
MAGNESIUM SERPL-MCNC: 2.2 MG/DL (ref 1.6–2.6)
MCH RBC QN AUTO: 29.4 PG (ref 25.2–33.5)
MCHC RBC AUTO-ENTMCNC: 31.3 G/DL (ref 25.2–33.5)
MCV RBC AUTO: 93.7 FL (ref 82.6–102.9)
MONOCYTES NFR BLD: 0.72 K/UL (ref 0.1–1.2)
MONOCYTES NFR BLD: 5 % (ref 3–12)
NEUTROPHILS NFR BLD: 85 % (ref 36–65)
NEUTS SEG NFR BLD: 11.95 K/UL (ref 1.5–8.1)
NRBC BLD-RTO: 0.2 PER 100 WBC
PLATELET # BLD AUTO: 126 K/UL (ref 138–453)
PMV BLD AUTO: 10.5 FL (ref 8.1–13.5)
POTASSIUM SERPL-SCNC: 4 MMOL/L (ref 3.7–5.3)
RBC # BLD AUTO: 2.86 M/UL (ref 3.95–5.11)
RBC # BLD: ABNORMAL 10*6/UL
SODIUM SERPL-SCNC: 143 MMOL/L (ref 135–144)
TRANSFUSION STATUS: NORMAL
TRANSFUSION STATUS: NORMAL
UNIT DIVISION: 0
UNIT DIVISION: 0
UNIT ISSUE DATE/TIME: NORMAL
UNIT ISSUE DATE/TIME: NORMAL
WBC OTHER # BLD: 14.2 K/UL (ref 3.5–11.3)

## 2025-03-20 PROCEDURE — 85014 HEMATOCRIT: CPT

## 2025-03-20 PROCEDURE — 99231 SBSQ HOSP IP/OBS SF/LOW 25: CPT | Performed by: INTERNAL MEDICINE

## 2025-03-20 PROCEDURE — 85025 COMPLETE CBC W/AUTO DIFF WBC: CPT

## 2025-03-20 PROCEDURE — 82947 ASSAY GLUCOSE BLOOD QUANT: CPT

## 2025-03-20 PROCEDURE — 6370000000 HC RX 637 (ALT 250 FOR IP): Performed by: NURSE PRACTITIONER

## 2025-03-20 PROCEDURE — 97110 THERAPEUTIC EXERCISES: CPT | Performed by: PHYSICAL THERAPY ASSISTANT

## 2025-03-20 PROCEDURE — 85018 HEMOGLOBIN: CPT

## 2025-03-20 PROCEDURE — 6360000002 HC RX W HCPCS: Performed by: NURSE PRACTITIONER

## 2025-03-20 PROCEDURE — 97530 THERAPEUTIC ACTIVITIES: CPT

## 2025-03-20 PROCEDURE — 2500000003 HC RX 250 WO HCPCS: Performed by: NURSE PRACTITIONER

## 2025-03-20 PROCEDURE — 2700000000 HC OXYGEN THERAPY PER DAY

## 2025-03-20 PROCEDURE — 94761 N-INVAS EAR/PLS OXIMETRY MLT: CPT

## 2025-03-20 PROCEDURE — 80048 BASIC METABOLIC PNL TOTAL CA: CPT

## 2025-03-20 PROCEDURE — 83735 ASSAY OF MAGNESIUM: CPT

## 2025-03-20 PROCEDURE — 97110 THERAPEUTIC EXERCISES: CPT

## 2025-03-20 PROCEDURE — 94640 AIRWAY INHALATION TREATMENT: CPT

## 2025-03-20 PROCEDURE — 36415 COLL VENOUS BLD VENIPUNCTURE: CPT

## 2025-03-20 PROCEDURE — 2060000000 HC ICU INTERMEDIATE R&B

## 2025-03-20 PROCEDURE — 6370000000 HC RX 637 (ALT 250 FOR IP): Performed by: INTERNAL MEDICINE

## 2025-03-20 RX ORDER — LACTULOSE 10 G/15ML
20 SOLUTION ORAL 3 TIMES DAILY
Status: DISCONTINUED | OUTPATIENT
Start: 2025-03-20 | End: 2025-03-21 | Stop reason: HOSPADM

## 2025-03-20 RX ORDER — HYDROCODONE BITARTRATE AND ACETAMINOPHEN 5; 325 MG/1; MG/1
1 TABLET ORAL EVERY 4 HOURS PRN
Qty: 40 TABLET | Refills: 0 | Status: SHIPPED | OUTPATIENT
Start: 2025-03-20 | End: 2025-03-27

## 2025-03-20 RX ADMIN — AMIODARONE HYDROCHLORIDE 200 MG: 200 TABLET ORAL at 22:05

## 2025-03-20 RX ADMIN — MONTELUKAST 10 MG: 10 TABLET, FILM COATED ORAL at 22:05

## 2025-03-20 RX ADMIN — LEVOTHYROXINE SODIUM 50 MCG: 0.03 TABLET ORAL at 09:00

## 2025-03-20 RX ADMIN — IPRATROPIUM BROMIDE AND ALBUTEROL SULFATE 1 DOSE: 2.5; .5 SOLUTION RESPIRATORY (INHALATION) at 19:54

## 2025-03-20 RX ADMIN — PREDNISONE 10 MG: 5 TABLET ORAL at 09:00

## 2025-03-20 RX ADMIN — PANTOPRAZOLE SODIUM 40 MG: 40 TABLET, DELAYED RELEASE ORAL at 05:41

## 2025-03-20 RX ADMIN — AMIODARONE HYDROCHLORIDE 200 MG: 200 TABLET ORAL at 09:00

## 2025-03-20 RX ADMIN — IPRATROPIUM BROMIDE AND ALBUTEROL SULFATE 1 DOSE: 2.5; .5 SOLUTION RESPIRATORY (INHALATION) at 07:41

## 2025-03-20 RX ADMIN — SODIUM CHLORIDE, PRESERVATIVE FREE 10 ML: 5 INJECTION INTRAVENOUS at 09:01

## 2025-03-20 RX ADMIN — IPRATROPIUM BROMIDE AND ALBUTEROL SULFATE 1 DOSE: 2.5; .5 SOLUTION RESPIRATORY (INHALATION) at 12:13

## 2025-03-20 RX ADMIN — DOCUSATE SODIUM 100 MG: 100 CAPSULE, LIQUID FILLED ORAL at 09:00

## 2025-03-20 RX ADMIN — BUDESONIDE INHALATION 500 MCG: 0.5 SUSPENSION RESPIRATORY (INHALATION) at 19:55

## 2025-03-20 RX ADMIN — EMPAGLIFLOZIN 10 MG: 10 TABLET, FILM COATED ORAL at 09:00

## 2025-03-20 RX ADMIN — CEFAZOLIN 2000 MG: 10 INJECTION, POWDER, FOR SOLUTION INTRAVENOUS at 00:34

## 2025-03-20 RX ADMIN — LACTULOSE 20 G: 10 SOLUTION ORAL at 12:21

## 2025-03-20 RX ADMIN — SODIUM CHLORIDE, PRESERVATIVE FREE 10 ML: 5 INJECTION INTRAVENOUS at 22:06

## 2025-03-20 RX ADMIN — FUROSEMIDE 40 MG: 40 TABLET ORAL at 09:00

## 2025-03-20 RX ADMIN — OXYCODONE HYDROCHLORIDE AND ACETAMINOPHEN 1 TABLET: 5; 325 TABLET ORAL at 16:59

## 2025-03-20 RX ADMIN — OXYCODONE HYDROCHLORIDE AND ACETAMINOPHEN 1 TABLET: 5; 325 TABLET ORAL at 09:51

## 2025-03-20 RX ADMIN — GLIPIZIDE 5 MG: 5 TABLET ORAL at 05:41

## 2025-03-20 RX ADMIN — IPRATROPIUM BROMIDE AND ALBUTEROL SULFATE 1 DOSE: 2.5; .5 SOLUTION RESPIRATORY (INHALATION) at 15:46

## 2025-03-20 RX ADMIN — FERROUS SULFATE TAB 325 MG (65 MG ELEMENTAL FE) 325 MG: 325 (65 FE) TAB at 09:00

## 2025-03-20 RX ADMIN — BUDESONIDE INHALATION 500 MCG: 0.5 SUSPENSION RESPIRATORY (INHALATION) at 07:41

## 2025-03-20 RX ADMIN — CETIRIZINE HYDROCHLORIDE 10 MG: 5 TABLET, FILM COATED ORAL at 09:00

## 2025-03-20 RX ADMIN — LACTULOSE 20 G: 10 SOLUTION ORAL at 22:04

## 2025-03-20 ASSESSMENT — PAIN DESCRIPTION - ORIENTATION
ORIENTATION: LEFT
ORIENTATION: LEFT

## 2025-03-20 ASSESSMENT — PAIN SCALES - GENERAL
PAINLEVEL_OUTOF10: 2
PAINLEVEL_OUTOF10: 5
PAINLEVEL_OUTOF10: 2
PAINLEVEL_OUTOF10: 4

## 2025-03-20 ASSESSMENT — PAIN SCALES - WONG BAKER
WONGBAKER_NUMERICALRESPONSE: NO HURT
WONGBAKER_NUMERICALRESPONSE: NO HURT

## 2025-03-20 ASSESSMENT — PAIN DESCRIPTION - DESCRIPTORS
DESCRIPTORS: ACHING
DESCRIPTORS: ACHING

## 2025-03-20 ASSESSMENT — PAIN DESCRIPTION - LOCATION
LOCATION: HIP
LOCATION: HIP

## 2025-03-20 NOTE — PROGRESS NOTES
Hospitalist Progress Note    Patient:  Marah Loyd     YOB: 1945    MRN: [id number]   Admit date: 3/17/2025     Acct: [id number]     PCP: Peter Ceron MD    CC--Interval History: POD 1 left distal femur fracture----3.19.2025---Haman---no weight bearing    Working on placement for cont'd physical--OT therapies    See note below    All other ROS negative except noted in HPI    Diet:  ADULT DIET; Regular    Medications:  Scheduled Meds:   lactulose  20 g Oral TID    [Held by provider] apixaban  5 mg Oral BID    docusate sodium  100 mg Oral Daily    sodium chloride  1,000 mL IntraVENous Once    sodium chloride flush  10 mL IntraVENous 2 times per day    amiodarone  200 mg Oral BID    cetirizine  10 mg Oral Daily    empagliflozin  10 mg Oral Daily    ferrous sulfate  325 mg Oral Daily with breakfast    glipiZIDE  5 mg Oral QAM AC    montelukast  10 mg Oral Nightly    levothyroxine  50 mcg Oral Daily    pantoprazole  40 mg Oral QAM AC    predniSONE  10 mg Oral Daily    budesonide  0.5 mg Nebulization BID RT    ipratropium 0.5 mg-albuterol 2.5 mg  1 Dose Inhalation 4x Daily RT    furosemide  40 mg Oral Daily     Continuous Infusions:   sodium chloride      sodium chloride      dextrose       PRN Meds:sodium chloride, sodium chloride flush, sodium chloride, magnesium sulfate, ondansetron, polyethylene glycol, acetaminophen **OR** acetaminophen, albuterol, potassium chloride **OR** potassium alternative oral replacement **OR** potassium chloride, fluticasone, glucose, dextrose bolus **OR** dextrose bolus, glucagon (rDNA), dextrose, oxyCODONE-acetaminophen, HYDROmorphone    Objective:  Labs:  CBC with Differential:    Lab Results   Component Value Date/Time    WBC 14.2 03/20/2025 05:42 AM    RBC 2.86 03/20/2025 05:42 AM    HGB 10.0 03/20/2025 12:18 PM    HCT 31.5 03/20/2025 12:18 PM     03/20/2025 05:42 AM    MCV 93.7 03/20/2025 05:42 AM    MCH 29.4 03/20/2025 05:42 AM    MCHC 31.3  distal epiphysis of left femur, initial encounter ([facility])  Active Problems:    PAF (paroxysmal atrial fibrillation) ([facility])    Anterior dislocation of left shoulder    Encounter for pre-operative cardiovascular clearance    Chronic heart failure with preserved ejection fraction ([facility])  Resolved Problems:    * No resolved hospital problems. *    CAITIE HE      [Jaleesa]  79  WF   [Peter Ceron, LESTER---[place];  gretchen Navarrete, Orthopedics; DC Cardiology---[facility]]  FULL CODE      ABIEL---dc'd 3.19.2025    left midline----3.19.2025    NO WEIGHT-BEARING LLE  SUPPLEMENTAL OXYGEN---continuous home supplemental oxygen       ELIQUIS and  ASA---held until 3.20.2025      COVID-19--NEGATIVE    INFLUENZA---NEGATIVE         Anti-infectives:    -------     POD _____ ORIF----repair distal femur fracture---3.19.2025----[facility]  Anemia----2 UNITS PRBCs----3.19.2025  Anemia--due to expected blood loss---fracture and surgery           Left distal---femur fracture due to a fall---3.17.2025         Left shoulder dislocation--3.17.2025---reduced in ER    Nocturnal hypoxia without TATI---requires home supplemental oxygen    CHF---chronic combined systolic---diastolic  PAF---Atrial fibrillation-----currently SR  ASCVD             EKG----12.24.2024---NSR---73--low voltage--iRBBB--old septal infarction             EKG----11.12.2024--sinus bradycardia---59---RAD---NSTCs             EKG--5.8.2024--NSR--71--low voltage--NSTCs             MI---2022             2D ECHO--11.9.2023--LA moderately dilated---LV mildly dilated but NLVSF--                          RA dilated--NRVSF---EDWARD---MAC--normal IVC diameter---LVEF ~ 60-65%             Cardiac catheterization--6.9.2022--normal coronaries--0% LM--LAD--LCx--RCA    Pulmonary HTN  Cardiomyopathy---stress-induced  Secondary hypercoagulable state due to Eliquis  HTN  Hyperlipidemia  Diabetes Mellitus Type 2  Hypothyroidism   CKD---Stage 3b  Obesity--morbid  GERD--HH    Malignancies        Colon

## 2025-03-20 NOTE — PROGRESS NOTES
Physical Therapy  Facility/Department: [facility]  PROGRESSIVE CARE  Daily Treatment Note  NAME: Marah Loyd  : 1945  MRN: [id number]    Date of Service: 3/20/2025    Discharge Recommendations:  Subacute/Skilled Nursing Facility, Continue to assess pending progress        Patient Diagnosis(es): The primary encounter diagnosis was Closed displaced fracture of distal epiphysis of left femur, initial encounter ([facility]). A diagnosis of Anterior dislocation of left shoulder, initial encounter was also pertinent to this visit.    Assessment  Activity Tolerance: Patient limited by pain;Treatment limited secondary to medical complications    Plan  Physical Therapy Plan  General Plan:  (BID M-F, QD Sat/Sun/Hol)  Current Treatment Recommendations: Strengthening;ROM;Balance training;Functional mobility training;Transfer training;Neuromuscular re-education;Gait training;Endurance training;Pain management;Return to work related activity;Home exercise program;Safety education & training;Patient/Caregiver education & training;Equipment evaluation, education, & procurement    Restrictions  Restrictions/Precautions  Restrictions/Precautions: Weight Bearing (Simultaneous filing. User may not have seen previous data.)  Lower Extremity Weight Bearing Restrictions  Left Lower Extremity Weight Bearing: Non Weight Bearing (Simultaneous filing. User may not have seen previous data.)  Position Activity Restriction  Other Position/Activity Restrictions: L shoudler Dislocation, Relocated in the ER     Subjective   Subjective  Subjective: Pt in bed upon arrival. Daughter present in room during session. Pt agreeable to session  Pain: Did not rate    Objective  Vitals  O2 Device: Nasal cannula  Bed Mobility Training  Bed Mobility Training: Yes  Overall Level of Assistance: Partial/Moderate assistance;2 Person assistance  Supine to Sit: Partial/Moderate assistance;2 Person assistance  Sit to Supine: Partial/Moderate

## 2025-03-20 NOTE — PROGRESS NOTES
Physical Therapy  Facility/Department: [facility]  PROGRESSIVE CARE  Daily Treatment Note  NAME: Marah Loyd  : 1945  MRN: [id number]    Date of Service: 3/20/2025    Discharge Recommendations:  Subacute/Skilled Nursing Facility, Continue to assess pending progress        Patient Diagnosis(es): The primary encounter diagnosis was Closed displaced fracture of distal epiphysis of left femur, initial encounter ([facility]). A diagnosis of Anterior dislocation of left shoulder, initial encounter was also pertinent to this visit.    Assessment  Activity Tolerance: Patient limited by pain    Plan  Physical Therapy Plan  Current Treatment Recommendations: Strengthening;ROM;Balance training;Functional mobility training;Transfer training;Neuromuscular re-education;Gait training;Endurance training;Pain management;Return to work related activity;Home exercise program;Safety education & training;Patient/Caregiver education & training;Equipment evaluation, education, & procurement    Restrictions  Restrictions/Precautions  Restrictions/Precautions: Weight Bearing (Simultaneous filing. User may not have seen previous data.)  Lower Extremity Weight Bearing Restrictions  Left Lower Extremity Weight Bearing: Non Weight Bearing (Simultaneous filing. User may not have seen previous data.)  Position Activity Restriction  Other Position/Activity Restrictions: L shoudler Dislocation, Relocated in the ER     Subjective   Subjective  Subjective: Patient in bed at initiation of session. Agreeable to therapy at this time.  Pain: No pain noted at initiation. Increased with activity.    Objective  Vitals  O2 Device: Nasal cannula (2L)  Bed Mobility Training  Bed Mobility Training: Yes  Overall Level of Assistance: Partial/Moderate assistance  Supine to Sit: Partial/Moderate assistance  Sit to Supine: Partial/Moderate assistance;Substantial/Maximal assistance;2 Person assistance  Balance  Sitting: High guard  Standing: With support  Transfer  Training  Transfer Training: Yes  Overall Level of Assistance: Partial/Moderate assistance  Sit to Stand: Partial/Moderate assistance  Stand to Sit: Partial/Moderate assistance  Gait  Gait Training: No     PT Exercises  Exercise Treatment: Supine ankle pumps, quad sets, glute sets, heel slides, hip abd x10 ea.     Safety Devices  Type of Devices: All fall risk precautions in place;Gait belt;Call light within reach;Bed alarm in place;Left in bed         Goals  Short Term Goals  Time Frame for Short Term Goals: LOS  Short Term Goal 1: Bed mobility with min x 1  Short Term Goal 2: Sit to Stand manintaining NWB on the L, mod x 2  Short Term Goal 3: Stand Pivot Transfer with max x 2  Patient Goals   Patient Goals : Improve Strength    Education  Patient Education  Education Given To: Patient  Education Provided: Role of Therapy;Mobility Training;Precautions    AM-PAC - Mobility              Therapy Time   Individual Concurrent Group Co-treatment   Time In 0903         Time Out 0920         Minutes 17                 Rodrigo Kellogg PTA

## 2025-03-20 NOTE — PLAN OF CARE
Problem: Safety - Adult  Goal: Free from fall injury  3/19/2025 2147 by Yaneth Billings RN  Outcome: Progressing  3/19/2025 1256 by Denisha Andrews RN  Outcome: Progressing     Problem: Chronic Conditions and Co-morbidities  Goal: Patient's chronic conditions and co-morbidity symptoms are monitored and maintained or improved  3/19/2025 2147 by Yaenth Billings RN  Outcome: Progressing  3/19/2025 1256 by Denisha Andrews RN  Outcome: Progressing     Problem: Discharge Planning  Goal: Discharge to home or other facility with appropriate resources  3/19/2025 2147 by Yaneth Billings RN  Outcome: Progressing  3/19/2025 1256 by Denisha Andrews RN  Outcome: Progressing     Problem: Skin/Tissue Integrity  Goal: Skin integrity remains intact  Description: 1.  Monitor for areas of redness and/or skin breakdown  2.  Assess vascular access sites hourly  3.  Every 4-6 hours minimum:  Change oxygen saturation probe site  4.  Every 4-6 hours:  If on nasal continuous positive airway pressure, respiratory therapy assess nares and determine need for appliance change or resting period  3/19/2025 2147 by Yaneth Billings RN  Outcome: Progressing  3/19/2025 1256 by Denisha Andrews RN  Outcome: Progressing     Problem: ABCDS Injury Assessment  Goal: Absence of physical injury  3/19/2025 2147 by Yaneth Billings RN  Outcome: Progressing  3/19/2025 1256 by Denisha Andrews RN  Outcome: Progressing

## 2025-03-20 NOTE — PLAN OF CARE
Problem: Safety - Adult  Goal: Free from fall injury  3/20/2025 1146 by Estephania Collins RN  Outcome: Progressing  3/19/2025 2147 by Yaneth Billings RN  Outcome: Progressing     Problem: Chronic Conditions and Co-morbidities  Goal: Patient's chronic conditions and co-morbidity symptoms are monitored and maintained or improved  3/20/2025 1146 by Estephania Collins RN  Outcome: Progressing  3/19/2025 2147 by Yaneth Billings RN  Outcome: Progressing     Problem: Discharge Planning  Goal: Discharge to home or other facility with appropriate resources  3/20/2025 1146 by Estephania Collins RN  Outcome: Progressing  3/19/2025 2147 by Yaneth Billings RN  Outcome: Progressing     Problem: Skin/Tissue Integrity  Goal: Skin integrity remains intact  Description: 1.  Monitor for areas of redness and/or skin breakdown  2.  Assess vascular access sites hourly  3.  Every 4-6 hours minimum:  Change oxygen saturation probe site  4.  Every 4-6 hours:  If on nasal continuous positive airway pressure, respiratory therapy assess nares and determine need for appliance change or resting period  3/20/2025 1146 by Estephania Collins RN  Outcome: Progressing  3/19/2025 2147 by Yaneth Billings RN  Outcome: Progressing     Problem: ABCDS Injury Assessment  Goal: Absence of physical injury  3/20/2025 1146 by Estephania Collins RN  Outcome: Progressing  3/19/2025 2147 by Yaneth Billings RN  Outcome: Progressing     Problem: Pain  Goal: Verbalizes/displays adequate comfort level or baseline comfort level  3/20/2025 1146 by Estephania Collins RN  Outcome: Progressing  3/19/2025 2147 by Yaneth Billings RN  Outcome: Progressing

## 2025-03-20 NOTE — PROGRESS NOTES
Occupational Therapy  Facility/Department: [facility]  PROGRESSIVE CARE  Daily Treatment Note  NAME: Marah Loyd  : 1945  MRN: [id number]    Date of Service: 3/20/2025    Discharge Recommendations:  Subacute/Skilled Nursing Facility, Long Term Care with OT, Patient would benefit from continued therapy after discharge  OT Equipment Recommendations  Equipment Needed: Yes  Mobility Devices: ADL Assistive Devices      Patient Diagnosis(es): The primary encounter diagnosis was Closed displaced fracture of distal epiphysis of left femur, initial encounter ([facility]). A diagnosis of Anterior dislocation of left shoulder, initial encounter was also pertinent to this visit.     Assessment   Activity Tolerance: Patient tolerated treatment well;Patient limited by pain  Discharge Recommendations: Subacute/Skilled Nursing Facility;Long Term Care with OT;Patient would benefit from continued therapy after discharge  Equipment Needed: Yes  Mobility Devices: ADL Assistive Devices     Plan  Occupational Therapy Plan  Times Per Week: 1-2  Current Treatment Recommendations: Functional mobility training;Patient/Caregiver education & training;Equipment evaluation, education, & procurement;Self-Care / ADL    Restrictions       Subjective  Subjective  Subjective: Patient supine in bed upon [facility] arrival, agreeable for OT. Patient content with education only at this time. Daughter present.  Orientation  Overall Orientation Status: Within Normal Limits  Orientation Level: Oriented X4  Pain: Did not rate  Cognition  Overall Cognitive Status: WNL       Objective  Vitals  Vitals  O2 Device: Nasal cannula (2L)  Bed Mobility Training  Bed Mobility Training: No    Transfer Training  Transfer Training: No    Gait  Gait Training: No              Safety Devices  Type of Devices: All fall risk precautions in place;Call light within reach;Bed alarm in place;Left in bed    Patient Education  Education Given To: Patient;Family  Education Provided: Role of  Therapy;Family Education;Equipment;ADL Adaptive Strategies  Education Provided Comments: adaptive equipment for ease in LB ADLs  Education Method: Demonstration;Verbal;Printed Information/Hand-outs  Barriers to Learning: None  Education Outcome: Verbalized understanding    Goals  Short Term Goals  Time Frame for Short Term Goals: LOS  Short Term Goal 1: Patient to complete BSC transfer with mod assist x2 using a/e as needed  Short Term Goal 2: Patient to complete toilet hygiene and clothing manipulation with mod/max assist x2 using a/e as needed  Short Term Goal 3: Patient education adaptive equipment and technique LB dressing for increased ease ADLs - MET    AM-PAC - ADL       Therapy Time   Individual Concurrent Group Co-treatment   Time In 1250         Time Out 1302         Minutes 12         Timed Code Treatment Minutes: 12 Minutes       Bairon Finn VEDA

## 2025-03-20 NOTE — PROGRESS NOTES
Salma Loyd, PPatient Assessment complete. Anterior dislocation of left shoulder, initial encounter [S43.015A]  Closed displaced fracture of distal epiphysis of left femur, initial encounter ([facility]) [S72.621W] .   Vitals:    03/20/25 1213   BP: (!) 127/51   Pulse: 66   Resp: (!) 31   Temp: 97.7 °F (36.5 °C)   SpO2: 94%   . Patients home meds are   Prior to Admission medications    Medication Sig Start Date End Date Taking? Authorizing Provider   HYDROcodone-acetaminophen (NORCO) 5-325 MG per tablet Take 1 tablet by mouth every 4 hours as needed for Pain for up to 7 days. Max Daily Amount: 6 tablets 3/20/25 3/27/25 Yes Angelita Mitchell APRN - CNP   OXYGEN Inhale 2 L/min into the lungs continuous 3/14/25 3/14/26 Yes ProviderWicho MD   guaiFENesin-codeine (GUAIFENESIN AC) 100-10 MG/5ML liquid Take 5-10 mLs by mouth 4 (four) times daily as needed for up to 3 days. No driving if makes drowsy 3/13/25  Yes ProviderWicho MD   VENTOLIN  (90 Base) MCG/ACT inhaler USE 2 INHALATIONS BY MOUTH EVERY 6 HOURS AS NEEDED FOR WHEEZING 1/10/25  Yes Rinku Restrepo MD   furosemide (LASIX) 40 MG tablet Take 1 tablet by mouth as needed (water gain) 12/27/24  Yes Taiwo Knox MD   ipratropium 0.5 mg-albuterol 2.5 mg (DUONEB) 0.5-2.5 (3) MG/3ML SOLN nebulizer solution USE 1 VIAL VIA NEBULIZER INTO  THE LUNGS EVERY 4 HOURS 11/4/24  Yes Nikolas Ji APRN - CNP   ELIQUIS 5 MG TABS tablet TAKE 1 TABLET BY MOUTH TWICE  DAILY 10/30/24  Yes Becka Soto APRN - CNP   ferrous sulfate (IRON 325) 325 (65 Fe) MG tablet Take 1 tablet by mouth daily (with breakfast)   Yes Wicho Diego MD   empagliflozin (JARDIANCE) 10 MG tablet Take 1 tablet by mouth daily 9/23/24  Yes Enriqueta Medina APRN - CNP   amiodarone (CORDARONE) 200 MG tablet TAKE 1 TABLET BY MOUTH TWICE  DAILY 8/14/24  Yes Claudia Hearn, APRN - CNP   glimepiride (AMARYL) 2 MG tablet Take 1 tablet by mouth every morning  Bronchodilator Assessment  BRONCHODILATOR ASSESSMENT SCORE  Score 0 1 2 3 4 5   Breath Sounds   [x]  Patient Baseline []  No Wheeze good aeration []  Faint, scattered wheezing, good aeration []  Expiratory Wheezing and or moderately diminished []  Insp/Exp wheeze and/or very diminished []  Insp/Exp and/ or marked distress   Respiratory Rate   [x]  Patient Baseline []  Less than 20 []  Less than 20 []  20-25 []  Greater than 25 []  Greater than 25   Peak flow % of Pred or PB [x]  NA   []  Greater than 90%  []  81-90% []  71-80% []  Less than or equal to 70%  or unable to perform []  Unable due to Respiratory Distress   Dyspnea re [x]  Patient Baseline []  No SOB []  No SOB []  SOB on exertion []  SOB min activity []  At rest/acute   e FEV% Predicted       [x]  NA []  Above 69%  []  Unable []  Above 60-69%  []  Unable []  Above 50-59%  []  Unable []  Above 35-49%  []  Unable []  Less than 35%  []  Unable                 [x]  Hyperinflation Assessment  Score 1 2 3   CXR and Breath Sounds   [x]  Clear []  No atelectasis  Basilar aeration []  Atelectasis or absent basilar breath sounds   Incentive Spirometry Volume  (Per IBW)   [x]  Greater than or equal to 15ml/Kg []  less than 15ml/Kg []  less than 15ml/Kg   Surgery within last 2 weeks [x]  None or general   []  Abdominal or thoracic surgery  []  Abdominal or thoracic   Chronic Pulmonary Historyre []  No [x]  Yes []  Yes     [x]  Secretion Management Assessment  Score 1 2 3   Bilateral Breath Sounds   [x]  Occasional Rhonchi []  Scattered Rhonchi []  Course Rhonchi and/or poor aeration   Sputum    [x]  Small amount of thin secretions []  Moderate amount of viscous secretions []  Copius, Viscious Yellow/ Secretions   CXR as reported by physician []  clear  [x]  Unavailable []  Infiltrates and/or consolidation  []  Unavailable []  Mucus Plugging and or lobar consolidation  []  Unavailable   Cough [x]  Strong, productive cough []  Weak productive cough []  No cough or

## 2025-03-20 NOTE — PROGRESS NOTES
Orthopaedic Progress Note      SUBJECTIVE   Ms. Loyd is post op day # 1 s/p Open treatment left supracondylar femur fracture without intercondylar extension   Hgb 8.4  Patient was seen resting in bed.  Alert oriented x 3.  Patient is noted to have controlled pain with medication.  Her left leg dressing is dry and intact.  She can flex and extend her left toes.  Denies numbness or tingling.  She states she is planning to go to a nursing home for rehab at discharge.  She is nonweightbearing on the left lower extremity.  She denies any concerns today.        Allergies:    Allergies as of 03/17/2025 - Fully Reviewed 03/17/2025   Allergen Reaction Noted    Erythromycin  03/02/2017    Fenofibrate  03/02/2017    Lipitor [atorvastatin]  03/02/2017    Lopid [gemfibrozil]  03/02/2017    Other  03/02/2017    Propoxyphene Other (See Comments) 07/19/2014     Current Inpatient Medications:  Current Facility-Administered Medications: 0.9 % sodium chloride infusion, , IntraVENous, PRN  [Held by provider] apixaban (ELIQUIS) tablet 5 mg, 5 mg, Oral, BID  docusate sodium (COLACE) capsule 100 mg, 100 mg, Oral, Daily  sodium chloride 0.9 % bolus 1,000 mL, 1,000 mL, IntraVENous, Once  sodium chloride flush 0.9 % injection 10 mL, 10 mL, IntraVENous, 2 times per day  sodium chloride flush 0.9 % injection 10 mL, 10 mL, IntraVENous, PRN  0.9 % sodium chloride infusion, , IntraVENous, PRN  magnesium sulfate 2000 mg in 50 mL IVPB premix, 2,000 mg, IntraVENous, PRN  ondansetron (ZOFRAN) injection 4 mg, 4 mg, IntraVENous, Q6H PRN  polyethylene glycol (GLYCOLAX) packet 17 g, 17 g, Oral, Daily PRN  acetaminophen (TYLENOL) tablet 650 mg, 650 mg, Oral, Q4H PRN **OR** acetaminophen (TYLENOL) suppository 650 mg, 650 mg, Rectal, Q4H PRN  albuterol (PROVENTIL) (2.5 MG/3ML) 0.083% nebulizer solution 2.5 mg, 2.5 mg, Nebulization, As Directed RT PRN  potassium chloride (KLOR-CON M) extended release tablet 40 mEq, 40 mEq, Oral, PRN **OR** potassium

## 2025-03-21 VITALS
WEIGHT: 241.4 LBS | BODY MASS INDEX: 44.42 KG/M2 | RESPIRATION RATE: 21 BRPM | SYSTOLIC BLOOD PRESSURE: 131 MMHG | DIASTOLIC BLOOD PRESSURE: 58 MMHG | TEMPERATURE: 97.3 F | OXYGEN SATURATION: 95 % | HEIGHT: 62 IN | HEART RATE: 69 BPM

## 2025-03-21 LAB
ANION GAP SERPL CALCULATED.3IONS-SCNC: 6 MMOL/L (ref 9–17)
BASOPHILS # BLD: <0.03 K/UL (ref 0–0.2)
BASOPHILS NFR BLD: 0 % (ref 0–2)
BUN SERPL-MCNC: 28 MG/DL (ref 8–23)
BUN/CREAT SERPL: 28 (ref 9–20)
CALCIUM SERPL-MCNC: 7.7 MG/DL (ref 8.6–10.4)
CHLORIDE SERPL-SCNC: 104 MMOL/L (ref 98–107)
CO2 SERPL-SCNC: 32 MMOL/L (ref 20–31)
CREAT SERPL-MCNC: 1 MG/DL (ref 0.5–0.9)
EOSINOPHIL # BLD: 0.03 K/UL (ref 0–0.44)
EOSINOPHILS RELATIVE PERCENT: 0 % (ref 1–4)
ERYTHROCYTE [DISTWIDTH] IN BLOOD BY AUTOMATED COUNT: 15.9 % (ref 11.8–14.4)
GFR, ESTIMATED: 57 ML/MIN/1.73M2
GLUCOSE BLD-MCNC: 29 MG/DL (ref 65–105)
GLUCOSE BLD-MCNC: 77 MG/DL (ref 65–105)
GLUCOSE SERPL-MCNC: 74 MG/DL (ref 70–99)
HCT VFR BLD AUTO: 27 % (ref 36.3–47.1)
HGB BLD-MCNC: 8.4 G/DL (ref 11.9–15.1)
IMM GRANULOCYTES # BLD AUTO: 0.08 K/UL (ref 0–0.3)
IMM GRANULOCYTES NFR BLD: 1 %
LYMPHOCYTES NFR BLD: 1.6 K/UL (ref 1.1–3.7)
LYMPHOCYTES RELATIVE PERCENT: 15 % (ref 24–43)
MCH RBC QN AUTO: 29.8 PG (ref 25.2–33.5)
MCHC RBC AUTO-ENTMCNC: 31.1 G/DL (ref 25.2–33.5)
MCV RBC AUTO: 95.7 FL (ref 82.6–102.9)
MONOCYTES NFR BLD: 0.72 K/UL (ref 0.1–1.2)
MONOCYTES NFR BLD: 7 % (ref 3–12)
NEUTROPHILS NFR BLD: 77 % (ref 36–65)
NEUTS SEG NFR BLD: 7.99 K/UL (ref 1.5–8.1)
NRBC BLD-RTO: 0.2 PER 100 WBC
PLATELET # BLD AUTO: 150 K/UL (ref 138–453)
PMV BLD AUTO: 10.5 FL (ref 8.1–13.5)
POTASSIUM SERPL-SCNC: 3.9 MMOL/L (ref 3.7–5.3)
RBC # BLD AUTO: 2.82 M/UL (ref 3.95–5.11)
RBC # BLD: ABNORMAL 10*6/UL
SODIUM SERPL-SCNC: 142 MMOL/L (ref 135–144)
WBC OTHER # BLD: 10.4 K/UL (ref 3.5–11.3)

## 2025-03-21 PROCEDURE — 6370000000 HC RX 637 (ALT 250 FOR IP): Performed by: NURSE PRACTITIONER

## 2025-03-21 PROCEDURE — 36415 COLL VENOUS BLD VENIPUNCTURE: CPT

## 2025-03-21 PROCEDURE — 97530 THERAPEUTIC ACTIVITIES: CPT

## 2025-03-21 PROCEDURE — 94761 N-INVAS EAR/PLS OXIMETRY MLT: CPT

## 2025-03-21 PROCEDURE — 2700000000 HC OXYGEN THERAPY PER DAY

## 2025-03-21 PROCEDURE — 97110 THERAPEUTIC EXERCISES: CPT

## 2025-03-21 PROCEDURE — 2500000003 HC RX 250 WO HCPCS: Performed by: NURSE PRACTITIONER

## 2025-03-21 PROCEDURE — 6370000000 HC RX 637 (ALT 250 FOR IP): Performed by: INTERNAL MEDICINE

## 2025-03-21 PROCEDURE — 6360000002 HC RX W HCPCS: Performed by: NURSE PRACTITIONER

## 2025-03-21 PROCEDURE — 99238 HOSP IP/OBS DSCHRG MGMT 30/<: CPT | Performed by: INTERNAL MEDICINE

## 2025-03-21 PROCEDURE — 80048 BASIC METABOLIC PNL TOTAL CA: CPT

## 2025-03-21 PROCEDURE — 82947 ASSAY GLUCOSE BLOOD QUANT: CPT

## 2025-03-21 PROCEDURE — 94640 AIRWAY INHALATION TREATMENT: CPT

## 2025-03-21 PROCEDURE — 85025 COMPLETE CBC W/AUTO DIFF WBC: CPT

## 2025-03-21 RX ORDER — GLIPIZIDE 5 MG/1
2.5 TABLET ORAL
Status: DISCONTINUED | OUTPATIENT
Start: 2025-03-22 | End: 2025-03-21

## 2025-03-21 RX ORDER — FUROSEMIDE 40 MG/1
40 TABLET ORAL DAILY
Qty: 60 TABLET | Refills: 0
Start: 2025-03-22

## 2025-03-21 RX ORDER — PSEUDOEPHEDRINE HCL 30 MG
100 TABLET ORAL DAILY
Qty: 30 CAPSULE | Refills: 0
Start: 2025-03-22

## 2025-03-21 RX ORDER — LACTULOSE 10 G/15ML
20 SOLUTION ORAL 2 TIMES DAILY
Qty: 946 ML | Refills: 0
Start: 2025-03-21

## 2025-03-21 RX ORDER — PREDNISONE 10 MG/1
10 TABLET ORAL DAILY
Qty: 30 TABLET | Refills: 0
Start: 2025-03-22

## 2025-03-21 RX ADMIN — FERROUS SULFATE TAB 325 MG (65 MG ELEMENTAL FE) 325 MG: 325 (65 FE) TAB at 09:00

## 2025-03-21 RX ADMIN — FUROSEMIDE 40 MG: 40 TABLET ORAL at 09:00

## 2025-03-21 RX ADMIN — APIXABAN 5 MG: 5 TABLET, FILM COATED ORAL at 10:47

## 2025-03-21 RX ADMIN — SODIUM CHLORIDE, PRESERVATIVE FREE 10 ML: 5 INJECTION INTRAVENOUS at 09:00

## 2025-03-21 RX ADMIN — LEVOTHYROXINE SODIUM 50 MCG: 0.03 TABLET ORAL at 08:59

## 2025-03-21 RX ADMIN — IPRATROPIUM BROMIDE AND ALBUTEROL SULFATE 1 DOSE: 2.5; .5 SOLUTION RESPIRATORY (INHALATION) at 12:23

## 2025-03-21 RX ADMIN — AMIODARONE HYDROCHLORIDE 200 MG: 200 TABLET ORAL at 09:00

## 2025-03-21 RX ADMIN — PANTOPRAZOLE SODIUM 40 MG: 40 TABLET, DELAYED RELEASE ORAL at 06:04

## 2025-03-21 RX ADMIN — Medication 16 G: at 11:18

## 2025-03-21 RX ADMIN — IPRATROPIUM BROMIDE AND ALBUTEROL SULFATE 1 DOSE: 2.5; .5 SOLUTION RESPIRATORY (INHALATION) at 09:19

## 2025-03-21 RX ADMIN — PREDNISONE 10 MG: 5 TABLET ORAL at 09:00

## 2025-03-21 RX ADMIN — CETIRIZINE HYDROCHLORIDE 10 MG: 5 TABLET, FILM COATED ORAL at 08:59

## 2025-03-21 RX ADMIN — BUDESONIDE INHALATION 500 MCG: 0.5 SUSPENSION RESPIRATORY (INHALATION) at 09:23

## 2025-03-21 RX ADMIN — EMPAGLIFLOZIN 10 MG: 10 TABLET, FILM COATED ORAL at 08:59

## 2025-03-21 RX ADMIN — GLIPIZIDE 5 MG: 5 TABLET ORAL at 06:39

## 2025-03-21 RX ADMIN — OXYCODONE HYDROCHLORIDE AND ACETAMINOPHEN 1 TABLET: 5; 325 TABLET ORAL at 04:00

## 2025-03-21 RX ADMIN — DOCUSATE SODIUM 100 MG: 100 CAPSULE, LIQUID FILLED ORAL at 08:59

## 2025-03-21 RX ADMIN — OXYCODONE HYDROCHLORIDE AND ACETAMINOPHEN 1 TABLET: 5; 325 TABLET ORAL at 08:59

## 2025-03-21 ASSESSMENT — PAIN DESCRIPTION - DESCRIPTORS
DESCRIPTORS: ACHING
DESCRIPTORS: ACHING

## 2025-03-21 ASSESSMENT — PAIN DESCRIPTION - LOCATION
LOCATION: LEG
LOCATION: HIP;SHOULDER

## 2025-03-21 ASSESSMENT — PAIN SCALES - GENERAL
PAINLEVEL_OUTOF10: 6
PAINLEVEL_OUTOF10: 2
PAINLEVEL_OUTOF10: 3

## 2025-03-21 ASSESSMENT — PAIN DESCRIPTION - ORIENTATION
ORIENTATION: RIGHT;LEFT
ORIENTATION: LEFT

## 2025-03-21 ASSESSMENT — PAIN SCALES - WONG BAKER: WONGBAKER_NUMERICALRESPONSE: NO HURT

## 2025-03-21 NOTE — DISCHARGE SUMMARY
[place] [id number] [place] [address]                            DISCHARGE SUMMARY      PATIENT NAME: CAITIE HE             : 1945  MED REC NO: [id number]                         ROOM: 0214  ACCOUNT NO: [id number]                       ADMIT DATE: 2025  PROVIDER: Mesfin Acharya MD      PRIMARY CARE PHYSICIAN:  Peter Ceron MD    PERSONAL PHYSICIAN:  Peter Ceron, [place], [place].  The patient is seen by [facility], [facility]; Phillip Navarrete, Orthopedics.    DIAGNOSES:    1. Left distal femur fracture due to a fall 2025, left shoulder dislocation 2025, reduced in the emergency department, status post open reduction and internal fixation repair of a distal femur fracture 2025 by Dr. Navarrete.  2. Anemia requiring 2 units of PRBC transfusion 2025.  3. Anemia due to expected blood loss and fracture and surgery.  4. Nocturnal hypoxia without obstructive sleep apnea, requires home supplemental oxygen.  5. Congestive heart failure, chronic, combined systolic and diastolic.  6. Paroxysmal atrial fibrillation, currently in a sinus rhythm.  7. Coronary artery disease.  8. Myocardial infarction in .  2D echo of 2023, left atrium moderately dilated, left ventricle mildly dilated, but normal left ventricular systolic function.  Right atrium dilated.  Normal right ventricular systolic function.  EDWARD, MAC.  Normal inferior vena cava diameter.  Left ventricular ejection fraction 60% to 65%.  Cardiac catheterization 2022, normal coronaries, 0% LM, left anterior descending, left circumflex, right coronary artery.  9. Pulmonary hypertension.  10. Cardiomyopathy, stress-induced.  11. Secondary hypercoagulable state due to Eliquis.  12. Hypertension.  13. Hyperlipidemia.  14. Diabetes mellitus type 2.  15. Hypothyroidism.  16. Chronic kidney disease stage

## 2025-03-21 NOTE — FLOWSHEET NOTE
Physical Therapy     Attempted to see patient this afternoon. Upon arrival, pt receiving breathing treatment with respiratory therapy. Will attempt session later this afternoon.     Keena Esqueda, PTA

## 2025-03-21 NOTE — PROGRESS NOTES
([facility])  Active Problems:    PAF (paroxysmal atrial fibrillation) ([facility])    Anterior dislocation of left shoulder    Encounter for pre-operative cardiovascular clearance    Chronic heart failure with preserved ejection fraction ([facility])  Resolved Problems:    * No resolved hospital problems. *    TEOFILO YU  M.  75  HM  [alexandria Sprague, ; shane Paul, Urology]  FULL CODE    CPAP     SCDs    Anti-infectives:   Cefepime IV;  Meropenem IV    Prostatitis---ESBL E coli---UTI----3.20.2025---fever--chills--dyspnea---nausea--vomiting       BLOOD CULTURE--POSITIVE--2/2---ESBL E coli---3.20.2025---biofire--IDS--pending   UTI--POA  Dehydration---3.20.2025  POD  _____  prostate biopsy--3.18.2025  Constipation           2D ECHO---3.21.2025---EF 50%---no vegetations described         CT CAP----3.20.2025--no PE--mild inflammation around prostate and                          urinary bladder base compatible with recent biopsy        EKG--3.20.2025---sinus tachycardia--126---iRBBB---old anterior infarction    ASCVD           Stress test---2017---LVEF ~ 55%           Cardiac catheterization--2014--LVEF ~ 49%  Cardiomyopathy  HTN  Dyslipidemia   IFG             COPD  TATI---CPAP    Overweight   Cognitive impairment--mild  Tobacco abuse---8.13.1986---8.13.1996  HEARING IMPAIRMENT    PMH:   BPH--increased PSA--laser prostatectomy--2007, cervical spinal stenosis,              elevated LFTs ED--retrograde ejaculation, genital herpes, hematuria, hemorrhoids,              hyperopia--astigmatism--presbyopia--, lumbar foraminal stenosis, urethral stone,              posterior vitreous degeneration, PUD, chronic sinusitis, esophageal varices, DDD,               cervical facet syndrome  PSH:    colonoscopy--2006--2026, hemorrhoidal banding--2006, left hydrocelectomy--2003,               BIH--2011, cataract--IOL---OU---2020, multiple spinal PM injections--2015--L2-3-4-5,               septoplasty--inferior turbinate reduction---2021,  EGD---2020    Allergies:  NKDA    Plan:    ESBL E coli bacteremia---UTI--POA---cont'd antibiotics pending IDS    IVF    See orders     Electronically signed by Mesfin Acharya MD on 3/21/2025 at 11:47 AM    Hospitalist

## 2025-03-21 NOTE — CARE COORDINATION
TOMAS completed and faxed    CRF faxed.    Transportation being set up.      Electronically signed by MARTA Elizabeth on 3/21/2025 at 1:17 PM

## 2025-03-21 NOTE — PROGRESS NOTES
Hospitalist Progress Note    Patient:  Marah Loyd     YOB: 1945    MRN: [id number]   Admit date: 3/17/2025     Acct: [id number]     PCP: Peter Ceron MD    CC--Interval History: POD 2 ORIF left distal femur--3.19.2025---to Bernadette    Lower glucose level ---> stop OHAs--cont'd Jardiance    See note below         All other ROS negative except noted in HPI    Diet:  ADULT DIET; Regular    Medications:  Scheduled Meds:   lactulose  20 g Oral TID    apixaban  5 mg Oral BID    docusate sodium  100 mg Oral Daily    sodium chloride  1,000 mL IntraVENous Once    sodium chloride flush  10 mL IntraVENous 2 times per day    amiodarone  200 mg Oral BID    cetirizine  10 mg Oral Daily    empagliflozin  10 mg Oral Daily    ferrous sulfate  325 mg Oral Daily with breakfast    montelukast  10 mg Oral Nightly    levothyroxine  50 mcg Oral Daily    pantoprazole  40 mg Oral QAM AC    predniSONE  10 mg Oral Daily    budesonide  0.5 mg Nebulization BID RT    ipratropium 0.5 mg-albuterol 2.5 mg  1 Dose Inhalation 4x Daily RT    furosemide  40 mg Oral Daily     Continuous Infusions:   sodium chloride      sodium chloride      dextrose       PRN Meds:sodium chloride, sodium chloride flush, sodium chloride, magnesium sulfate, ondansetron, polyethylene glycol, acetaminophen **OR** acetaminophen, albuterol, potassium chloride **OR** potassium alternative oral replacement **OR** potassium chloride, fluticasone, glucose, dextrose bolus **OR** dextrose bolus, glucagon (rDNA), dextrose, oxyCODONE-acetaminophen, HYDROmorphone    Objective:  Labs:  CBC with Differential:    Lab Results   Component Value Date/Time    WBC 10.4 03/21/2025 05:34 AM    RBC 2.82 03/21/2025 05:34 AM    HGB 8.4 03/21/2025 05:34 AM    HCT 27.0 03/21/2025 05:34 AM     03/21/2025 05:34 AM    MCV 95.7 03/21/2025 05:34 AM    MCH 29.8 03/21/2025 05:34 AM    MCHC 31.1 03/21/2025 05:34 AM    RDW 15.9 03/21/2025 05:34 AM    LYMPHOPCT 15 03/21/2025

## 2025-03-21 NOTE — CARE COORDINATION
IMM letter provided to patient.  Patient offered four hours to make informed decision regarding appeal process; patient agreeable to discharge.       Second IMM given by Babs LOZANO. Copy made in chart.       Electronically signed by MARTA Elizabeth on 3/21/2025 at 12:20 PM

## 2025-03-21 NOTE — PROGRESS NOTES
Occupational Therapy  Facility/Department: [facility]  PROGRESSIVE CARE  Daily Treatment Note  NAME: Marah Loyd  : 1945  MRN: [id number]    Date of Service: 3/21/2025    Discharge Recommendations:  Subacute/Skilled Nursing Facility, Long Term Care with OT, Patient would benefit from continued therapy after discharge         Patient Diagnosis(es): The primary encounter diagnosis was Closed displaced fracture of distal epiphysis of left femur, initial encounter ([facility]). A diagnosis of Anterior dislocation of left shoulder, initial encounter was also pertinent to this visit.     Assessment   Activity Tolerance: Patient limited by pain;Treatment limited secondary to medical complications  Discharge Recommendations: Subacute/Skilled Nursing Facility;Long Term Care with OT;Patient would benefit from continued therapy after discharge     Plan  Occupational Therapy Plan  Times Per Week: 1-2  Current Treatment Recommendations: Functional mobility training;Patient/Caregiver education & training;Equipment evaluation, education, & procurement;Self-Care / ADL    Restrictions       Subjective  Subjective  Subjective: Patient supine in bed upon VEDA arrival, agreeable for OT.  Orientation  Overall Orientation Status: Within Normal Limits  Pain: 5/10 LLE  Cognition  Overall Cognitive Status: WNL       Objective  Vitals  Vitals  O2 Device: Nasal cannula (2L)  Bed Mobility Training  Bed Mobility Training: Yes (nursing present to assist with transfers)  Overall Level of Assistance: Substantial/Maximal assistance;2 Person assistance  Interventions: Safety awareness training  Supine to Sit: Substantial/Maximal assistance;2 Person assistance  Sit to Supine: Substantial/Maximal assistance;2 Person assistance  Scooting: Partial/Moderate assistance;2 Person assistance  Balance  Sitting: High guard  Standing: Impaired  Standing - Static: Constant support;Poor (completed 2 sit-to-stands with minimal to no output and required Max Ax2.  Unable to maintain LLE NWB status at this time and could only tolerate standing <10 seconds.)  Transfer Training  Transfer Training: Yes  Overall Level of Assistance: Substantial/Maximal assistance;2 Person assistance  Interventions: Safety awareness training;Weight shifting training/pressure relief;Manual cues;Verbal cues  Sit to Stand: Substantial/Maximal assistance;2 Person assistance  Stand to Sit: Substantial/Maximal assistance;2 Person assistance  Gait  Gait Training: No              Safety Devices  Type of Devices: All fall risk precautions in place;Call light within reach;Bed alarm in place;Left in bed;Nurse notified         Goals  Short Term Goals  Time Frame for Short Term Goals: LOS  Short Term Goal 1: Patient to complete BSC transfer with mod assist x2 using a/e as needed  Short Term Goal 2: Patient to complete toilet hygiene and clothing manipulation with mod/max assist x2 using a/e as needed  Short Term Goal 3: Patient education adaptive equipment and technique LB dressing for increased ease ADLs    AM-PAC - ADL       Therapy Time   Individual Concurrent Group Co-treatment   Time In 0820         Time Out 0855         Minutes 35         Timed Code Treatment Minutes: 35 Minutes       VEDA Zaidi

## 2025-03-21 NOTE — PROGRESS NOTES
Physical Therapy    Attempted to see pt for second attempt this pm with RN stating pt was on bedpan. Pt states will be on bed pan a while. Will attempt tmw as able.    Amita Hammond PTA

## 2025-03-21 NOTE — PROGRESS NOTES
Physical Therapy  Facility/Department: [facility]  PROGRESSIVE CARE  Daily Treatment Note  NAME: Marah Loyd  : 1945  MRN: [id number]    Date of Service: 3/21/2025    Discharge Recommendations:  Subacute/Skilled Nursing Facility, Continue to assess pending progress        Patient Diagnosis(es): The primary encounter diagnosis was Closed displaced fracture of distal epiphysis of left femur, initial encounter ([facility]). A diagnosis of Anterior dislocation of left shoulder, initial encounter was also pertinent to this visit.    Assessment  Activity Tolerance: Patient limited by pain;Treatment limited secondary to medical complications    Plan  Physical Therapy Plan  General Plan:  (BID M-F, 1x/day Holidays and weekends)  Current Treatment Recommendations: Strengthening;ROM;Balance training;Functional mobility training;Transfer training;Neuromuscular re-education;Gait training;Endurance training;Pain management;Return to work related activity;Home exercise program;Safety education & training;Patient/Caregiver education & training;Equipment evaluation, education, & procurement    Restrictions  Restrictions/Precautions  Restrictions/Precautions: Weight Bearing (Simultaneous filing. User may not have seen previous data.)  Lower Extremity Weight Bearing Restrictions  Left Lower Extremity Weight Bearing: Non Weight Bearing (Simultaneous filing. User may not have seen previous data.)  Position Activity Restriction  Other Position/Activity Restrictions: L shoudler Dislocation, Relocated in the ER     Subjective   Subjective  Subjective: Pt in bed upon arrival. Pt states she just got up with LOPEZ. Agreeable to bed exercises at this time.  Pain: 5/10 LLE    Objective  Vitals  O2 Device: Nasal cannula  Bed Mobility Training   Bed Mobility Training: No (pt declines transfers at this time. Will re-attempt this afternoon.)  Transfer Training  Transfer Training: No   Gait   Gait Training: No   PT Exercises  Exercise Treatment:  Supine: bilateral ankle pumps, quad sets, glute sets, heel slides, hip abd, SLR x 10 eaRadha DIOP with therapist assist on L LE for hip abd and SLR.     Safety Devices  Type of Devices: All fall risk precautions in place;Call light within reach;Bed alarm in place;Left in bed;Nurse notified         Goals  Short Term Goals  Time Frame for Short Term Goals: LOS  Short Term Goal 1: Bed mobility with min x 1  Short Term Goal 2: Sit to Stand manintaining NWB on the L, mod x 2  Short Term Goal 3: Stand Pivot Transfer with max x 2  Patient Goals   Patient Goals : Improve Strength    Education       AM-PAC - Mobility              Therapy Time   Individual Concurrent Group Co-treatment   Time In 1019         Time Out 1030         Minutes 11                 TRISHA CHRISTENSEN, PTA

## 2025-03-21 NOTE — PROGRESS NOTES
Physical Therapy  Attempted to see patient at this time. Patient just returning to bed after working with LOPEZ. Asked to hold therapy until later this AM due to fatigue. WIll attempt later this morning.     Morgan Kellogg PTA

## 2025-03-21 NOTE — DISCHARGE INSTR - DIET
Good nutrition is important when healing from an illness, injury, or surgery.  Follow any nutrition recommendations given to you during your hospital stay.   If you were given an oral nutrition supplement while in the hospital, continue to take this supplement at home.  You can take it with meals, in-between meals, and/or before bedtime. These supplements can be purchased at most local grocery stores, pharmacies, and chain [employer]-stores.   If you have any questions about your diet or nutrition, call the hospital and ask for the dietitian.  Cardiac/diabetic diet with 1800 ml/day fluid restriction

## 2025-03-24 ENCOUNTER — APPOINTMENT (OUTPATIENT)
Dept: GENERAL RADIOLOGY | Age: 80
End: 2025-03-24
Payer: MEDICARE

## 2025-03-24 ENCOUNTER — HOSPITAL ENCOUNTER (EMERGENCY)
Age: 80
Discharge: HOME | End: 2025-03-25
Attending: EMERGENCY MEDICINE
Payer: MEDICARE

## 2025-03-24 ENCOUNTER — OUTSIDE SERVICES (OUTPATIENT)
Dept: INTERNAL MEDICINE | Age: 80
End: 2025-03-24

## 2025-03-24 DIAGNOSIS — N18.32 STAGE 3B CHRONIC KIDNEY DISEASE (HCC): ICD-10-CM

## 2025-03-24 DIAGNOSIS — J96.11 CHRONIC RESPIRATORY FAILURE WITH HYPOXIA: ICD-10-CM

## 2025-03-24 DIAGNOSIS — I48.0 PAF (PAROXYSMAL ATRIAL FIBRILLATION) (HCC): ICD-10-CM

## 2025-03-24 DIAGNOSIS — I27.20 MILD PULMONARY HYPERTENSION (HCC): ICD-10-CM

## 2025-03-24 DIAGNOSIS — I50.42 CHRONIC COMBINED SYSTOLIC AND DIASTOLIC CHF (CONGESTIVE HEART FAILURE) (HCC): ICD-10-CM

## 2025-03-24 DIAGNOSIS — S43.015D ANTERIOR SHOULDER DISLOCATION, LEFT, SUBSEQUENT ENCOUNTER: Primary | ICD-10-CM

## 2025-03-24 DIAGNOSIS — R53.1 GENERAL WEAKNESS: ICD-10-CM

## 2025-03-24 DIAGNOSIS — S72.445E: ICD-10-CM

## 2025-03-24 DIAGNOSIS — S43.015D ANTERIOR DISLOCATION OF LEFT SHOULDER, SUBSEQUENT ENCOUNTER: Primary | ICD-10-CM

## 2025-03-24 DIAGNOSIS — F41.9 ANXIETY: ICD-10-CM

## 2025-03-24 PROCEDURE — 73030 X-RAY EXAM OF SHOULDER: CPT

## 2025-03-24 PROCEDURE — 23650 CLTX SHO DSLC W/MNPJ WO ANES: CPT

## 2025-03-24 PROCEDURE — 6360000002 HC RX W HCPCS: Performed by: EMERGENCY MEDICINE

## 2025-03-24 PROCEDURE — 96374 THER/PROPH/DIAG INJ IV PUSH: CPT

## 2025-03-24 PROCEDURE — 2500000003 HC RX 250 WO HCPCS: Performed by: EMERGENCY MEDICINE

## 2025-03-24 PROCEDURE — 99285 EMERGENCY DEPT VISIT HI MDM: CPT

## 2025-03-24 RX ORDER — FENTANYL CITRATE 50 UG/ML
25 INJECTION, SOLUTION INTRAMUSCULAR; INTRAVENOUS ONCE
Refills: 0 | Status: COMPLETED | OUTPATIENT
Start: 2025-03-24 | End: 2025-03-24

## 2025-03-24 RX ADMIN — FENTANYL CITRATE 25 MCG: 50 INJECTION INTRAMUSCULAR; INTRAVENOUS at 20:18

## 2025-03-24 RX ADMIN — Medication 100 MG: at 22:22

## 2025-03-24 RX ADMIN — Medication 50 MG: at 23:05

## 2025-03-24 ASSESSMENT — PAIN DESCRIPTION - LOCATION
LOCATION: SHOULDER

## 2025-03-24 ASSESSMENT — PAIN SCALES - GENERAL
PAINLEVEL_OUTOF10: 7
PAINLEVEL_OUTOF10: 6
PAINLEVEL_OUTOF10: 6

## 2025-03-24 ASSESSMENT — PAIN DESCRIPTION - ORIENTATION
ORIENTATION: LEFT

## 2025-03-24 ASSESSMENT — PAIN - FUNCTIONAL ASSESSMENT: PAIN_FUNCTIONAL_ASSESSMENT: 0-10

## 2025-03-24 NOTE — PROGRESS NOTES
03/24/25  Marah Loyd  1945    Patient Resident of [facility]    Chief Complaint  1. Anterior dislocation of left shoulder, subsequent encounter    2. Type I or II open nondisplaced fracture of distal epiphysis of left femur with routine healing, subsequent encounter    3. Mild pulmonary hypertension (HCC)    4. PAF (paroxysmal atrial fibrillation) ([facility])    5. Chronic combined systolic and diastolic CHF (congestive heart failure) ([facility])    6. Chronic respiratory failure with hypoxia (HCC)    7. Anxiety    8. Stage 3b chronic kidney disease ([facility])    9. General weakness        HPI:  Pleasant 79-year-old female with history of pulmonary hypertension, atrial fibs, CHF, chronic respiratory failure on chronic O2, anxiety, chronic kidney disease being seen for posthospital follow-up where she was admitted 3/17/2025 through 3/21/2025 after sustaining a fall on her oxygen tubing.  Sustained distal femur fracture.  Patient unfortunately had a hip placement in hip fracture right at the area of the prosthesis.  She underwent ORIF with repair of the distal femur fracture on 3/19/2025 and is nonweightbearing given the nature of the fracture.  She also had a shoulder dislocation which they did reduce during the hospital stay.  During hospital had significant postoperative anemia requiring 2 units of packed red blood cells due to inability to care for herself in the home setting was sent to the [facility] on 3/21/2025.  Hemoglobin at discharge 8.4 with a hematocrit of 27 and platelets at 150.  Electrolytes were stable.  Was put on 1800 fluid restriction and again nonweightbearing to the left lower extremity.  On exam patient states the leg pain is not significant but she is having severe left shoulder pain.  She has been utilizing Norco around-the-clock.  Patient states they were trying to pull her up in bed and she felt a pop in her shoulder.  States the shoulder is causing her severe pain.  She denies

## 2025-03-25 ENCOUNTER — TELEPHONE (OUTPATIENT)
Dept: ORTHOPEDIC SURGERY | Age: 80
End: 2025-03-25

## 2025-03-25 VITALS
RESPIRATION RATE: 16 BRPM | OXYGEN SATURATION: 100 % | DIASTOLIC BLOOD PRESSURE: 61 MMHG | WEIGHT: 227 LBS | SYSTOLIC BLOOD PRESSURE: 162 MMHG | TEMPERATURE: 98.8 F | BODY MASS INDEX: 41.77 KG/M2 | HEART RATE: 75 BPM | HEIGHT: 62 IN

## 2025-03-25 ASSESSMENT — PAIN SCALES - GENERAL: PAINLEVEL_OUTOF10: 0

## 2025-03-25 ASSESSMENT — PAIN - FUNCTIONAL ASSESSMENT: PAIN_FUNCTIONAL_ASSESSMENT: 0-10

## 2025-03-25 NOTE — PROGRESS NOTES
RT present in room during conscious sedation.   @22:18 HR 71, RR 14, CO2 46, SpO2 98% on 2L  @22:24 meds given, HR 71, RR 15, CO2 39, SpO2 95% on 2L  @22:25 HR 71, RR 9, CO2 31, SpO2 87% on 2L, increased to 4L, SpO2 now 97%  @22:30 finished, RT dismissed, HR 71, RR 20, CO2 45, SpO2 99% on 4L

## 2025-03-25 NOTE — DISCHARGE INSTRUCTIONS
Dear nursing staff-patient's left shoulder is very sensitive to dislocation as I believe a lot of the ligaments and supportive structures are hurt severely.  She will dislocate very easily.  Keep the brace on her carefully and with any movement or activities around her shoulder I would recommend her shoulder be held firmly while she is being dressed or undressed or cleaned.  Please make sure she follows up with orthopedic doctor.

## 2025-03-25 NOTE — ED NOTES
Report called to Kendra ESTEVEZ at [facility], informed of [facility] ETA 0730 for transport back to [facility]

## 2025-03-25 NOTE — ED PROVIDER NOTES
[facility] Emergency Department  [address], [place] [address]  Phone: [phone]      Patient Name:  Marah Loyd  Medical Record Number:  [id number]  YOB: 1945  Date of Service:  3/24/2025  Primary Care Physician:  Peter Ceron MD      CHIEF COMPLAINT:       Chief Complaint   Patient presents with    Arm Pain     Pt c/o lt arm pain, had fallen a while ago which resulted in a dislocated lt shoulder and lt femur fx, yesterday after lunch pt reports being turned in bed by staff at [facility] and felt a severe pain, has remained since, xrays done at [facility] which found lt should dilocated again, seeping edema noted to lt arm       HISTORY OF PRESENT ILLNESS:    Marah Loyd is a 79 y.o. female who presents with the complaint of left shoulder dislocation.  Patient had a fall about a week ago resulting in a left femur injury but also left shoulder as location.  She was at the [facility] recovering when yesterday they tried to roll her and her shoulder dislocated but they did not know that.  They evaluated her today due to the continued pain and an x-ray showed a anterior dislocation.  She was sent here for evaluation.  She does have some mild left shoulder pain but denies numbness tingling or weakness.    CURRENT MEDICATIONS:      Previous Medications    AMIODARONE (CORDARONE) 200 MG TABLET    TAKE 1 TABLET BY MOUTH TWICE  DAILY    ASPIRIN EC 81 MG EC TABLET    Take 1 tablet by mouth daily    BUDESONIDE-FORMOTEROL (SYMBICORT) 160-4.5 MCG/ACT AERO    Inhale 2 puffs into the lungs 2 times daily    CETIRIZINE (ZYRTEC) 10 MG TABLET    Take 1 tablet by mouth daily    DOCUSATE SODIUM (COLACE, DULCOLAX) 100 MG CAPS    Take 100 mg by mouth daily    ELIQUIS 5 MG TABS TABLET    TAKE 1 TABLET BY MOUTH TWICE  DAILY    EMPAGLIFLOZIN (JARDIANCE) 10 MG TABLET    Take 1 tablet by mouth daily    FERROUS SULFATE (IRON 325) 325 (65 FE) MG TABLET    Take 1 tablet by mouth daily (with breakfast)    FLUTICASONE

## 2025-03-25 NOTE — PROGRESS NOTES
RT present in room during second attempt conscious sedation.   @2300, HR 70, RR 14, SpO2 99% on 4L  @2305, meds given, HR 69, RR 14, SpO2 98% 4L  @2310 finished, RT dismissed, HR 69, RR 10, SpO2 98% 4L

## 2025-03-25 NOTE — ED NOTES
-look into calcium scoring CT if ever interested to see about underlying heart disease. May want to check with insurance.   -mammogram ordered, has to be 3/9/24 or later   Report called to 3rd floor nurse @ 4225

## 2025-03-27 PROBLEM — D50.9 MICROCYTIC ANEMIA: Status: ACTIVE | Noted: 2024-07-30

## 2025-03-27 PROBLEM — R73.9 HYPERGLYCEMIA: Status: ACTIVE | Noted: 2025-01-30

## 2025-03-27 PROBLEM — J18.9 PNEUMONIA OF LEFT LOWER LOBE DUE TO INFECTIOUS ORGANISM: Status: ACTIVE | Noted: 2025-03-04

## 2025-03-27 PROBLEM — E53.8 B12 DEFICIENCY: Status: ACTIVE | Noted: 2019-08-01

## 2025-03-27 PROBLEM — J11.00 PNEUMONIA DUE TO INFLUENZA: Status: ACTIVE | Noted: 2025-03-04

## 2025-03-27 PROBLEM — N28.9 RENAL INSUFFICIENCY SYNDROME: Status: ACTIVE | Noted: 2023-06-26

## 2025-03-27 NOTE — PROGRESS NOTES
Allergy list, medication list, history and problem list and immunization list all updated per MAR at [facility] and recent hospitalization records.

## 2025-03-28 ENCOUNTER — HOSPITAL ENCOUNTER (OUTPATIENT)
Age: 80
Setting detail: SPECIMEN
Discharge: HOME | End: 2025-03-28

## 2025-03-28 LAB
ANION GAP SERPL CALCULATED.3IONS-SCNC: 6 MMOL/L (ref 9–17)
BASOPHILS # BLD: <0.03 K/UL (ref 0–0.2)
BASOPHILS NFR BLD: 0 % (ref 0–2)
BUN SERPL-MCNC: 20 MG/DL (ref 8–23)
BUN/CREAT SERPL: 25 (ref 9–20)
CALCIUM SERPL-MCNC: 7.8 MG/DL (ref 8.6–10.4)
CHLORIDE SERPL-SCNC: 104 MMOL/L (ref 98–107)
CO2 SERPL-SCNC: 33 MMOL/L (ref 20–31)
CREAT SERPL-MCNC: 0.8 MG/DL (ref 0.5–0.9)
EOSINOPHIL # BLD: 0.11 K/UL (ref 0–0.44)
EOSINOPHILS RELATIVE PERCENT: 2 % (ref 1–4)
ERYTHROCYTE [DISTWIDTH] IN BLOOD BY AUTOMATED COUNT: 17 % (ref 11.8–14.4)
GFR, ESTIMATED: 75 ML/MIN/1.73M2
GLUCOSE SERPL-MCNC: 92 MG/DL (ref 70–99)
HCT VFR BLD AUTO: 29.1 % (ref 36.3–47.1)
HGB BLD-MCNC: 8.5 G/DL (ref 11.9–15.1)
IMM GRANULOCYTES # BLD AUTO: 0.09 K/UL (ref 0–0.3)
IMM GRANULOCYTES NFR BLD: 1 %
LYMPHOCYTES NFR BLD: 1.32 K/UL (ref 1.1–3.7)
LYMPHOCYTES RELATIVE PERCENT: 18 % (ref 24–43)
MCH RBC QN AUTO: 29.2 PG (ref 25.2–33.5)
MCHC RBC AUTO-ENTMCNC: 29.2 G/DL (ref 25.2–33.5)
MCV RBC AUTO: 100 FL (ref 82.6–102.9)
MONOCYTES NFR BLD: 0.35 K/UL (ref 0.1–1.2)
MONOCYTES NFR BLD: 5 % (ref 3–12)
NEUTROPHILS NFR BLD: 74 % (ref 36–65)
NEUTS SEG NFR BLD: 5.47 K/UL (ref 1.5–8.1)
NRBC BLD-RTO: 0.4 PER 100 WBC
PLATELET # BLD AUTO: 235 K/UL (ref 138–453)
PMV BLD AUTO: 9.4 FL (ref 8.1–13.5)
POTASSIUM SERPL-SCNC: 4 MMOL/L (ref 3.7–5.3)
RBC # BLD AUTO: 2.91 M/UL (ref 3.95–5.11)
RBC # BLD: ABNORMAL 10*6/UL
SODIUM SERPL-SCNC: 143 MMOL/L (ref 135–144)
WBC OTHER # BLD: 7.4 K/UL (ref 3.5–11.3)

## 2025-03-28 PROCEDURE — 36415 COLL VENOUS BLD VENIPUNCTURE: CPT

## 2025-03-28 PROCEDURE — 80048 BASIC METABOLIC PNL TOTAL CA: CPT

## 2025-03-28 PROCEDURE — 85025 COMPLETE CBC W/AUTO DIFF WBC: CPT

## 2025-03-30 PROCEDURE — 99305 1ST NF CARE MODERATE MDM 35: CPT | Performed by: INTERNAL MEDICINE

## 2025-04-07 ENCOUNTER — OUTSIDE SERVICES (OUTPATIENT)
Dept: INTERNAL MEDICINE | Age: 80
End: 2025-04-07
Payer: MEDICARE

## 2025-04-07 DIAGNOSIS — N18.32 TYPE 2 DIABETES MELLITUS WITH STAGE 3B CHRONIC KIDNEY DISEASE, WITHOUT LONG-TERM CURRENT USE OF INSULIN (HCC): ICD-10-CM

## 2025-04-07 DIAGNOSIS — E11.22 TYPE 2 DIABETES MELLITUS WITH STAGE 3B CHRONIC KIDNEY DISEASE, WITHOUT LONG-TERM CURRENT USE OF INSULIN (HCC): ICD-10-CM

## 2025-04-07 DIAGNOSIS — L03.113 CELLULITIS OF RIGHT UPPER EXTREMITY: Primary | ICD-10-CM

## 2025-04-07 PROCEDURE — 99308 SBSQ NF CARE LOW MDM 20: CPT | Performed by: NURSE PRACTITIONER
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
## 2025-04-09 ENCOUNTER — OUTSIDE SERVICES (OUTPATIENT)
Dept: INTERNAL MEDICINE | Age: 80
End: 2025-04-09

## 2025-04-09 DIAGNOSIS — T30.0 FRICTION BURN: Primary | ICD-10-CM

## 2025-04-09 DIAGNOSIS — I50.32 CHRONIC HEART FAILURE WITH PRESERVED EJECTION FRACTION (HCC): ICD-10-CM

## 2025-04-09 DIAGNOSIS — L03.113 CELLULITIS OF RIGHT UPPER EXTREMITY: ICD-10-CM

## 2025-04-09 DIAGNOSIS — N39.3 STRESS INCONTINENCE: ICD-10-CM

## 2025-04-10 NOTE — PROGRESS NOTES
jodie Swenson 04/09/25  Marah Loyd  1945    Patient Resident of [facility]    Chief Complaint  1. Friction burn    2. Cellulitis of right upper extremity    3. Stress incontinence    4. Chronic heart failure with preserved ejection fraction (HCC)        HPI:  79-year-old patient being seen as staff concerned of a rash to her right thigh.  She denies any pain.  Patient does have stress incontinence so they do have her in a brief when she is getting to physical therapy as she states she has leakage.  She currently is on an antibiotic for cellulitis to the right antecubital fossa where it appears a Band-Aid was pulled off and had some surrounding redness.  She does have increased urination especially after she has had her water pill.  Denies any shortness of breath at present      Allergies   Allergen Reactions    Erythromycin Nausea Only    Fenofibrate Nausea Only    Lipitor [Atorvastatin] Nausea Only    Lopid [Gemfibrozil] Nausea Only    Other      DARVOCET      Propoxyphene Other (See Comments)     GI ISSUE-NAUSEA       Past Medical History:   Diagnosis Date    Abdominal pain, RLQ 11/08/2021    Acute exacerbation of chronic obstructive pulmonary disease (COPD) ([facility]) 06/07/2022    Acute respiratory failure with hypoxia ([facility]) 06/07/2022    Anterior dislocation of left humerus 03/2025    Atrial fibrillation ([facility])     Displaced fracture of left femur ([facility]) 03/2025    Diverticulitis     History of iron deficiency anemia     Hyperlipidemia     Kidney stone     MI (myocardial infarction) ([facility])     Obesity     Pericardial effusion 11/08/2021    Pneumonia of both lungs due to infectious organism 06/07/2022    Prolonged Q-T interval on ECG 05/01/2019    Formatting of this note might be different from the original. on [place] report    Small bowel obstruction (HCC)     Stress disorder, acute     Stress-induced cardiomyopathy     Thyroid goiter     Vitamin B12 deficiency     Vitamin D deficiency        Past

## 2025-04-14 DIAGNOSIS — S72.442A CLOSED DISPLACED FRACTURE OF DISTAL EPIPHYSIS OF LEFT FEMUR, INITIAL ENCOUNTER (HCC): ICD-10-CM

## 2025-04-14 RX ORDER — HYDROCODONE BITARTRATE AND ACETAMINOPHEN 5; 325 MG/1; MG/1
1 TABLET ORAL EVERY 4 HOURS PRN
Qty: 42 TABLET | Refills: 0 | Status: SHIPPED | OUTPATIENT
Start: 2025-04-14 | End: 2025-04-21

## 2025-04-14 NOTE — TELEPHONE ENCOUNTER
Bernadette requesting a refill of the below medication which has been pended for you:     Requested Prescriptions     Pending Prescriptions Disp Refills    HYDROcodone-acetaminophen (NORCO) 5-325 MG per tablet 42 tablet 0     Sig: Take 1 tablet by mouth every 4 hours as needed for Pain for up to 7 days. Max Daily Amount: 6 tablets       Allergies   Allergen Reactions    Erythromycin Nausea Only    Fenofibrate Nausea Only    Lipitor [Atorvastatin] Nausea Only    Lopid [Gemfibrozil] Nausea Only    Other      DARVOCET      Propoxyphene Other (See Comments)     GI ISSUE-NAUSEA

## 2025-04-15 ENCOUNTER — TELEPHONE (OUTPATIENT)
Dept: ORTHOPEDIC SURGERY | Age: 80
End: 2025-04-15

## 2025-04-15 NOTE — TELEPHONE ENCOUNTER
Per amadou schumacher ok for patient to do rom only no lifting to her shoulder, until her follow up.verbal to the [facility].this message was given to alan at the [facility].

## 2025-04-17 PROBLEM — Z01.810 ENCOUNTER FOR PRE-OPERATIVE CARDIOVASCULAR CLEARANCE: Status: RESOLVED | Noted: 2025-03-18 | Resolved: 2025-04-17

## 2025-04-22 ENCOUNTER — OFFICE VISIT (OUTPATIENT)
Dept: ORTHOPEDIC SURGERY | Age: 80
End: 2025-04-22
Payer: MEDICARE

## 2025-04-22 VITALS
HEIGHT: 62 IN | DIASTOLIC BLOOD PRESSURE: 72 MMHG | BODY MASS INDEX: 41.77 KG/M2 | SYSTOLIC BLOOD PRESSURE: 118 MMHG | WEIGHT: 227 LBS

## 2025-04-22 DIAGNOSIS — M25.512 LEFT SHOULDER PAIN, UNSPECIFIED CHRONICITY: Primary | ICD-10-CM

## 2025-04-22 DIAGNOSIS — S72.402D CLOSED FRACTURE OF DISTAL END OF LEFT FEMUR WITH ROUTINE HEALING, UNSPECIFIED FRACTURE MORPHOLOGY, SUBSEQUENT ENCOUNTER: ICD-10-CM

## 2025-04-22 DIAGNOSIS — S43.015D ANTERIOR DISLOCATION OF LEFT SHOULDER, SUBSEQUENT ENCOUNTER: ICD-10-CM

## 2025-04-22 PROCEDURE — 99024 POSTOP FOLLOW-UP VISIT: CPT | Performed by: NURSE PRACTITIONER

## 2025-04-22 PROCEDURE — 99204 OFFICE O/P NEW MOD 45 MIN: CPT | Performed by: NURSE PRACTITIONER

## 2025-04-22 NOTE — PROGRESS NOTES
Orthopaedic Progress Note      CHIEF COMPLAINT: Left shoulder dislocation, left distal femur periprosthetic fracture    HISTORY OF PRESENT ILLNESS:       Ms. Loyd  is a 79 y.o. female  who presents with a follow-up after undergoing surgery on March 19 for an ORIF of the left distal femur fracture with plates and screws.  Patient was noted to have a right total knee replacement.  She also sustained a left shoulder dislocation at that time.  This was reduced in the emergency department.  Patient has been out of the sling.  She has had decreased range of motion working with therapy in the nursing home for improvement.  She denies numbness or tingling in the left upper or lower extremity.  She has a history of rheumatoid arthritis.  She is on Eliquis chronically.  She has been nonweightbearing in a wheelchair staying at the nursing home.  Her surgical wound is well-healed to the left leg.      Past Medical History:    Past Medical History:   Diagnosis Date    Abdominal pain, RLQ 11/08/2021    Acute exacerbation of chronic obstructive pulmonary disease (COPD) ([facility]) 06/07/2022    Acute respiratory failure with hypoxia 06/07/2022    Anterior dislocation of left humerus 03/2025    Atrial fibrillation ([facility])     Displaced fracture of left femur 03/2025    Diverticulitis     History of iron deficiency anemia     Hyperlipidemia     Kidney stone     MI (myocardial infarction) ([facility])     Obesity     Pericardial effusion 11/08/2021    Pneumonia of both lungs due to infectious organism 06/07/2022    Prolonged Q-T interval on ECG 05/01/2019    Formatting of this note might be different from the original. on [place] report    Small bowel obstruction (HCC)     Stress disorder, acute     Stress-induced cardiomyopathy     Thyroid goiter     Vitamin B12 deficiency     Vitamin D deficiency        Past Surgical History:    Past Surgical History:   Procedure Laterality Date    CARDIAC CATHETERIZATION      COLON SURGERY  04/2008

## 2025-04-29 ENCOUNTER — APPOINTMENT (OUTPATIENT)
Dept: GENERAL RADIOLOGY | Age: 80
DRG: 571 | End: 2025-04-29
Payer: MEDICARE

## 2025-04-29 ENCOUNTER — HOSPITAL ENCOUNTER (INPATIENT)
Age: 80
LOS: 7 days | Discharge: NURSING, REHAB OR LONG-TERM CARE | DRG: 571 | End: 2025-05-06
Attending: EMERGENCY MEDICINE | Admitting: INTERNAL MEDICINE
Payer: MEDICARE

## 2025-04-29 ENCOUNTER — APPOINTMENT (OUTPATIENT)
Dept: CT IMAGING | Age: 80
DRG: 571 | End: 2025-04-29
Payer: MEDICARE

## 2025-04-29 DIAGNOSIS — G89.18 POST-OP PAIN: ICD-10-CM

## 2025-04-29 DIAGNOSIS — S51.001D OPEN WOUND OF RIGHT ELBOW, SUBSEQUENT ENCOUNTER: ICD-10-CM

## 2025-04-29 DIAGNOSIS — L03.113 CELLULITIS OF RIGHT ELBOW: Primary | ICD-10-CM

## 2025-04-29 LAB
ALBUMIN SERPL-MCNC: 3.4 G/DL (ref 3.5–5.2)
ALBUMIN/GLOB SERPL: 1.4 {RATIO} (ref 1–2.5)
ALP SERPL-CCNC: 97 U/L (ref 35–104)
ALT SERPL-CCNC: 14 U/L (ref 10–35)
ANION GAP SERPL CALCULATED.3IONS-SCNC: 12 MMOL/L (ref 9–16)
AST SERPL-CCNC: 14 U/L (ref 10–35)
BACTERIA URNS QL MICRO: ABNORMAL
BASOPHILS # BLD: 0.04 K/UL (ref 0–0.2)
BASOPHILS NFR BLD: 0 % (ref 0–2)
BILIRUB SERPL-MCNC: 0.9 MG/DL (ref 0–1.2)
BILIRUB UR QL STRIP: NEGATIVE
BUN SERPL-MCNC: 19 MG/DL (ref 8–23)
BUN/CREAT SERPL: 21 (ref 9–20)
CALCIUM SERPL-MCNC: 8.8 MG/DL (ref 8.6–10.4)
CHLORIDE SERPL-SCNC: 98 MMOL/L (ref 98–107)
CK SERPL-CCNC: 14 U/L (ref 26–192)
CLARITY UR: CLEAR
CO2 SERPL-SCNC: 31 MMOL/L (ref 20–31)
COLOR UR: YELLOW
CREAT SERPL-MCNC: 0.9 MG/DL (ref 0.6–0.9)
CRP SERPL HS-MCNC: 272 MG/L (ref 0–5)
EOSINOPHIL # BLD: 0.07 K/UL (ref 0–0.44)
EOSINOPHILS RELATIVE PERCENT: 0 % (ref 1–4)
EPI CELLS #/AREA URNS HPF: ABNORMAL /HPF (ref 0–5)
ERYTHROCYTE [DISTWIDTH] IN BLOOD BY AUTOMATED COUNT: 16.5 % (ref 11.8–14.4)
ERYTHROCYTE [SEDIMENTATION RATE] IN BLOOD BY PHOTOMETRIC METHOD: 29 MM/HR (ref 0–30)
GFR, ESTIMATED: 65 ML/MIN/1.73M2
GLUCOSE SERPL-MCNC: 90 MG/DL (ref 74–99)
GLUCOSE UR STRIP-MCNC: ABNORMAL MG/DL
HCT VFR BLD AUTO: 39 % (ref 36.3–47.1)
HGB BLD-MCNC: 11.9 G/DL (ref 11.9–15.1)
HGB UR QL STRIP.AUTO: ABNORMAL
IMM GRANULOCYTES # BLD AUTO: 0.1 K/UL (ref 0–0.3)
IMM GRANULOCYTES NFR BLD: 1 %
INR PPP: 1.5
KETONES UR STRIP-MCNC: NEGATIVE MG/DL
LACTATE BLDV-SCNC: 1.1 MMOL/L
LEUKOCYTE ESTERASE UR QL STRIP: NEGATIVE
LIPASE SERPL-CCNC: 14 U/L (ref 13–60)
LYMPHOCYTES NFR BLD: 3 K/UL (ref 1.1–3.7)
LYMPHOCYTES RELATIVE PERCENT: 19 % (ref 24–43)
MAGNESIUM SERPL-MCNC: 2.3 MG/DL (ref 1.6–2.4)
MCH RBC QN AUTO: 30.1 PG (ref 25.2–33.5)
MCHC RBC AUTO-ENTMCNC: 30.5 G/DL (ref 25.2–33.5)
MCV RBC AUTO: 98.5 FL (ref 82.6–102.9)
MONOCYTES NFR BLD: 0.93 K/UL (ref 0.1–1.2)
MONOCYTES NFR BLD: 6 % (ref 3–12)
MYOGLOBIN SERPL-MCNC: 36 NG/ML (ref 25–58)
NEUTROPHILS NFR BLD: 74 % (ref 36–65)
NEUTS SEG NFR BLD: 11.6 K/UL (ref 1.5–8.1)
NITRITE UR QL STRIP: NEGATIVE
NRBC BLD-RTO: 0 PER 100 WBC
PARTIAL THROMBOPLASTIN TIME: 35.6 SEC (ref 23.9–33.8)
PH UR STRIP: 6 [PH] (ref 5–6)
PLATELET # BLD AUTO: 213 K/UL (ref 138–453)
PMV BLD AUTO: 9.5 FL (ref 8.1–13.5)
POTASSIUM SERPL-SCNC: 3.8 MMOL/L (ref 3.7–5.3)
PROCALCITONIN SERPL-MCNC: 0.18 NG/ML
PROT SERPL-MCNC: 5.9 G/DL (ref 6.6–8.7)
PROT UR STRIP-MCNC: ABNORMAL MG/DL
PROTHROMBIN TIME: 18.2 SEC (ref 11.5–14.2)
RBC # BLD AUTO: 3.96 M/UL (ref 3.95–5.11)
RBC # BLD: ABNORMAL 10*6/UL
RBC #/AREA URNS HPF: ABNORMAL /HPF (ref 0–4)
SODIUM SERPL-SCNC: 141 MMOL/L (ref 136–145)
SP GR UR STRIP: 1.02 (ref 1.01–1.02)
UROBILINOGEN UR STRIP-ACNC: NORMAL EU/DL (ref 0–1)
WBC #/AREA URNS HPF: ABNORMAL /HPF (ref 0–4)
WBC OTHER # BLD: 15.7 K/UL (ref 3.5–11.3)

## 2025-04-29 PROCEDURE — 80053 COMPREHEN METABOLIC PANEL: CPT

## 2025-04-29 PROCEDURE — 6360000004 HC RX CONTRAST MEDICATION: Performed by: EMERGENCY MEDICINE

## 2025-04-29 PROCEDURE — 97161 PT EVAL LOW COMPLEX 20 MIN: CPT | Performed by: PHYSICAL THERAPIST

## 2025-04-29 PROCEDURE — 81001 URINALYSIS AUTO W/SCOPE: CPT

## 2025-04-29 PROCEDURE — 86140 C-REACTIVE PROTEIN: CPT

## 2025-04-29 PROCEDURE — 85025 COMPLETE CBC W/AUTO DIFF WBC: CPT

## 2025-04-29 PROCEDURE — 83605 ASSAY OF LACTIC ACID: CPT

## 2025-04-29 PROCEDURE — 83874 ASSAY OF MYOGLOBIN: CPT

## 2025-04-29 PROCEDURE — 94760 N-INVAS EAR/PLS OXIMETRY 1: CPT

## 2025-04-29 PROCEDURE — 87075 CULTR BACTERIA EXCEPT BLOOD: CPT

## 2025-04-29 PROCEDURE — 99222 1ST HOSP IP/OBS MODERATE 55: CPT | Performed by: INTERNAL MEDICINE

## 2025-04-29 PROCEDURE — 2709999900 CT ABDOMEN PELVIS W IV CONTRAST

## 2025-04-29 PROCEDURE — 6370000000 HC RX 637 (ALT 250 FOR IP): Performed by: INTERNAL MEDICINE

## 2025-04-29 PROCEDURE — 99285 EMERGENCY DEPT VISIT HI MDM: CPT

## 2025-04-29 PROCEDURE — 93005 ELECTROCARDIOGRAM TRACING: CPT | Performed by: INTERNAL MEDICINE

## 2025-04-29 PROCEDURE — 71045 X-RAY EXAM CHEST 1 VIEW: CPT

## 2025-04-29 PROCEDURE — 6360000002 HC RX W HCPCS: Performed by: EMERGENCY MEDICINE

## 2025-04-29 PROCEDURE — 96366 THER/PROPH/DIAG IV INF ADDON: CPT

## 2025-04-29 PROCEDURE — 87040 BLOOD CULTURE FOR BACTERIA: CPT

## 2025-04-29 PROCEDURE — 2580000003 HC RX 258: Performed by: EMERGENCY MEDICINE

## 2025-04-29 PROCEDURE — 2060000000 HC ICU INTERMEDIATE R&B

## 2025-04-29 PROCEDURE — 87186 SC STD MICRODIL/AGAR DIL: CPT

## 2025-04-29 PROCEDURE — 94640 AIRWAY INHALATION TREATMENT: CPT

## 2025-04-29 PROCEDURE — 87086 URINE CULTURE/COLONY COUNT: CPT

## 2025-04-29 PROCEDURE — 83735 ASSAY OF MAGNESIUM: CPT

## 2025-04-29 PROCEDURE — 82550 ASSAY OF CK (CPK): CPT

## 2025-04-29 PROCEDURE — 87077 CULTURE AEROBIC IDENTIFY: CPT

## 2025-04-29 PROCEDURE — 85730 THROMBOPLASTIN TIME PARTIAL: CPT

## 2025-04-29 PROCEDURE — 85652 RBC SED RATE AUTOMATED: CPT

## 2025-04-29 PROCEDURE — 83690 ASSAY OF LIPASE: CPT

## 2025-04-29 PROCEDURE — 36415 COLL VENOUS BLD VENIPUNCTURE: CPT

## 2025-04-29 PROCEDURE — 87205 SMEAR GRAM STAIN: CPT

## 2025-04-29 PROCEDURE — 87088 URINE BACTERIA CULTURE: CPT

## 2025-04-29 PROCEDURE — 73201 CT UPPER EXTREMITY W/DYE: CPT

## 2025-04-29 PROCEDURE — 96367 TX/PROPH/DG ADDL SEQ IV INF: CPT

## 2025-04-29 PROCEDURE — 85610 PROTHROMBIN TIME: CPT

## 2025-04-29 PROCEDURE — 97166 OT EVAL MOD COMPLEX 45 MIN: CPT | Performed by: OCCUPATIONAL THERAPIST

## 2025-04-29 PROCEDURE — 87070 CULTURE OTHR SPECIMN AEROBIC: CPT

## 2025-04-29 PROCEDURE — 99222 1ST HOSP IP/OBS MODERATE 55: CPT | Performed by: NURSE PRACTITIONER

## 2025-04-29 PROCEDURE — 2700000000 HC OXYGEN THERAPY PER DAY

## 2025-04-29 PROCEDURE — 96365 THER/PROPH/DIAG IV INF INIT: CPT

## 2025-04-29 PROCEDURE — 84145 PROCALCITONIN (PCT): CPT

## 2025-04-29 PROCEDURE — 2500000003 HC RX 250 WO HCPCS: Performed by: INTERNAL MEDICINE

## 2025-04-29 RX ORDER — DOCUSATE SODIUM 100 MG/1
100 CAPSULE, LIQUID FILLED ORAL DAILY
Status: DISCONTINUED | OUTPATIENT
Start: 2025-04-29 | End: 2025-05-06 | Stop reason: HOSPADM

## 2025-04-29 RX ORDER — BUDESONIDE AND FORMOTEROL FUMARATE DIHYDRATE 160; 4.5 UG/1; UG/1
2 AEROSOL RESPIRATORY (INHALATION) 2 TIMES DAILY
Status: DISCONTINUED | OUTPATIENT
Start: 2025-04-29 | End: 2025-05-06 | Stop reason: HOSPADM

## 2025-04-29 RX ORDER — FLUTICASONE PROPIONATE 50 MCG
2 SPRAY, SUSPENSION (ML) NASAL DAILY
Status: DISCONTINUED | OUTPATIENT
Start: 2025-04-29 | End: 2025-05-06 | Stop reason: HOSPADM

## 2025-04-29 RX ORDER — PREDNISONE 5 MG/1
5 TABLET ORAL DAILY
Status: DISCONTINUED | OUTPATIENT
Start: 2025-04-29 | End: 2025-04-29

## 2025-04-29 RX ORDER — POLYETHYLENE GLYCOL 3350 17 G/17G
17 POWDER, FOR SOLUTION ORAL DAILY PRN
Status: DISCONTINUED | OUTPATIENT
Start: 2025-04-29 | End: 2025-05-06 | Stop reason: HOSPADM

## 2025-04-29 RX ORDER — FUROSEMIDE 40 MG/1
40 TABLET ORAL DAILY
Status: DISCONTINUED | OUTPATIENT
Start: 2025-04-29 | End: 2025-05-06 | Stop reason: HOSPADM

## 2025-04-29 RX ORDER — MONTELUKAST SODIUM 10 MG/1
10 TABLET ORAL NIGHTLY
Status: DISCONTINUED | OUTPATIENT
Start: 2025-04-29 | End: 2025-05-06 | Stop reason: HOSPADM

## 2025-04-29 RX ORDER — ASPIRIN 81 MG/1
81 TABLET ORAL DAILY
Status: DISCONTINUED | OUTPATIENT
Start: 2025-04-29 | End: 2025-05-06 | Stop reason: HOSPADM

## 2025-04-29 RX ORDER — FERROUS SULFATE 325(65) MG
325 TABLET ORAL
Status: DISCONTINUED | OUTPATIENT
Start: 2025-04-30 | End: 2025-05-06 | Stop reason: HOSPADM

## 2025-04-29 RX ORDER — TRAZODONE HYDROCHLORIDE 50 MG/1
50 TABLET ORAL NIGHTLY
Status: DISCONTINUED | OUTPATIENT
Start: 2025-04-29 | End: 2025-05-06 | Stop reason: HOSPADM

## 2025-04-29 RX ORDER — CETIRIZINE HYDROCHLORIDE 5 MG/1
10 TABLET ORAL DAILY
Status: DISCONTINUED | OUTPATIENT
Start: 2025-04-29 | End: 2025-05-06 | Stop reason: HOSPADM

## 2025-04-29 RX ORDER — ACETAMINOPHEN 325 MG/1
650 TABLET ORAL EVERY 4 HOURS PRN
Status: DISCONTINUED | OUTPATIENT
Start: 2025-04-29 | End: 2025-05-06 | Stop reason: HOSPADM

## 2025-04-29 RX ORDER — IPRATROPIUM BROMIDE AND ALBUTEROL SULFATE 2.5; .5 MG/3ML; MG/3ML
1 SOLUTION RESPIRATORY (INHALATION)
Status: DISCONTINUED | OUTPATIENT
Start: 2025-04-29 | End: 2025-05-06 | Stop reason: HOSPADM

## 2025-04-29 RX ORDER — SODIUM CHLORIDE 0.9 % (FLUSH) 0.9 %
10 SYRINGE (ML) INJECTION EVERY 12 HOURS SCHEDULED
Status: DISCONTINUED | OUTPATIENT
Start: 2025-04-29 | End: 2025-05-06 | Stop reason: HOSPADM

## 2025-04-29 RX ORDER — ONDANSETRON 2 MG/ML
4 INJECTION INTRAMUSCULAR; INTRAVENOUS EVERY 6 HOURS PRN
Status: DISCONTINUED | OUTPATIENT
Start: 2025-04-29 | End: 2025-04-29

## 2025-04-29 RX ORDER — HYDROCODONE BITARTRATE AND ACETAMINOPHEN 5; 325 MG/1; MG/1
1 TABLET ORAL EVERY 4 HOURS PRN
Status: DISCONTINUED | OUTPATIENT
Start: 2025-04-29 | End: 2025-05-01

## 2025-04-29 RX ORDER — SODIUM CHLORIDE 9 MG/ML
INJECTION, SOLUTION INTRAVENOUS PRN
Status: DISCONTINUED | OUTPATIENT
Start: 2025-04-29 | End: 2025-05-06 | Stop reason: HOSPADM

## 2025-04-29 RX ORDER — CETIRIZINE HYDROCHLORIDE 10 MG/1
10 TABLET ORAL DAILY
COMMUNITY

## 2025-04-29 RX ORDER — LACTULOSE 10 G/15ML
20 SOLUTION ORAL 2 TIMES DAILY
Status: DISCONTINUED | OUTPATIENT
Start: 2025-04-29 | End: 2025-05-06

## 2025-04-29 RX ORDER — HYDROCODONE BITARTRATE AND ACETAMINOPHEN 5; 325 MG/1; MG/1
1 TABLET ORAL EVERY 4 HOURS PRN
Status: ON HOLD | COMMUNITY
End: 2025-05-01 | Stop reason: HOSPADM

## 2025-04-29 RX ORDER — IOPAMIDOL 755 MG/ML
100 INJECTION, SOLUTION INTRAVASCULAR
Status: COMPLETED | OUTPATIENT
Start: 2025-04-29 | End: 2025-04-29

## 2025-04-29 RX ORDER — ALBUTEROL SULFATE 90 UG/1
2 INHALANT RESPIRATORY (INHALATION) EVERY 6 HOURS PRN
Status: DISCONTINUED | OUTPATIENT
Start: 2025-04-29 | End: 2025-05-06 | Stop reason: HOSPADM

## 2025-04-29 RX ORDER — AMIODARONE HYDROCHLORIDE 200 MG/1
200 TABLET ORAL 2 TIMES DAILY
Status: DISCONTINUED | OUTPATIENT
Start: 2025-04-29 | End: 2025-05-06 | Stop reason: HOSPADM

## 2025-04-29 RX ORDER — LEVOTHYROXINE SODIUM 25 UG/1
50 TABLET ORAL DAILY
Status: DISCONTINUED | OUTPATIENT
Start: 2025-04-29 | End: 2025-05-06 | Stop reason: HOSPADM

## 2025-04-29 RX ORDER — 0.9 % SODIUM CHLORIDE 0.9 %
1000 INTRAVENOUS SOLUTION INTRAVENOUS ONCE
Status: COMPLETED | OUTPATIENT
Start: 2025-04-29 | End: 2025-04-29

## 2025-04-29 RX ORDER — SODIUM CHLORIDE 0.9 % (FLUSH) 0.9 %
10 SYRINGE (ML) INJECTION PRN
Status: DISCONTINUED | OUTPATIENT
Start: 2025-04-29 | End: 2025-05-06 | Stop reason: HOSPADM

## 2025-04-29 RX ORDER — CEFUROXIME AXETIL 500 MG/1
500 TABLET ORAL 2 TIMES DAILY
Status: ON HOLD | COMMUNITY
End: 2025-05-06 | Stop reason: HOSPADM

## 2025-04-29 RX ORDER — PANTOPRAZOLE SODIUM 40 MG/1
40 TABLET, DELAYED RELEASE ORAL
Status: DISCONTINUED | OUTPATIENT
Start: 2025-04-30 | End: 2025-05-06 | Stop reason: HOSPADM

## 2025-04-29 RX ADMIN — DOCUSATE SODIUM 100 MG: 100 CAPSULE, LIQUID FILLED ORAL at 13:47

## 2025-04-29 RX ADMIN — IPRATROPIUM BROMIDE AND ALBUTEROL SULFATE 1 DOSE: .5; 2.5 SOLUTION RESPIRATORY (INHALATION) at 17:00

## 2025-04-29 RX ADMIN — AMIODARONE HYDROCHLORIDE 200 MG: 200 TABLET ORAL at 13:47

## 2025-04-29 RX ADMIN — FUROSEMIDE 40 MG: 40 TABLET ORAL at 13:47

## 2025-04-29 RX ADMIN — CEFEPIME 2000 MG: 2 INJECTION, POWDER, FOR SOLUTION INTRAVENOUS at 09:03

## 2025-04-29 RX ADMIN — FLUTICASONE PROPIONATE 2 SPRAY: 50 SPRAY, METERED NASAL at 13:54

## 2025-04-29 RX ADMIN — IOPAMIDOL 100 ML: 755 INJECTION, SOLUTION INTRAVENOUS at 08:52

## 2025-04-29 RX ADMIN — ASPIRIN 81 MG: 81 TABLET, COATED ORAL at 13:47

## 2025-04-29 RX ADMIN — VANCOMYCIN HYDROCHLORIDE 1250 MG: 1.25 INJECTION, POWDER, LYOPHILIZED, FOR SOLUTION INTRAVENOUS at 09:37

## 2025-04-29 RX ADMIN — SODIUM CHLORIDE 1000 ML: 0.9 INJECTION, SOLUTION INTRAVENOUS at 08:35

## 2025-04-29 RX ADMIN — CETIRIZINE HYDROCHLORIDE 10 MG: 5 TABLET, FILM COATED ORAL at 13:47

## 2025-04-29 RX ADMIN — BUDESONIDE AND FORMOTEROL FUMARATE DIHYDRATE 2 PUFF: 160; 4.5 AEROSOL RESPIRATORY (INHALATION) at 20:13

## 2025-04-29 RX ADMIN — AMIODARONE HYDROCHLORIDE 200 MG: 200 TABLET ORAL at 21:03

## 2025-04-29 RX ADMIN — SODIUM CHLORIDE, PRESERVATIVE FREE 10 ML: 5 INJECTION INTRAVENOUS at 21:03

## 2025-04-29 RX ADMIN — IPRATROPIUM BROMIDE AND ALBUTEROL SULFATE 1 DOSE: .5; 2.5 SOLUTION RESPIRATORY (INHALATION) at 20:12

## 2025-04-29 RX ADMIN — LACTULOSE 20 G: 10 SOLUTION ORAL at 21:03

## 2025-04-29 RX ADMIN — TRAZODONE HYDROCHLORIDE 50 MG: 50 TABLET ORAL at 21:03

## 2025-04-29 RX ADMIN — EMPAGLIFLOZIN 10 MG: 10 TABLET, FILM COATED ORAL at 13:47

## 2025-04-29 RX ADMIN — LEVOTHYROXINE SODIUM 50 MCG: 0.03 TABLET ORAL at 13:47

## 2025-04-29 RX ADMIN — SODIUM CHLORIDE, PRESERVATIVE FREE 10 ML: 5 INJECTION INTRAVENOUS at 13:46

## 2025-04-29 RX ADMIN — MONTELUKAST 10 MG: 10 TABLET, FILM COATED ORAL at 21:03

## 2025-04-29 ASSESSMENT — PAIN SCALES - GENERAL: PAINLEVEL_OUTOF10: 0

## 2025-04-29 NOTE — ED NOTES
CBC WITH AUTO DIFFERENTIAL - Abnormal; Notable for the following components:       Result Value    WBC 15.7 (*)     RDW 16.5 (*)     Neutrophils % 74 (*)     Lymphocytes % 19 (*)     Eosinophils % 0 (*)     Immature Granulocytes % 1 (*)     Neutrophils Absolute 11.60 (*)     All other components within normal limits   COMPREHENSIVE METABOLIC PANEL - Abnormal; Notable for the following components:    BUN/Creatinine Ratio 21 (*)     Total Protein 5.9 (*)     Albumin 3.4 (*)     All other components within normal limits   APTT - Abnormal; Notable for the following components:    APTT 35.6 (*)     All other components within normal limits   PROTIME-INR - Abnormal; Notable for the following components:    Protime 18.2 (*)     All other components within normal limits   URINALYSIS WITH MICROSCOPIC - Abnormal; Notable for the following components:    Glucose, Ur 2+ (*)     Urine Hgb TRACE (*)     Protein, UA 2+ (*)     Bacteria, UA FEW (*)     All other components within normal limits   C-REACTIVE PROTEIN - Abnormal; Notable for the following components:    .0 (*)     All other components within normal limits   CK - Abnormal; Notable for the following components:    Total CK 14 (*)     All other components within normal limits   CULTURE, BLOOD 1   CULTURE, BLOOD 1   CULTURE, ANAEROBIC AND AEROBIC   CULTURE, URINE   LIPASE   LACTIC ACID   SEDIMENTATION RATE   MYOGLOBIN, BLOOD     Imaging:    XR CHEST PORTABLE   Final Result   No acute cardiopulmonary process.         CT ELBOW RIGHT W CONTRAST   Final Result   Subcutaneous edema suggesting cellulitis.  No focal drainable fluid   collection.      No fracture.      Consider MRI if appropriate.         CT ABDOMEN PELVIS W IV CONTRAST Additional Contrast? None   Final Result   No acute abnormality of the abdomen or pelvis.      A few indeterminate hypodensities throughout the liver, with the largest   decreased and likely representing hemangiomas.  However, there is a

## 2025-04-29 NOTE — ED PROVIDER NOTES
Placed This Encounter   Medications    sodium chloride 0.9 % bolus 1,000 mL    iopamidol (ISOVUE-370) 76 % injection 100 mL    ceFEPIme (MAXIPIME) 2,000 mg in sodium chloride 0.9 % 100 mL IVPB (addEASE)     Antimicrobial Indications:   Skin and Soft Tissue Infection     Skin duration of therapy:   7 days    vancomycin (VANCOCIN) 1,250 mg in sodium chloride 0.9 % 250 mL IVPB ([id number])     Antimicrobial Indications:   Skin and Soft Tissue Infection    vancomycin (VANCOCIN) intermittent dosing (placeholder)     Antimicrobial Indications:   Skin and Soft Tissue Infection     Skin duration of therapy:   5 days       DIAGNOSTIC RESULTS / EMERGENCYDEPARTMENT COURSE / MDM   LABS:  Labs Reviewed   CBC WITH AUTO DIFFERENTIAL - Abnormal; Notable for the following components:       Result Value    WBC 15.7 (*)     RDW 16.5 (*)     Neutrophils % 74 (*)     Lymphocytes % 19 (*)     Eosinophils % 0 (*)     Immature Granulocytes % 1 (*)     Neutrophils Absolute 11.60 (*)     All other components within normal limits   COMPREHENSIVE METABOLIC PANEL - Abnormal; Notable for the following components:    BUN/Creatinine Ratio 21 (*)     Total Protein 5.9 (*)     Albumin 3.4 (*)     All other components within normal limits   APTT - Abnormal; Notable for the following components:    APTT 35.6 (*)     All other components within normal limits   PROTIME-INR - Abnormal; Notable for the following components:    Protime 18.2 (*)     All other components within normal limits   URINALYSIS WITH MICROSCOPIC - Abnormal; Notable for the following components:    Glucose, Ur 2+ (*)     Urine Hgb TRACE (*)     Protein, UA 2+ (*)     Bacteria, UA FEW (*)     All other components within normal limits   C-REACTIVE PROTEIN - Abnormal; Notable for the following components:    .0 (*)     All other components within normal limits   CK - Abnormal; Notable for the following components:    Total CK 14 (*)     All other components within normal limits

## 2025-04-29 NOTE — CONSULTS
Problems:    * No active hospital problems. *  Resolved Problems:    * No resolved hospital problems. *  Right elbow cellulitis, right elbow wound    PLAN as discussed with Dr. Navarrete:  1)  Hospitalists to admit for IV abx. Will place pt NPO  after midnight and reevaluate in the morning for any improvements with antibiotics.  Elevate as needed.  She may eat today.  She will be nonweightbearing to the left lower extremity.  Weightbearing as tolerated to bilateral upper extremities.      Electronically signed by JANNETH Matta CNP on 4/29/2025 at 11:29 AM

## 2025-04-29 NOTE — CARE COORDINATION
Case Management Assessment  Initial Evaluation    Date/Time of Evaluation: 4/29/2025 2:08 PM  Assessment Completed by: Maty Hawley RN    If patient is discharged prior to next notation, then this note serves as note for discharge by case management.    Patient Name: Marah Loyd                   YOB: 1945  Diagnosis: Cellulitis of right elbow [L03.113]                   Date / Time: 4/29/2025  7:25 AM    Patient Admission Status: Inpatient   Readmission Risk (Low < 19, Mod (19-27), High > 27): Readmission Risk Score: 23.6    Current PCP: Peter Ceron MD  PCP verified by CM? Yes    Chart Reviewed: Yes      History Provided by: Patient  Patient Orientation: Alert and Oriented    Patient Cognition: Alert    Hospitalization in the last 30 days (Readmission):  No    If yes, Readmission Assessment in  Navigator will be completed.    Advance Directives:      Code Status: DNR-CCA   Patient's Primary Decision Maker is:        Discharge Planning:    Patient lives with: Other (Comment) Type of Home: Skilled Nursing Facility  Primary Care Giver: Other (Comment) ([facility])  Patient Support Systems include: Family Members   Current Financial resources: Medicare  Current community resources: ECF/Home Care  Current services prior to admission: Skilled Nursing Facility            Current DME:              Type of Home Care services:       ADLS  Prior functional level: Assistance with the following:, Bathing, Dressing, Toileting, Cooking, Housework, Shopping, Mobility  Current functional level: Assistance with the following:, Bathing, Dressing, Toileting, Cooking, Housework, Shopping, Mobility    PT AM-PAC:   /24  OT AM-PAC:   /24    Family can provide assistance at DC: No  Would you like Case Management to discuss the discharge plan with any other family members/significant others, and if so, who? No  Plans to Return to Present Housing: Yes  Other Identified Issues/Barriers to RETURNING to

## 2025-04-29 NOTE — H&P
HOSPITALIST ADMISSION H&P      REASON FOR ADMISSION:  medial right elbow infection---cellulitis  ESTIMATED LENGTH OF STAY:   > 2 midnights---2-4 days    ATTENDING/ADMITTING PHYSICIAN: Mesfin Acharya MD MD  PCP: Peter Ceron MD    HISTORY OF PRESENT ILLNESS:      The patient is a 79 y.o. female patient of Peter Ceron MD who presents with right medial elbow--ulceration---infection--cellulitis--ulceration----red---tender---draining---feverish---nausea--malaise----IV antibiotics and to be seen by Orthopedics    Nocturnal hypoxia---without TATI--more recently continuous    CHF--stable    PAF---currently sinus rhythm    Diabetes Mellitus Type 2---BG = 90    See note below       See below for additional PMH.    Patient isin-rloqfkqtxh-dwsepkqe-available records reviewed, including, but not limited to, ER reports---labs--office records---personal notes.       Past Medical History:   Diagnosis Date    Abdominal pain, RLQ 11/08/2021    Acute exacerbation of chronic obstructive pulmonary disease (COPD) ([facility]) 06/07/2022    Acute respiratory failure with hypoxia ([facility]) 06/07/2022    Anterior dislocation of left humerus 03/2025    Atrial fibrillation ([facility])     Displaced fracture of left femur ([facility]) 03/2025    Diverticulitis     History of iron deficiency anemia     Hyperlipidemia     Kidney stone     MI (myocardial infarction) ([facility])     Obesity     Pericardial effusion 11/08/2021    Pneumonia of both lungs due to infectious organism 06/07/2022    Prolonged Q-T interval on ECG 05/01/2019    Formatting of this note might be different from the original. on [place] report    Small bowel obstruction (HCC)     Stress disorder, acute     Stress-induced cardiomyopathy     Thyroid goiter     Vitamin B12 deficiency     Vitamin D deficiency            Past Surgical History:   Procedure Laterality Date    CARDIAC CATHETERIZATION      COLON SURGERY  04/2008    COLECTOMY    COLONOSCOPY      COLONOSCOPY  03/13/2017

## 2025-04-30 LAB
ANION GAP SERPL CALCULATED.3IONS-SCNC: 12 MMOL/L (ref 9–16)
BASOPHILS # BLD: 0.04 K/UL (ref 0–0.2)
BASOPHILS NFR BLD: 0 % (ref 0–2)
BUN SERPL-MCNC: 18 MG/DL (ref 8–23)
BUN/CREAT SERPL: 23 (ref 9–20)
CALCIUM SERPL-MCNC: 8.4 MG/DL (ref 8.6–10.4)
CHLORIDE SERPL-SCNC: 102 MMOL/L (ref 98–107)
CO2 SERPL-SCNC: 27 MMOL/L (ref 20–31)
CREAT SERPL-MCNC: 0.8 MG/DL (ref 0.6–0.9)
EKG ATRIAL RATE: 67 BPM
EKG P AXIS: 66 DEGREES
EKG P-R INTERVAL: 178 MS
EKG Q-T INTERVAL: 324 MS
EKG QRS DURATION: 94 MS
EKG QTC CALCULATION (BAZETT): 342 MS
EKG R AXIS: -18 DEGREES
EKG T AXIS: 59 DEGREES
EKG VENTRICULAR RATE: 67 BPM
EOSINOPHIL # BLD: 0.1 K/UL (ref 0–0.44)
EOSINOPHILS RELATIVE PERCENT: 1 % (ref 1–4)
ERYTHROCYTE [DISTWIDTH] IN BLOOD BY AUTOMATED COUNT: 16.3 % (ref 11.8–14.4)
GFR, ESTIMATED: 75 ML/MIN/1.73M2
GLUCOSE SERPL-MCNC: 91 MG/DL (ref 74–99)
HCT VFR BLD AUTO: 36.6 % (ref 36.3–47.1)
HGB BLD-MCNC: 11.2 G/DL (ref 11.9–15.1)
IMM GRANULOCYTES # BLD AUTO: 0.09 K/UL (ref 0–0.3)
IMM GRANULOCYTES NFR BLD: 1 %
LYMPHOCYTES NFR BLD: 2.87 K/UL (ref 1.1–3.7)
LYMPHOCYTES RELATIVE PERCENT: 23 % (ref 24–43)
MAGNESIUM SERPL-MCNC: 2.5 MG/DL (ref 1.6–2.4)
MCH RBC QN AUTO: 29.8 PG (ref 25.2–33.5)
MCHC RBC AUTO-ENTMCNC: 30.6 G/DL (ref 25.2–33.5)
MCV RBC AUTO: 97.3 FL (ref 82.6–102.9)
MICROORGANISM SPEC CULT: ABNORMAL
MONOCYTES NFR BLD: 0.74 K/UL (ref 0.1–1.2)
MONOCYTES NFR BLD: 6 % (ref 3–12)
NEUTROPHILS NFR BLD: 69 % (ref 36–65)
NEUTS SEG NFR BLD: 8.88 K/UL (ref 1.5–8.1)
NRBC BLD-RTO: 0 PER 100 WBC
PLATELET # BLD AUTO: 176 K/UL (ref 138–453)
PMV BLD AUTO: 9.5 FL (ref 8.1–13.5)
POTASSIUM SERPL-SCNC: 3.3 MMOL/L (ref 3.7–5.3)
RBC # BLD AUTO: 3.76 M/UL (ref 3.95–5.11)
RBC # BLD: ABNORMAL 10*6/UL
SODIUM SERPL-SCNC: 141 MMOL/L (ref 136–145)
SPECIMEN DESCRIPTION: ABNORMAL
VANCOMYCIN SERPL-MCNC: 7.3 UG/ML (ref 5–40)
WBC OTHER # BLD: 12.7 K/UL (ref 3.5–11.3)

## 2025-04-30 PROCEDURE — 2500000003 HC RX 250 WO HCPCS: Performed by: INTERNAL MEDICINE

## 2025-04-30 PROCEDURE — 6360000002 HC RX W HCPCS

## 2025-04-30 PROCEDURE — 2580000003 HC RX 258: Performed by: EMERGENCY MEDICINE

## 2025-04-30 PROCEDURE — 6370000000 HC RX 637 (ALT 250 FOR IP): Performed by: INTERNAL MEDICINE

## 2025-04-30 PROCEDURE — 94669 MECHANICAL CHEST WALL OSCILL: CPT

## 2025-04-30 PROCEDURE — 94760 N-INVAS EAR/PLS OXIMETRY 1: CPT

## 2025-04-30 PROCEDURE — 2580000003 HC RX 258

## 2025-04-30 PROCEDURE — 80202 ASSAY OF VANCOMYCIN: CPT

## 2025-04-30 PROCEDURE — 2060000000 HC ICU INTERMEDIATE R&B

## 2025-04-30 PROCEDURE — 6370000000 HC RX 637 (ALT 250 FOR IP)

## 2025-04-30 PROCEDURE — 36415 COLL VENOUS BLD VENIPUNCTURE: CPT

## 2025-04-30 PROCEDURE — 94640 AIRWAY INHALATION TREATMENT: CPT

## 2025-04-30 PROCEDURE — 97110 THERAPEUTIC EXERCISES: CPT

## 2025-04-30 PROCEDURE — 94761 N-INVAS EAR/PLS OXIMETRY MLT: CPT

## 2025-04-30 PROCEDURE — 2700000000 HC OXYGEN THERAPY PER DAY

## 2025-04-30 PROCEDURE — 6360000002 HC RX W HCPCS: Performed by: EMERGENCY MEDICINE

## 2025-04-30 PROCEDURE — 80048 BASIC METABOLIC PNL TOTAL CA: CPT

## 2025-04-30 PROCEDURE — 97530 THERAPEUTIC ACTIVITIES: CPT

## 2025-04-30 PROCEDURE — 85025 COMPLETE CBC W/AUTO DIFF WBC: CPT

## 2025-04-30 PROCEDURE — 83735 ASSAY OF MAGNESIUM: CPT

## 2025-04-30 RX ORDER — POTASSIUM CHLORIDE 1500 MG/1
40 TABLET, EXTENDED RELEASE ORAL ONCE
Status: COMPLETED | OUTPATIENT
Start: 2025-04-30 | End: 2025-04-30

## 2025-04-30 RX ADMIN — AMIODARONE HYDROCHLORIDE 200 MG: 200 TABLET ORAL at 08:13

## 2025-04-30 RX ADMIN — IPRATROPIUM BROMIDE AND ALBUTEROL SULFATE 1 DOSE: .5; 2.5 SOLUTION RESPIRATORY (INHALATION) at 07:51

## 2025-04-30 RX ADMIN — CEFEPIME 2000 MG: 2 INJECTION, POWDER, FOR SOLUTION INTRAVENOUS at 16:39

## 2025-04-30 RX ADMIN — EMPAGLIFLOZIN 10 MG: 10 TABLET, FILM COATED ORAL at 08:15

## 2025-04-30 RX ADMIN — IPRATROPIUM BROMIDE AND ALBUTEROL SULFATE 1 DOSE: .5; 2.5 SOLUTION RESPIRATORY (INHALATION) at 15:51

## 2025-04-30 RX ADMIN — DOCUSATE SODIUM 100 MG: 100 CAPSULE, LIQUID FILLED ORAL at 08:16

## 2025-04-30 RX ADMIN — POTASSIUM CHLORIDE 40 MEQ: 1500 TABLET, EXTENDED RELEASE ORAL at 06:12

## 2025-04-30 RX ADMIN — FERROUS SULFATE TAB 325 MG (65 MG ELEMENTAL FE) 325 MG: 325 (65 FE) TAB at 08:15

## 2025-04-30 RX ADMIN — BUDESONIDE AND FORMOTEROL FUMARATE DIHYDRATE 2 PUFF: 160; 4.5 AEROSOL RESPIRATORY (INHALATION) at 19:43

## 2025-04-30 RX ADMIN — CEFEPIME 2000 MG: 2 INJECTION, POWDER, FOR SOLUTION INTRAVENOUS at 04:59

## 2025-04-30 RX ADMIN — BUDESONIDE AND FORMOTEROL FUMARATE DIHYDRATE 2 PUFF: 160; 4.5 AEROSOL RESPIRATORY (INHALATION) at 07:52

## 2025-04-30 RX ADMIN — HYDROCODONE BITARTRATE AND ACETAMINOPHEN 1 TABLET: 5; 325 TABLET ORAL at 19:15

## 2025-04-30 RX ADMIN — PANTOPRAZOLE SODIUM 40 MG: 40 TABLET, DELAYED RELEASE ORAL at 05:01

## 2025-04-30 RX ADMIN — MONTELUKAST 10 MG: 10 TABLET, FILM COATED ORAL at 21:03

## 2025-04-30 RX ADMIN — IPRATROPIUM BROMIDE AND ALBUTEROL SULFATE 1 DOSE: .5; 2.5 SOLUTION RESPIRATORY (INHALATION) at 12:10

## 2025-04-30 RX ADMIN — LEVOTHYROXINE SODIUM 50 MCG: 0.03 TABLET ORAL at 08:16

## 2025-04-30 RX ADMIN — TIOTROPIUM BROMIDE INHALATION SPRAY 1 PUFF: 3.12 SPRAY, METERED RESPIRATORY (INHALATION) at 07:52

## 2025-04-30 RX ADMIN — SODIUM CHLORIDE, PRESERVATIVE FREE 10 ML: 5 INJECTION INTRAVENOUS at 08:16

## 2025-04-30 RX ADMIN — TRAZODONE HYDROCHLORIDE 50 MG: 50 TABLET ORAL at 21:03

## 2025-04-30 RX ADMIN — IPRATROPIUM BROMIDE AND ALBUTEROL SULFATE 1 DOSE: .5; 2.5 SOLUTION RESPIRATORY (INHALATION) at 19:43

## 2025-04-30 RX ADMIN — APIXABAN 5 MG: 5 TABLET, FILM COATED ORAL at 21:03

## 2025-04-30 RX ADMIN — SODIUM CHLORIDE, PRESERVATIVE FREE 10 ML: 5 INJECTION INTRAVENOUS at 04:57

## 2025-04-30 RX ADMIN — ASPIRIN 81 MG: 81 TABLET, COATED ORAL at 08:15

## 2025-04-30 RX ADMIN — CETIRIZINE HYDROCHLORIDE 10 MG: 5 TABLET, FILM COATED ORAL at 08:16

## 2025-04-30 RX ADMIN — FUROSEMIDE 40 MG: 40 TABLET ORAL at 08:16

## 2025-04-30 RX ADMIN — AMIODARONE HYDROCHLORIDE 200 MG: 200 TABLET ORAL at 21:04

## 2025-04-30 RX ADMIN — FLUTICASONE PROPIONATE 2 SPRAY: 50 SPRAY, METERED NASAL at 08:27

## 2025-04-30 RX ADMIN — VANCOMYCIN HYDROCHLORIDE 1250 MG: 1.25 INJECTION, POWDER, LYOPHILIZED, FOR SOLUTION INTRAVENOUS at 08:26

## 2025-04-30 RX ADMIN — LACTULOSE 20 G: 10 SOLUTION ORAL at 21:04

## 2025-04-30 RX ADMIN — SODIUM CHLORIDE, PRESERVATIVE FREE 10 ML: 5 INJECTION INTRAVENOUS at 21:06

## 2025-04-30 ASSESSMENT — PAIN SCALES - GENERAL
PAINLEVEL_OUTOF10: 1
PAINLEVEL_OUTOF10: 4

## 2025-04-30 ASSESSMENT — PAIN DESCRIPTION - ORIENTATION: ORIENTATION: RIGHT;LEFT

## 2025-04-30 ASSESSMENT — PAIN DESCRIPTION - DESCRIPTORS: DESCRIPTORS: ACHING

## 2025-04-30 ASSESSMENT — PAIN DESCRIPTION - LOCATION: LOCATION: LEG;ARM

## 2025-05-01 ENCOUNTER — ANESTHESIA (OUTPATIENT)
Dept: OPERATING ROOM | Age: 80
End: 2025-05-01
Payer: MEDICARE

## 2025-05-01 ENCOUNTER — ANESTHESIA EVENT (OUTPATIENT)
Dept: OPERATING ROOM | Age: 80
End: 2025-05-01
Payer: MEDICARE

## 2025-05-01 PROBLEM — S51.001A OPEN WOUND OF RIGHT ELBOW: Status: ACTIVE | Noted: 2025-05-01

## 2025-05-01 LAB
ALBUMIN SERPL-MCNC: 2.9 G/DL (ref 3.5–5.2)
ALBUMIN/GLOB SERPL: 1.3 {RATIO} (ref 1–2.5)
ALP SERPL-CCNC: 78 U/L (ref 35–104)
ALT SERPL-CCNC: 9 U/L (ref 10–35)
ANION GAP SERPL CALCULATED.3IONS-SCNC: 11 MMOL/L (ref 9–16)
AST SERPL-CCNC: 12 U/L (ref 10–35)
BASOPHILS # BLD: 0.03 K/UL (ref 0–0.2)
BASOPHILS NFR BLD: 0 % (ref 0–2)
BILIRUB DIRECT SERPL-MCNC: 0.2 MG/DL (ref 0–0.2)
BILIRUB INDIRECT SERPL-MCNC: 0.1 MG/DL (ref 0–1)
BILIRUB SERPL-MCNC: 0.3 MG/DL (ref 0–1.2)
BUN SERPL-MCNC: 17 MG/DL (ref 8–23)
CALCIUM SERPL-MCNC: 8.4 MG/DL (ref 8.6–10.4)
CHLORIDE SERPL-SCNC: 106 MMOL/L (ref 98–107)
CO2 SERPL-SCNC: 26 MMOL/L (ref 20–31)
CREAT SERPL-MCNC: 0.8 MG/DL (ref 0.6–0.9)
EOSINOPHIL # BLD: 0.21 K/UL (ref 0–0.44)
EOSINOPHILS RELATIVE PERCENT: 2 % (ref 1–4)
ERYTHROCYTE [DISTWIDTH] IN BLOOD BY AUTOMATED COUNT: 16 % (ref 11.8–14.4)
GFR, ESTIMATED: 75 ML/MIN/1.73M2
GLUCOSE SERPL-MCNC: 108 MG/DL (ref 74–99)
HCT VFR BLD AUTO: 35 % (ref 36.3–47.1)
HGB BLD-MCNC: 10.5 G/DL (ref 11.9–15.1)
IMM GRANULOCYTES # BLD AUTO: 0.05 K/UL (ref 0–0.3)
IMM GRANULOCYTES NFR BLD: 1 %
LYMPHOCYTES NFR BLD: 2.32 K/UL (ref 1.1–3.7)
LYMPHOCYTES RELATIVE PERCENT: 27 % (ref 24–43)
MAGNESIUM SERPL-MCNC: 2.5 MG/DL (ref 1.6–2.4)
MCH RBC QN AUTO: 29.7 PG (ref 25.2–33.5)
MCHC RBC AUTO-ENTMCNC: 30 G/DL (ref 25.2–33.5)
MCV RBC AUTO: 98.9 FL (ref 82.6–102.9)
MONOCYTES NFR BLD: 0.53 K/UL (ref 0.1–1.2)
MONOCYTES NFR BLD: 6 % (ref 3–12)
NEUTROPHILS NFR BLD: 64 % (ref 36–65)
NEUTS SEG NFR BLD: 5.53 K/UL (ref 1.5–8.1)
NRBC BLD-RTO: 0 PER 100 WBC
PLATELET # BLD AUTO: 202 K/UL (ref 138–453)
PMV BLD AUTO: 9.7 FL (ref 8.1–13.5)
POTASSIUM SERPL-SCNC: 3.6 MMOL/L (ref 3.7–5.3)
PROT SERPL-MCNC: 5.2 G/DL (ref 6.6–8.7)
RBC # BLD AUTO: 3.54 M/UL (ref 3.95–5.11)
RBC # BLD: ABNORMAL 10*6/UL
SODIUM SERPL-SCNC: 143 MMOL/L (ref 136–145)
WBC OTHER # BLD: 8.7 K/UL (ref 3.5–11.3)

## 2025-05-01 PROCEDURE — 6360000002 HC RX W HCPCS

## 2025-05-01 PROCEDURE — 6370000000 HC RX 637 (ALT 250 FOR IP): Performed by: INTERNAL MEDICINE

## 2025-05-01 PROCEDURE — 36415 COLL VENOUS BLD VENIPUNCTURE: CPT

## 2025-05-01 PROCEDURE — 2060000000 HC ICU INTERMEDIATE R&B

## 2025-05-01 PROCEDURE — 2580000003 HC RX 258

## 2025-05-01 PROCEDURE — 2709999900 HC NON-CHARGEABLE SUPPLY: Performed by: ORTHOPAEDIC SURGERY

## 2025-05-01 PROCEDURE — 6370000000 HC RX 637 (ALT 250 FOR IP)

## 2025-05-01 PROCEDURE — 3700000001 HC ADD 15 MINUTES (ANESTHESIA): Performed by: ORTHOPAEDIC SURGERY

## 2025-05-01 PROCEDURE — 94761 N-INVAS EAR/PLS OXIMETRY MLT: CPT

## 2025-05-01 PROCEDURE — 0JBG0ZZ EXCISION OF RIGHT LOWER ARM SUBCUTANEOUS TISSUE AND FASCIA, OPEN APPROACH: ICD-10-PCS | Performed by: ORTHOPAEDIC SURGERY

## 2025-05-01 PROCEDURE — 2500000003 HC RX 250 WO HCPCS: Performed by: INTERNAL MEDICINE

## 2025-05-01 PROCEDURE — 83735 ASSAY OF MAGNESIUM: CPT

## 2025-05-01 PROCEDURE — 2580000003 HC RX 258: Performed by: EMERGENCY MEDICINE

## 2025-05-01 PROCEDURE — 6360000002 HC RX W HCPCS: Performed by: EMERGENCY MEDICINE

## 2025-05-01 PROCEDURE — 7100000011 HC PHASE II RECOVERY - ADDTL 15 MIN: Performed by: ORTHOPAEDIC SURGERY

## 2025-05-01 PROCEDURE — 6370000000 HC RX 637 (ALT 250 FOR IP): Performed by: ORTHOPAEDIC SURGERY

## 2025-05-01 PROCEDURE — 6360000002 HC RX W HCPCS: Performed by: NURSE PRACTITIONER

## 2025-05-01 PROCEDURE — 2580000003 HC RX 258: Performed by: NURSE PRACTITIONER

## 2025-05-01 PROCEDURE — 7100000010 HC PHASE II RECOVERY - FIRST 15 MIN: Performed by: ORTHOPAEDIC SURGERY

## 2025-05-01 PROCEDURE — 6370000000 HC RX 637 (ALT 250 FOR IP): Performed by: NURSE PRACTITIONER

## 2025-05-01 PROCEDURE — 23930 I&D UPR A/E DP ABSC/HMTMA: CPT | Performed by: ORTHOPAEDIC SURGERY

## 2025-05-01 PROCEDURE — 80053 COMPREHEN METABOLIC PANEL: CPT

## 2025-05-01 PROCEDURE — 3600000012 HC SURGERY LEVEL 2 ADDTL 15MIN: Performed by: ORTHOPAEDIC SURGERY

## 2025-05-01 PROCEDURE — 82248 BILIRUBIN DIRECT: CPT

## 2025-05-01 PROCEDURE — 3700000000 HC ANESTHESIA ATTENDED CARE: Performed by: ORTHOPAEDIC SURGERY

## 2025-05-01 PROCEDURE — 2500000003 HC RX 250 WO HCPCS

## 2025-05-01 PROCEDURE — 99231 SBSQ HOSP IP/OBS SF/LOW 25: CPT | Performed by: INTERNAL MEDICINE

## 2025-05-01 PROCEDURE — 6360000002 HC RX W HCPCS: Performed by: ORTHOPAEDIC SURGERY

## 2025-05-01 PROCEDURE — 94640 AIRWAY INHALATION TREATMENT: CPT

## 2025-05-01 PROCEDURE — 85025 COMPLETE CBC W/AUTO DIFF WBC: CPT

## 2025-05-01 PROCEDURE — 2700000000 HC OXYGEN THERAPY PER DAY

## 2025-05-01 PROCEDURE — 3600000002 HC SURGERY LEVEL 2 BASE: Performed by: ORTHOPAEDIC SURGERY

## 2025-05-01 RX ORDER — HYDROCODONE BITARTRATE AND ACETAMINOPHEN 5; 325 MG/1; MG/1
1 TABLET ORAL ONCE
Refills: 0 | Status: COMPLETED | OUTPATIENT
Start: 2025-05-01 | End: 2025-05-01

## 2025-05-01 RX ORDER — SODIUM CHLORIDE 9 MG/ML
INJECTION, SOLUTION INTRAVENOUS PRN
Status: DISCONTINUED | OUTPATIENT
Start: 2025-05-01 | End: 2025-05-01 | Stop reason: HOSPADM

## 2025-05-01 RX ORDER — SODIUM CHLORIDE, SODIUM LACTATE, POTASSIUM CHLORIDE, CALCIUM CHLORIDE 600; 310; 30; 20 MG/100ML; MG/100ML; MG/100ML; MG/100ML
INJECTION, SOLUTION INTRAVENOUS
Status: DISCONTINUED | OUTPATIENT
Start: 2025-05-01 | End: 2025-05-01 | Stop reason: SDUPTHER

## 2025-05-01 RX ORDER — SODIUM CHLORIDE 0.9 % (FLUSH) 0.9 %
5-40 SYRINGE (ML) INJECTION EVERY 12 HOURS SCHEDULED
Status: DISCONTINUED | OUTPATIENT
Start: 2025-05-01 | End: 2025-05-01 | Stop reason: HOSPADM

## 2025-05-01 RX ORDER — FENTANYL CITRATE 50 UG/ML
INJECTION, SOLUTION INTRAMUSCULAR; INTRAVENOUS
Status: DISCONTINUED | OUTPATIENT
Start: 2025-05-01 | End: 2025-05-01 | Stop reason: SDUPTHER

## 2025-05-01 RX ORDER — OXYCODONE AND ACETAMINOPHEN 5; 325 MG/1; MG/1
1 TABLET ORAL EVERY 4 HOURS PRN
Refills: 0 | Status: DISCONTINUED | OUTPATIENT
Start: 2025-05-01 | End: 2025-05-06 | Stop reason: HOSPADM

## 2025-05-01 RX ORDER — POTASSIUM CHLORIDE 1500 MG/1
40 TABLET, EXTENDED RELEASE ORAL ONCE
Status: COMPLETED | OUTPATIENT
Start: 2025-05-01 | End: 2025-05-01

## 2025-05-01 RX ORDER — DOXYCYCLINE HYCLATE 100 MG
100 TABLET ORAL 2 TIMES DAILY
Qty: 20 TABLET | Refills: 0 | Status: SHIPPED | OUTPATIENT
Start: 2025-05-01 | End: 2025-05-02 | Stop reason: HOSPADM

## 2025-05-01 RX ORDER — BUPIVACAINE HYDROCHLORIDE 5 MG/ML
INJECTION, SOLUTION EPIDURAL; INTRACAUDAL; PERINEURAL PRN
Status: DISCONTINUED | OUTPATIENT
Start: 2025-05-01 | End: 2025-05-01 | Stop reason: ALTCHOICE

## 2025-05-01 RX ORDER — OXYCODONE AND ACETAMINOPHEN 5; 325 MG/1; MG/1
2 TABLET ORAL EVERY 4 HOURS PRN
Refills: 0 | Status: DISCONTINUED | OUTPATIENT
Start: 2025-05-01 | End: 2025-05-06 | Stop reason: HOSPADM

## 2025-05-01 RX ORDER — FENTANYL CITRATE 50 UG/ML
25 INJECTION, SOLUTION INTRAMUSCULAR; INTRAVENOUS EVERY 5 MIN PRN
Status: DISCONTINUED | OUTPATIENT
Start: 2025-05-01 | End: 2025-05-01 | Stop reason: HOSPADM

## 2025-05-01 RX ORDER — MORPHINE SULFATE 2 MG/ML
2 INJECTION, SOLUTION INTRAMUSCULAR; INTRAVENOUS EVERY 5 MIN PRN
Status: DISCONTINUED | OUTPATIENT
Start: 2025-05-01 | End: 2025-05-01 | Stop reason: HOSPADM

## 2025-05-01 RX ORDER — HYDROCODONE BITARTRATE AND ACETAMINOPHEN 5; 325 MG/1; MG/1
1 TABLET ORAL EVERY 6 HOURS PRN
Qty: 28 TABLET | Refills: 0 | Status: SHIPPED | OUTPATIENT
Start: 2025-05-01 | End: 2025-05-08

## 2025-05-01 RX ORDER — PROPOFOL 10 MG/ML
INJECTION, EMULSION INTRAVENOUS
Status: DISCONTINUED | OUTPATIENT
Start: 2025-05-01 | End: 2025-05-01 | Stop reason: SDUPTHER

## 2025-05-01 RX ORDER — LIDOCAINE HYDROCHLORIDE 10 MG/ML
INJECTION, SOLUTION EPIDURAL; INFILTRATION; INTRACAUDAL; PERINEURAL
Status: DISCONTINUED | OUTPATIENT
Start: 2025-05-01 | End: 2025-05-01 | Stop reason: SDUPTHER

## 2025-05-01 RX ORDER — ONDANSETRON 2 MG/ML
4 INJECTION INTRAMUSCULAR; INTRAVENOUS PRN
Status: DISCONTINUED | OUTPATIENT
Start: 2025-05-01 | End: 2025-05-01 | Stop reason: HOSPADM

## 2025-05-01 RX ORDER — DIPHENHYDRAMINE HYDROCHLORIDE 50 MG/ML
12.5 INJECTION, SOLUTION INTRAMUSCULAR; INTRAVENOUS
Status: DISCONTINUED | OUTPATIENT
Start: 2025-05-01 | End: 2025-05-01 | Stop reason: HOSPADM

## 2025-05-01 RX ORDER — SODIUM CHLORIDE 0.9 % (FLUSH) 0.9 %
5-40 SYRINGE (ML) INJECTION PRN
Status: DISCONTINUED | OUTPATIENT
Start: 2025-05-01 | End: 2025-05-01 | Stop reason: HOSPADM

## 2025-05-01 RX ORDER — NALOXONE HYDROCHLORIDE 0.4 MG/ML
INJECTION, SOLUTION INTRAMUSCULAR; INTRAVENOUS; SUBCUTANEOUS PRN
Status: DISCONTINUED | OUTPATIENT
Start: 2025-05-01 | End: 2025-05-01 | Stop reason: HOSPADM

## 2025-05-01 RX ORDER — DEXMEDETOMIDINE HYDROCHLORIDE 100 UG/ML
INJECTION, SOLUTION INTRAVENOUS
Status: DISCONTINUED | OUTPATIENT
Start: 2025-05-01 | End: 2025-05-01 | Stop reason: SDUPTHER

## 2025-05-01 RX ADMIN — TIOTROPIUM BROMIDE INHALATION SPRAY 1 PUFF: 3.12 SPRAY, METERED RESPIRATORY (INHALATION) at 08:43

## 2025-05-01 RX ADMIN — PANTOPRAZOLE SODIUM 40 MG: 40 TABLET, DELAYED RELEASE ORAL at 05:08

## 2025-05-01 RX ADMIN — TRAZODONE HYDROCHLORIDE 50 MG: 50 TABLET ORAL at 21:48

## 2025-05-01 RX ADMIN — SODIUM CHLORIDE, PRESERVATIVE FREE 10 ML: 5 INJECTION INTRAVENOUS at 09:44

## 2025-05-01 RX ADMIN — CETIRIZINE HYDROCHLORIDE 10 MG: 5 TABLET, FILM COATED ORAL at 09:38

## 2025-05-01 RX ADMIN — EMPAGLIFLOZIN 10 MG: 10 TABLET, FILM COATED ORAL at 09:39

## 2025-05-01 RX ADMIN — PROPOFOL 75 MG: 10 INJECTION, EMULSION INTRAVENOUS at 15:25

## 2025-05-01 RX ADMIN — HYDROCODONE BITARTRATE AND ACETAMINOPHEN 1 TABLET: 5; 325 TABLET ORAL at 20:35

## 2025-05-01 RX ADMIN — BUDESONIDE AND FORMOTEROL FUMARATE DIHYDRATE 2 PUFF: 160; 4.5 AEROSOL RESPIRATORY (INHALATION) at 08:42

## 2025-05-01 RX ADMIN — MONTELUKAST 10 MG: 10 TABLET, FILM COATED ORAL at 21:48

## 2025-05-01 RX ADMIN — FERROUS SULFATE TAB 325 MG (65 MG ELEMENTAL FE) 325 MG: 325 (65 FE) TAB at 09:39

## 2025-05-01 RX ADMIN — PHENYLEPHRINE HYDROCHLORIDE 100 MCG: 10 INJECTION INTRAVENOUS at 15:46

## 2025-05-01 RX ADMIN — AMIODARONE HYDROCHLORIDE 200 MG: 200 TABLET ORAL at 21:48

## 2025-05-01 RX ADMIN — VANCOMYCIN HYDROCHLORIDE 1250 MG: 1.25 INJECTION, POWDER, LYOPHILIZED, FOR SOLUTION INTRAVENOUS at 09:46

## 2025-05-01 RX ADMIN — BUDESONIDE AND FORMOTEROL FUMARATE DIHYDRATE 2 PUFF: 160; 4.5 AEROSOL RESPIRATORY (INHALATION) at 20:10

## 2025-05-01 RX ADMIN — POTASSIUM CHLORIDE 40 MEQ: 1500 TABLET, EXTENDED RELEASE ORAL at 05:58

## 2025-05-01 RX ADMIN — AMIODARONE HYDROCHLORIDE 200 MG: 200 TABLET ORAL at 09:39

## 2025-05-01 RX ADMIN — OXYCODONE AND ACETAMINOPHEN 2 TABLET: 325; 5 TABLET ORAL at 22:14

## 2025-05-01 RX ADMIN — LEVOTHYROXINE SODIUM 50 MCG: 0.03 TABLET ORAL at 09:38

## 2025-05-01 RX ADMIN — FENTANYL CITRATE 25 MCG: 50 INJECTION, SOLUTION INTRAMUSCULAR; INTRAVENOUS at 15:27

## 2025-05-01 RX ADMIN — HYDROCODONE BITARTRATE AND ACETAMINOPHEN 1 TABLET: 5; 325 TABLET ORAL at 16:24

## 2025-05-01 RX ADMIN — FUROSEMIDE 40 MG: 40 TABLET ORAL at 09:39

## 2025-05-01 RX ADMIN — IPRATROPIUM BROMIDE AND ALBUTEROL SULFATE 1 DOSE: .5; 2.5 SOLUTION RESPIRATORY (INHALATION) at 08:43

## 2025-05-01 RX ADMIN — FENTANYL CITRATE 25 MCG: 50 INJECTION, SOLUTION INTRAMUSCULAR; INTRAVENOUS at 15:32

## 2025-05-01 RX ADMIN — LIDOCAINE HYDROCHLORIDE 50 MG: 10 INJECTION, SOLUTION EPIDURAL; INFILTRATION; INTRACAUDAL; PERINEURAL at 15:25

## 2025-05-01 RX ADMIN — CEFEPIME 2000 MG: 2 INJECTION, POWDER, FOR SOLUTION INTRAVENOUS at 17:43

## 2025-05-01 RX ADMIN — IPRATROPIUM BROMIDE AND ALBUTEROL SULFATE 1 DOSE: .5; 2.5 SOLUTION RESPIRATORY (INHALATION) at 20:06

## 2025-05-01 RX ADMIN — DEXMEDETOMIDINE HYDROCHLORIDE 10 MCG: 100 INJECTION, SOLUTION INTRAVENOUS at 15:25

## 2025-05-01 RX ADMIN — PROPOFOL 120 MCG/KG/MIN: 10 INJECTION, EMULSION INTRAVENOUS at 15:26

## 2025-05-01 RX ADMIN — LACTULOSE 20 G: 10 SOLUTION ORAL at 21:48

## 2025-05-01 RX ADMIN — CEFEPIME 2000 MG: 2 INJECTION, POWDER, FOR SOLUTION INTRAVENOUS at 05:12

## 2025-05-01 RX ADMIN — SODIUM CHLORIDE, POTASSIUM CHLORIDE, SODIUM LACTATE AND CALCIUM CHLORIDE: 600; 310; 30; 20 INJECTION, SOLUTION INTRAVENOUS at 15:21

## 2025-05-01 RX ADMIN — IPRATROPIUM BROMIDE AND ALBUTEROL SULFATE 1 DOSE: .5; 2.5 SOLUTION RESPIRATORY (INHALATION) at 12:20

## 2025-05-01 RX ADMIN — FLUTICASONE PROPIONATE 2 SPRAY: 50 SPRAY, METERED NASAL at 09:43

## 2025-05-01 RX ADMIN — DOCUSATE SODIUM 100 MG: 100 CAPSULE, LIQUID FILLED ORAL at 09:39

## 2025-05-01 ASSESSMENT — PAIN SCALES - GENERAL
PAINLEVEL_OUTOF10: 8
PAINLEVEL_OUTOF10: 3
PAINLEVEL_OUTOF10: 6
PAINLEVEL_OUTOF10: 10
PAINLEVEL_OUTOF10: 3

## 2025-05-01 ASSESSMENT — PAIN - FUNCTIONAL ASSESSMENT
PAIN_FUNCTIONAL_ASSESSMENT: 0-10
PAIN_FUNCTIONAL_ASSESSMENT: NONE - DENIES PAIN
PAIN_FUNCTIONAL_ASSESSMENT: 0-10

## 2025-05-01 ASSESSMENT — PAIN DESCRIPTION - ORIENTATION
ORIENTATION: RIGHT
ORIENTATION: RIGHT

## 2025-05-01 ASSESSMENT — PAIN DESCRIPTION - DESCRIPTORS
DESCRIPTORS: ACHING
DESCRIPTORS: ACHING
DESCRIPTORS: DISCOMFORT
DESCRIPTORS: ACHING

## 2025-05-01 ASSESSMENT — PAIN DESCRIPTION - LOCATION
LOCATION: ARM
LOCATION: ARM

## 2025-05-01 NOTE — OP NOTE
Operative Note      Patient: Marah Loyd  YOB: 1945  MRN: [id number]    Date of Procedure: 5/1/2025    Pre-Op Diagnosis Codes:      * Open wound of right elbow, subsequent encounter [S51.001D]     * Cellulitis of right elbow [L03.113]    Post-Op Diagnosis: Same       Procedure: Excisional debridement right elbow/arm abscess    Surgeon(s):  Phillip Navarrete MD    Assistant:   First Assistant: Erlinda Navarrete    Anesthesia: General    Estimated Blood Loss (mL): less than 100     Complications: None    Specimens:   * No specimens in log *    Implants:  * No implants in log *      Drains:   External Urinary Catheter (Active)   Site Assessment Clean,dry & intact 05/01/25 1345   Placement Repositioned 05/01/25 1345   Securement Method Other (Comment) 05/01/25 1345   Catheter Care Catheter/Wick replaced 05/01/25 0518   Perineal Care Yes 05/01/25 1345   Suction 40 mmgHg continuous 05/01/25 1345   Urine Color Yellow 05/01/25 1345   Urine Appearance Clear 05/01/25 1345   Output (mL) 950 mL 05/01/25 1345       Findings:  Infection Present At Time Of Surgery (PATOS) (choose all levels that have infection present):  - Deep Infection (muscle/fascia) present as evidenced by pus  Other Findings: confirmed    Detailed Description of Procedure:     Indications  This 79-year-old female well-known to me for history of femur fracture.  Over the past several days is developed an abscess on the medial side of her elbow.  Unknown trauma.  Attempted IV antibiotics for couple days unsuccessfully.  Felt to benefit an excisional debridement.  Patient agreed.    Narrative  Patient taken the operating room underwent a general anesthetic.  Right upper extremities prepped draped normal sterile fashion.  Timeout was taken consent was confirmed.  Utilizing a sharp blade ellipsed out the area of abscess down to and including fascia.  Extending it proximally to gross purulence is obtained.  Already have positive cultures.  It was

## 2025-05-01 NOTE — ANESTHESIA PRE PROCEDURE
COLECTOMY    COLONOSCOPY      COLONOSCOPY  03/13/2017    POLYP,DIVERTICULOSIS    COLONOSCOPY N/A 11/12/2024    severe diverticulosis with some inflammation and small blood clot, anastomosis looked normal; Dr. Colunga at [facility]    FEMUR SURGERY Left 3/19/2025    Open Reduction Internal Fixation Left Femur performed by Phillip Navarrete MD at [facility] OR    HEMORRHOID SURGERY      BANDING    HERNIA REPAIR  2004    VENTRAL HERNIA    HYSTERECTOMY (CERVIX STATUS UNKNOWN)      JOINT REPLACEMENT      BILATERAL KNEE REPLACEMENT    LAPAROSCOPY N/A 07/11/2019    EXPLORATORY LAPAROSCOPY CONVERTED TO OPEN LYSIS OF ADHESIONS PARTIAL COLECTOMY performed by Mesfin Colunga MD at [facility] OR    THYROIDECTOMY, PARTIAL      goiter removed     UPPER GASTROINTESTINAL ENDOSCOPY         Social History:    Social History     Tobacco Use    Smoking status: Never    Smokeless tobacco: Never    Tobacco comments:     KENNA Loyd RRT 7/10/19   Substance Use Topics    Alcohol use: No                                Counseling given: Not Answered  Tobacco comments: KENNA Loyd RRT 7/10/19      Vital Signs (Current):   Vitals:    05/01/25 0800 05/01/25 0846 05/01/25 1130 05/01/25 1222   BP: 121/60  111/67    Pulse: 62  67 63   Resp: 14  20 15   Temp: 36.3 °C (97.3 °F)  36.7 °C (98 °F)    TempSrc: Temporal  Temporal    SpO2: 98% 99% 97%    Weight:       Height:                                                  BP Readings from Last 3 Encounters:   05/01/25 111/67   04/22/25 118/72   03/25/25 (!) 162/61       NPO Status:                                                                                 BMI:   Wt Readings from Last 3 Encounters:   05/01/25 101.3 kg (223 lb 6.4 oz)   04/22/25 103 kg (227 lb)   03/24/25 103 kg (227 lb)     Body mass index is 40.86 kg/m².    CBC:   Lab Results   Component Value Date/Time    WBC 8.7 05/01/2025 05:09 AM    RBC 3.54 05/01/2025 05:09 AM    HGB 10.5 05/01/2025 05:09 AM    HCT 35.0 05/01/2025 05:09 AM    MCV 98.9 05/01/2025 05:09 AM

## 2025-05-01 NOTE — ANESTHESIA POSTPROCEDURE EVALUATION
Department of Anesthesiology  Postprocedure Note    Patient: Marah Loyd  MRN: [id number]  YOB: 1945  Date of evaluation: 5/1/2025    Procedure Summary       Date: 05/01/25 Room / Location: [address]    Anesthesia Start: 1521 Anesthesia Stop: 1602    Procedure: Incision & Drainage Right elbow (Right) Diagnosis:       Open wound of right elbow, subsequent encounter      Cellulitis of right elbow      (Open wound of right elbow, subsequent encounter [S51.001D])      (Cellulitis of right elbow [L03.113])    Surgeons: Phillip Navarrete MD Responsible Provider: Aaron Rodrigues APRN - CRNA    Anesthesia Type: General, TIVA ASA Status: 3            Anesthesia Type: General, TIVA    Marija Phase I: Marija Score: 10    Marija Phase II: Marija Score: 9    Anesthesia Post Evaluation    Patient location during evaluation: bedside  Patient participation: complete - patient participated  Level of consciousness: sleepy but conscious  Airway patency: patent  Nausea & Vomiting: no nausea  Cardiovascular status: hemodynamically stable  Respiratory status: nasal cannula  Hydration status: euvolemic  Pain management: satisfactory to patient    No notable events documented.

## 2025-05-01 NOTE — DISCHARGE INSTRUCTIONS
DR. NAVARRETE DISCHARGE INSTRUCTIONS  Dr. Phillip Navarrete MD       ACTIVITY / WEIGHTBEARING  INSTRUCTIONS:  Weightbearing as tolerated bilateral upper extremities   Non weightbearing left lower extremity  PT/OT    DIET:  Increase Fluid/Water intake, eat foods high in fiber, fruits and vegetables to help to prevent constipation.    WOUND/DRESSING INSTRUCTIONS:  Always ensure you wash your hands before and after caring for your wound/dressing.  Keep your dressing clean and dry. Change dressing daily using dry gauze.  Can wash around incision.     Do not place antibiotic ointment, lotion, creams on surgical incision.  Smoking impairs adequate wound healing.  If smoking, consider quitting and decreasing.  If diabetic, keeping blood glucose levels in acceptable range will limit complications.   May apply ice to surgical incision to help to prevent swelling. Apply 20 minutes at a time, as needed.      MEDICATION INSTRUCTIONS:  Take pain medication as prescribed.  Decrease as tolerable.   See orders regarding resuming your home medications and any new medications.  Continue blood thinner if ordered by your physician.   Wear compression stocking as directed, this will help reduce swelling and blood clots. Wear on during the day, may remove at bedtime. Wear until follow-up unless otherwise directed.     FOLLOW-UP CARE:  If a follow-up appointment was not made for you at discharge, call [phone] ([place] office) or [phone] ([place] office) to schedule an appointment for 2-3 weeks.    Call Dr Navarrete's office with any concerns.     Signs of infection such as fever, chills, redness, pus, or bad smelling discharge from incision site.     Excessive bleeding, swelling, increasing pain, or discharge around incision site.     The stitches or staples come apart at the incision site.     Cough shortness of breath, or chest pain. Severe nausea or vomiting.     Pain that you cannot control with the medications you have been given or  pain

## 2025-05-01 NOTE — CARE COORDINATION
Call from [facility] regarding precert. If patient does not return today (5/1/2025) insurance requires a new precert.        Electronically signed by MARTA Elizabeth on 5/1/2025 at 10:19 AM

## 2025-05-01 NOTE — FLOWSHEET NOTE
Physical Therapy     Attempted to see patient this afternoon. Per nursing, patient currently in surgery at this time. Will resume therapy tomorrow 5/2/25.     Keena Esqueda PTA

## 2025-05-02 LAB
ANION GAP SERPL CALCULATED.3IONS-SCNC: 12 MMOL/L (ref 9–16)
BASOPHILS # BLD: 0.03 K/UL (ref 0–0.2)
BASOPHILS NFR BLD: 0 % (ref 0–2)
BUN SERPL-MCNC: 18 MG/DL (ref 8–23)
BUN/CREAT SERPL: 20 (ref 9–20)
CALCIUM SERPL-MCNC: 8.4 MG/DL (ref 8.6–10.4)
CHLORIDE SERPL-SCNC: 105 MMOL/L (ref 98–107)
CO2 SERPL-SCNC: 26 MMOL/L (ref 20–31)
CREAT SERPL-MCNC: 0.9 MG/DL (ref 0.6–0.9)
EOSINOPHIL # BLD: 0.23 K/UL (ref 0–0.44)
EOSINOPHILS RELATIVE PERCENT: 3 % (ref 1–4)
ERYTHROCYTE [DISTWIDTH] IN BLOOD BY AUTOMATED COUNT: 16 % (ref 11.8–14.4)
GFR, ESTIMATED: 65 ML/MIN/1.73M2
GLUCOSE SERPL-MCNC: 108 MG/DL (ref 74–99)
HCT VFR BLD AUTO: 33.4 % (ref 36.3–47.1)
HGB BLD-MCNC: 10.1 G/DL (ref 11.9–15.1)
IMM GRANULOCYTES # BLD AUTO: 0.06 K/UL (ref 0–0.3)
IMM GRANULOCYTES NFR BLD: 1 %
LYMPHOCYTES NFR BLD: 2.25 K/UL (ref 1.1–3.7)
LYMPHOCYTES RELATIVE PERCENT: 26 % (ref 24–43)
MAGNESIUM SERPL-MCNC: 2.3 MG/DL (ref 1.6–2.4)
MCH RBC QN AUTO: 29.8 PG (ref 25.2–33.5)
MCHC RBC AUTO-ENTMCNC: 30.2 G/DL (ref 25.2–33.5)
MCV RBC AUTO: 98.5 FL (ref 82.6–102.9)
MONOCYTES NFR BLD: 0.73 K/UL (ref 0.1–1.2)
MONOCYTES NFR BLD: 9 % (ref 3–12)
NEUTROPHILS NFR BLD: 61 % (ref 36–65)
NEUTS SEG NFR BLD: 5.24 K/UL (ref 1.5–8.1)
NRBC BLD-RTO: 0 PER 100 WBC
PLATELET # BLD AUTO: 231 K/UL (ref 138–453)
PMV BLD AUTO: 9.8 FL (ref 8.1–13.5)
POTASSIUM SERPL-SCNC: 3.8 MMOL/L (ref 3.7–5.3)
RBC # BLD AUTO: 3.39 M/UL (ref 3.95–5.11)
RBC # BLD: ABNORMAL 10*6/UL
SODIUM SERPL-SCNC: 143 MMOL/L (ref 136–145)
VANCOMYCIN SERPL-MCNC: 12.2 UG/ML (ref 5–40)
WBC OTHER # BLD: 8.5 K/UL (ref 3.5–11.3)

## 2025-05-02 PROCEDURE — 2580000003 HC RX 258: Performed by: NURSE PRACTITIONER

## 2025-05-02 PROCEDURE — 83735 ASSAY OF MAGNESIUM: CPT

## 2025-05-02 PROCEDURE — 94640 AIRWAY INHALATION TREATMENT: CPT

## 2025-05-02 PROCEDURE — 6360000002 HC RX W HCPCS: Performed by: NURSE PRACTITIONER

## 2025-05-02 PROCEDURE — 97110 THERAPEUTIC EXERCISES: CPT

## 2025-05-02 PROCEDURE — 6370000000 HC RX 637 (ALT 250 FOR IP)

## 2025-05-02 PROCEDURE — 6360000002 HC RX W HCPCS

## 2025-05-02 PROCEDURE — 80048 BASIC METABOLIC PNL TOTAL CA: CPT

## 2025-05-02 PROCEDURE — 85025 COMPLETE CBC W/AUTO DIFF WBC: CPT

## 2025-05-02 PROCEDURE — 99231 SBSQ HOSP IP/OBS SF/LOW 25: CPT | Performed by: INTERNAL MEDICINE

## 2025-05-02 PROCEDURE — 94761 N-INVAS EAR/PLS OXIMETRY MLT: CPT

## 2025-05-02 PROCEDURE — 2060000000 HC ICU INTERMEDIATE R&B

## 2025-05-02 PROCEDURE — 80202 ASSAY OF VANCOMYCIN: CPT

## 2025-05-02 PROCEDURE — 36415 COLL VENOUS BLD VENIPUNCTURE: CPT

## 2025-05-02 PROCEDURE — 6370000000 HC RX 637 (ALT 250 FOR IP): Performed by: NURSE PRACTITIONER

## 2025-05-02 PROCEDURE — 2500000003 HC RX 250 WO HCPCS: Performed by: NURSE PRACTITIONER

## 2025-05-02 PROCEDURE — 2700000000 HC OXYGEN THERAPY PER DAY

## 2025-05-02 PROCEDURE — 6360000002 HC RX W HCPCS: Performed by: INTERNAL MEDICINE

## 2025-05-02 RX ORDER — ONDANSETRON 2 MG/ML
INJECTION INTRAMUSCULAR; INTRAVENOUS
Status: COMPLETED
Start: 2025-05-02 | End: 2025-05-02

## 2025-05-02 RX ORDER — CLINDAMYCIN PHOSPHATE 600 MG/50ML
600 INJECTION, SOLUTION INTRAVENOUS EVERY 8 HOURS
Status: DISCONTINUED | OUTPATIENT
Start: 2025-05-02 | End: 2025-05-06 | Stop reason: HOSPADM

## 2025-05-02 RX ORDER — AMOXICILLIN 500 MG/1
500 CAPSULE ORAL 2 TIMES DAILY
Qty: 20 CAPSULE | Refills: 0 | Status: SHIPPED | OUTPATIENT
Start: 2025-05-02 | End: 2025-05-12

## 2025-05-02 RX ORDER — ONDANSETRON 2 MG/ML
8 INJECTION INTRAMUSCULAR; INTRAVENOUS EVERY 4 HOURS PRN
Status: DISCONTINUED | OUTPATIENT
Start: 2025-05-02 | End: 2025-05-06 | Stop reason: HOSPADM

## 2025-05-02 RX ADMIN — IPRATROPIUM BROMIDE AND ALBUTEROL SULFATE 1 DOSE: .5; 2.5 SOLUTION RESPIRATORY (INHALATION) at 16:13

## 2025-05-02 RX ADMIN — TRAZODONE HYDROCHLORIDE 50 MG: 50 TABLET ORAL at 20:30

## 2025-05-02 RX ADMIN — CEFEPIME 2000 MG: 2 INJECTION, POWDER, FOR SOLUTION INTRAVENOUS at 17:40

## 2025-05-02 RX ADMIN — FLUTICASONE PROPIONATE 2 SPRAY: 50 SPRAY, METERED NASAL at 10:04

## 2025-05-02 RX ADMIN — EMPAGLIFLOZIN 10 MG: 10 TABLET, FILM COATED ORAL at 10:00

## 2025-05-02 RX ADMIN — CEFEPIME 2000 MG: 2 INJECTION, POWDER, FOR SOLUTION INTRAVENOUS at 05:32

## 2025-05-02 RX ADMIN — OXYCODONE AND ACETAMINOPHEN 2 TABLET: 325; 5 TABLET ORAL at 09:59

## 2025-05-02 RX ADMIN — CLINDAMYCIN PHOSPHATE 600 MG: 600 INJECTION, SOLUTION INTRAVENOUS at 17:14

## 2025-05-02 RX ADMIN — TIOTROPIUM BROMIDE INHALATION SPRAY 1 PUFF: 3.12 SPRAY, METERED RESPIRATORY (INHALATION) at 08:33

## 2025-05-02 RX ADMIN — MONTELUKAST 10 MG: 10 TABLET, FILM COATED ORAL at 20:31

## 2025-05-02 RX ADMIN — ASPIRIN 81 MG: 81 TABLET, COATED ORAL at 10:00

## 2025-05-02 RX ADMIN — DOCUSATE SODIUM 100 MG: 100 CAPSULE, LIQUID FILLED ORAL at 10:00

## 2025-05-02 RX ADMIN — SODIUM CHLORIDE, PRESERVATIVE FREE 10 ML: 5 INJECTION INTRAVENOUS at 10:01

## 2025-05-02 RX ADMIN — IPRATROPIUM BROMIDE AND ALBUTEROL SULFATE 1 DOSE: .5; 2.5 SOLUTION RESPIRATORY (INHALATION) at 08:33

## 2025-05-02 RX ADMIN — AMIODARONE HYDROCHLORIDE 200 MG: 200 TABLET ORAL at 20:31

## 2025-05-02 RX ADMIN — IPRATROPIUM BROMIDE AND ALBUTEROL SULFATE 1 DOSE: .5; 2.5 SOLUTION RESPIRATORY (INHALATION) at 13:06

## 2025-05-02 RX ADMIN — OXYCODONE AND ACETAMINOPHEN 1 TABLET: 325; 5 TABLET ORAL at 21:21

## 2025-05-02 RX ADMIN — LACTULOSE 20 G: 10 SOLUTION ORAL at 20:31

## 2025-05-02 RX ADMIN — ONDANSETRON 8 MG: 2 INJECTION, SOLUTION INTRAMUSCULAR; INTRAVENOUS at 10:08

## 2025-05-02 RX ADMIN — BUDESONIDE AND FORMOTEROL FUMARATE DIHYDRATE 2 PUFF: 160; 4.5 AEROSOL RESPIRATORY (INHALATION) at 08:33

## 2025-05-02 RX ADMIN — SODIUM CHLORIDE, PRESERVATIVE FREE 10 ML: 5 INJECTION INTRAVENOUS at 17:32

## 2025-05-02 RX ADMIN — PANTOPRAZOLE SODIUM 40 MG: 40 TABLET, DELAYED RELEASE ORAL at 05:32

## 2025-05-02 RX ADMIN — IPRATROPIUM BROMIDE AND ALBUTEROL SULFATE 1 DOSE: .5; 2.5 SOLUTION RESPIRATORY (INHALATION) at 19:48

## 2025-05-02 RX ADMIN — AMIODARONE HYDROCHLORIDE 200 MG: 200 TABLET ORAL at 09:59

## 2025-05-02 RX ADMIN — VANCOMYCIN HYDROCHLORIDE 1250 MG: 1.25 INJECTION, POWDER, LYOPHILIZED, FOR SOLUTION INTRAVENOUS at 10:27

## 2025-05-02 RX ADMIN — FERROUS SULFATE TAB 325 MG (65 MG ELEMENTAL FE) 325 MG: 325 (65 FE) TAB at 10:00

## 2025-05-02 RX ADMIN — FUROSEMIDE 40 MG: 40 TABLET ORAL at 09:59

## 2025-05-02 RX ADMIN — CETIRIZINE HYDROCHLORIDE 10 MG: 5 TABLET, FILM COATED ORAL at 10:00

## 2025-05-02 RX ADMIN — BUDESONIDE AND FORMOTEROL FUMARATE DIHYDRATE 2 PUFF: 160; 4.5 AEROSOL RESPIRATORY (INHALATION) at 19:49

## 2025-05-02 RX ADMIN — LEVOTHYROXINE SODIUM 50 MCG: 0.03 TABLET ORAL at 10:00

## 2025-05-02 RX ADMIN — SODIUM CHLORIDE, PRESERVATIVE FREE 10 ML: 5 INJECTION INTRAVENOUS at 17:13

## 2025-05-02 RX ADMIN — SODIUM CHLORIDE, PRESERVATIVE FREE 10 ML: 5 INJECTION INTRAVENOUS at 10:08

## 2025-05-02 ASSESSMENT — PAIN SCALES - GENERAL
PAINLEVEL_OUTOF10: 3
PAINLEVEL_OUTOF10: 0
PAINLEVEL_OUTOF10: 0
PAINLEVEL_OUTOF10: 3
PAINLEVEL_OUTOF10: 5
PAINLEVEL_OUTOF10: 7

## 2025-05-02 ASSESSMENT — PAIN - FUNCTIONAL ASSESSMENT
PAIN_FUNCTIONAL_ASSESSMENT: PREVENTS OR INTERFERES SOME ACTIVE ACTIVITIES AND ADLS
PAIN_FUNCTIONAL_ASSESSMENT: PREVENTS OR INTERFERES SOME ACTIVE ACTIVITIES AND ADLS

## 2025-05-02 ASSESSMENT — PAIN DESCRIPTION - ORIENTATION
ORIENTATION: RIGHT

## 2025-05-02 ASSESSMENT — PAIN DESCRIPTION - DESCRIPTORS
DESCRIPTORS: SORE
DESCRIPTORS: DISCOMFORT
DESCRIPTORS: DISCOMFORT

## 2025-05-02 ASSESSMENT — PAIN DESCRIPTION - LOCATION
LOCATION: ARM

## 2025-05-02 ASSESSMENT — PAIN DESCRIPTION - PAIN TYPE: TYPE: SURGICAL PAIN

## 2025-05-02 ASSESSMENT — PAIN SCALES - WONG BAKER: WONGBAKER_NUMERICALRESPONSE: NO HURT

## 2025-05-02 NOTE — CARE COORDINATION
SW contacted by [facility] regarding returning. Informed will restart the precert for today. Information faxed.      Electronically signed by MARTA Elizabeth on 5/2/2025 at 10:08 AM

## 2025-05-03 LAB
ANION GAP SERPL CALCULATED.3IONS-SCNC: 11 MMOL/L (ref 9–16)
BASOPHILS # BLD: 0.05 K/UL (ref 0–0.2)
BASOPHILS NFR BLD: 1 % (ref 0–2)
BUN SERPL-MCNC: 18 MG/DL (ref 8–23)
BUN/CREAT SERPL: 20 (ref 9–20)
CALCIUM SERPL-MCNC: 8.4 MG/DL (ref 8.6–10.4)
CHLORIDE SERPL-SCNC: 104 MMOL/L (ref 98–107)
CO2 SERPL-SCNC: 26 MMOL/L (ref 20–31)
CREAT SERPL-MCNC: 0.9 MG/DL (ref 0.6–0.9)
EOSINOPHIL # BLD: 0.29 K/UL (ref 0–0.44)
EOSINOPHILS RELATIVE PERCENT: 4 % (ref 1–4)
ERYTHROCYTE [DISTWIDTH] IN BLOOD BY AUTOMATED COUNT: 16 % (ref 11.8–14.4)
GFR, ESTIMATED: 65 ML/MIN/1.73M2
GLUCOSE SERPL-MCNC: 109 MG/DL (ref 74–99)
HCT VFR BLD AUTO: 33.5 % (ref 36.3–47.1)
HGB BLD-MCNC: 10 G/DL (ref 11.9–15.1)
IMM GRANULOCYTES # BLD AUTO: 0.05 K/UL (ref 0–0.3)
IMM GRANULOCYTES NFR BLD: 1 %
LYMPHOCYTES NFR BLD: 2.35 K/UL (ref 1.1–3.7)
LYMPHOCYTES RELATIVE PERCENT: 32 % (ref 24–43)
MCH RBC QN AUTO: 29.6 PG (ref 25.2–33.5)
MCHC RBC AUTO-ENTMCNC: 29.9 G/DL (ref 25.2–33.5)
MCV RBC AUTO: 99.1 FL (ref 82.6–102.9)
MONOCYTES NFR BLD: 0.57 K/UL (ref 0.1–1.2)
MONOCYTES NFR BLD: 8 % (ref 3–12)
NEUTROPHILS NFR BLD: 54 % (ref 36–65)
NEUTS SEG NFR BLD: 3.98 K/UL (ref 1.5–8.1)
NRBC BLD-RTO: 0 PER 100 WBC
PLATELET # BLD AUTO: 238 K/UL (ref 138–453)
PMV BLD AUTO: 9.6 FL (ref 8.1–13.5)
POTASSIUM SERPL-SCNC: 3.9 MMOL/L (ref 3.7–5.3)
RBC # BLD AUTO: 3.38 M/UL (ref 3.95–5.11)
RBC # BLD: ABNORMAL 10*6/UL
SODIUM SERPL-SCNC: 141 MMOL/L (ref 136–145)
WBC OTHER # BLD: 7.3 K/UL (ref 3.5–11.3)

## 2025-05-03 PROCEDURE — 99232 SBSQ HOSP IP/OBS MODERATE 35: CPT | Performed by: FAMILY MEDICINE

## 2025-05-03 PROCEDURE — 2060000000 HC ICU INTERMEDIATE R&B

## 2025-05-03 PROCEDURE — 80048 BASIC METABOLIC PNL TOTAL CA: CPT

## 2025-05-03 PROCEDURE — 2580000003 HC RX 258: Performed by: NURSE PRACTITIONER

## 2025-05-03 PROCEDURE — 6360000002 HC RX W HCPCS: Performed by: NURSE PRACTITIONER

## 2025-05-03 PROCEDURE — 36415 COLL VENOUS BLD VENIPUNCTURE: CPT

## 2025-05-03 PROCEDURE — 6360000002 HC RX W HCPCS: Performed by: INTERNAL MEDICINE

## 2025-05-03 PROCEDURE — 94761 N-INVAS EAR/PLS OXIMETRY MLT: CPT

## 2025-05-03 PROCEDURE — 6370000000 HC RX 637 (ALT 250 FOR IP)

## 2025-05-03 PROCEDURE — 94640 AIRWAY INHALATION TREATMENT: CPT

## 2025-05-03 PROCEDURE — 6370000000 HC RX 637 (ALT 250 FOR IP): Performed by: NURSE PRACTITIONER

## 2025-05-03 PROCEDURE — 2500000003 HC RX 250 WO HCPCS: Performed by: INTERNAL MEDICINE

## 2025-05-03 PROCEDURE — 97110 THERAPEUTIC EXERCISES: CPT

## 2025-05-03 PROCEDURE — 2500000003 HC RX 250 WO HCPCS: Performed by: NURSE PRACTITIONER

## 2025-05-03 PROCEDURE — 2700000000 HC OXYGEN THERAPY PER DAY

## 2025-05-03 PROCEDURE — 85025 COMPLETE CBC W/AUTO DIFF WBC: CPT

## 2025-05-03 RX ADMIN — CLINDAMYCIN PHOSPHATE 600 MG: 600 INJECTION, SOLUTION INTRAVENOUS at 00:09

## 2025-05-03 RX ADMIN — LACTULOSE 20 G: 10 SOLUTION ORAL at 09:21

## 2025-05-03 RX ADMIN — IPRATROPIUM BROMIDE AND ALBUTEROL SULFATE 1 DOSE: .5; 2.5 SOLUTION RESPIRATORY (INHALATION) at 12:55

## 2025-05-03 RX ADMIN — CEFEPIME 2000 MG: 2 INJECTION, POWDER, FOR SOLUTION INTRAVENOUS at 04:34

## 2025-05-03 RX ADMIN — SODIUM CHLORIDE, PRESERVATIVE FREE 10 ML: 5 INJECTION INTRAVENOUS at 09:22

## 2025-05-03 RX ADMIN — APIXABAN 5 MG: 5 TABLET, FILM COATED ORAL at 20:52

## 2025-05-03 RX ADMIN — MONTELUKAST 10 MG: 10 TABLET, FILM COATED ORAL at 20:52

## 2025-05-03 RX ADMIN — IPRATROPIUM BROMIDE AND ALBUTEROL SULFATE 1 DOSE: .5; 2.5 SOLUTION RESPIRATORY (INHALATION) at 16:16

## 2025-05-03 RX ADMIN — LEVOTHYROXINE SODIUM 50 MCG: 0.03 TABLET ORAL at 09:21

## 2025-05-03 RX ADMIN — TIOTROPIUM BROMIDE INHALATION SPRAY 1 PUFF: 3.12 SPRAY, METERED RESPIRATORY (INHALATION) at 08:03

## 2025-05-03 RX ADMIN — CLINDAMYCIN PHOSPHATE 600 MG: 600 INJECTION, SOLUTION INTRAVENOUS at 17:08

## 2025-05-03 RX ADMIN — ASPIRIN 81 MG: 81 TABLET, COATED ORAL at 09:21

## 2025-05-03 RX ADMIN — SODIUM CHLORIDE, PRESERVATIVE FREE 10 ML: 5 INJECTION INTRAVENOUS at 00:05

## 2025-05-03 RX ADMIN — FERROUS SULFATE TAB 325 MG (65 MG ELEMENTAL FE) 325 MG: 325 (65 FE) TAB at 09:21

## 2025-05-03 RX ADMIN — FUROSEMIDE 40 MG: 40 TABLET ORAL at 09:21

## 2025-05-03 RX ADMIN — SODIUM CHLORIDE, PRESERVATIVE FREE 10 ML: 5 INJECTION INTRAVENOUS at 04:31

## 2025-05-03 RX ADMIN — CLINDAMYCIN PHOSPHATE 600 MG: 600 INJECTION, SOLUTION INTRAVENOUS at 09:27

## 2025-05-03 RX ADMIN — DOCUSATE SODIUM 100 MG: 100 CAPSULE, LIQUID FILLED ORAL at 09:21

## 2025-05-03 RX ADMIN — BUDESONIDE AND FORMOTEROL FUMARATE DIHYDRATE 2 PUFF: 160; 4.5 AEROSOL RESPIRATORY (INHALATION) at 08:03

## 2025-05-03 RX ADMIN — EMPAGLIFLOZIN 10 MG: 10 TABLET, FILM COATED ORAL at 09:21

## 2025-05-03 RX ADMIN — FLUTICASONE PROPIONATE 2 SPRAY: 50 SPRAY, METERED NASAL at 11:20

## 2025-05-03 RX ADMIN — SODIUM CHLORIDE, PRESERVATIVE FREE 10 ML: 5 INJECTION INTRAVENOUS at 00:54

## 2025-05-03 RX ADMIN — SODIUM CHLORIDE, PRESERVATIVE FREE 10 ML: 5 INJECTION INTRAVENOUS at 20:52

## 2025-05-03 RX ADMIN — CETIRIZINE HYDROCHLORIDE 10 MG: 5 TABLET, FILM COATED ORAL at 09:21

## 2025-05-03 RX ADMIN — OXYCODONE AND ACETAMINOPHEN 2 TABLET: 325; 5 TABLET ORAL at 11:28

## 2025-05-03 RX ADMIN — IPRATROPIUM BROMIDE AND ALBUTEROL SULFATE 1 DOSE: .5; 2.5 SOLUTION RESPIRATORY (INHALATION) at 08:01

## 2025-05-03 RX ADMIN — AMIODARONE HYDROCHLORIDE 200 MG: 200 TABLET ORAL at 20:52

## 2025-05-03 RX ADMIN — AMIODARONE HYDROCHLORIDE 200 MG: 200 TABLET ORAL at 09:21

## 2025-05-03 RX ADMIN — IPRATROPIUM BROMIDE AND ALBUTEROL SULFATE 1 DOSE: .5; 2.5 SOLUTION RESPIRATORY (INHALATION) at 19:58

## 2025-05-03 RX ADMIN — WATER 2000 MG: 1 INJECTION INTRAMUSCULAR; INTRAVENOUS; SUBCUTANEOUS at 17:08

## 2025-05-03 RX ADMIN — BUDESONIDE AND FORMOTEROL FUMARATE DIHYDRATE 2 PUFF: 160; 4.5 AEROSOL RESPIRATORY (INHALATION) at 19:58

## 2025-05-03 RX ADMIN — VANCOMYCIN HYDROCHLORIDE 1250 MG: 1.25 INJECTION, POWDER, LYOPHILIZED, FOR SOLUTION INTRAVENOUS at 09:35

## 2025-05-03 RX ADMIN — PANTOPRAZOLE SODIUM 40 MG: 40 TABLET, DELAYED RELEASE ORAL at 05:31

## 2025-05-03 RX ADMIN — TRAZODONE HYDROCHLORIDE 50 MG: 50 TABLET ORAL at 20:52

## 2025-05-03 ASSESSMENT — PAIN SCALES - GENERAL
PAINLEVEL_OUTOF10: 2
PAINLEVEL_OUTOF10: 3
PAINLEVEL_OUTOF10: 7

## 2025-05-03 ASSESSMENT — PAIN DESCRIPTION - LOCATION
LOCATION: ELBOW
LOCATION: ARM

## 2025-05-03 ASSESSMENT — PAIN DESCRIPTION - PAIN TYPE: TYPE: SURGICAL PAIN

## 2025-05-03 ASSESSMENT — PAIN DESCRIPTION - DESCRIPTORS
DESCRIPTORS: ACHING
DESCRIPTORS: ACHING

## 2025-05-03 ASSESSMENT — PAIN DESCRIPTION - ORIENTATION
ORIENTATION: RIGHT
ORIENTATION: RIGHT

## 2025-05-03 ASSESSMENT — PAIN - FUNCTIONAL ASSESSMENT: PAIN_FUNCTIONAL_ASSESSMENT: PREVENTS OR INTERFERES SOME ACTIVE ACTIVITIES AND ADLS

## 2025-05-03 ASSESSMENT — PAIN DESCRIPTION - FREQUENCY: FREQUENCY: INTERMITTENT

## 2025-05-04 LAB
ANION GAP SERPL CALCULATED.3IONS-SCNC: 10 MMOL/L (ref 9–17)
BASOPHILS # BLD: 0.07 K/UL (ref 0–0.2)
BASOPHILS NFR BLD: 1 % (ref 0–2)
BUN SERPL-MCNC: 18 MG/DL (ref 8–23)
BUN/CREAT SERPL: 16 (ref 9–20)
CALCIUM SERPL-MCNC: 8.2 MG/DL (ref 8.6–10.4)
CHLORIDE SERPL-SCNC: 104 MMOL/L (ref 98–107)
CO2 SERPL-SCNC: 27 MMOL/L (ref 20–31)
CREAT SERPL-MCNC: 1.1 MG/DL (ref 0.5–0.9)
EOSINOPHIL # BLD: 0.31 K/UL (ref 0–0.44)
EOSINOPHILS RELATIVE PERCENT: 4 % (ref 1–4)
ERYTHROCYTE [DISTWIDTH] IN BLOOD BY AUTOMATED COUNT: 15.7 % (ref 11.8–14.4)
GFR, ESTIMATED: 51 ML/MIN/1.73M2
GLUCOSE SERPL-MCNC: 106 MG/DL (ref 70–99)
HCT VFR BLD AUTO: 33.2 % (ref 36.3–47.1)
HGB BLD-MCNC: 9.9 G/DL (ref 11.9–15.1)
IMM GRANULOCYTES # BLD AUTO: 0.12 K/UL (ref 0–0.3)
IMM GRANULOCYTES NFR BLD: 2 %
LYMPHOCYTES NFR BLD: 2.77 K/UL (ref 1.1–3.7)
LYMPHOCYTES RELATIVE PERCENT: 34 % (ref 24–43)
MCH RBC QN AUTO: 29.6 PG (ref 25.2–33.5)
MCHC RBC AUTO-ENTMCNC: 29.8 G/DL (ref 25.2–33.5)
MCV RBC AUTO: 99.4 FL (ref 82.6–102.9)
MICROORGANISM SPEC CULT: ABNORMAL
MICROORGANISM SPEC CULT: NORMAL
MICROORGANISM SPEC CULT: NORMAL
MICROORGANISM/AGENT SPEC: ABNORMAL
MICROORGANISM/AGENT SPEC: ABNORMAL
MONOCYTES NFR BLD: 0.58 K/UL (ref 0.1–1.2)
MONOCYTES NFR BLD: 7 % (ref 3–12)
NEUTROPHILS NFR BLD: 52 % (ref 36–65)
NEUTS SEG NFR BLD: 4.36 K/UL (ref 1.5–8.1)
NRBC BLD-RTO: 0 PER 100 WBC
PLATELET # BLD AUTO: 234 K/UL (ref 138–453)
PMV BLD AUTO: 9.4 FL (ref 8.1–13.5)
POTASSIUM SERPL-SCNC: 3.8 MMOL/L (ref 3.7–5.3)
RBC # BLD AUTO: 3.34 M/UL (ref 3.95–5.11)
RBC # BLD: ABNORMAL 10*6/UL
SERVICE CMNT-IMP: NORMAL
SERVICE CMNT-IMP: NORMAL
SODIUM SERPL-SCNC: 141 MMOL/L (ref 135–144)
SPECIMEN DESCRIPTION: ABNORMAL
SPECIMEN DESCRIPTION: NORMAL
SPECIMEN DESCRIPTION: NORMAL
WBC OTHER # BLD: 8.2 K/UL (ref 3.5–11.3)

## 2025-05-04 PROCEDURE — 80048 BASIC METABOLIC PNL TOTAL CA: CPT

## 2025-05-04 PROCEDURE — 6370000000 HC RX 637 (ALT 250 FOR IP): Performed by: NURSE PRACTITIONER

## 2025-05-04 PROCEDURE — 97530 THERAPEUTIC ACTIVITIES: CPT

## 2025-05-04 PROCEDURE — 94760 N-INVAS EAR/PLS OXIMETRY 1: CPT

## 2025-05-04 PROCEDURE — 2060000000 HC ICU INTERMEDIATE R&B

## 2025-05-04 PROCEDURE — 36415 COLL VENOUS BLD VENIPUNCTURE: CPT

## 2025-05-04 PROCEDURE — 2700000000 HC OXYGEN THERAPY PER DAY

## 2025-05-04 PROCEDURE — 85025 COMPLETE CBC W/AUTO DIFF WBC: CPT

## 2025-05-04 PROCEDURE — 97110 THERAPEUTIC EXERCISES: CPT

## 2025-05-04 PROCEDURE — 99232 SBSQ HOSP IP/OBS MODERATE 35: CPT | Performed by: FAMILY MEDICINE

## 2025-05-04 PROCEDURE — 6370000000 HC RX 637 (ALT 250 FOR IP)

## 2025-05-04 PROCEDURE — 94640 AIRWAY INHALATION TREATMENT: CPT

## 2025-05-04 PROCEDURE — 6360000002 HC RX W HCPCS: Performed by: INTERNAL MEDICINE

## 2025-05-04 PROCEDURE — 2500000003 HC RX 250 WO HCPCS: Performed by: NURSE PRACTITIONER

## 2025-05-04 PROCEDURE — 2500000003 HC RX 250 WO HCPCS: Performed by: INTERNAL MEDICINE

## 2025-05-04 RX ADMIN — CLINDAMYCIN PHOSPHATE 600 MG: 600 INJECTION, SOLUTION INTRAVENOUS at 17:06

## 2025-05-04 RX ADMIN — CLINDAMYCIN PHOSPHATE 600 MG: 600 INJECTION, SOLUTION INTRAVENOUS at 09:10

## 2025-05-04 RX ADMIN — SODIUM CHLORIDE, PRESERVATIVE FREE 10 ML: 5 INJECTION INTRAVENOUS at 05:14

## 2025-05-04 RX ADMIN — MONTELUKAST 10 MG: 10 TABLET, FILM COATED ORAL at 21:47

## 2025-05-04 RX ADMIN — FLUTICASONE PROPIONATE 2 SPRAY: 50 SPRAY, METERED NASAL at 09:07

## 2025-05-04 RX ADMIN — AMIODARONE HYDROCHLORIDE 200 MG: 200 TABLET ORAL at 09:03

## 2025-05-04 RX ADMIN — SODIUM CHLORIDE, PRESERVATIVE FREE 10 ML: 5 INJECTION INTRAVENOUS at 00:04

## 2025-05-04 RX ADMIN — BUDESONIDE AND FORMOTEROL FUMARATE DIHYDRATE 2 PUFF: 160; 4.5 AEROSOL RESPIRATORY (INHALATION) at 21:04

## 2025-05-04 RX ADMIN — LEVOTHYROXINE SODIUM 50 MCG: 0.03 TABLET ORAL at 09:03

## 2025-05-04 RX ADMIN — APIXABAN 5 MG: 5 TABLET, FILM COATED ORAL at 21:47

## 2025-05-04 RX ADMIN — CLINDAMYCIN PHOSPHATE 600 MG: 600 INJECTION, SOLUTION INTRAVENOUS at 00:07

## 2025-05-04 RX ADMIN — WATER 2000 MG: 1 INJECTION INTRAMUSCULAR; INTRAVENOUS; SUBCUTANEOUS at 05:14

## 2025-05-04 RX ADMIN — WATER 2000 MG: 1 INJECTION INTRAMUSCULAR; INTRAVENOUS; SUBCUTANEOUS at 17:02

## 2025-05-04 RX ADMIN — DOCUSATE SODIUM 100 MG: 100 CAPSULE, LIQUID FILLED ORAL at 09:03

## 2025-05-04 RX ADMIN — IPRATROPIUM BROMIDE AND ALBUTEROL SULFATE 1 DOSE: .5; 2.5 SOLUTION RESPIRATORY (INHALATION) at 21:03

## 2025-05-04 RX ADMIN — LACTULOSE 20 G: 10 SOLUTION ORAL at 09:03

## 2025-05-04 RX ADMIN — ONDANSETRON 8 MG: 2 INJECTION, SOLUTION INTRAMUSCULAR; INTRAVENOUS at 07:52

## 2025-05-04 RX ADMIN — OXYCODONE AND ACETAMINOPHEN 1 TABLET: 325; 5 TABLET ORAL at 00:04

## 2025-05-04 RX ADMIN — TIOTROPIUM BROMIDE INHALATION SPRAY 1 PUFF: 3.12 SPRAY, METERED RESPIRATORY (INHALATION) at 12:48

## 2025-05-04 RX ADMIN — CETIRIZINE HYDROCHLORIDE 10 MG: 5 TABLET, FILM COATED ORAL at 09:03

## 2025-05-04 RX ADMIN — EMPAGLIFLOZIN 10 MG: 10 TABLET, FILM COATED ORAL at 09:03

## 2025-05-04 RX ADMIN — LACTULOSE 20 G: 10 SOLUTION ORAL at 21:47

## 2025-05-04 RX ADMIN — SODIUM CHLORIDE, PRESERVATIVE FREE 10 ML: 5 INJECTION INTRAVENOUS at 09:00

## 2025-05-04 RX ADMIN — OXYCODONE AND ACETAMINOPHEN 1 TABLET: 325; 5 TABLET ORAL at 22:00

## 2025-05-04 RX ADMIN — FERROUS SULFATE TAB 325 MG (65 MG ELEMENTAL FE) 325 MG: 325 (65 FE) TAB at 09:03

## 2025-05-04 RX ADMIN — SODIUM CHLORIDE, PRESERVATIVE FREE 10 ML: 5 INJECTION INTRAVENOUS at 21:48

## 2025-05-04 RX ADMIN — ACETAMINOPHEN 650 MG: 325 TABLET ORAL at 09:03

## 2025-05-04 RX ADMIN — ASPIRIN 81 MG: 81 TABLET, COATED ORAL at 09:03

## 2025-05-04 RX ADMIN — BUDESONIDE AND FORMOTEROL FUMARATE DIHYDRATE 2 PUFF: 160; 4.5 AEROSOL RESPIRATORY (INHALATION) at 12:48

## 2025-05-04 RX ADMIN — FUROSEMIDE 40 MG: 40 TABLET ORAL at 09:03

## 2025-05-04 RX ADMIN — APIXABAN 5 MG: 5 TABLET, FILM COATED ORAL at 09:03

## 2025-05-04 RX ADMIN — AMIODARONE HYDROCHLORIDE 200 MG: 200 TABLET ORAL at 21:44

## 2025-05-04 RX ADMIN — PANTOPRAZOLE SODIUM 40 MG: 40 TABLET, DELAYED RELEASE ORAL at 05:14

## 2025-05-04 RX ADMIN — TRAZODONE HYDROCHLORIDE 50 MG: 50 TABLET ORAL at 21:47

## 2025-05-04 RX ADMIN — IPRATROPIUM BROMIDE AND ALBUTEROL SULFATE 1 DOSE: .5; 2.5 SOLUTION RESPIRATORY (INHALATION) at 12:47

## 2025-05-04 RX ADMIN — IPRATROPIUM BROMIDE AND ALBUTEROL SULFATE 1 DOSE: .5; 2.5 SOLUTION RESPIRATORY (INHALATION) at 16:15

## 2025-05-04 RX ADMIN — SODIUM CHLORIDE, PRESERVATIVE FREE 10 ML: 5 INJECTION INTRAVENOUS at 00:43

## 2025-05-04 ASSESSMENT — PAIN DESCRIPTION - DESCRIPTORS
DESCRIPTORS: ACHING
DESCRIPTORS: DISCOMFORT

## 2025-05-04 ASSESSMENT — PAIN DESCRIPTION - ORIENTATION
ORIENTATION: RIGHT

## 2025-05-04 ASSESSMENT — PAIN DESCRIPTION - LOCATION
LOCATION: ARM
LOCATION: ARM
LOCATION: HEAD
LOCATION: ARM

## 2025-05-04 ASSESSMENT — PAIN SCALES - GENERAL
PAINLEVEL_OUTOF10: 3
PAINLEVEL_OUTOF10: 4
PAINLEVEL_OUTOF10: 5
PAINLEVEL_OUTOF10: 1
PAINLEVEL_OUTOF10: 5

## 2025-05-04 ASSESSMENT — PAIN DESCRIPTION - FREQUENCY: FREQUENCY: INTERMITTENT

## 2025-05-04 ASSESSMENT — PAIN DESCRIPTION - PAIN TYPE: TYPE: ACUTE PAIN

## 2025-05-04 ASSESSMENT — PAIN SCALES - WONG BAKER: WONGBAKER_NUMERICALRESPONSE: NO HURT

## 2025-05-05 LAB
ANION GAP SERPL CALCULATED.3IONS-SCNC: 13 MMOL/L (ref 9–16)
BASOPHILS # BLD: 0.08 K/UL (ref 0–0.2)
BASOPHILS NFR BLD: 1 % (ref 0–2)
BUN SERPL-MCNC: 17 MG/DL (ref 8–23)
BUN/CREAT SERPL: 14 (ref 9–20)
CALCIUM SERPL-MCNC: 8.4 MG/DL (ref 8.6–10.4)
CHLORIDE SERPL-SCNC: 102 MMOL/L (ref 98–107)
CO2 SERPL-SCNC: 25 MMOL/L (ref 20–31)
CREAT SERPL-MCNC: 1.2 MG/DL (ref 0.6–0.9)
EOSINOPHIL # BLD: 0.23 K/UL (ref 0–0.44)
EOSINOPHILS RELATIVE PERCENT: 3 % (ref 1–4)
ERYTHROCYTE [DISTWIDTH] IN BLOOD BY AUTOMATED COUNT: 15.7 % (ref 11.8–14.4)
GFR, ESTIMATED: 46 ML/MIN/1.73M2
GLUCOSE SERPL-MCNC: 102 MG/DL (ref 74–99)
HCT VFR BLD AUTO: 30.6 % (ref 36.3–47.1)
HGB BLD-MCNC: 8.8 G/DL (ref 11.9–15.1)
IMM GRANULOCYTES # BLD AUTO: 0.15 K/UL (ref 0–0.3)
IMM GRANULOCYTES NFR BLD: 2 %
LYMPHOCYTES NFR BLD: 2.9 K/UL (ref 1.1–3.7)
LYMPHOCYTES RELATIVE PERCENT: 36 % (ref 24–43)
MCH RBC QN AUTO: 28.9 PG (ref 25.2–33.5)
MCHC RBC AUTO-ENTMCNC: 28.8 G/DL (ref 25.2–33.5)
MCV RBC AUTO: 100.3 FL (ref 82.6–102.9)
MONOCYTES NFR BLD: 0.6 K/UL (ref 0.1–1.2)
MONOCYTES NFR BLD: 7 % (ref 3–12)
NEUTROPHILS NFR BLD: 51 % (ref 36–65)
NEUTS SEG NFR BLD: 4.13 K/UL (ref 1.5–8.1)
NRBC BLD-RTO: 0 PER 100 WBC
PLATELET # BLD AUTO: 205 K/UL (ref 138–453)
PMV BLD AUTO: 9.4 FL (ref 8.1–13.5)
POTASSIUM SERPL-SCNC: 3.6 MMOL/L (ref 3.7–5.3)
RBC # BLD AUTO: 3.05 M/UL (ref 3.95–5.11)
RBC # BLD: ABNORMAL 10*6/UL
SODIUM SERPL-SCNC: 140 MMOL/L (ref 136–145)
WBC OTHER # BLD: 8.1 K/UL (ref 3.5–11.3)

## 2025-05-05 PROCEDURE — 6370000000 HC RX 637 (ALT 250 FOR IP)

## 2025-05-05 PROCEDURE — 99231 SBSQ HOSP IP/OBS SF/LOW 25: CPT | Performed by: INTERNAL MEDICINE

## 2025-05-05 PROCEDURE — 80048 BASIC METABOLIC PNL TOTAL CA: CPT

## 2025-05-05 PROCEDURE — 94761 N-INVAS EAR/PLS OXIMETRY MLT: CPT

## 2025-05-05 PROCEDURE — 2500000003 HC RX 250 WO HCPCS: Performed by: NURSE PRACTITIONER

## 2025-05-05 PROCEDURE — 6370000000 HC RX 637 (ALT 250 FOR IP): Performed by: NURSE PRACTITIONER

## 2025-05-05 PROCEDURE — 94640 AIRWAY INHALATION TREATMENT: CPT

## 2025-05-05 PROCEDURE — 2500000003 HC RX 250 WO HCPCS: Performed by: INTERNAL MEDICINE

## 2025-05-05 PROCEDURE — 2060000000 HC ICU INTERMEDIATE R&B

## 2025-05-05 PROCEDURE — 2700000000 HC OXYGEN THERAPY PER DAY

## 2025-05-05 PROCEDURE — 6360000002 HC RX W HCPCS: Performed by: INTERNAL MEDICINE

## 2025-05-05 PROCEDURE — 94760 N-INVAS EAR/PLS OXIMETRY 1: CPT

## 2025-05-05 PROCEDURE — 36415 COLL VENOUS BLD VENIPUNCTURE: CPT

## 2025-05-05 PROCEDURE — 85025 COMPLETE CBC W/AUTO DIFF WBC: CPT

## 2025-05-05 PROCEDURE — 97110 THERAPEUTIC EXERCISES: CPT

## 2025-05-05 RX ORDER — POTASSIUM CHLORIDE 1500 MG/1
40 TABLET, EXTENDED RELEASE ORAL ONCE
Status: COMPLETED | OUTPATIENT
Start: 2025-05-05 | End: 2025-05-05

## 2025-05-05 RX ADMIN — IPRATROPIUM BROMIDE AND ALBUTEROL SULFATE 1 DOSE: .5; 2.5 SOLUTION RESPIRATORY (INHALATION) at 15:22

## 2025-05-05 RX ADMIN — IPRATROPIUM BROMIDE AND ALBUTEROL SULFATE 1 DOSE: .5; 2.5 SOLUTION RESPIRATORY (INHALATION) at 08:24

## 2025-05-05 RX ADMIN — DOCUSATE SODIUM 100 MG: 100 CAPSULE, LIQUID FILLED ORAL at 09:15

## 2025-05-05 RX ADMIN — FUROSEMIDE 40 MG: 40 TABLET ORAL at 09:16

## 2025-05-05 RX ADMIN — MONTELUKAST 10 MG: 10 TABLET, FILM COATED ORAL at 21:23

## 2025-05-05 RX ADMIN — ASPIRIN 81 MG: 81 TABLET, COATED ORAL at 09:15

## 2025-05-05 RX ADMIN — SODIUM CHLORIDE, PRESERVATIVE FREE 10 ML: 5 INJECTION INTRAVENOUS at 21:24

## 2025-05-05 RX ADMIN — FLUTICASONE PROPIONATE 2 SPRAY: 50 SPRAY, METERED NASAL at 09:15

## 2025-05-05 RX ADMIN — SODIUM CHLORIDE, PRESERVATIVE FREE 10 ML: 5 INJECTION INTRAVENOUS at 09:21

## 2025-05-05 RX ADMIN — FERROUS SULFATE TAB 325 MG (65 MG ELEMENTAL FE) 325 MG: 325 (65 FE) TAB at 09:16

## 2025-05-05 RX ADMIN — LEVOTHYROXINE SODIUM 50 MCG: 0.03 TABLET ORAL at 09:16

## 2025-05-05 RX ADMIN — LACTULOSE 20 G: 10 SOLUTION ORAL at 21:24

## 2025-05-05 RX ADMIN — IPRATROPIUM BROMIDE AND ALBUTEROL SULFATE 1 DOSE: .5; 2.5 SOLUTION RESPIRATORY (INHALATION) at 12:16

## 2025-05-05 RX ADMIN — CETIRIZINE HYDROCHLORIDE 10 MG: 5 TABLET, FILM COATED ORAL at 09:15

## 2025-05-05 RX ADMIN — APIXABAN 5 MG: 5 TABLET, FILM COATED ORAL at 09:16

## 2025-05-05 RX ADMIN — EMPAGLIFLOZIN 10 MG: 10 TABLET, FILM COATED ORAL at 09:16

## 2025-05-05 RX ADMIN — OXYCODONE AND ACETAMINOPHEN 1 TABLET: 325; 5 TABLET ORAL at 17:29

## 2025-05-05 RX ADMIN — AMIODARONE HYDROCHLORIDE 200 MG: 200 TABLET ORAL at 09:16

## 2025-05-05 RX ADMIN — CLINDAMYCIN PHOSPHATE 600 MG: 600 INJECTION, SOLUTION INTRAVENOUS at 00:44

## 2025-05-05 RX ADMIN — BUDESONIDE AND FORMOTEROL FUMARATE DIHYDRATE 2 PUFF: 160; 4.5 AEROSOL RESPIRATORY (INHALATION) at 19:50

## 2025-05-05 RX ADMIN — CLINDAMYCIN PHOSPHATE 600 MG: 600 INJECTION, SOLUTION INTRAVENOUS at 23:56

## 2025-05-05 RX ADMIN — APIXABAN 5 MG: 5 TABLET, FILM COATED ORAL at 21:24

## 2025-05-05 RX ADMIN — PANTOPRAZOLE SODIUM 40 MG: 40 TABLET, DELAYED RELEASE ORAL at 05:20

## 2025-05-05 RX ADMIN — TRAZODONE HYDROCHLORIDE 50 MG: 50 TABLET ORAL at 21:24

## 2025-05-05 RX ADMIN — BUDESONIDE AND FORMOTEROL FUMARATE DIHYDRATE 2 PUFF: 160; 4.5 AEROSOL RESPIRATORY (INHALATION) at 08:24

## 2025-05-05 RX ADMIN — CLINDAMYCIN PHOSPHATE 600 MG: 600 INJECTION, SOLUTION INTRAVENOUS at 09:23

## 2025-05-05 RX ADMIN — CLINDAMYCIN PHOSPHATE 600 MG: 600 INJECTION, SOLUTION INTRAVENOUS at 17:24

## 2025-05-05 RX ADMIN — IPRATROPIUM BROMIDE AND ALBUTEROL SULFATE 1 DOSE: .5; 2.5 SOLUTION RESPIRATORY (INHALATION) at 19:49

## 2025-05-05 RX ADMIN — WATER 2000 MG: 1 INJECTION INTRAMUSCULAR; INTRAVENOUS; SUBCUTANEOUS at 05:20

## 2025-05-05 RX ADMIN — AMIODARONE HYDROCHLORIDE 200 MG: 200 TABLET ORAL at 21:24

## 2025-05-05 RX ADMIN — POTASSIUM CHLORIDE 40 MEQ: 1500 TABLET, EXTENDED RELEASE ORAL at 06:51

## 2025-05-05 RX ADMIN — TIOTROPIUM BROMIDE INHALATION SPRAY 1 PUFF: 3.12 SPRAY, METERED RESPIRATORY (INHALATION) at 08:25

## 2025-05-05 ASSESSMENT — PAIN DESCRIPTION - ORIENTATION: ORIENTATION: RIGHT

## 2025-05-05 ASSESSMENT — PAIN SCALES - GENERAL
PAINLEVEL_OUTOF10: 4
PAINLEVEL_OUTOF10: 2
PAINLEVEL_OUTOF10: 2

## 2025-05-05 ASSESSMENT — PAIN DESCRIPTION - LOCATION: LOCATION: ARM

## 2025-05-05 ASSESSMENT — PAIN DESCRIPTION - DESCRIPTORS: DESCRIPTORS: ACHING

## 2025-05-06 VITALS
HEIGHT: 62 IN | TEMPERATURE: 98.5 F | RESPIRATION RATE: 16 BRPM | DIASTOLIC BLOOD PRESSURE: 58 MMHG | WEIGHT: 223.6 LBS | SYSTOLIC BLOOD PRESSURE: 134 MMHG | HEART RATE: 58 BPM | OXYGEN SATURATION: 99 % | BODY MASS INDEX: 41.15 KG/M2

## 2025-05-06 LAB
ANION GAP SERPL CALCULATED.3IONS-SCNC: 10 MMOL/L (ref 9–16)
BASOPHILS # BLD: 0.06 K/UL (ref 0–0.2)
BASOPHILS NFR BLD: 1 % (ref 0–2)
BUN SERPL-MCNC: 19 MG/DL (ref 8–23)
BUN/CREAT SERPL: 16 (ref 9–20)
CALCIUM SERPL-MCNC: 8.7 MG/DL (ref 8.6–10.4)
CHLORIDE SERPL-SCNC: 104 MMOL/L (ref 98–107)
CO2 SERPL-SCNC: 28 MMOL/L (ref 20–31)
CREAT SERPL-MCNC: 1.2 MG/DL (ref 0.6–0.9)
EOSINOPHIL # BLD: 0.23 K/UL (ref 0–0.44)
EOSINOPHILS RELATIVE PERCENT: 3 % (ref 1–4)
ERYTHROCYTE [DISTWIDTH] IN BLOOD BY AUTOMATED COUNT: 15.6 % (ref 11.8–14.4)
GFR, ESTIMATED: 46 ML/MIN/1.73M2
GLUCOSE SERPL-MCNC: 94 MG/DL (ref 74–99)
HCT VFR BLD AUTO: 33.4 % (ref 36.3–47.1)
HGB BLD-MCNC: 10.1 G/DL (ref 11.9–15.1)
IMM GRANULOCYTES # BLD AUTO: 0.16 K/UL (ref 0–0.3)
IMM GRANULOCYTES NFR BLD: 2 %
LYMPHOCYTES NFR BLD: 2.57 K/UL (ref 1.1–3.7)
LYMPHOCYTES RELATIVE PERCENT: 33 % (ref 24–43)
MCH RBC QN AUTO: 29.3 PG (ref 25.2–33.5)
MCHC RBC AUTO-ENTMCNC: 30.2 G/DL (ref 25.2–33.5)
MCV RBC AUTO: 96.8 FL (ref 82.6–102.9)
MONOCYTES NFR BLD: 0.6 K/UL (ref 0.1–1.2)
MONOCYTES NFR BLD: 8 % (ref 3–12)
NEUTROPHILS NFR BLD: 53 % (ref 36–65)
NEUTS SEG NFR BLD: 4.26 K/UL (ref 1.5–8.1)
NRBC BLD-RTO: 0 PER 100 WBC
PLATELET # BLD AUTO: 235 K/UL (ref 138–453)
PMV BLD AUTO: 9.4 FL (ref 8.1–13.5)
POTASSIUM SERPL-SCNC: 4 MMOL/L (ref 3.7–5.3)
RBC # BLD AUTO: 3.45 M/UL (ref 3.95–5.11)
RBC # BLD: ABNORMAL 10*6/UL
SODIUM SERPL-SCNC: 142 MMOL/L (ref 136–145)
WBC OTHER # BLD: 7.9 K/UL (ref 3.5–11.3)

## 2025-05-06 PROCEDURE — 6370000000 HC RX 637 (ALT 250 FOR IP): Performed by: NURSE PRACTITIONER

## 2025-05-06 PROCEDURE — 94760 N-INVAS EAR/PLS OXIMETRY 1: CPT

## 2025-05-06 PROCEDURE — 2700000000 HC OXYGEN THERAPY PER DAY

## 2025-05-06 PROCEDURE — 36415 COLL VENOUS BLD VENIPUNCTURE: CPT

## 2025-05-06 PROCEDURE — 2500000003 HC RX 250 WO HCPCS: Performed by: NURSE PRACTITIONER

## 2025-05-06 PROCEDURE — 99238 HOSP IP/OBS DSCHRG MGMT 30/<: CPT | Performed by: INTERNAL MEDICINE

## 2025-05-06 PROCEDURE — 94640 AIRWAY INHALATION TREATMENT: CPT

## 2025-05-06 PROCEDURE — 85025 COMPLETE CBC W/AUTO DIFF WBC: CPT

## 2025-05-06 PROCEDURE — 97110 THERAPEUTIC EXERCISES: CPT

## 2025-05-06 PROCEDURE — 80048 BASIC METABOLIC PNL TOTAL CA: CPT

## 2025-05-06 PROCEDURE — 6370000000 HC RX 637 (ALT 250 FOR IP): Performed by: INTERNAL MEDICINE

## 2025-05-06 PROCEDURE — 6370000000 HC RX 637 (ALT 250 FOR IP)

## 2025-05-06 PROCEDURE — 6360000002 HC RX W HCPCS: Performed by: INTERNAL MEDICINE

## 2025-05-06 RX ORDER — MICONAZOLE NITRATE 20 MG/G
CREAM TOPICAL
Qty: 118 ML | Refills: 1
Start: 2025-05-06

## 2025-05-06 RX ORDER — MICONAZOLE NITRATE 20 MG/G
CREAM TOPICAL 2 TIMES DAILY
Status: DISCONTINUED | OUTPATIENT
Start: 2025-05-06 | End: 2025-05-06 | Stop reason: HOSPADM

## 2025-05-06 RX ORDER — LACTULOSE 10 G/15ML
20 SOLUTION ORAL
Status: DISCONTINUED | OUTPATIENT
Start: 2025-05-06 | End: 2025-05-06 | Stop reason: HOSPADM

## 2025-05-06 RX ADMIN — IPRATROPIUM BROMIDE AND ALBUTEROL SULFATE 1 DOSE: .5; 2.5 SOLUTION RESPIRATORY (INHALATION) at 11:34

## 2025-05-06 RX ADMIN — PANTOPRAZOLE SODIUM 40 MG: 40 TABLET, DELAYED RELEASE ORAL at 05:34

## 2025-05-06 RX ADMIN — BUDESONIDE AND FORMOTEROL FUMARATE DIHYDRATE 2 PUFF: 160; 4.5 AEROSOL RESPIRATORY (INHALATION) at 08:00

## 2025-05-06 RX ADMIN — AMIODARONE HYDROCHLORIDE 200 MG: 200 TABLET ORAL at 10:06

## 2025-05-06 RX ADMIN — LEVOTHYROXINE SODIUM 50 MCG: 0.03 TABLET ORAL at 10:06

## 2025-05-06 RX ADMIN — IPRATROPIUM BROMIDE AND ALBUTEROL SULFATE 1 DOSE: .5; 2.5 SOLUTION RESPIRATORY (INHALATION) at 07:59

## 2025-05-06 RX ADMIN — CLINDAMYCIN PHOSPHATE 600 MG: 600 INJECTION, SOLUTION INTRAVENOUS at 10:03

## 2025-05-06 RX ADMIN — FERROUS SULFATE TAB 325 MG (65 MG ELEMENTAL FE) 325 MG: 325 (65 FE) TAB at 10:05

## 2025-05-06 RX ADMIN — FUROSEMIDE 40 MG: 40 TABLET ORAL at 10:06

## 2025-05-06 RX ADMIN — EMPAGLIFLOZIN 10 MG: 10 TABLET, FILM COATED ORAL at 10:06

## 2025-05-06 RX ADMIN — OXYCODONE AND ACETAMINOPHEN 1 TABLET: 325; 5 TABLET ORAL at 10:05

## 2025-05-06 RX ADMIN — CALCIUM CARBONATE: 500 TABLET, CHEWABLE ORAL at 11:54

## 2025-05-06 RX ADMIN — APIXABAN 5 MG: 5 TABLET, FILM COATED ORAL at 10:04

## 2025-05-06 RX ADMIN — CETIRIZINE HYDROCHLORIDE 10 MG: 5 TABLET, FILM COATED ORAL at 10:04

## 2025-05-06 RX ADMIN — SODIUM CHLORIDE, PRESERVATIVE FREE 10 ML: 5 INJECTION INTRAVENOUS at 10:04

## 2025-05-06 RX ADMIN — TIOTROPIUM BROMIDE INHALATION SPRAY 1 PUFF: 3.12 SPRAY, METERED RESPIRATORY (INHALATION) at 08:00

## 2025-05-06 RX ADMIN — ASPIRIN 81 MG: 81 TABLET, COATED ORAL at 10:04

## 2025-05-06 ASSESSMENT — PAIN DESCRIPTION - ORIENTATION: ORIENTATION: RIGHT

## 2025-05-06 ASSESSMENT — PAIN SCALES - GENERAL: PAINLEVEL_OUTOF10: 4

## 2025-05-06 ASSESSMENT — PAIN DESCRIPTION - DESCRIPTORS: DESCRIPTORS: ACHING

## 2025-05-06 ASSESSMENT — PAIN - FUNCTIONAL ASSESSMENT: PAIN_FUNCTIONAL_ASSESSMENT: ACTIVITIES ARE NOT PREVENTED

## 2025-05-06 ASSESSMENT — PAIN DESCRIPTION - LOCATION: LOCATION: ARM

## 2025-05-06 NOTE — DISCHARGE INSTR - DIET

## 2025-05-06 NOTE — CARE COORDINATION
Discharge Report    [facility]  Clinical Case Management Department  Written by: Maty Hawley RN    Patient Name: Marah Loyd  Attending Provider: Mesfin Acharya MD  Admit Date: 2025  7:25 AM  MRN: [id number]  Account: [id number]                     : 1945  Discharge Date: 2025       Disposition: SNF, to [facility] providing transportation ETA 1400    Maty Hawley RN

## 2025-05-06 NOTE — CARE COORDINATION
05/06/25 1258   IMM Letter   IMM Letter given to Patient/Family/Significant other/Guardian/POA/by: Second IMM given to pt by STEFANO Hawley RN   IMM Letter date given: 05/06/25   IMM Letter time given: 1259     IMM letter provided to patient.  Patient offered four hours to make informed decision regarding appeal process; patient agreeable to discharge.

## 2025-05-06 NOTE — DISCHARGE INSTR - COC
Continuity of Care Form    Patient Name: Marah Loyd   :  1945  MRN:  [id number]    Admit date:  2025  Discharge date:  2025    Code Status Order: DNR-CCA   Advance Directives:    Date/Time Healthcare Directive Type of Healthcare Directive Copy in Chart Healthcare Agent Appointed Healthcare Agent's Name Healthcare Agent's Phone Number    25 1020 Yes, patient has an advance directive for healthcare treatment  Durable power of  for health care  Yes, copy in chart  Healthcare power of   Sagar Loyd  [phone]             Admitting Physician:  Mesfin Acharya MD  PCP: Peter Ceron MD    Discharging Nurse: Supriya  Discharging Hospital Unit/Room#: 0202/0202-01  Discharging Unit Phone Number: [phone]    Emergency Contact:   Extended Emergency Contact Information  Primary Emergency Contact: Sagar Loyd  Address: [address] [place] [address]  Home Phone: [phone]  Relation: Spouse  Secondary Emergency Contact: Ember Keith  Mobile Phone: [phone]  Relation: Child    Past Surgical History:  Past Surgical History:   Procedure Laterality Date    ARM SURGERY Right 2025    Incision & Drainage Right elbow performed by Phillip Navarrete MD at [facility] OR    CARDIAC CATHETERIZATION      COLON SURGERY  2008    COLECTOMY    COLONOSCOPY      COLONOSCOPY  2017    POLYP,DIVERTICULOSIS    COLONOSCOPY N/A 2024    severe diverticulosis with some inflammation and small blood clot, anastomosis looked normal; Dr. Colunga at [facility]    FEMUR SURGERY Left 3/19/2025    Open Reduction Internal Fixation Left Femur performed by Phillip Navarrete MD at [facility] OR    HEMORRHOID SURGERY      BANDING    HERNIA REPAIR      VENTRAL HERNIA    HYSTERECTOMY (CERVIX STATUS UNKNOWN)      JOINT REPLACEMENT      BILATERAL KNEE REPLACEMENT    LAPAROSCOPY N/A 2019    EXPLORATORY LAPAROSCOPY CONVERTED TO OPEN LYSIS OF ADHESIONS PARTIAL

## 2025-05-06 NOTE — PROGRESS NOTES
Orthopedic Progress Note    Patient:  Marah Loyd  YOB: 1945     79 y.o. female    Subjective:  Patient seen and examined.  Overall doing better today.  No new complaints.    Objective:   Vitals:    04/30/25 0813   BP: 116/60   Pulse: 65   Resp: 19   Temp: 98.4 °F (36.9 °C)   SpO2: 98%     Gen: Alert, oriented, no acute distress  Cardiovascular: reg rate, no dependent edema, distal pulses 2+  Respiratory: Chest symmetric, no accessory muscle use, normal respirations, no audible wheezes  Right upper extremity: Draining wound at elbow.  Wound is indurated.  Was able to express some purulent fluid from the wound.  She is moving her elbow without much pain. Compartments soft. 2+ rad pulse. Median/Radial/Ulnar/AIN/PIN motor intact. Median/Radial/Ulnar nerve SILT.     Recent Labs     04/29/25  0806 04/30/25  0505   WBC 15.7* 12.7*   HGB 11.9 11.2*   HCT 39.0 36.6    176   INR 1.5  --     141   K 3.8 3.3*   BUN 19 18   CREATININE 0.9 0.8   GLUCOSE 90 91      Meds:    See rec for complete list    Impression/plan: 79 y.o. female s/p being seen for right elbow cellulitis and abscess.    Express some purulent fluid from the wound today.  It is already draining.  Erythema improved from yesterday.  Recommend continuing IV antibiotics.  Will continue to trend.  She may eat today.  N.p.o. at midnight and will be evaluated in the morning.        Joshua Dunaway, DO   Orthopedic Surgery               
   05/05/25 1446   Encounter Summary   Encounter Overview/Reason Spiritual/Emotional Needs   Service Provided For Patient and family together   Referral/Consult From Rounding   Support System Spouse;Children   Last Encounter  05/05/25   Complexity of Encounter Low   Begin Time 1426   End Time  1447   Total Time Calculated 21 min   Spiritual/Emotional needs   Type Spiritual Support   Assessment/Intervention/Outcome   Assessment Calm   Intervention Active listening;Prayer (assurance of)/Dickens   Outcome Engaged in conversation;Expressed feelings, needs, and concerns;Expressed Gratitude      rounding on PCU    Assessment: Patient is resting in bed with  and daughter present for support. Family engaged well in conversation.  spoke mostly about cars he has had.    Intervention: Engaged in conversation. Patient expressed appreciation for visit and offer of continued prayer.    Plan: Chaplains are available on site or on call 24/7 for spiritual and emotional support.  
  Physician Progress Note      PATIENT:               CAITIE HE  CSN #:                  [id number]  :                       1945  ADMIT DATE:       2025 7:25 AM  DISCH DATE:  RESPONDING  PROVIDER #:        Mesfin COLEY MD          QUERY TEXT:    Cellulitis right elbow is documented in the medical record Family Medicine PN   by Traci Child MD at 2025 with WBC 15.7,12.7 . ?Please   clarify any associated diagnoses:    The clinical indicators include:  This is a 79 y.o. female patient who presents with right medial elbow   ulceration.    - \" Cellulitis---Vancomycin--Cefepime---Orthopedics \" (from H&P by Mesfin Coley MD at 2025)    - \" Cellulitis right elbow s/p debridement postop day #2. UTI \" (from Family   Medicine PN by Traci Child MD at 2025)    On  WBC- 15.7   On  WBC- 12.7 RR 22    IV Cefepime, IV Vancocin, IV Clindamycin, 0.9 % sodium chloride infusion,   serial labs, vitals monitoring    Thank Flo isidro [facility] CDS  Options provided:  -- Sepsis due to right elbow cellulitis, present on admission  -- Right elbow cellulitis without sepsis  -- Other - I will add my own diagnosis  -- Disagree - Not applicable / Not valid  -- Disagree - Clinically unable to determine / Unknown  -- Refer to Clinical Documentation Reviewer    PROVIDER RESPONSE TEXT:    After further study, This patient has Right elbow cellulitis without sepsis    Query created by: Flo Palma on 2025 2:10 AM      Electronically signed by:  Mesfin COLEY MD 2025 10:59 AM          
  Physician Progress Note      PATIENT:               CAITIE HE  CSN #:                  [id number]  :                       1945  ADMIT DATE:       2025 7:25 AM  DISCH DATE:  RESPONDING  PROVIDER #:        Mesfin COLEY MD          QUERY TEXT:    Based on your medical judgment, please clarify these findings and document if   any of the following are being evaluated and/or treated:    The clinical indicators include:  This is a 79 y.o. female who presents with right medial   elbow--ulceration---infection--cellulitis.    - \" PAF---currently sinus rhythm \" (from H&P by Mesfin Coley MD at   2025)    - \" She is currently taking Eliquis.  Previous history of MI and atrial   fibrillation. \" (from Orthopedic Consults by Angelita Mitchell APRN - [facility]   at 2025)    - \" PAF--currently SR \" (from IM Progress Notes by Mesfin Coley MD at   2025)    Apixaban (ELIQUIS) tablet 5 mg    Thank Flo isidro CDS  Options provided:  -- Secondary hypercoagulable state in a patient with atrial fibrillation  -- Other - I will add my own diagnosis  -- Disagree - Not applicable / Not valid  -- Disagree - Clinically unable to determine / Unknown  -- Refer to Clinical Documentation Reviewer    PROVIDER RESPONSE TEXT:    This patient has secondary hypercoagulable state in a patient with atrial   fibrillation.    Query created by: Flo Palma on 2025 2:39 AM      Electronically signed by:  Mesfin COLEY MD 2025 12:40 PM          
  Physician Progress Note      PATIENT:               CAITIE HE  CSN #:                  [id number]  :                       1945  ADMIT DATE:       2025 7:25 AM  DISCH DATE:  RESPONDING  PROVIDER #:        Phillip Navarrete MD          QUERY TEXT:    Excisional Debridement of right elbow is documented in the medical record   Orthopedic Op Note by Phillip Navarrete MD at 2025.  To accurately reflect   the procedure performed please document the deepest depth of tissue removed as   down to and including in the procedure:    The clinical indicators include:  This is a 79 y.o. female who presents with right medial elbow ulceration.    Procedure- Excisional debridement right elbow/arm abscess    - Utilizing a sharp blade ellipsed out the area of abscess down to and   including fascia.  Extending it proximally to gross purulence is obtained.    Already have positive cultures.  It was discarded.  Debrided all areas of   concern.  Was thoroughly irrigated.  Loosely closed with nylon suture. (from   Op Note by Phillip Navarrete MD at 2025)    s/p Excisional Debridement, IV Cefepime, IV Vancocin    Thank Flo isidro [facility] CDS  Options provided:  -- Excisional debridement of skin  -- Excisional debridement of subcutaneous tissue  -- Excisional debridement of fascia  -- Excisional debridement of skin: Excisional debridement of skin was   performed.  -- Excisional debridement of subcutaneous tissue: Excisional debridement of   subcutaneous tissue was performed.  -- Excisional debridement of fascia: Excisional debridement of fascia was   performed.  -- Other - I will add my own diagnosis  -- Disagree - Not applicable / Not valid  -- Disagree - Clinically unable to determine / Unknown  -- Refer to Clinical Documentation Reviewer    PROVIDER RESPONSE TEXT:    Excisional debridement of subcutaneous tissue was performed.    Query created by: Flo Palma on 2025 1:35 AM      Electronically signed 
Called to ortho desk, talked with Magnolia. Consult called to have a provider in the ortho department come and see the patient. Patient was seen by Dr. Dunaway yesterday and he ordered pt to be NPO for ortho to see today. Magnoila stated she would notify the providers.   
[facility]   Pharmacy Pharmacokinetic Monitoring Service - Vancomycin    Consulting Provider: SHERLY Haas DO   Indication: SSTI  Target Concentration: Goal AUC/FILIPE 400-600 mg*hr/L  Day of Therapy: 5  Additional Antimicrobials: cefepime, clindamycin    Pertinent Laboratory Values:   Wt Readings from Last 1 Encounters:   05/03/25 102.5 kg (226 lb)     Temp Readings from Last 1 Encounters:   05/03/25 98 °F (36.7 °C) (Temporal)     Estimated Creatinine Clearance: 57 mL/min (based on SCr of 0.9 mg/dL).  Recent Labs     05/02/25  0506 05/03/25  0540   CREATININE 0.9 0.9   BUN 18 18   WBC 8.5 7.3     Procalcitonin: n/a    Pertinent Cultures:  Culture Date Source Results   4/29/25 Blood x 2  Aerobic/anerobic  urine NGTD  Strep pyogens  E Coli   MRSA Nasal Swab: N/A. Non-respiratory infection.    Recent vancomycin administrations                     vancomycin (VANCOCIN) 1,250 mg in sodium chloride 0.9 % 250 mL IVPB ([id number]) ()  Restarted 05/02/25 1158     1,250 mg New Bag  1027     1,250 mg New Bag 05/01/25 0946                    Assessment:  Date/Time Current Dose Concentration Timing of Concentration (h) AUC   5/3/25 1250 mg IVPB q 24 h     491   Note: Serum concentrations collected for AUC dosing may appear elevated if collected in close proximity to the dose administered, this is not necessarily an indication of toxicity    Plan:  Current dosing regimen is therapeutic  Continue current dose  Repeat vancomycin concentration ordered for 5/5/25 @ 0600   Pharmacy will continue to monitor patient and adjust therapy as indicated    Thank you for the consult,  Pricila Blankenship [facility]  5/3/2025 8:44 AM    
[facility]   Pharmacy Pharmacokinetic Monitoring Service - Vancomycin    Consulting Provider: SHERLY Haas DO   Indication: SSTI  Target Concentration: Goal AUC/FILIPE 400-600 mg*hr/L  Day of Therapy: 3  Additional Antimicrobials: cefepime    Pertinent Laboratory Values:   Wt Readings from Last 1 Encounters:   05/01/25 101.3 kg (223 lb 6.4 oz)     Temp Readings from Last 1 Encounters:   05/01/25 98 °F (36.7 °C) (Temporal)     Estimated Creatinine Clearance: 64 mL/min (based on SCr of 0.8 mg/dL).  Recent Labs     04/30/25  0505 05/01/25  0509   CREATININE 0.8 0.8   BUN 18 17   WBC 12.7* 8.7     Procalcitonin:      Pertinent Cultures:  Culture Date Source Results   4/29/25 Blood x 2  Aerobic/anerobic  urine NGTD  NGTD  E. Coli   MRSA Nasal Swab: N/A. Non-respiratory infection.    Recent vancomycin administrations                     vancomycin (VANCOCIN) 1,250 mg in sodium chloride 0.9 % 250 mL IVPB ([id number]) (mg) 1,250 mg New Bag 04/30/25 0826     1,250 mg New Bag 04/29/25 0937                    Assessment:  Date/Time Current Dose Concentration Timing of Concentration (h) AUC   5/1/25 1250 mg IVPB q 24 h     477 predicted   Note: Serum concentrations collected for AUC dosing may appear elevated if collected in close proximity to the dose administered, this is not necessarily an indication of toxicity    Plan:  Current dosing regimen is therapeutic  Continue current dose  Repeat vancomycin concentration ordered for 5/2/25 @ 0600   Pharmacy will continue to monitor patient and adjust therapy as indicated    Thank you for the consult,  Pricila Blankenship [facility]  5/1/2025 8:36 AM    
[facility]   Pharmacy Pharmacokinetic Monitoring Service - Vancomycin     Marah Loyd is a 79 y.o. female starting on vancomycin therapy for SSTI. Pharmacy consulted by Dr. JANN Haas for monitoring and adjustment.    Target Concentration: Goal AUC/FILIPE 400-600 mg*hr/L    Additional Antimicrobials: cefepime    Pertinent Laboratory Values:   Wt Readings from Last 1 Encounters:   04/29/25 103 kg (227 lb)     Temp Readings from Last 1 Encounters:   04/29/25 100.1 °F (37.8 °C) (Tympanic)     Estimated Creatinine Clearance: 57 mL/min (based on SCr of 0.9 mg/dL).  Recent Labs     04/29/25  0806   CREATININE 0.9   BUN 19   WBC 15.7*     Procalcitonin:      Pertinent Cultures:  Culture Date Source Results   4/29/25 Blood x 2  Aerobic/anaerobic  urine pending   MRSA Nasal Swab: N/A. Non-respiratory infection.    Plan:  Dosing recommendations based on Bayesian software  Start vancomycin 1250 mg q 24h   Anticipated AUC of 488 and trough concentration of 13.8 at steady state  Renal labs as indicated   Vancomycin concentration ordered for 04/30/25 @ 0600   Pharmacy will continue to monitor patient and adjust therapy as indicated    Thank you for the consult,  Pricila Blankenship RPH  4/29/2025 9:19 AM   
[facility]   Pharmacy Pharmacokinetic Monitoring Service - Vancomycin    Consulting Provider: SHERLY Haas DO   Indication: SSTI  Target Concentration: Goal AUC/FILIPE 400-600 mg*hr/L  Day of Therapy: 2  Additional Antimicrobials: cefepime    Pertinent Laboratory Values:   Wt Readings from Last 1 Encounters:   04/30/25 98 kg (216 lb)     Temp Readings from Last 1 Encounters:   04/30/25 97.5 °F (36.4 °C) (Temporal)     Estimated Creatinine Clearance: 62 mL/min (based on SCr of 0.8 mg/dL).  Recent Labs     04/29/25  0806 04/30/25  0505   CREATININE 0.9 0.8   BUN 19 18   WBC 15.7* 12.7*     Procalcitonin:      Pertinent Cultures:  Culture Date Source Results   4/29/25 Blood x 2  Aerobic/anaerobic  urine NGTD   MRSA Nasal Swab: N/A. Non-respiratory infection.    Recent vancomycin administrations                     vancomycin (VANCOCIN) 1,250 mg in sodium chloride 0.9 % 250 mL IVPB ([id number]) (mg) 1,250 mg New Bag 04/29/25 0937                    Assessment:  Date/Time Current Dose Concentration Timing of Concentration (h) AUC   4/30/25 1250 mg IVPB q 24 h 7.3 19 h 27 m 475   Note: Serum concentrations collected for AUC dosing may appear elevated if collected in close proximity to the dose administered, this is not necessarily an indication of toxicity    Plan:  Current dosing regimen is therapeutic  Continue current dose  Repeat vancomycin concentration ordered for 5/2/25 @ 0600   Pharmacy will continue to monitor patient and adjust therapy as indicated    Thank you for the consult,  Pricila Blankenship [facility]  4/30/2025 7:25 AM an  
[facility]   Pharmacy Pharmacokinetic Monitoring Service - Vancomycin    Consulting Provider: SHERLY Haas DO   Indication: SSTI  Target Concentration: Goal AUC/FILIPE 400-600 mg*hr/L  Day of Therapy: 6  Additional Antimicrobials: cefepime, clindamycin    Pertinent Laboratory Values:   Wt Readings from Last 1 Encounters:   05/04/25 101.7 kg (224 lb 3.2 oz)     Temp Readings from Last 1 Encounters:   05/04/25 98.4 °F (36.9 °C) (Temporal)     Estimated Creatinine Clearance: 46 mL/min (A) (based on SCr of 1.1 mg/dL (H)).  Recent Labs     05/03/25  0540 05/04/25  0541   CREATININE 0.9 1.1*   BUN 18 18   WBC 7.3 8.2       Pertinent Cultures:  Culture Date Source Results   4/29/25 Blood x 2  Aerobic/anerobic  urine NGTD  Strep pyogens  E Coli   MRSA Nasal Swab: N/A. Non-respiratory infection.    Recent vancomycin administrations                     vancomycin (VANCOCIN) 1,250 mg in sodium chloride 0.9 % 250 mL IVPB ([id number]) (mg) 1,250 mg New Bag 05/03/25 0935      Restarted 05/02/25 1158     1,250 mg New Bag  1027                    Assessment:  Date/Time Current Dose Concentration Timing of Concentration (h) AUC   5/4/25 1250mg Q24 hours 12.2 19 576   Note: Serum concentrations collected for AUC dosing may appear elevated if collected in close proximity to the dose administered, this is not necessarily an indication of toxicity    Plan:  Current dosing regimen is therapeutic  Continue current dose of vancomycin 1250mg Q24 hours  AUC: 576; trough 16.8 at steady state predicted  Pharmacy will continue to monitor patient and adjust therapy as indicated    Thank you for the consult,    Indio Cross [facility]  5/4/2025 11:55 AM    
[facility]   Pharmacy Pharmacokinetic Monitoring Service - Vancomycin    Consulting Provider: SHERLY Haas DO   Indication: SSTI  Target Concentration: Goal AUC/FILIPE 400-600 mg*hr/L  Day of Therapy: 4  Additional Antimicrobials: cefepime    Pertinent Laboratory Values:   Wt Readings from Last 1 Encounters:   05/02/25 101 kg (222 lb 11.2 oz)     Temp Readings from Last 1 Encounters:   05/02/25 98 °F (36.7 °C) (Temporal)     Estimated Creatinine Clearance: 56 mL/min (based on SCr of 0.9 mg/dL).  Recent Labs     05/01/25  0509 05/02/25  0506   CREATININE 0.8 0.9   BUN 17 18   WBC 8.7 8.5     Procalcitonin: n/a    Pertinent Cultures:  Culture Date Source Results   4/29/25 Blood x 2  Aerobic/anerobic  urine NGTD  Strep pyogens  E Coli   MRSA Nasal Swab: N/A. Non-respiratory infection.    Recent vancomycin administrations                     vancomycin (VANCOCIN) 1,250 mg in sodium chloride 0.9 % 250 mL IVPB ([id number]) (mg) 1,250 mg New Bag 05/01/25 0946     1,250 mg New Bag 04/30/25 0826     1,250 mg New Bag 04/29/25 0937                    Assessment:  Date/Time Current Dose Concentration Timing of Concentration (h) AUC   5/2/25 1250 mg IVPB q 24 h 12.2  19 h 19 m 491   Note: Serum concentrations collected for AUC dosing may appear elevated if collected in close proximity to the dose administered, this is not necessarily an indication of toxicity    Plan:  Current dosing regimen is therapeutic  Continue current dose  Repeat vancomycin concentration ordered for 5/4/25 @ 0600   Pharmacy will continue to monitor patient and adjust therapy as indicated    Thank you for the consult,  Pricila Blankenship [facility]  5/2/2025 8:55 AM    
Hospitalist Progress Note    Patient:  Marah Loyd     YOB: 1945    MRN: [id number]   Admit date: 4/29/2025     Acct: [id number]     PCP: Peter Ceron MD    CC--Interval History: Right antecubital fossa infection--abscess---seen by Orthopedics---some pus expressed---subsequently more pus----IV antibiotics cont'd---NPO p MN for possible I&D---5.1.2025.    E coli present in urine, but sparse WBCs---on Cefepime which should cover it.    Supplemental oxygen on NC    PAF--currently SR    See note below     All other ROS negative except noted in HPI    Diet:  Diet NPO  ADULT DIET; Regular; Low Fat/Low Chol/High Fiber/2 gm Na    Medications:  Scheduled Meds:   cefepime  2,000 mg IntraVENous Q12H    vancomycin  1,250 mg IntraVENous Q24H    vancomycin (VANCOCIN) intermittent dosing (placeholder)   Other RX Placeholder    sodium chloride flush  10 mL IntraVENous 2 times per day    amiodarone  200 mg Oral BID    aspirin EC  81 mg Oral Daily    budesonide-formoterol  2 puff Inhalation BID    cetirizine  10 mg Oral Daily    docusate sodium  100 mg Oral Daily    apixaban  5 mg Oral BID    empagliflozin  10 mg Oral Daily    ferrous sulfate  325 mg Oral Daily with breakfast    fluticasone  2 spray Nasal Daily    furosemide  40 mg Oral Daily    ipratropium 0.5 mg-albuterol 2.5 mg  1 Dose Inhalation Q4H WA RT    lactulose  20 g Oral BID    levothyroxine  50 mcg Oral Daily    montelukast  10 mg Oral Nightly    pantoprazole  40 mg Oral QAM AC    tiotropium  1 puff Inhalation Daily RT    traZODone  50 mg Oral Nightly     Continuous Infusions:   sodium chloride       PRN Meds:sodium chloride flush, sodium chloride, polyethylene glycol, acetaminophen, HYDROcodone-acetaminophen, albuterol sulfate HFA    Objective:  Labs:  CBC with Differential:    Lab Results   Component Value Date/Time    WBC 12.7 04/30/2025 05:05 AM    RBC 3.76 04/30/2025 05:05 AM    HGB 11.2 04/30/2025 05:05 AM    HCT 36.6 04/30/2025 05:05 AM 
Hospitalist Progress Note    Patient:  Marah Loyd     YOB: 1945    MRN: [id number]   Admit date: 4/29/2025     Acct: [id number]     PCP: Peter Ceron MD    CC--Interval History: Planned I&D---debridement right antecubital fossa---5.1.2025---[facility]    See note below     All other ROS negative except noted in HPI    Diet:  Diet NPO    Medications:  Scheduled Meds:   cefepime  2,000 mg IntraVENous Q12H    vancomycin  1,250 mg IntraVENous Q24H    vancomycin (VANCOCIN) intermittent dosing (placeholder)   Other RX Placeholder    sodium chloride flush  10 mL IntraVENous 2 times per day    amiodarone  200 mg Oral BID    aspirin EC  81 mg Oral Daily    budesonide-formoterol  2 puff Inhalation BID    cetirizine  10 mg Oral Daily    docusate sodium  100 mg Oral Daily    [Held by provider] apixaban  5 mg Oral BID    empagliflozin  10 mg Oral Daily    ferrous sulfate  325 mg Oral Daily with breakfast    fluticasone  2 spray Nasal Daily    furosemide  40 mg Oral Daily    ipratropium 0.5 mg-albuterol 2.5 mg  1 Dose Inhalation Q4H WA RT    lactulose  20 g Oral BID    levothyroxine  50 mcg Oral Daily    montelukast  10 mg Oral Nightly    pantoprazole  40 mg Oral QAM AC    tiotropium  1 puff Inhalation Daily RT    traZODone  50 mg Oral Nightly     Continuous Infusions:   sodium chloride       PRN Meds:sodium chloride flush, sodium chloride, polyethylene glycol, acetaminophen, HYDROcodone-acetaminophen, albuterol sulfate HFA    Objective:  Labs:  CBC with Differential:    Lab Results   Component Value Date/Time    WBC 8.7 05/01/2025 05:09 AM    RBC 3.54 05/01/2025 05:09 AM    HGB 10.5 05/01/2025 05:09 AM    HCT 35.0 05/01/2025 05:09 AM     05/01/2025 05:09 AM    MCV 98.9 05/01/2025 05:09 AM    MCH 29.7 05/01/2025 05:09 AM    MCHC 30.0 05/01/2025 05:09 AM    RDW 16.0 05/01/2025 05:09 AM    LYMPHOPCT 27 05/01/2025 05:09 AM    MONOPCT 6 05/01/2025 05:09 AM    EOSPCT 2 05/01/2025 05:09 AM    BASOPCT 0 
Hospitalist Progress Note    Patient:  Marah Loyd     YOB: 1945    MRN: [id number]   Admit date: 4/29/2025     Acct: [id number]     PCP: Peter Ceron MD    CC--Interval History: POD 4 excisional debridement right elbow--abscess--Strep pyogenes---on Clindamycin------working on placement    E coli UTI---completed antibiotics---Cefepime dc'd    K = 3.6  ---> potassium replacement    See note below     All other ROS negative except noted in HPI    Diet:  ADULT DIET; Regular; Low Fat/Low Chol/High Fiber/2 gm Na    Medications:  Scheduled Meds:   ceFEPIme (MAXIPIME) 2,000 mg in sterile water 20 mL IV syringe  2,000 mg IntraVENous Q12H    clindamycin (CLEOCIN) IV  600 mg IntraVENous Q8H    apixaban  5 mg Oral BID    sodium chloride flush  10 mL IntraVENous 2 times per day    amiodarone  200 mg Oral BID    aspirin EC  81 mg Oral Daily    budesonide-formoterol  2 puff Inhalation BID    cetirizine  10 mg Oral Daily    docusate sodium  100 mg Oral Daily    empagliflozin  10 mg Oral Daily    ferrous sulfate  325 mg Oral Daily with breakfast    fluticasone  2 spray Nasal Daily    furosemide  40 mg Oral Daily    ipratropium 0.5 mg-albuterol 2.5 mg  1 Dose Inhalation Q4H WA RT    lactulose  20 g Oral BID    levothyroxine  50 mcg Oral Daily    montelukast  10 mg Oral Nightly    pantoprazole  40 mg Oral QAM AC    tiotropium  1 puff Inhalation Daily RT    traZODone  50 mg Oral Nightly     Continuous Infusions:   sodium chloride       PRN Meds:ondansetron, oxyCODONE-acetaminophen **OR** oxyCODONE-acetaminophen, sodium chloride flush, sodium chloride, polyethylene glycol, acetaminophen, albuterol sulfate HFA    Objective:  Labs:  CBC with Differential:    Lab Results   Component Value Date/Time    WBC 8.1 05/05/2025 05:04 AM    RBC 3.05 05/05/2025 05:04 AM    HGB 8.8 05/05/2025 05:04 AM    HCT 30.6 05/05/2025 05:04 AM     05/05/2025 05:04 AM    .3 05/05/2025 05:04 AM    [facility] 28.9 05/05/2025 
Hospitalist Progress Note    Patient:  Marah Loyd     YOB: 1945    MRN: [id number]   Admit date: 4/29/2025     Acct: [id number]     PCP: Peter Ceron MD    CC--Interval History: POD 1 excisional debridement of right elbow--arm abscess--5.1.2025---[facility]--on Vancomycin--Zosyn pending cultures    Nausea this AM ---> Zofran    Headache--analgesics    DM2----BG = 108    HTN---/66    See note below        All other ROS negative except noted in HPI    Diet:  ADULT DIET; Regular; Low Fat/Low Chol/High Fiber/2 gm Na    Medications:  Scheduled Meds:   [Held by provider] apixaban  5 mg Oral BID    cefepime  2,000 mg IntraVENous Q12H    vancomycin  1,250 mg IntraVENous Q24H    vancomycin (VANCOCIN) intermittent dosing (placeholder)   Other RX Placeholder    sodium chloride flush  10 mL IntraVENous 2 times per day    amiodarone  200 mg Oral BID    aspirin EC  81 mg Oral Daily    budesonide-formoterol  2 puff Inhalation BID    cetirizine  10 mg Oral Daily    docusate sodium  100 mg Oral Daily    empagliflozin  10 mg Oral Daily    ferrous sulfate  325 mg Oral Daily with breakfast    fluticasone  2 spray Nasal Daily    furosemide  40 mg Oral Daily    ipratropium 0.5 mg-albuterol 2.5 mg  1 Dose Inhalation Q4H WA RT    lactulose  20 g Oral BID    levothyroxine  50 mcg Oral Daily    montelukast  10 mg Oral Nightly    pantoprazole  40 mg Oral QAM AC    tiotropium  1 puff Inhalation Daily RT    traZODone  50 mg Oral Nightly     Continuous Infusions:   sodium chloride       PRN Meds:ondansetron, oxyCODONE-acetaminophen **OR** oxyCODONE-acetaminophen, sodium chloride flush, sodium chloride, polyethylene glycol, acetaminophen, albuterol sulfate HFA    Objective:  Labs:  CBC with Differential:    Lab Results   Component Value Date/Time    WBC 8.5 05/02/2025 05:06 AM    RBC 3.39 05/02/2025 05:06 AM    HGB 10.1 05/02/2025 05:06 AM    HCT 33.4 05/02/2025 05:06 AM     05/02/2025 05:06 AM    MCV 98.5 
Hospitalist Progress Note    Patient:  Marah Loyd     YOB: 1945    MRN: [id number]   Admit date: 4/29/2025     Acct: [id number]     PCP: Peter Ceron MD    CC--Interval History: POD 5--excisional debridement right elbow--arm abscess---5.1.2026--IV antibiotics--Clindamycin for Strep pyogenes--to [facility] so that patient may receive wound care--and resume prior physical--OT therapies.    See note below     All other ROS negative except noted in HPI    Diet:  ADULT DIET; Regular; Low Fat/Low Chol/High Fiber/2 gm Na    Medications:  Scheduled Meds:   clindamycin (CLEOCIN) IV  600 mg IntraVENous Q8H    apixaban  5 mg Oral BID    sodium chloride flush  10 mL IntraVENous 2 times per day    amiodarone  200 mg Oral BID    aspirin EC  81 mg Oral Daily    budesonide-formoterol  2 puff Inhalation BID    cetirizine  10 mg Oral Daily    docusate sodium  100 mg Oral Daily    empagliflozin  10 mg Oral Daily    ferrous sulfate  325 mg Oral Daily with breakfast    fluticasone  2 spray Nasal Daily    furosemide  40 mg Oral Daily    ipratropium 0.5 mg-albuterol 2.5 mg  1 Dose Inhalation Q4H WA RT    lactulose  20 g Oral BID    levothyroxine  50 mcg Oral Daily    montelukast  10 mg Oral Nightly    pantoprazole  40 mg Oral QAM AC    tiotropium  1 puff Inhalation Daily RT    traZODone  50 mg Oral Nightly     Continuous Infusions:   sodium chloride       PRN Meds:ondansetron, oxyCODONE-acetaminophen **OR** oxyCODONE-acetaminophen, sodium chloride flush, sodium chloride, polyethylene glycol, acetaminophen, albuterol sulfate HFA    Objective:  Labs:  CBC with Differential:    Lab Results   Component Value Date/Time    WBC 7.9 05/06/2025 05:16 AM    RBC 3.45 05/06/2025 05:16 AM    HGB 10.1 05/06/2025 05:16 AM    HCT 33.4 05/06/2025 05:16 AM     05/06/2025 05:16 AM    MCV 96.8 05/06/2025 05:16 AM    MCH 29.3 05/06/2025 05:16 AM    MCHC 30.2 05/06/2025 05:16 AM    RDW 15.6 05/06/2025 05:16 AM    
Hospitalist Progress Note  5/3/2025 10:34 AM  Subjective:   Admit Date: 4/29/2025  PCP: Peter Ceron MD    Interval History: Patient with cellulitis right elbow s/p debridement doing fine redness and swelling is improved he is on IV cefepime IV clindamycin and vancomycin and  .denies any chest pain or shortness of breath .  Is afebrile WBC count remains stable wound culture shows strep pyogenes.  Also has a positive urine culture for E. coli sensitive to multiple antibiotics    Diet: ADULT DIET; Regular; Low Fat/Low Chol/High Fiber/2 gm Na  Medications:   Scheduled Meds:   ceFEPIme (MAXIPIME) 2,000 mg in sterile water 20 mL IV syringe  2,000 mg IntraVENous Q12H    clindamycin (CLEOCIN) IV  600 mg IntraVENous Q8H    [Held by provider] apixaban  5 mg Oral BID    vancomycin  1,250 mg IntraVENous Q24H    vancomycin (VANCOCIN) intermittent dosing (placeholder)   Other RX Placeholder    sodium chloride flush  10 mL IntraVENous 2 times per day    amiodarone  200 mg Oral BID    aspirin EC  81 mg Oral Daily    budesonide-formoterol  2 puff Inhalation BID    cetirizine  10 mg Oral Daily    docusate sodium  100 mg Oral Daily    empagliflozin  10 mg Oral Daily    ferrous sulfate  325 mg Oral Daily with breakfast    fluticasone  2 spray Nasal Daily    furosemide  40 mg Oral Daily    ipratropium 0.5 mg-albuterol 2.5 mg  1 Dose Inhalation Q4H WA RT    lactulose  20 g Oral BID    levothyroxine  50 mcg Oral Daily    montelukast  10 mg Oral Nightly    pantoprazole  40 mg Oral QAM AC    tiotropium  1 puff Inhalation Daily RT    traZODone  50 mg Oral Nightly     Continuous Infusions:   sodium chloride       CBC:   Recent Labs     05/01/25  0509 05/02/25  0506 05/03/25  0540   WBC 8.7 8.5 7.3   HGB 10.5* 10.1* 10.0*    231 238     BMP:    Recent Labs     05/01/25  0509 05/02/25  0506 05/03/25  0540    143 141   K 3.6* 3.8 3.9    105 104   CO2 26 26 26   BUN 17 18 18   CREATININE 0.8 0.9 0.9   GLUCOSE 108* 108* 
Occupational Therapy    Patient met with refusal for OT at this time and would like to wait if PT/OT are available at the same time to do a session. Will attempt later in day schedule permitting.    JESSICA Zaidi/LISA  
Occupational Therapy    Patient met with refusal for OT this morning requesting to remain in bed due to increased pain in her RUE. She was agreeable to do bed exercises for her BLEs with PT only at this time. Will attempt OT at a later time schedule permitting.    JESSICA Zaidi/LISA  
Occupational Therapy  Facility/Department: [facility]  Daily Treatment Note  NAME: Marah Loyd  : 1945  MRN: [id number]    Date of Service: 2025    Discharge Recommendations:  Subacute/Skilled Nursing Facility, Long Term Care with OT, Patient would benefit from continued therapy after discharge         Patient Diagnosis(es): The encounter diagnosis was Cellulitis of right elbow.     Assessment   Activity Tolerance: Patient tolerated treatment well;Patient limited by fatigue;Patient limited by endurance  Discharge Recommendations: Subacute/Skilled Nursing Facility;Long Term Care with OT;Patient would benefit from continued therapy after discharge     Plan  Occupational Therapy Plan  Times Per Week: 1-2  Current Treatment Recommendations: Patient/Caregiver education & training;Equipment evaluation, education, & procurement;Functional mobility training;Self-Care / ADL    Restrictions  Lower Extremity Weight Bearing Restrictions  Left Lower Extremity Weight Bearing: Non Weight Bearing    Subjective  Subjective  Subjective: Patient lying supine in iain, agreeable and cooperative for OT. PTA present to assist with transfers  Orientation  Overall Orientation Status: Within Functional Limits  Pain: did not rate  Cognition  Overall Cognitive Status: WFL       Objective  Vitals  Vitals  O2 Device: Nasal cannula (2L O2)  Bed Mobility Training  Bed Mobility Training: Yes  Overall Level of Assistance: Stand by assistance  Supine to Sit: Stand by assistance  Sit to Supine: Stand by assistance  Scooting: Stand by assistance  Balance  Sitting: Intact  Standing: Impaired  Standing - Static: Fair  Transfer Training  Transfer Training: Yes (sit-to-stand from EOB to RW level 1x2')  Overall Level of Assistance: Minimal assistance;2 Person assistance  Interventions: Weight shifting training/pressure relief;Safety awareness training (NWB LLE)  Sit to Stand: Minimal assistance;2 Person assistance  Stand to Sit: 
Occupational Therapy  Facility/Department: [facility]GRESS[facility]  Occupational Therapy Initial Assessment    Name: Marah Loyd  : 1945  MRN: [id number]  Date of Service: 2025    Discharge Recommendations:  Subacute/Skilled Nursing Facility, Long Term Care with OT, Patient would benefit from continued therapy after discharge     Patient Diagnosis(es): The encounter diagnosis was Cellulitis of right elbow.  Past Medical History:  has a past medical history of Abdominal pain, RLQ, Acute exacerbation of chronic obstructive pulmonary disease (COPD) (HCC), Acute respiratory failure with hypoxia (HCC), Anterior dislocation of left humerus, Atrial fibrillation (HCC), Displaced fracture of left femur (HCC), Diverticulitis, History of iron deficiency anemia, Hyperlipidemia, Kidney stone, MI (myocardial infarction) (HCC), Obesity, Pericardial effusion, Pneumonia of both lungs due to infectious organism, Prolonged Q-T interval on ECG, Small bowel obstruction (HCC), Stress disorder, acute, Stress-induced cardiomyopathy, Thyroid goiter, Vitamin B12 deficiency, and Vitamin D deficiency.  Past Surgical History:  has a past surgical history that includes Colonoscopy; Colon surgery (2008); Thyroidectomy, partial; Upper gastrointestinal endoscopy; joint replacement; hernia repair (); Hemorrhoid surgery; Colonoscopy (2017); Hysterectomy; laparoscopy (N/A, 2019); Cardiac catheterization; Colonoscopy (N/A, 2024); and Femur Surgery (Left, 3/19/2025).    Treatment Diagnosis: general weakness      Assessment  Performance deficits / Impairments: Decreased functional mobility ;Decreased ADL status;Decreased ROM;Decreased endurance;Decreased balance;Decreased high-level IADLs  Treatment Diagnosis: general weakness  Prognosis: Good  Decision Making: Medium Complexity        Plan  Occupational Therapy Plan  Times Per Week: 1-2  Current Treatment Recommendations: Patient/Caregiver education & training, 
Orthopaedic Progress Note      SUBJECTIVE   Ms. Loyd  who presented with to the ER from the nursing home with an right elbow wound and cellulitis.  Patient did undergo surgery with Dr. Navarrete on March 19, 2025 for an ORIF of the left distal femur.  Patient has been nonweightbearing since time of surgery.  She then subsequently had a fall on March 24 where she did have an anterior dislocation of the left shoulder.  Her shoulder was reduced.  She can weight-bear as tolerated to the left upper extremity.  She will remain nonweightbearing to the left lower extremity.  She is working with therapy in the nursing home.  Patient states that she has developed a wound to her left elbow that was healing.  She states the wound has been present for about 3 to 4 weeks.  It may have started as a type of blister.  She is unsure if this is from an old IV stick site.  She has been nonweightbearing and may have been resting her elbows more frequently on armrest.  She does have full motion of the right elbow and fingers. Her temperature has improved since admission.  She was started on oral antibiotics just a few days prior to admission.  The nursing home has been doing dry dressing changes and applying a type of a salve to the wound.   Vital signs are stable.  She is currently taking Eliquis, which has been placed on hold.  Previous history of MI and atrial fibrillation.  Patient did have a CT scan of her elbow which was noted cellulitis. She has been receiving IV abx. Yesterday wound noted to be improved. Today redness and pus like drainage was noted . Pt has been NPO.       Allergies:    Allergies as of 04/29/2025 - Fully Reviewed 04/29/2025   Allergen Reaction Noted    Erythromycin Nausea Only 03/02/2017    Fenofibrate Nausea Only 03/02/2017    Lipitor [atorvastatin] Nausea Only 03/02/2017    Lopid [gemfibrozil] Nausea Only 03/02/2017    Other  03/02/2017    Propoxyphene Other (See Comments) 07/19/2014     Current Inpatient 
Orthopaedic Progress Note      SUBJECTIVE   Ms. Loyd is post op day # 1 s/p Excisional debridement right elbow/arm abscess   Pt seen resting in bed, A & O x 3  Pt notes pain to right arm, drsg removed, incision to R AC fossa well approximated with sutures, redness improved from yesterday, no drainage.   VSS  Hgb 10.1  Cultures + in right elbow wound for STREPTOCOCCUS PYOGENES (GROUP A) LIGHT GROWTH Identification by MALDI-TOF Abnormal    Blood cultures negative   Urine +  Iv abx continued      Allergies:    Allergies as of 04/29/2025 - Fully Reviewed 04/29/2025   Allergen Reaction Noted    Erythromycin Nausea Only 03/02/2017    Fenofibrate Nausea Only 03/02/2017    Lipitor [atorvastatin] Nausea Only 03/02/2017    Lopid [gemfibrozil] Nausea Only 03/02/2017    Other  03/02/2017    Propoxyphene Other (See Comments) 07/19/2014     Current Inpatient Medications:  Current Facility-Administered Medications: ondansetron (ZOFRAN) injection 8 mg, 8 mg, IntraVENous, Q4H PRN  [Held by provider] apixaban (ELIQUIS) tablet 5 mg, 5 mg, Oral, BID  oxyCODONE-acetaminophen (PERCOCET) 5-325 MG per tablet 1 tablet, 1 tablet, Oral, Q4H PRN **OR** oxyCODONE-acetaminophen (PERCOCET) 5-325 MG per tablet 2 tablet, 2 tablet, Oral, Q4H PRN  ceFEPIme (MAXIPIME) 2,000 mg in sodium chloride 0.9 % 100 mL IVPB (addEASE), 2,000 mg, IntraVENous, Q12H  vancomycin (VANCOCIN) 1,250 mg in sodium chloride 0.9 % 250 mL IVPB (Auwp2Nem), 1,250 mg, IntraVENous, Q24H  vancomycin (VANCOCIN) intermittent dosing (placeholder), , Other, RX Placeholder  sodium chloride flush 0.9 % injection 10 mL, 10 mL, IntraVENous, 2 times per day  sodium chloride flush 0.9 % injection 10 mL, 10 mL, IntraVENous, PRN  0.9 % sodium chloride infusion, , IntraVENous, PRN  polyethylene glycol (GLYCOLAX) packet 17 g, 17 g, Oral, Daily PRN  acetaminophen (TYLENOL) tablet 650 mg, 650 mg, Oral, Q4H PRN  amiodarone (CORDARONE) tablet 200 mg, 200 mg, Oral, BID  aspirin EC tablet 81 mg, 81 
Physical Therapy  Facility/Department: [facility]  Daily Treatment Note  NAME: Marah Loyd  : 1945  MRN: [id number]    Date of Service: 2025    Discharge Recommendations:  Subacute/Skilled Nursing Facility        Patient Diagnosis(es): The primary encounter diagnosis was Cellulitis of right elbow. Diagnoses of Post-op pain and Open wound of right elbow, subsequent encounter were also pertinent to this visit.    Assessment  Activity Tolerance: Patient tolerated treatment well;Patient limited by fatigue;Patient limited by endurance;Other (comment) (At end of session, pt reports increased nausea. Pt given a bag/bucket before beginning to throw up.)    Plan  Physical Therapy Plan  General Plan: 5-7 times per week  Current Treatment Recommendations: Strengthening;ROM;Balance training;Functional mobility training;Transfer training;Manual;Neuromuscular re-education;Gait training;Wheelchair mobility training;Endurance training;Home exercise program;Safety education & training;Patient/Caregiver education & training;Equipment evaluation, education, & procurement    Restrictions  Restrictions/Precautions  Restrictions/Precautions: Weight Bearing  Lower Extremity Weight Bearing Restrictions  Left Lower Extremity Weight Bearing: Non Weight Bearing  Upper Extremity Weight Bearing Restrictions  Right Upper Extremity Weight Bearing: Weight Bearing As Tolerated  Left Upper Extremity Weight Bearing: Weight Bearing As Tolerated     Subjective   Subjective  Subjective: Pt in bed upon arrival. Pt reports significant R UE pain. WIlling to complete bed ex only at this time.  Pain: 5-6/10 R UE    Objective  Vitals  O2 Device: Nasal cannula  Bed Mobility Training  Bed Mobility Training: No  Transfer Training  Transfer Training: No  Gait  Gait Training: No     PT Exercises  Exercise Treatment: Supine: bilateral ankle pumps, quad sets, glute sets, heel slides, LAQ, seated marches, hip abd, SLR x 15 ea.     Safety 
Physical Therapy  Facility/Department: [facility]  Daily Treatment Note  NAME: Marah Loyd  : 1945  MRN: [id number]    Date of Service: 2025    Discharge Recommendations:  Subacute/Skilled Nursing Facility        Patient Diagnosis(es): The encounter diagnosis was Cellulitis of right elbow.    Assessment  Activity Tolerance: Patient tolerated treatment well;Patient limited by fatigue;Patient limited by endurance    Plan  Physical Therapy Plan  General Plan: 5-7 times per week  Current Treatment Recommendations: Strengthening;ROM;Balance training;Functional mobility training;Transfer training;Manual;Neuromuscular re-education;Gait training;Wheelchair mobility training;Endurance training;Home exercise program;Safety education & training;Patient/Caregiver education & training;Equipment evaluation, education, & procurement    Restrictions  Restrictions/Precautions  Restrictions/Precautions: Weight Bearing  Lower Extremity Weight Bearing Restrictions  Left Lower Extremity Weight Bearing: Non Weight Bearing  Upper Extremity Weight Bearing Restrictions  Right Upper Extremity Weight Bearing: Weight Bearing As Tolerated  Left Upper Extremity Weight Bearing: Weight Bearing As Tolerated     Subjective   Subjective  Subjective: Pt in bed upon arrival. Pt pleasant and agreeable to session  Pain: did not rate    Objective  Vitals  O2 Device: Nasal cannula  Bed Mobility Training  Bed Mobility Training: Yes  Overall Level of Assistance: Stand by assistance  Supine to Sit: Stand by assistance  Sit to Supine: Stand by assistance  Scooting: Stand by assistance  Balance  Sitting: Intact  Standing: Impaired  Standing - Static: Fair  Transfer Training  Transfer Training: Yes (attemtped to move to L and pt was unable to)  Overall Level of Assistance: Minimal assistance;2 Person assistance  Interventions: Weight shifting training/pressure relief;Safety awareness training (NWB LLE)  Sit to Stand: Minimal 
Physical Therapy  Facility[facility]Department: [facility]  Daily Treatment Note  NAME: Marah Loyd  : 1945  MRN: [id number]    Date of Service: 2025    Discharge Recommendations:  Subacute/Skilled Nursing Facility        Patient Diagnosis(es): The primary encounter diagnosis was Cellulitis of right elbow. Diagnoses of Post-op pain and Open wound of right elbow, subsequent encounter were also pertinent to this visit.    Assessment  Activity Tolerance: Patient tolerated treatment well;Treatment limited secondary to medical complications    Plan  Physical Therapy Plan  General Plan: 5-7 times per week  Current Treatment Recommendations: Strengthening;ROM;Balance training;Functional mobility training;Transfer training;Manual;Neuromuscular re-education;Gait training;Wheelchair mobility training;Endurance training;Home exercise program;Safety education & training;Patient/Caregiver education & training;Equipment evaluation, education, & procurement    Restrictions  Restrictions/Precautions  Restrictions/Precautions: Weight Bearing  Lower Extremity Weight Bearing Restrictions  Left Lower Extremity Weight Bearing: Non Weight Bearing  Upper Extremity Weight Bearing Restrictions  Right Upper Extremity Weight Bearing: Weight Bearing As Tolerated  Left Upper Extremity Weight Bearing: Weight Bearing As Tolerated     Subjective   Subjective  Subjective: Pt in bed upon arrival. Pt pleasant and agreeable to session.  Pain: 2/10 overall    Objective  Vitals  O2 Device: Nasal cannula  Bed Mobility Training  Bed Mobility Training: Yes  Overall Level of Assistance: Contact guard assistance  Supine to Sit: Contact guard assistance  Sit to Supine: Contact guard assistance  Scooting: Stand by assistance  Balance  Sitting: Intact  Standing: Impaired  Standing - Static: Fair  Transfer Training  Transfer Training: Yes  Overall Level of Assistance: Minimal assistance;2 Person assistance  Sit to Stand: Minimal assistance;2 
Physical Therapy  Facility/Department: [facility]GRESSIVE [facility]  Daily Treatment Note  NAME: Marah Loyd  : 1945  MRN: [id number]    Date of Service: 2025    Discharge Recommendations:  Subacute/Skilled Nursing Facility        Patient Diagnosis(es): The primary encounter diagnosis was Cellulitis of right elbow. Diagnoses of Post-op pain and Open wound of right elbow, subsequent encounter were also pertinent to this visit.    Assessment  Activity Tolerance: Patient tolerated treatment well;Treatment limited secondary to medical complications    Plan  Physical Therapy Plan  General Plan: 5-7 times per week  Current Treatment Recommendations: Strengthening;ROM;Balance training;Functional mobility training;Transfer training;Manual;Neuromuscular re-education;Gait training;Wheelchair mobility training;Endurance training;Home exercise program;Safety education & training;Patient/Caregiver education & training;Equipment evaluation, education, & procurement    Restrictions  Restrictions/Precautions  Restrictions/Precautions: Weight Bearing  Lower Extremity Weight Bearing Restrictions  Left Lower Extremity Weight Bearing: Non Weight Bearing  Upper Extremity Weight Bearing Restrictions  Right Upper Extremity Weight Bearing: Weight Bearing As Tolerated  Left Upper Extremity Weight Bearing: Weight Bearing As Tolerated     Subjective   Subjective  Subjective: Pt in bed upon arrival. Pt pleasant and agreeable to session.  Pain: did not state    Objective  Vitals  O2 Device: Nasal cannula  Bed Mobility Training  Bed Mobility Training: Yes  Overall Level of Assistance: Contact guard assistance;Minimal assistance  Supine to Sit: Minimal assistance  Sit to Supine: Contact guard assistance  Scooting: Stand by assistance  Balance  Sitting: Intact  Transfer Training  Transfer Training: No (declined this date)     PT Exercises  Exercise Treatment: Supine: bilateral ankle pumps, quad sets, glute sets, heel slides, LAQ, seated 
Physical Therapy  Facility/Department: [facility]GR[facility]  Daily Treatment Note  NAME: Marah Loyd  : 1945  MRN: [id number]    Date of Service: 5/3/2025    Discharge Recommendations:  Subacute/Skilled Nursing Facility        Patient Diagnosis(es): The primary encounter diagnosis was Cellulitis of right elbow. Diagnoses of Post-op pain and Open wound of right elbow, subsequent encounter were also pertinent to this visit.    Assessment   The patient is able to complete mobility in the bed within the available ROM    Plan   Attempt to sit up to the side of the bed next visit to advance mobility as tolerated.     Restrictions  Restrictions/Precautions  Restrictions/Precautions: Weight Bearing  Lower Extremity Weight Bearing Restrictions  Left Lower Extremity Weight Bearing: Non Weight Bearing  Upper Extremity Weight Bearing Restrictions  Right Upper Extremity Weight Bearing: Weight Bearing As Tolerated  Left Upper Extremity Weight Bearing: Weight Bearing As Tolerated     Subjective    The patient reports she is having pain in the elbow this morning and is only willing to do her bed ther-ex.     Objective  Completed bed there-ex with the bilateral ankles knees and hips to improve strength and mobility  Vitals                           Goals  Short Term Goals  Time Frame for Short Term Goals: LOS  Short Term Goal 1: Bed moblity with mod I  Short Term Goal 2: Transfer with min x 1  Short Term Goal 3: Standing x 5min maintaining NWB on the L  Patient Goals   Patient Goals : Return to Facility    Education       AM-PAC - Mobility              Therapy Time   Individual Concurrent Group Co-treatment   Time In 0800         Time Out 0819         Minutes 19                 Bennett Boone, PT           
Physical Therapy  Facility/Department: [facility]  Daily Treatment Note  NAME: Marah Loyd  : 1945  MRN: [id number]    Date of Service: 2025    Discharge Recommendations:  Subacute/Skilled Nursing Facility        Patient Diagnosis(es): The primary encounter diagnosis was Cellulitis of right elbow. Diagnoses of Post-op pain and Open wound of right elbow, subsequent encounter were also pertinent to this visit.    Assessment   The patient is able to complte the bed there-ex and mobility but is not standing or even sitting up at this time.     Plan  Advance to standing and walking activities as tolerated      Restrictions  Restrictions/Precautions  Restrictions/Precautions: Weight Bearing  Lower Extremity Weight Bearing Restrictions  Left Lower Extremity Weight Bearing: Non Weight Bearing  Upper Extremity Weight Bearing Restrictions  Right Upper Extremity Weight Bearing: Weight Bearing As Tolerated  Left Upper Extremity Weight Bearing: Weight Bearing As Tolerated     Subjective    The patiennt reports she feels sick and not up to standing or walking today but she will do the bed there-ex.     Objective  Completed supine there-ex with glute sets, SLR, knee flexion, hip abd/add, ankle pumps all working at 2x20 reps with minimal verbal cues to complete.     Vitals                           Goals  Short Term Goals  Time Frame for Short Term Goals: LOS  Short Term Goal 1: Bed moblity with mod I  Short Term Goal 2: Transfer with min x 1  Short Term Goal 3: Standing x 5min maintaining NWB on the L  Patient Goals   Patient Goals : Return to Facility    Education       AM-PAC - Mobility              Therapy Time   Individual Concurrent Group Co-treatment   Time In 0800         Time Out 0824         Minutes 24                 Bennett Boone, PT           
Physical Therapy  Facility/Department: [facility]  Physical Therapy Initial Assessment    Name: Marah Loyd  : 1945  MRN: [id number]  Date of Service: 2025    Discharge Recommendations:  Subacute/Skilled Nursing Facility          Patient Diagnosis(es): The encounter diagnosis was Cellulitis of right elbow.  Past Medical History:  has a past medical history of Abdominal pain, RLQ, Acute exacerbation of chronic obstructive pulmonary disease (COPD) (HCC), Acute respiratory failure with hypoxia (HCC), Anterior dislocation of left humerus, Atrial fibrillation (HCC), Displaced fracture of left femur (HCC), Diverticulitis, History of iron deficiency anemia, Hyperlipidemia, Kidney stone, MI (myocardial infarction) (HCC), Obesity, Pericardial effusion, Pneumonia of both lungs due to infectious organism, Prolonged Q-T interval on ECG, Small bowel obstruction (HCC), Stress disorder, acute, Stress-induced cardiomyopathy, Thyroid goiter, Vitamin B12 deficiency, and Vitamin D deficiency.  Past Surgical History:  has a past surgical history that includes Colonoscopy; Colon surgery (2008); Thyroidectomy, partial; Upper gastrointestinal endoscopy; joint replacement; hernia repair (); Hemorrhoid surgery; Colonoscopy (2017); Hysterectomy; laparoscopy (N/A, 2019); Cardiac catheterization; Colonoscopy (N/A, 2024); and Femur Surgery (Left, 3/19/2025).    Assessment  Body Structures, Functions, Activity Limitations Requiring Skilled Therapeutic Intervention: Decreased ADL status;Decreased ROM;Decreased strength;Decreased posture;Decreased balance;Increased pain;Decreased endurance;Decreased safe awareness;Decreased functional mobility   Therapy Prognosis: Good  Decision Making: Low Complexity  Requires PT Follow-Up: Yes  Activity Tolerance  Activity Tolerance: Patient tolerated evaluation without incident    Plan  Physical Therapy Plan  General Plan: 5-7 times per week  Current 
Report called to the [facility] [place] and report given to Digna ESTEVEZ.   
Vancomycin level reviewed therapeutic.  Mery Rodrigues  4/30/2025  9:20 AM    
hours.  BNP: No results for input(s): \"BNP\" in the last 72 hours.  Lipids: No results for input(s): \"CHOL\", \"HDL\" in the last 72 hours.    Invalid input(s): \"LDLCALCU\"  INR: No results for input(s): \"INR\" in the last 72 hours.    Objective:   Vitals: BP (!) 113/45   Pulse 62   Temp 98.4 °F (36.9 °C) (Temporal)   Resp 16   Ht 1.575 m (5' 2\")   Wt 101.7 kg (224 lb 3.2 oz)   SpO2 97%   BMI 41.01 kg/m²   General appearance: alert and cooperative with exam  HEENT: Eye: Normal external eye, conjunctiva, lids cornea, ARGELIA.  Neck: no adenopathy, no carotid bruit, no JVD, supple, symmetrical, trachea midline, and thyroid not enlarged, symmetric, no tenderness/mass/nodules  Lungs: clear to auscultation bilaterally  Heart: regular rate and rhythm, S1, S2 normal, no murmur, click, rub or gallop  Abdomen: soft, non-tender; bowel sounds normal; no masses,  no organomegaly  Extremities:  Right elbow postdebridement wound site looks good sutures intact redness surrounding is improved no edema.  Neurologic: Mental status: Alert, oriented, thought content appropriate    Assessment and Plan:   Cellulitis right elbow s/p debridement postop day #2  UTI.  DM type 2  PLAN: Will continue with IV cefepime and clindamycin till discahrge. Encourage oral hydration has slight bump in creatinine,monitor daily labs Continue rest of plan, patient awaiting approval for ECF .  Patient Active Problem List:     Anxiety     Depression     H/O colon cancer, stage II     High risk medication use     Hx of abnormal mammogram     Hyperlipidemia     Morbid obesity with BMI of 40.0-44.9, adult (HCC)     Neuroendocrine tumor (HCC)     OA (osteoarthritis)     Status post subtotal thyroidectomy     Thyroid goiter     Lipoma of abdominal wall     Lymphadenopathy, axillary     PAF (paroxysmal atrial fibrillation) (HCC)     Mild pulmonary hypertension (HCC)     COPD exacerbation (HCC)     Eosinophilic asthma     Chronic combined systolic and diastolic CHF

## 2025-05-06 NOTE — PLAN OF CARE
Problem: Chronic Conditions and Co-morbidities  Goal: Patient's chronic conditions and co-morbidity symptoms are monitored and maintained or improved  4/29/2025 2000 by Shania Ritter RN  Outcome: Progressing  4/29/2025 2000 by Shania Ritter RN  Outcome: Progressing  Flowsheets  Taken 4/29/2025 1954 by Shania Ritter RN  Care Plan - Patient's Chronic Conditions and Co-Morbidity Symptoms are Monitored and Maintained or Improved:   Monitor and assess patient's chronic conditions and comorbid symptoms for stability, deterioration, or improvement   Collaborate with multidisciplinary team to address chronic and comorbid conditions and prevent exacerbation or deterioration   Update acute care plan with appropriate goals if chronic or comorbid symptoms are exacerbated and prevent overall improvement and discharge  Taken 4/29/2025 1240 by Denisha Andrews RN  Care Plan - Patient's Chronic Conditions and Co-Morbidity Symptoms are Monitored and Maintained or Improved:   Monitor and assess patient's chronic conditions and comorbid symptoms for stability, deterioration, or improvement   Collaborate with multidisciplinary team to address chronic and comorbid conditions and prevent exacerbation or deterioration   Update acute care plan with appropriate goals if chronic or comorbid symptoms are exacerbated and prevent overall improvement and discharge     Problem: Discharge Planning  Goal: Discharge to home or other facility with appropriate resources  4/29/2025 2000 by Shania Ritter RN  Outcome: Progressing  4/29/2025 2000 by Shania Ritter RN  Outcome: Progressing  Flowsheets  Taken 4/29/2025 1954 by Shania Ritter RN  Discharge to home or other facility with appropriate resources:   Identify barriers to discharge with patient and caregiver   Refer to discharge planning if patient needs post-hospital services based on physician order or complex needs related to functional status, cognitive ability or social 
  Problem: Chronic Conditions and Co-morbidities  Goal: Patient's chronic conditions and co-morbidity symptoms are monitored and maintained or improved  4/30/2025 2129 by Marcelina Ramirez  Outcome: Progressing     Problem: Discharge Planning  Goal: Discharge to home or other facility with appropriate resources  4/30/2025 2129 by Marcelina Ramirez  Outcome: Progressing     Problem: Safety - Adult  Goal: Free from fall injury  4/30/2025 2129 by Marcelina Ramirez  Outcome: Progressing     Problem: Skin/Tissue Integrity - Adult  Goal: Skin integrity remains intact  Description: 1.  Monitor for areas of redness and/or skin breakdown2.  Assess vascular access sites hourly3.  Every 4-6 hours minimum:  Change oxygen saturation probe site4.  Every 4-6 hours:  If on nasal continuous positive airway pressure, respiratory therapy assess nares and determine need for appliance change or resting period  4/30/2025 2129 by Marcelina Ramirez  Outcome: Progressing     Problem: Skin/Tissue Integrity - Adult  Goal: Incisions, wounds, or drain sites healing without S/S of infection  4/30/2025 2129 by Marcelina Ramirez  Outcome: Progressing     Problem: Skin/Tissue Integrity - Adult  Goal: Oral mucous membranes remain intact  4/30/2025 2129 by Marcelina Ramirez  Outcome: Progressing     Problem: Musculoskeletal - Adult  Goal: Return mobility to safest level of function  4/30/2025 2129 by Marcelina Ramirez  Outcome: Progressing     Problem: Musculoskeletal - Adult  Goal: Return ADL status to a safe level of function  4/30/2025 2129 by Marcelina Ramirez  Outcome: Progressing     Problem: Respiratory - Adult  Goal: Achieves optimal ventilation and oxygenation  Outcome: Progressing     Problem: Genitourinary - Adult  Goal: Absence of urinary retention  Outcome: Progressing     Problem: Skin/Tissue Integrity  Goal: Skin integrity remains intact  Description: 1.  Monitor for areas of redness and/or skin breakdown2.  Assess vascular access sites hourly3.  
  Problem: Chronic Conditions and Co-morbidities  Goal: Patient's chronic conditions and co-morbidity symptoms are monitored and maintained or improved  5/1/2025 1023 by Galen Soni RN  Outcome: Progressing  4/30/2025 2129 by Marcelina Ramirez  Outcome: Progressing     Problem: Discharge Planning  Goal: Discharge to home or other facility with appropriate resources  5/1/2025 1023 by Galen Soni RN  Outcome: Progressing  4/30/2025 2129 by Marcelina Ramirez  Outcome: Progressing     Problem: Safety - Adult  Goal: Free from fall injury  5/1/2025 1023 by Galen Soni RN  Outcome: Progressing  4/30/2025 2129 by Marcelina Ramirez  Outcome: Progressing     Problem: Skin/Tissue Integrity - Adult  Goal: Skin integrity remains intact  Description: 1.  Monitor for areas of redness and/or skin breakdown2.  Assess vascular access sites hourly3.  Every 4-6 hours minimum:  Change oxygen saturation probe site4.  Every 4-6 hours:  If on nasal continuous positive airway pressure, respiratory therapy assess nares and determine need for appliance change or resting period  5/1/2025 1023 by Galen Soni RN  Outcome: Progressing  4/30/2025 2129 by Marcelina Ramirez  Outcome: Progressing  Goal: Incisions, wounds, or drain sites healing without S/S of infection  5/1/2025 1023 by Galen Soni RN  Outcome: Progressing  4/30/2025 2129 by Marcelina Ramirez  Outcome: Progressing  Goal: Oral mucous membranes remain intact  5/1/2025 1023 by Galen Soni RN  Outcome: Progressing  4/30/2025 2129 by Marcelina Ramirez  Outcome: Progressing     Problem: Musculoskeletal - Adult  Goal: Return mobility to safest level of function  5/1/2025 1023 by Galen Soni RN  Outcome: Progressing  4/30/2025 2129 by Marcelina Ramirez  Outcome: Progressing  Goal: Return ADL status to a safe level of function  5/1/2025 1023 by Galen Soni RN  Outcome: Progressing  4/30/2025 2129 by Marcelina Ramirez  Outcome: Progressing     Problem: 
  Problem: Chronic Conditions and Co-morbidities  Goal: Patient's chronic conditions and co-morbidity symptoms are monitored and maintained or improved  5/1/2025 2345 by Roxanne Barrios RN  Outcome: Progressing  5/1/2025 1023 by Galen Soni RN  Outcome: Progressing     Problem: Discharge Planning  Goal: Discharge to home or other facility with appropriate resources  5/1/2025 2345 by Roxanne Barrois RN  Outcome: Progressing  5/1/2025 1023 by Galen Soni RN  Outcome: Progressing     Problem: Safety - Adult  Goal: Free from fall injury  5/1/2025 2345 by Roxanne Barrios RN  Outcome: Progressing  5/1/2025 1023 by Galen Soni RN  Outcome: Progressing     Problem: Skin/Tissue Integrity - Adult  Goal: Skin integrity remains intact  Description: 1.  Monitor for areas of redness and/or skin breakdown2.  Assess vascular access sites hourly3.  Every 4-6 hours minimum:  Change oxygen saturation probe site4.  Every 4-6 hours:  If on nasal continuous positive airway pressure, respiratory therapy assess nares and determine need for appliance change or resting period  5/1/2025 2345 by Roxanne Barrios RN  Outcome: Progressing  5/1/2025 1023 by Galen Soni RN  Outcome: Progressing  Goal: Incisions, wounds, or drain sites healing without S/S of infection  5/1/2025 2345 by Roxanne Barrios RN  Outcome: Progressing  5/1/2025 1023 by Galen Soni RN  Outcome: Progressing  Goal: Oral mucous membranes remain intact  5/1/2025 2345 by Roxanne Barrios RN  Outcome: Progressing  5/1/2025 1023 by Galen Soni RN  Outcome: Progressing     Problem: Musculoskeletal - Adult  Goal: Return mobility to safest level of function  5/1/2025 2345 by Roxanne Barrios RN  Outcome: Progressing  5/1/2025 1023 by Galen Soni RN  Outcome: Progressing  Goal: Return ADL status to a safe level of function  5/1/2025 2345 by Roxanne Barrios RN  Outcome: Progressing  5/1/2025 1023 by Galen Soni 
  Problem: Chronic Conditions and Co-morbidities  Goal: Patient's chronic conditions and co-morbidity symptoms are monitored and maintained or improved  5/4/2025 0032 by Roxanne Barrios RN  Outcome: Progressing  5/3/2025 1519 by Marquita Monroe RN  Outcome: Progressing     Problem: Discharge Planning  Goal: Discharge to home or other facility with appropriate resources  5/4/2025 0032 by Roxanne Barrios RN  Outcome: Progressing  5/3/2025 1519 by Marquita Monroe RN  Outcome: Progressing     Problem: Safety - Adult  Goal: Free from fall injury  5/4/2025 0032 by Roxanen Barrios RN  Outcome: Progressing  5/3/2025 1519 by Marquita Monroe RN  Outcome: Progressing     Problem: Skin/Tissue Integrity - Adult  Goal: Skin integrity remains intact  Description: 1.  Monitor for areas of redness and/or skin breakdown2.  Assess vascular access sites hourly3.  Every 4-6 hours minimum:  Change oxygen saturation probe site4.  Every 4-6 hours:  If on nasal continuous positive airway pressure, respiratory therapy assess nares and determine need for appliance change or resting period  5/4/2025 0032 by Roxanne Barrios RN  Outcome: Progressing  5/3/2025 1519 by Marquita Monroe RN  Outcome: Progressing  Goal: Incisions, wounds, or drain sites healing without S/S of infection  5/4/2025 0032 by Roxanne Barrios RN  Outcome: Progressing  5/3/2025 1519 by Marquita Monroe RN  Outcome: Progressing  Goal: Oral mucous membranes remain intact  5/4/2025 0032 by Roxanne Barrios RN  Outcome: Progressing  5/3/2025 1519 by Marquita Monroe RN  Outcome: Progressing     Problem: Musculoskeletal - Adult  Goal: Return mobility to safest level of function  5/4/2025 0032 by Roxanne Barrios RN  Outcome: Progressing  5/3/2025 1519 by Marquita Monroe RN  Outcome: Progressing  Goal: Return ADL status to a safe level of function  5/4/2025 0032 by Roxanne Barrios RN  Outcome: Progressing  5/3/2025 1519 by Marquita Monroe RN  Outcome: Progressing   
  Problem: Chronic Conditions and Co-morbidities  Goal: Patient's chronic conditions and co-morbidity symptoms are monitored and maintained or improved  5/4/2025 2258 by Amber Foster RN  Outcome: Progressing  5/4/2025 1855 by Marquita Monroe RN  Outcome: Progressing     Problem: Discharge Planning  Goal: Discharge to home or other facility with appropriate resources  5/4/2025 2258 by Amber Foster RN  Outcome: Progressing  5/4/2025 1855 by Marquita Monroe RN  Outcome: Progressing     Problem: Safety - Adult  Goal: Free from fall injury  5/4/2025 2258 by Amber Foster RN  Outcome: Progressing  5/4/2025 1855 by Marquita Monroe RN  Outcome: Progressing     
  Problem: Chronic Conditions and Co-morbidities  Goal: Patient's chronic conditions and co-morbidity symptoms are monitored and maintained or improved  5/6/2025 0150 by Amber Foster RN  Outcome: Progressing  5/5/2025 1836 by Marquita Monroe RN  Outcome: Progressing     Problem: Safety - Adult  Goal: Free from fall injury  5/6/2025 0150 by Amber Foster RN  Outcome: Progressing  5/5/2025 1836 by aMrquita Monroe RN  Outcome: Progressing     Problem: Discharge Planning  Goal: Discharge to home or other facility with appropriate resources  5/6/2025 0150 by Amber Foster RN  Outcome: Progressing  5/5/2025 1836 by Marquita Monroe RN  Outcome: Progressing     
  Problem: Chronic Conditions and Co-morbidities  Goal: Patient's chronic conditions and co-morbidity symptoms are monitored and maintained or improved  Outcome: Progressing     Problem: Discharge Planning  Goal: Discharge to home or other facility with appropriate resources  Outcome: Progressing     Problem: Safety - Adult  Goal: Free from fall injury  Outcome: Progressing     Problem: Skin/Tissue Integrity - Adult  Goal: Skin integrity remains intact  Description: 1.  Monitor for areas of redness and/or skin breakdown2.  Assess vascular access sites hourly3.  Every 4-6 hours minimum:  Change oxygen saturation probe site4.  Every 4-6 hours:  If on nasal continuous positive airway pressure, respiratory therapy assess nares and determine need for appliance change or resting period  Outcome: Progressing  Goal: Incisions, wounds, or drain sites healing without S/S of infection  Outcome: Progressing  Goal: Oral mucous membranes remain intact  Outcome: Progressing     Problem: Musculoskeletal - Adult  Goal: Return mobility to safest level of function  Outcome: Progressing  Goal: Return ADL status to a safe level of function  Outcome: Progressing     Problem: Respiratory - Adult  Goal: Achieves optimal ventilation and oxygenation  Outcome: Progressing     Problem: Genitourinary - Adult  Goal: Absence of urinary retention  Outcome: Progressing     Problem: Skin/Tissue Integrity  Goal: Skin integrity remains intact  Description: 1.  Monitor for areas of redness and/or skin breakdown2.  Assess vascular access sites hourly3.  Every 4-6 hours minimum:  Change oxygen saturation probe site4.  Every 4-6 hours:  If on nasal continuous positive airway pressure, respiratory therapy assess nares and determine need for appliance change or resting period  Outcome: Progressing     Problem: ABCDS Injury Assessment  Goal: Absence of physical injury  Outcome: Progressing     Problem: Pain  Goal: Verbalizes/displays adequate comfort level 
  Problem: Chronic Conditions and Co-morbidities  Goal: Patient's chronic conditions and co-morbidity symptoms are monitored and maintained or improved  Outcome: Progressing     Problem: Discharge Planning  Goal: Discharge to home or other facility with appropriate resources  Outcome: Progressing     Problem: Safety - Adult  Goal: Free from fall injury  Outcome: Progressing     Problem: Skin/Tissue Integrity - Adult  Goal: Skin integrity remains intact  Description: 1.  Monitor for areas of redness and/or skin breakdown2.  Assess vascular access sites hourly3.  Every 4-6 hours minimum:  Change oxygen saturation probe site4.  Every 4-6 hours:  If on nasal continuous positive airway pressure, respiratory therapy assess nares and determine need for appliance change or resting period  Outcome: Progressing  Goal: Incisions, wounds, or drain sites healing without S/S of infection  Outcome: Progressing  Goal: Oral mucous membranes remain intact  Outcome: Progressing     Problem: Musculoskeletal - Adult  Goal: Return mobility to safest level of function  Outcome: Progressing  Goal: Return ADL status to a safe level of function  Outcome: Progressing     Problem: Skin/Tissue Integrity  Goal: Skin integrity remains intact  Description: 1.  Monitor for areas of redness and/or skin breakdown2.  Assess vascular access sites hourly3.  Every 4-6 hours minimum:  Change oxygen saturation probe site4.  Every 4-6 hours:  If on nasal continuous positive airway pressure, respiratory therapy assess nares and determine need for appliance change or resting period  Outcome: Progressing     Problem: ABCDS Injury Assessment  Goal: Absence of physical injury  Outcome: Progressing     Problem: Pain  Goal: Verbalizes/displays adequate comfort level or baseline comfort level  Outcome: Progressing     Problem: Respiratory - Adult  Goal: Achieves optimal ventilation and oxygenation  Outcome: Progressing  Flowsheets (Taken 5/2/2025 0800 by Darryl 
assess nares and determine need for appliance change or resting period  Outcome: Progressing     Problem: ABCDS Injury Assessment  Goal: Absence of physical injury  Outcome: Progressing     Problem: Pain  Goal: Verbalizes/displays adequate comfort level or baseline comfort level  Outcome: Progressing     
or baseline comfort level  Outcome: Progressing     
type)   Monitor skin under medical devices  4/29/2025 2000 by Shania Ritter RN  Outcome: Progressing  Goal: Incisions, wounds, or drain sites healing without S/S of infection  4/30/2025 0837 by Galen Soni RN  Outcome: Progressing  4/29/2025 2000 by Shania Ritter RN  Outcome: Progressing  Goal: Oral mucous membranes remain intact  4/30/2025 0837 by Galen Soni RN  Outcome: Progressing  4/29/2025 2000 by Shania Ritter RN  Outcome: Progressing     Problem: Musculoskeletal - Adult  Goal: Return mobility to safest level of function  4/30/2025 0837 by Galen Soni RN  Outcome: Progressing  4/29/2025 2000 by Shania Ritter RN  Outcome: Progressing  Goal: Return ADL status to a safe level of function  4/30/2025 0837 by Galen Soni RN  Outcome: Progressing  4/29/2025 2000 by Shania Ritter RN  Outcome: Progressing     Problem: Skin/Tissue Integrity  Goal: Skin integrity remains intact  Description: 1.  Monitor for areas of redness and/or skin breakdown2.  Assess vascular access sites hourly3.  Every 4-6 hours minimum:  Change oxygen saturation probe site4.  Every 4-6 hours:  If on nasal continuous positive airway pressure, respiratory therapy assess nares and determine need for appliance change or resting period  4/30/2025 0837 by Galen Soni RN  Outcome: Progressing  Flowsheets (Taken 4/29/2025 2003 by Shania Ritter RN)  Skin Integrity Remains Intact:   Monitor for areas of redness and/or skin breakdown   Turn and reposition as indicated   Pressure redistribution bed/mattress (bed type)   Monitor skin under medical devices  4/29/2025 2000 by Shania Ritter RN  Outcome: Progressing     Problem: ABCDS Injury Assessment  Goal: Absence of physical injury  Outcome: Progressing     Problem: Pain  Goal: Verbalizes/displays adequate comfort level or baseline comfort level  Outcome: Progressing

## 2025-05-07 NOTE — DISCHARGE SUMMARY
Dr. Navarrete, Orthopedics.    Any aspect of the patient's care not discussed in the chart and/or dictation will be addressed and treated as an outpatient.    The patient's medications have been reviewed including, but not limited to, pre-hospital, hospital and discharge medications.  The patient and/or the patient's personal representatives were specifically advised the only medications to be taken are those set forth in the discharge orders and no other medications should be taken.  Any prior medications not on the discharge orders are specifically discontinued.          CLAUDETTE COLEY MD      D:  05/06/2025 14:24:31     T:  05/06/2025 21:16:11     DREAD/SHAHID  Job #:  [id number]     Doc#:  [id number]    CC:   [facility]

## 2025-05-09 NOTE — PROGRESS NOTES
Medication changes per Dr Josue today.    Ipratropium-Albuterol nebulizer treatments changed to prn.    Lactulose changed to prn due to loose stools.    MAR updated.

## 2025-05-12 ENCOUNTER — OUTSIDE SERVICES (OUTPATIENT)
Dept: INTERNAL MEDICINE | Age: 80
End: 2025-05-12
Payer: MEDICARE

## 2025-05-12 DIAGNOSIS — F41.9 ANXIETY: ICD-10-CM

## 2025-05-12 DIAGNOSIS — J96.11 CHRONIC RESPIRATORY FAILURE WITH HYPOXIA (HCC): ICD-10-CM

## 2025-05-12 DIAGNOSIS — E11.22 TYPE 2 DIABETES MELLITUS WITH STAGE 3B CHRONIC KIDNEY DISEASE, WITHOUT LONG-TERM CURRENT USE OF INSULIN (HCC): ICD-10-CM

## 2025-05-12 DIAGNOSIS — I50.42 CHRONIC COMBINED SYSTOLIC AND DIASTOLIC CHF (CONGESTIVE HEART FAILURE) (HCC): ICD-10-CM

## 2025-05-12 DIAGNOSIS — R53.1 GENERAL WEAKNESS: ICD-10-CM

## 2025-05-12 DIAGNOSIS — R19.5 LOOSE STOOLS: ICD-10-CM

## 2025-05-12 DIAGNOSIS — N18.32 STAGE 3B CHRONIC KIDNEY DISEASE (HCC): ICD-10-CM

## 2025-05-12 DIAGNOSIS — N18.32 TYPE 2 DIABETES MELLITUS WITH STAGE 3B CHRONIC KIDNEY DISEASE, WITHOUT LONG-TERM CURRENT USE OF INSULIN (HCC): ICD-10-CM

## 2025-05-12 DIAGNOSIS — L03.113 CELLULITIS OF RIGHT UPPER EXTREMITY: Primary | ICD-10-CM

## 2025-05-12 DIAGNOSIS — S72.445E: ICD-10-CM

## 2025-05-12 DIAGNOSIS — I48.0 PAF (PAROXYSMAL ATRIAL FIBRILLATION) (HCC): ICD-10-CM

## 2025-05-12 PROCEDURE — 99309 SBSQ NF CARE MODERATE MDM 30: CPT | Performed by: NURSE PRACTITIONER

## 2025-05-12 NOTE — PROGRESS NOTES
05/12/25  Marah Loyd  1945    Patient Resident of [facility]    Chief Complaint  1. Cellulitis of right upper extremity    2. Type I or II open nondisplaced fracture of distal epiphysis of left femur with routine healing, subsequent encounter    3. Type 2 diabetes mellitus with stage 3b chronic kidney disease, without long-term current use of insulin ([facility])    4. PAF (paroxysmal atrial fibrillation) ([facility])    5. Chronic combined systolic and diastolic CHF (congestive heart failure) ([facility])    6. Chronic respiratory failure with hypoxia ([facility])    7. Anxiety    8. Stage 3b chronic kidney disease ([facility])    9. General weakness    10. Loose stools        HPI:  Pleasant 79-year-old female with history of pulmonary hypertension, atrial fibs, CHF, chronic respiratory failure on chronic O2, anxiety, chronic kidney disease being seen for posthospital follow-up where she was admitted 3/17/2025 through 3/21/2025 after sustaining a fall on her oxygen tubing. Sustained distal femur fracture. Patient unfortunately had a hip placement in hip fracture right at the area of the prosthesis. She underwent ORIF with repair of the distal femur fracture on 3/19/2025 and is nonweightbearing given the nature of the fracture. She also had a shoulder dislocation which they did reduce during the hospital stay. During hospital had significant postoperative anemia requiring 2 units of packed red blood cells due to inability to care for herself in the home setting was sent to the [facility] on 3/21/2025. Hemoglobin at discharge 8.4 with a hematocrit of 27 and platelets at 150. Electrolytes were stable. Was put on 1800 fluid restriction and again nonweightbearing to the left lower extremity. On exam patient states the leg pain is not significant but she is having severe left shoulder pain. She has been utilizing Norco around-the-clock. Patient states they were trying to pull her up in bed and she felt a pop in her shoulder.

## 2025-05-13 ENCOUNTER — OFFICE VISIT (OUTPATIENT)
Dept: PULMONOLOGY | Age: 80
End: 2025-05-13
Payer: MEDICARE

## 2025-05-13 ENCOUNTER — HOSPITAL ENCOUNTER (OUTPATIENT)
Dept: GENERAL RADIOLOGY | Age: 80
Discharge: HOME | End: 2025-05-15
Payer: MEDICARE

## 2025-05-13 ENCOUNTER — OFFICE VISIT (OUTPATIENT)
Dept: ORTHOPEDIC SURGERY | Age: 80
End: 2025-05-13
Payer: MEDICARE

## 2025-05-13 VITALS
OXYGEN SATURATION: 94 % | HEIGHT: 62 IN | BODY MASS INDEX: 41.22 KG/M2 | DIASTOLIC BLOOD PRESSURE: 74 MMHG | TEMPERATURE: 97.9 F | WEIGHT: 224 LBS | SYSTOLIC BLOOD PRESSURE: 118 MMHG | HEART RATE: 60 BPM

## 2025-05-13 VITALS — WEIGHT: 224 LBS | BODY MASS INDEX: 41.22 KG/M2 | HEIGHT: 62 IN

## 2025-05-13 DIAGNOSIS — K21.9 GASTROESOPHAGEAL REFLUX DISEASE, UNSPECIFIED WHETHER ESOPHAGITIS PRESENT: ICD-10-CM

## 2025-05-13 DIAGNOSIS — G47.33 OSA (OBSTRUCTIVE SLEEP APNEA): ICD-10-CM

## 2025-05-13 DIAGNOSIS — J96.11 CHRONIC RESPIRATORY FAILURE WITH HYPOXIA (HCC): ICD-10-CM

## 2025-05-13 DIAGNOSIS — S72.402D CLOSED FRACTURE OF DISTAL END OF LEFT FEMUR WITH ROUTINE HEALING, UNSPECIFIED FRACTURE MORPHOLOGY, SUBSEQUENT ENCOUNTER: ICD-10-CM

## 2025-05-13 DIAGNOSIS — S72.402D CLOSED FRACTURE OF DISTAL END OF LEFT FEMUR WITH ROUTINE HEALING, UNSPECIFIED FRACTURE MORPHOLOGY, SUBSEQUENT ENCOUNTER: Primary | ICD-10-CM

## 2025-05-13 DIAGNOSIS — S51.001S OPEN WOUND OF RIGHT ELBOW, SEQUELA: ICD-10-CM

## 2025-05-13 DIAGNOSIS — J82.83 EOSINOPHILIC ASTHMA: Primary | ICD-10-CM

## 2025-05-13 DIAGNOSIS — S72.442A CLOSED DISPLACED FRACTURE OF DISTAL EPIPHYSIS OF LEFT FEMUR, INITIAL ENCOUNTER (HCC): Primary | ICD-10-CM

## 2025-05-13 PROCEDURE — 99024 POSTOP FOLLOW-UP VISIT: CPT | Performed by: NURSE PRACTITIONER

## 2025-05-13 PROCEDURE — G8417 CALC BMI ABV UP PARAM F/U: HCPCS | Performed by: INTERNAL MEDICINE

## 2025-05-13 PROCEDURE — 1036F TOBACCO NON-USER: CPT | Performed by: INTERNAL MEDICINE

## 2025-05-13 PROCEDURE — 1160F RVW MEDS BY RX/DR IN RCRD: CPT | Performed by: INTERNAL MEDICINE

## 2025-05-13 PROCEDURE — 99214 OFFICE O/P EST MOD 30 MIN: CPT | Performed by: NURSE PRACTITIONER

## 2025-05-13 PROCEDURE — 1111F DSCHRG MED/CURRENT MED MERGE: CPT | Performed by: INTERNAL MEDICINE

## 2025-05-13 PROCEDURE — 99213 OFFICE O/P EST LOW 20 MIN: CPT | Performed by: INTERNAL MEDICINE

## 2025-05-13 PROCEDURE — 1090F PRES/ABSN URINE INCON ASSESS: CPT | Performed by: INTERNAL MEDICINE

## 2025-05-13 PROCEDURE — 73552 X-RAY EXAM OF FEMUR 2/>: CPT

## 2025-05-13 PROCEDURE — 1123F ACP DISCUSS/DSCN MKR DOCD: CPT | Performed by: INTERNAL MEDICINE

## 2025-05-13 PROCEDURE — G8400 PT W/DXA NO RESULTS DOC: HCPCS | Performed by: INTERNAL MEDICINE

## 2025-05-13 PROCEDURE — 99214 OFFICE O/P EST MOD 30 MIN: CPT | Performed by: INTERNAL MEDICINE

## 2025-05-13 PROCEDURE — 1159F MED LIST DOCD IN RCRD: CPT | Performed by: INTERNAL MEDICINE

## 2025-05-13 PROCEDURE — G8427 DOCREV CUR MEDS BY ELIG CLIN: HCPCS | Performed by: INTERNAL MEDICINE

## 2025-05-13 NOTE — PROGRESS NOTES
Orthopaedic Progress Note      CHIEF COMPLAINT: Follow-up right elbow I&D abscess on May 1, 2025.,  ORIF left distal femur March 19    HISTORY OF PRESENT ILLNESS:       Ms. Loyd  is a 79 y.o. female  who presents with with her daughter from the nursing home today.  She did undergo surgery on May 1, 2025 for an I&D of the right elbow abscess.  She was admitted in the hospital for IV antibiotics.  Her wound today has healed in the proximal and distal portions.  The center of the incision does note to be open so we will leave the stitches at that area.  Continue daily dressing changes.  Redness has improved.  There is minimal drainage.  Sutures were loosely closed.  She is able to flex and extend her arm.  She notes much improved pain to the left lower extremity.  Her surgical wound is improved.  She can flex and extend the knee and toes.  She has been nonweightbearing to the left lower extremity.      Past Medical History:    Past Medical History:   Diagnosis Date    Abdominal pain, RLQ 11/08/2021    Acute exacerbation of chronic obstructive pulmonary disease (COPD) ([facility]) 06/07/2022    Acute respiratory failure with hypoxia ([facility]) 06/07/2022    Anterior dislocation of left humerus 03/2025    Atrial fibrillation ([facility])     Displaced fracture of left femur ([facility]) 03/2025    Diverticulitis     History of iron deficiency anemia     Hyperlipidemia     Kidney stone     MI (myocardial infarction) ([facility])     Obesity     Pericardial effusion 11/08/2021    Pneumonia of both lungs due to infectious organism 06/07/2022    Prolonged Q-T interval on ECG 05/01/2019    Formatting of this note might be different from the original. on [place] report    Small bowel obstruction ([facility])     Stress disorder, acute     Stress-induced cardiomyopathy     Thyroid goiter     Vitamin B12 deficiency     Vitamin D deficiency        Past Surgical History:    Past Surgical History:   Procedure Laterality Date    ARM SURGERY Right 5/1/2025

## 2025-05-13 NOTE — PROGRESS NOTES
tiotropium (SPIRIVA RESPIMAT) 1.25 MCG/ACT AERS inhaler Inhale 2 puffs into the lungs daily 3 each 3    budesonide-formoterol (SYMBICORT) 160-4.5 MCG/ACT AERO Inhale 2 puffs into the lungs 2 times daily 3 each 3    montelukast (SINGULAIR) 10 MG tablet Take 1 tablet by mouth nightly 90 tablet 3    omeprazole (PRILOSEC) 20 MG delayed release capsule Take 1 capsule by mouth every morning (before breakfast) 90 capsule 3    levothyroxine (SYNTHROID) 50 MCG tablet Take 1 tablet by mouth daily      aspirin EC 81 MG EC tablet Take 1 tablet by mouth daily      fluticasone (FLONASE) 50 MCG/ACT nasal spray 2 sprays by Nasal route daily      traZODone (DESYREL) 50 MG tablet Take 1 tablet by mouth nightly       No facility-administered medications prior to visit.     ALLERGIES:  Allergies   Allergen Reactions    Erythromycin Nausea Only    Fenofibrate Nausea Only    Lipitor [Atorvastatin] Nausea Only    Lopid [Gemfibrozil] Nausea Only    Other      DARVOCET      Propoxyphene Other (See Comments)     GI ISSUE-NAUSEA        TOBACCO HISTORY:  She  reports that she has never smoked. She has never used smokeless tobacco.    AVOCATION/OCCUPATIONAL EXPOSURE:     Yes No   ASBESTOS [] [x]   SILICA DUST [] [x]   COAL [] [x]   FOUNDRY [] [x]   QUARRY [] [x]   COTTON MILL [] [x]   PETS [] [x]   PARAKEETS  []  [x]   TUBERCULOSIS [] [x]   HOT TUB [] [x]   DRUGS [] [x]   OTHER [] [x]     PULMONARY CO-MORBIDITIES/RISK FACTORS:     Yes No   NASO-BRONCHIAL/ENVIRONMENTAL ALLERGIES [] [x]   BRONCHIAL ASTHMA [x] []   CHRONIC BRONCHITIS/EMPHYSEMA/COPD [] [x]   CHRONIC SINUSITIS/POSTNASAL DRIP [] [x]   ACID REFLUX/GERD [] [x]   ACE/ARB USAGE [] [x]   ASPIRIN USAGE [] [x]   CORONARY ARTERY DISEASE  []  [x]   CONGESTIVE CARDIAC FAILURE [] [x]   ATRIAL FIBRILLATION [] [x]   AMIODARONE USAGE [] [x]   PULMONARY HYPERTENSION [] [x]   VENOUS THROMBOEMBOLISM [] [x]   END STAGE RENAL DISEASE [] [x]   CHRONIC LIVER DISEASE/CIRRHOSIS [] [x]   CONNECTIVE

## 2025-05-20 PROBLEM — J18.9 PNEUMONIA DUE TO INFECTIOUS ORGANISM, UNSPECIFIED LATERALITY, UNSPECIFIED PART OF LUNG: Status: RESOLVED | Noted: 2024-12-24 | Resolved: 2025-05-20

## 2025-05-20 PROBLEM — J11.00 PNEUMONIA DUE TO INFLUENZA: Status: RESOLVED | Noted: 2025-03-04 | Resolved: 2025-05-20

## 2025-05-20 PROBLEM — J44.1 COPD EXACERBATION (HCC): Status: RESOLVED | Noted: 2024-05-08 | Resolved: 2025-05-20

## 2025-05-20 PROBLEM — J18.9 PNEUMONIA OF LEFT LOWER LOBE DUE TO INFECTIOUS ORGANISM: Status: RESOLVED | Noted: 2025-03-04 | Resolved: 2025-05-20

## 2025-05-25 PROCEDURE — 99309 SBSQ NF CARE MODERATE MDM 30: CPT | Performed by: INTERNAL MEDICINE

## 2025-05-27 ENCOUNTER — OUTSIDE SERVICES (OUTPATIENT)
Dept: INTERNAL MEDICINE | Age: 80
End: 2025-05-27
Payer: MEDICARE

## 2025-05-27 DIAGNOSIS — I50.42 CHRONIC COMBINED SYSTOLIC AND DIASTOLIC CHF (CONGESTIVE HEART FAILURE) (HCC): ICD-10-CM

## 2025-05-27 DIAGNOSIS — E03.4 HYPOTHYROIDISM DUE TO ACQUIRED ATROPHY OF THYROID: ICD-10-CM

## 2025-05-27 DIAGNOSIS — N18.31 STAGE 3A CHRONIC KIDNEY DISEASE (CKD) (HCC): ICD-10-CM

## 2025-05-27 DIAGNOSIS — E66.01 MORBID OBESITY (HCC): ICD-10-CM

## 2025-05-27 DIAGNOSIS — J44.9 CHRONIC OBSTRUCTIVE PULMONARY DISEASE, UNSPECIFIED COPD TYPE (HCC): ICD-10-CM

## 2025-05-27 DIAGNOSIS — S72.442D: Primary | ICD-10-CM

## 2025-05-27 DIAGNOSIS — I48.0 PAF (PAROXYSMAL ATRIAL FIBRILLATION) (HCC): ICD-10-CM

## 2025-05-27 DIAGNOSIS — E11.22 CONTROLLED TYPE 2 DIABETES MELLITUS WITH STAGE 3 CHRONIC KIDNEY DISEASE, WITHOUT LONG-TERM CURRENT USE OF INSULIN (HCC): ICD-10-CM

## 2025-05-27 DIAGNOSIS — R53.1 GENERAL WEAKNESS: ICD-10-CM

## 2025-05-27 DIAGNOSIS — S43.015D ANTERIOR DISLOCATION OF LEFT SHOULDER, SUBSEQUENT ENCOUNTER: ICD-10-CM

## 2025-05-27 DIAGNOSIS — K21.9 GASTROESOPHAGEAL REFLUX DISEASE WITHOUT ESOPHAGITIS: ICD-10-CM

## 2025-05-27 DIAGNOSIS — N18.30 CONTROLLED TYPE 2 DIABETES MELLITUS WITH STAGE 3 CHRONIC KIDNEY DISEASE, WITHOUT LONG-TERM CURRENT USE OF INSULIN (HCC): ICD-10-CM

## 2025-05-27 PROBLEM — Z91.199 NONCOMPLIANCE WITH DIAGNOSTIC TESTING: Status: RESOLVED | Noted: 2024-05-09 | Resolved: 2025-05-27

## 2025-05-27 PROBLEM — R59.0 LYMPHADENOPATHY, AXILLARY: Status: RESOLVED | Noted: 2021-11-08 | Resolved: 2025-05-27

## 2025-05-27 PROBLEM — D50.9 MICROCYTIC ANEMIA: Status: RESOLVED | Noted: 2024-07-30 | Resolved: 2025-05-27

## 2025-05-27 PROBLEM — Z91.148 NONCOMPLIANCE WITH MEDICATION REGIMEN: Status: RESOLVED | Noted: 2024-05-09 | Resolved: 2025-05-27

## 2025-05-27 RX ORDER — MONTELUKAST SODIUM 10 MG/1
10 TABLET ORAL NIGHTLY
Qty: 90 TABLET | Refills: 3 | Status: SHIPPED | OUTPATIENT
Start: 2025-05-27

## 2025-05-27 NOTE — PROGRESS NOTES
DR. LANDRUM - [facility] NEW PATIENT HISTORY & PHYSICAL EXAM    DATE OF SERVICE: 3/30/25    NURSING HOME: [facility]    CHRONIC/ACTIVE PROBLEM LIST:     Patient Active Problem List   Diagnosis    Anxiety    Depression    H/O colon cancer, stage II    Hx of abnormal mammogram    Hyperlipidemia    Morbid obesity ([facility])    Neuroendocrine tumor ([facility])    OA (osteoarthritis)    Status post subtotal thyroidectomy    Thyroid goiter    Lipoma of abdominal wall    PAF (paroxysmal atrial fibrillation) ([facility])    Mild pulmonary hypertension ([facility])    Eosinophilic asthma    Chronic combined systolic and diastolic CHF (congestive heart failure) ([facility])    GERD (gastroesophageal reflux disease)    Stress-induced cardiomyopathy    Insomnia    Secondary hypercoagulable state    Controlled type 2 diabetes mellitus with stage 3 chronic kidney disease, without long-term current use of insulin ([facility])    Diverticulosis    Hiatal hernia    Urinary urgency    Steroid-dependent asthma    Chronic respiratory failure with hypoxia ([facility])    Closed displaced fracture of distal epiphysis of left femur, initial encounter ([facility])    Anterior dislocation of left shoulder    Chronic heart failure with preserved ejection fraction ([facility])    Hyperglycemia    Cellulitis of right elbow    Open wound of right elbow    Stage 3a chronic kidney disease (CKD) ([facility])    BMI 40.0-44.9, adult ([facility])       CHIEF COMPLAINT: Here for rehabilitation and physical therapy    HISTORY OF CHIEF COMPLAINT: This 79 y.o.  female was admitted to the [facility] for rehabilitation and physical therapy after a recent hospitalization at [facility] for a left femur fracture. She had an open reduction with internal fixation done by Dr. Navarrete. Following the hospital stay she went to the nursing home. Then on 3/24/25 she fell and had left shoulder pain so she was sent to the emergency room, where she was found to have a left

## 2025-06-02 DIAGNOSIS — S72.442D CLOSED DISPLACED FRACTURE OF DISTAL EPIPHYSIS OF LEFT FEMUR WITH ROUTINE HEALING: Primary | ICD-10-CM

## 2025-06-02 DIAGNOSIS — G89.18 POST-OP PAIN: ICD-10-CM

## 2025-06-02 DIAGNOSIS — S72.442A CLOSED DISPLACED FRACTURE OF DISTAL EPIPHYSIS OF LEFT FEMUR, INITIAL ENCOUNTER (HCC): Primary | ICD-10-CM

## 2025-06-02 RX ORDER — HYDROCODONE BITARTRATE AND ACETAMINOPHEN 5; 325 MG/1; MG/1
1 TABLET ORAL EVERY 6 HOURS PRN
Qty: 120 TABLET | Refills: 0 | Status: SHIPPED | OUTPATIENT
Start: 2025-06-02 | End: 2025-07-02

## 2025-06-04 RX ORDER — ONDANSETRON 4 MG/1
4 TABLET, FILM COATED ORAL EVERY 8 HOURS PRN
COMMUNITY

## 2025-06-07 DIAGNOSIS — J45.50: ICD-10-CM

## 2025-06-09 RX ORDER — TIOTROPIUM BROMIDE INHALATION SPRAY 1.56 UG/1
SPRAY, METERED RESPIRATORY (INHALATION)
Qty: 12 G | Refills: 3 | Status: SHIPPED | OUTPATIENT
Start: 2025-06-09

## 2025-06-10 ENCOUNTER — HOSPITAL ENCOUNTER (OUTPATIENT)
Dept: GENERAL RADIOLOGY | Age: 80
Discharge: HOME | End: 2025-06-12
Payer: MEDICARE

## 2025-06-10 ENCOUNTER — OFFICE VISIT (OUTPATIENT)
Dept: ORTHOPEDIC SURGERY | Age: 80
End: 2025-06-10
Payer: MEDICARE

## 2025-06-10 VITALS
HEART RATE: 92 BPM | WEIGHT: 224 LBS | HEIGHT: 62 IN | BODY MASS INDEX: 41.22 KG/M2 | SYSTOLIC BLOOD PRESSURE: 120 MMHG | DIASTOLIC BLOOD PRESSURE: 84 MMHG

## 2025-06-10 DIAGNOSIS — M25.511 RIGHT SHOULDER PAIN, UNSPECIFIED CHRONICITY: ICD-10-CM

## 2025-06-10 DIAGNOSIS — S72.442D CLOSED DISPLACED FRACTURE OF DISTAL EPIPHYSIS OF LEFT FEMUR WITH ROUTINE HEALING, SUBSEQUENT ENCOUNTER: Primary | ICD-10-CM

## 2025-06-10 DIAGNOSIS — S72.442A CLOSED DISPLACED FRACTURE OF DISTAL EPIPHYSIS OF LEFT FEMUR, INITIAL ENCOUNTER (HCC): ICD-10-CM

## 2025-06-10 PROCEDURE — 73552 X-RAY EXAM OF FEMUR 2/>: CPT

## 2025-06-10 PROCEDURE — 99214 OFFICE O/P EST MOD 30 MIN: CPT | Performed by: PHYSICIAN ASSISTANT

## 2025-06-10 PROCEDURE — 20610 DRAIN/INJ JOINT/BURSA W/O US: CPT | Performed by: PHYSICIAN ASSISTANT

## 2025-06-10 RX ORDER — TRIAMCINOLONE ACETONIDE 40 MG/ML
40 INJECTION, SUSPENSION INTRA-ARTICULAR; INTRAMUSCULAR ONCE
Status: COMPLETED | OUTPATIENT
Start: 2025-06-10 | End: 2025-06-10

## 2025-06-10 RX ORDER — BUPIVACAINE HYDROCHLORIDE 5 MG/ML
3 INJECTION, SOLUTION PERINEURAL ONCE
Status: COMPLETED | OUTPATIENT
Start: 2025-06-10 | End: 2025-06-10

## 2025-06-10 RX ADMIN — TRIAMCINOLONE ACETONIDE 40 MG: 200 INJECTION, SUSPENSION INTRA-ARTICULAR; INTRAMUSCULAR at 11:16

## 2025-06-10 RX ADMIN — BUPIVACAINE HYDROCHLORIDE 15 MG: 5 INJECTION, SOLUTION PERINEURAL at 11:15

## 2025-06-10 NOTE — PROGRESS NOTES
Orthopaedic Progress Note      CHIEF COMPLAINT: Left distal femur fracture    HISTORY OF PRESENT ILLNESS:       Ms. Loyd  is a 79 y.o. female  who presents with a history of a periprosthetic supracondylar distal femur fracture that was treated with a plate and screws almost 3 months ago.  She is doing reasonably well at this time.  She has been essentially nonweightbearing.  She comes in today for follow-up with new x-rays.  She also reports to me that she is having increasing right shoulder pain.  States that she can move her arm but she has pain reaching across her body.  She did have x-rays at outlying facility that are not present today did confirm some mild arthritis.  She is here today for evaluation of both these issues.      Past Medical History:    Past Medical History:   Diagnosis Date    Abdominal pain, RLQ 11/08/2021    Acute exacerbation of chronic obstructive pulmonary disease (COPD) ([facility]) 06/07/2022    Acute respiratory failure with hypoxia ([facility]) 06/07/2022    Anterior dislocation of left humerus 03/2025    Atrial fibrillation ([facility])     Displaced fracture of left femur ([facility]) 03/2025    Diverticulitis     History of iron deficiency anemia     Hyperlipidemia     Kidney stone     Lymphadenopathy, axillary 11/08/2021    MI (myocardial infarction) ([facility])     Microcytic anemia 07/30/2024    Noncompliance with diagnostic testing 05/09/2024    Noncompliance with medication regimen 05/09/2024    Obesity     Pericardial effusion 11/08/2021    Pneumonia of both lungs due to infectious organism 06/07/2022    Prolonged Q-T interval on ECG 05/01/2019    Formatting of this note might be different from the original. on [place] report    Small bowel obstruction ([facility])     Stress disorder, acute     Stress-induced cardiomyopathy     Thyroid goiter     Vitamin B12 deficiency     Vitamin D deficiency        Past Surgical History:    Past Surgical History:   Procedure Laterality Date    ARM SURGERY Right

## 2025-06-11 ENCOUNTER — TELEPHONE (OUTPATIENT)
Dept: ORTHOPEDIC SURGERY | Age: 80
End: 2025-06-11

## 2025-06-11 NOTE — TELEPHONE ENCOUNTER
Nurse from the [facility] called and asked if ortho can do a Peer to peer for patient so that way she can stay in the nursing home.    If you would like to do peer to peer phone number is [phone] Option 5, needs to be done by 6/12/25 @ 9am

## 2025-06-15 DIAGNOSIS — J45.50: ICD-10-CM

## 2025-06-16 RX ORDER — BUDESONIDE AND FORMOTEROL FUMARATE DIHYDRATE 160; 4.5 UG/1; UG/1
AEROSOL RESPIRATORY (INHALATION)
Qty: 30.6 G | Refills: 3 | Status: SHIPPED | OUTPATIENT
Start: 2025-06-16

## 2025-06-16 RX ORDER — OMEPRAZOLE 20 MG/1
CAPSULE, DELAYED RELEASE ORAL
Qty: 90 CAPSULE | Refills: 3 | Status: SHIPPED | OUTPATIENT
Start: 2025-06-16

## 2025-06-16 RX ORDER — AMIODARONE HYDROCHLORIDE 200 MG/1
200 TABLET ORAL 2 TIMES DAILY
Qty: 180 TABLET | Refills: 3 | Status: SHIPPED | OUTPATIENT
Start: 2025-06-16

## 2025-06-23 ENCOUNTER — TELEPHONE (OUTPATIENT)
Dept: ORTHOPEDIC SURGERY | Age: 80
End: 2025-06-23

## 2025-06-25 ENCOUNTER — OUTSIDE SERVICES (OUTPATIENT)
Dept: INTERNAL MEDICINE | Age: 80
End: 2025-06-25
Payer: MEDICARE

## 2025-06-25 DIAGNOSIS — E03.4 HYPOTHYROIDISM DUE TO ACQUIRED ATROPHY OF THYROID: ICD-10-CM

## 2025-06-25 DIAGNOSIS — I50.42 CHRONIC COMBINED SYSTOLIC AND DIASTOLIC CHF (CONGESTIVE HEART FAILURE) (HCC): ICD-10-CM

## 2025-06-25 DIAGNOSIS — N18.30 CONTROLLED TYPE 2 DIABETES MELLITUS WITH STAGE 3 CHRONIC KIDNEY DISEASE, WITHOUT LONG-TERM CURRENT USE OF INSULIN (HCC): ICD-10-CM

## 2025-06-25 DIAGNOSIS — G89.18 POST-OP PAIN: ICD-10-CM

## 2025-06-25 DIAGNOSIS — I48.0 PAF (PAROXYSMAL ATRIAL FIBRILLATION) (HCC): ICD-10-CM

## 2025-06-25 DIAGNOSIS — S72.442D CLOSED DISPLACED FRACTURE OF DISTAL EPIPHYSIS OF LEFT FEMUR WITH ROUTINE HEALING: ICD-10-CM

## 2025-06-25 DIAGNOSIS — E11.22 CONTROLLED TYPE 2 DIABETES MELLITUS WITH STAGE 3 CHRONIC KIDNEY DISEASE, WITHOUT LONG-TERM CURRENT USE OF INSULIN (HCC): ICD-10-CM

## 2025-06-25 DIAGNOSIS — J45.50: ICD-10-CM

## 2025-06-25 DIAGNOSIS — J44.9 CHRONIC OBSTRUCTIVE PULMONARY DISEASE, UNSPECIFIED COPD TYPE (HCC): ICD-10-CM

## 2025-06-25 DIAGNOSIS — S72.442D CLOSED DISPLACED FRACTURE OF DISTAL EPIPHYSIS OF LEFT FEMUR WITH ROUTINE HEALING: Primary | ICD-10-CM

## 2025-06-25 PROCEDURE — 99315 NF DSCHRG MGMT 30 MIN/LESS: CPT | Performed by: NURSE PRACTITIONER

## 2025-06-25 RX ORDER — AMIODARONE HYDROCHLORIDE 200 MG/1
200 TABLET ORAL 2 TIMES DAILY
Qty: 60 TABLET | Refills: 0 | Status: SHIPPED | OUTPATIENT
Start: 2025-06-25

## 2025-06-25 RX ORDER — HYDROCODONE BITARTRATE AND ACETAMINOPHEN 5; 325 MG/1; MG/1
1 TABLET ORAL EVERY 6 HOURS PRN
Qty: 28 TABLET | Refills: 0 | Status: SHIPPED | OUTPATIENT
Start: 2025-06-25 | End: 2025-07-02

## 2025-06-25 RX ORDER — IPRATROPIUM BROMIDE AND ALBUTEROL SULFATE 2.5; .5 MG/3ML; MG/3ML
1 SOLUTION RESPIRATORY (INHALATION) EVERY 6 HOURS PRN
Qty: 9 ML | Refills: 0 | Status: SHIPPED | OUTPATIENT
Start: 2025-06-25

## 2025-06-25 RX ORDER — TRAZODONE HYDROCHLORIDE 50 MG/1
50 TABLET ORAL NIGHTLY
Qty: 30 TABLET | Refills: 0 | Status: SHIPPED | OUTPATIENT
Start: 2025-06-25

## 2025-06-25 RX ORDER — LACTULOSE 10 G/15ML
30 SOLUTION ORAL DAILY PRN
Qty: 946 ML | Refills: 0 | Status: SHIPPED | OUTPATIENT
Start: 2025-06-25

## 2025-06-25 RX ORDER — FUROSEMIDE 40 MG/1
40 TABLET ORAL DAILY
Qty: 30 TABLET | Refills: 0 | Status: SHIPPED | OUTPATIENT
Start: 2025-06-25

## 2025-06-25 NOTE — TELEPHONE ENCOUNTER
OARRS checked today    Controlled Substance Monitoring:    Acute and Chronic Pain Monitoring:   RX Monitoring Periodic Controlled Substance Monitoring   6/25/2025   9:46 AM No signs of potential drug abuse or diversion identified.;Obtaining appropriate analgesic effect of treatment.

## 2025-06-25 NOTE — TELEPHONE ENCOUNTER
Bernadette requesting a refill of the below medication which has been pended for you for patient's discharge to home.    Requested Prescriptions     Pending Prescriptions Disp Refills    amiodarone (CORDARONE) 200 MG tablet 60 tablet 0     Sig: Take 1 tablet by mouth 2 times daily    apixaban (ELIQUIS) 5 MG TABS tablet 60 tablet 0     Sig: Take 1 tablet by mouth 2 times daily    furosemide (LASIX) 40 MG tablet 30 tablet 0     Sig: Take 1 tablet by mouth daily    lactulose (CHRONULAC) 10 GM/15ML solution 946 mL 0     Sig: Take 45 mLs by mouth daily as needed (constipation)    empagliflozin (JARDIANCE) 10 MG tablet 30 tablet 0     Sig: Take 1 tablet by mouth daily    ipratropium 0.5 mg-albuterol 2.5 mg (DUONEB) 0.5-2.5 (3) MG/3ML SOLN nebulizer solution 9 mL 0     Sig: Take 3 mLs by nebulization every 6 hours as needed for Shortness of Breath or Wheezing    HYDROcodone-acetaminophen (NORCO) 5-325 MG per tablet 28 tablet 0     Sig: Take 1 tablet by mouth every 6 hours as needed for Pain for up to 7 days. Take lowest dose possible to manage pain Max Daily Amount: 4 tablets    traZODone (DESYREL) 50 MG tablet 30 tablet 0     Sig: Take 1 tablet by mouth nightly         Allergies   Allergen Reactions    Erythromycin Nausea Only    Fenofibrate Nausea Only    Lipitor [Atorvastatin] Nausea Only    Lopid [Gemfibrozil] Nausea Only    Other      DARVOCET      Propoxyphene Other (See Comments)     GI ISSUE-NAUSEA

## 2025-06-26 NOTE — PROGRESS NOTES
06/25/25  Marah Loyd  1945    Patient Resident of [facility]    Chief Complaint  1. Closed displaced fracture of distal epiphysis of left femur with routine healing    2. Controlled type 2 diabetes mellitus with stage 3 chronic kidney disease, without long-term current use of insulin ([facility])    3. Hypothyroidism due to acquired atrophy of thyroid    4. PAF (paroxysmal atrial fibrillation) ([facility])    5. Chronic combined systolic and diastolic CHF (congestive heart failure) ([facility])    6. Chronic obstructive pulmonary disease, unspecified COPD type ([facility])    7. BMI 40.0-44.9, adult ([facility])        HPI:  Pleasant 79-year-old female with history of pulmonary hypertension, atrial fibs, CHF, chronic respiratory failure on chronic O2, anxiety, chronic kidney disease being seen for posthospital follow-up where she was admitted 3/17/2025 through 3/21/2025 after sustaining a fall on her oxygen tubing. Sustained distal femur fracture. Patient unfortunately had a hip placement in hip fracture right at the area of the prosthesis. She underwent ORIF with repair of the distal femur fracture on 3/19/2025 and is nonweightbearing given the nature of the fracture. She also had a shoulder dislocation which they did reduce during the hospital stay. During hospital had significant postoperative anemia requiring 2 units of packed red blood cells due to inability to care for herself in the home setting was sent to the [facility] on 3/21/2025. Hemoglobin at discharge 8.4 with a hematocrit of 27 and platelets at 150. Electrolytes were stable. Was put on 1800 fluid restriction and again nonweightbearing to the left lower extremity. On exam patient states the leg pain is not significant but she is having severe left shoulder pain. She has been utilizing Norco around-the-clock. Patient states they were trying to pull her up in bed and she felt a pop in her shoulder. States the shoulder is causing her severe pain. She denies

## 2025-06-29 ENCOUNTER — OUTSIDE SERVICES (OUTPATIENT)
Dept: INTERNAL MEDICINE | Age: 80
End: 2025-06-29

## 2025-06-29 DIAGNOSIS — S72.442D CLOSED DISPLACED FRACTURE OF DISTAL EPIPHYSIS OF LEFT FEMUR WITH ROUTINE HEALING: Primary | ICD-10-CM

## 2025-06-29 DIAGNOSIS — S43.015D ANTERIOR DISLOCATION OF LEFT SHOULDER, SUBSEQUENT ENCOUNTER: ICD-10-CM

## 2025-06-29 DIAGNOSIS — N18.31 STAGE 3A CHRONIC KIDNEY DISEASE (CKD) (HCC): ICD-10-CM

## 2025-06-29 DIAGNOSIS — E11.22 CONTROLLED TYPE 2 DIABETES MELLITUS WITH STAGE 3 CHRONIC KIDNEY DISEASE, WITHOUT LONG-TERM CURRENT USE OF INSULIN (HCC): ICD-10-CM

## 2025-06-29 DIAGNOSIS — I48.0 PAF (PAROXYSMAL ATRIAL FIBRILLATION) (HCC): ICD-10-CM

## 2025-06-29 DIAGNOSIS — J44.9 CHRONIC OBSTRUCTIVE PULMONARY DISEASE, UNSPECIFIED COPD TYPE (HCC): ICD-10-CM

## 2025-06-29 DIAGNOSIS — K21.9 GASTROESOPHAGEAL REFLUX DISEASE WITHOUT ESOPHAGITIS: ICD-10-CM

## 2025-06-29 DIAGNOSIS — E03.4 HYPOTHYROIDISM DUE TO ACQUIRED ATROPHY OF THYROID: ICD-10-CM

## 2025-06-29 DIAGNOSIS — I50.42 CHRONIC COMBINED SYSTOLIC AND DIASTOLIC CHF (CONGESTIVE HEART FAILURE) (HCC): ICD-10-CM

## 2025-06-29 DIAGNOSIS — E66.01 MORBID OBESITY (HCC): ICD-10-CM

## 2025-06-29 DIAGNOSIS — R53.1 GENERAL WEAKNESS: ICD-10-CM

## 2025-06-29 DIAGNOSIS — N18.30 CONTROLLED TYPE 2 DIABETES MELLITUS WITH STAGE 3 CHRONIC KIDNEY DISEASE, WITHOUT LONG-TERM CURRENT USE OF INSULIN (HCC): ICD-10-CM

## 2025-07-07 ENCOUNTER — OUTSIDE SERVICES (OUTPATIENT)
Dept: INTERNAL MEDICINE | Age: 80
End: 2025-07-07
Payer: MEDICARE

## 2025-07-07 DIAGNOSIS — E11.22 CONTROLLED TYPE 2 DIABETES MELLITUS WITH STAGE 3 CHRONIC KIDNEY DISEASE, WITHOUT LONG-TERM CURRENT USE OF INSULIN (HCC): ICD-10-CM

## 2025-07-07 DIAGNOSIS — S72.442D CLOSED DISPLACED FRACTURE OF DISTAL EPIPHYSIS OF LEFT FEMUR WITH ROUTINE HEALING: ICD-10-CM

## 2025-07-07 DIAGNOSIS — I48.0 PAF (PAROXYSMAL ATRIAL FIBRILLATION) (HCC): ICD-10-CM

## 2025-07-07 DIAGNOSIS — I50.42 CHRONIC COMBINED SYSTOLIC AND DIASTOLIC CHF (CONGESTIVE HEART FAILURE) (HCC): ICD-10-CM

## 2025-07-07 DIAGNOSIS — K21.9 GASTROESOPHAGEAL REFLUX DISEASE WITHOUT ESOPHAGITIS: ICD-10-CM

## 2025-07-07 DIAGNOSIS — E03.4 HYPOTHYROIDISM DUE TO ACQUIRED ATROPHY OF THYROID: ICD-10-CM

## 2025-07-07 DIAGNOSIS — J44.9 CHRONIC OBSTRUCTIVE PULMONARY DISEASE, UNSPECIFIED COPD TYPE (HCC): ICD-10-CM

## 2025-07-07 DIAGNOSIS — N18.30 CONTROLLED TYPE 2 DIABETES MELLITUS WITH STAGE 3 CHRONIC KIDNEY DISEASE, WITHOUT LONG-TERM CURRENT USE OF INSULIN (HCC): ICD-10-CM

## 2025-07-07 DIAGNOSIS — N18.31 STAGE 3A CHRONIC KIDNEY DISEASE (CKD) (HCC): ICD-10-CM

## 2025-07-07 DIAGNOSIS — E66.01 MORBID OBESITY (HCC): ICD-10-CM

## 2025-07-07 DIAGNOSIS — L03.113 CELLULITIS OF RIGHT ELBOW: Primary | ICD-10-CM

## 2025-07-08 NOTE — PROGRESS NOTES
laboratory reports.    2. Closed displaced fracture of distal epiphysis of left femur with routine healing, stable  - She will continue to see Dr. Navarrete's group  - We reviewed their most recent visit note  - She will continue to get physical therapy    3. Controlled type 2 diabetes mellitus with stage 3 chronic kidney disease, without long-term current use of insulin ([facility])  - Nursing home blood sugar logs and diabetic medication administration reviewed. No changes.     4. Hypothyroidism due to acquired atrophy of thyroid, stable  - She will continue to take Synthroid    5. PAF (paroxysmal atrial fibrillation) ([facility]), stable  6. Chronic combined systolic and diastolic CHF (congestive heart failure) ([facility]), stable  - She will continue to take her cardiac medicines    7. Chronic obstructive pulmonary disease, unspecified COPD type ([facility]), stable  - She will continue to see Dr. Restrepo  - We reviewed his most recent visit note   - She will continue to use her breathing medicines    8. Stage 3a chronic kidney disease (CKD) ([facility]), stable  - We will continue to monitor     9. Gastroesophageal reflux disease without esophagitis, stable  - She will continue to take Prilosec    10. Morbid obesity ([facility]), stable  11. BMI 40.0-44.9, adult ([facility])  - We discussed weight loss  - She will continue to watch her diet and exercise       Continue current treatment. Nursing home record reviewed and updates summarized and entered into electronic record. See nursing home orders and MAR.        Electronically signed by GUILLE LANDRUM DO on 7/7/2025 at 11:15 PM  Internal Medicine

## 2025-07-11 DIAGNOSIS — S72.442D CLOSED DISPLACED FRACTURE OF DISTAL EPIPHYSIS OF LEFT FEMUR WITH ROUTINE HEALING, SUBSEQUENT ENCOUNTER: Primary | ICD-10-CM

## 2025-07-24 ENCOUNTER — OFFICE VISIT (OUTPATIENT)
Dept: ORTHOPEDIC SURGERY | Age: 80
End: 2025-07-24
Payer: MEDICARE

## 2025-07-24 ENCOUNTER — HOSPITAL ENCOUNTER (OUTPATIENT)
Dept: GENERAL RADIOLOGY | Age: 80
Discharge: HOME | End: 2025-07-26
Payer: MEDICARE

## 2025-07-24 VITALS
HEIGHT: 62 IN | HEART RATE: 62 BPM | BODY MASS INDEX: 41.22 KG/M2 | WEIGHT: 224 LBS | SYSTOLIC BLOOD PRESSURE: 130 MMHG | DIASTOLIC BLOOD PRESSURE: 79 MMHG

## 2025-07-24 DIAGNOSIS — S72.442D CLOSED DISPLACED FRACTURE OF DISTAL EPIPHYSIS OF LEFT FEMUR WITH ROUTINE HEALING, SUBSEQUENT ENCOUNTER: ICD-10-CM

## 2025-07-24 DIAGNOSIS — S72.442D CLOSED DISPLACED FRACTURE OF DISTAL EPIPHYSIS OF LEFT FEMUR WITH ROUTINE HEALING, SUBSEQUENT ENCOUNTER: Primary | ICD-10-CM

## 2025-07-24 PROCEDURE — G8417 CALC BMI ABV UP PARAM F/U: HCPCS | Performed by: NURSE PRACTITIONER

## 2025-07-24 PROCEDURE — 1160F RVW MEDS BY RX/DR IN RCRD: CPT | Performed by: NURSE PRACTITIONER

## 2025-07-24 PROCEDURE — 99213 OFFICE O/P EST LOW 20 MIN: CPT | Performed by: NURSE PRACTITIONER

## 2025-07-24 PROCEDURE — 1123F ACP DISCUSS/DSCN MKR DOCD: CPT | Performed by: NURSE PRACTITIONER

## 2025-07-24 PROCEDURE — 1090F PRES/ABSN URINE INCON ASSESS: CPT | Performed by: NURSE PRACTITIONER

## 2025-07-24 PROCEDURE — 1036F TOBACCO NON-USER: CPT | Performed by: NURSE PRACTITIONER

## 2025-07-24 PROCEDURE — 1159F MED LIST DOCD IN RCRD: CPT | Performed by: NURSE PRACTITIONER

## 2025-07-24 PROCEDURE — G8427 DOCREV CUR MEDS BY ELIG CLIN: HCPCS | Performed by: NURSE PRACTITIONER

## 2025-07-24 PROCEDURE — 99214 OFFICE O/P EST MOD 30 MIN: CPT | Performed by: NURSE PRACTITIONER

## 2025-07-24 PROCEDURE — G8400 PT W/DXA NO RESULTS DOC: HCPCS | Performed by: NURSE PRACTITIONER

## 2025-07-24 PROCEDURE — 73552 X-RAY EXAM OF FEMUR 2/>: CPT

## 2025-07-24 NOTE — PROGRESS NOTES
Orthopaedic Progress Note      CHIEF COMPLAINT: Left distal femur fracture periprosthetic    HISTORY OF PRESENT ILLNESS:       Ms. Loyd  is a 79 y.o. female  who presents with a follow-up of a left distal femur fracture periprosthetic.  Patient is about 4-1/2 months out from injury.  She was treated with a plate and screws.  She last visit had began weightbearing using a walker.  She will also use a cane.  She is seen in a wheelchair today with a family member.  she does note improvement of the pain to the right leg.  She has been working with therapy.  She notes her pain to be improving.  She received a right subacromial shoulder injection last visit which she states helped her shoulder pain significantly.  She denies numbness or tingling.      Past Medical History:    Past Medical History:   Diagnosis Date    Abdominal pain, RLQ 11/08/2021    Acute exacerbation of chronic obstructive pulmonary disease (COPD) ([facility]) 06/07/2022    Acute respiratory failure with hypoxia ([facility]) 06/07/2022    Anterior dislocation of left humerus 03/2025    Atrial fibrillation ([facility])     Displaced fracture of left femur ([facility]) 03/2025    Diverticulitis     History of iron deficiency anemia     Hyperlipidemia     Kidney stone     Lymphadenopathy, axillary 11/08/2021    MI (myocardial infarction) ([facility])     Microcytic anemia 07/30/2024    Noncompliance with diagnostic testing 05/09/2024    Noncompliance with medication regimen 05/09/2024    Obesity     Pericardial effusion 11/08/2021    Pneumonia of both lungs due to infectious organism 06/07/2022    Prolonged Q-T interval on ECG 05/01/2019    Formatting of this note might be different from the original. on [place] report    Small bowel obstruction ([facility])     Stress disorder, acute     Stress-induced cardiomyopathy     Thyroid goiter     Vitamin B12 deficiency     Vitamin D deficiency        Past Surgical History:    Past Surgical History:   Procedure Laterality Date    ARM

## 2025-07-28 RX ORDER — FUROSEMIDE 40 MG/1
40 TABLET ORAL DAILY
Qty: 90 TABLET | Refills: 3 | Status: SHIPPED | OUTPATIENT
Start: 2025-07-28

## 2025-07-28 NOTE — TELEPHONE ENCOUNTER
Last Appt:  11/12/2024  Next Appt:   Visit date not found  Med verified in Epic    Pt need appt prior to refills

## 2025-07-28 NOTE — TELEPHONE ENCOUNTER
I spoke to pt.  She had a fracture and was in the nursing home.  She is now home and rescheduled with Dr. MARY Jean Baptiste on 8/4.

## 2025-08-04 ENCOUNTER — OFFICE VISIT (OUTPATIENT)
Dept: CARDIOLOGY | Age: 80
End: 2025-08-04
Payer: MEDICARE

## 2025-08-04 VITALS
HEIGHT: 62 IN | SYSTOLIC BLOOD PRESSURE: 138 MMHG | WEIGHT: 211 LBS | DIASTOLIC BLOOD PRESSURE: 72 MMHG | BODY MASS INDEX: 38.83 KG/M2 | HEART RATE: 59 BPM | RESPIRATION RATE: 12 BRPM

## 2025-08-04 DIAGNOSIS — I51.81 STRESS-INDUCED CARDIOMYOPATHY: ICD-10-CM

## 2025-08-04 DIAGNOSIS — I50.43 ACUTE ON CHRONIC COMBINED SYSTOLIC (CONGESTIVE) AND DIASTOLIC (CONGESTIVE) HEART FAILURE (HCC): ICD-10-CM

## 2025-08-04 DIAGNOSIS — I48.0 PAROXYSMAL A-FIB (HCC): ICD-10-CM

## 2025-08-04 DIAGNOSIS — R06.02 SOB (SHORTNESS OF BREATH): ICD-10-CM

## 2025-08-04 DIAGNOSIS — R94.31 ABNORMAL ELECTROCARDIOGRAM (ECG) (EKG): ICD-10-CM

## 2025-08-04 DIAGNOSIS — I48.91 ATRIAL FIBRILLATION, UNSPECIFIED TYPE (HCC): Primary | ICD-10-CM

## 2025-08-04 PROCEDURE — G8427 DOCREV CUR MEDS BY ELIG CLIN: HCPCS | Performed by: INTERNAL MEDICINE

## 2025-08-04 PROCEDURE — 1090F PRES/ABSN URINE INCON ASSESS: CPT | Performed by: INTERNAL MEDICINE

## 2025-08-04 PROCEDURE — 1036F TOBACCO NON-USER: CPT | Performed by: INTERNAL MEDICINE

## 2025-08-04 PROCEDURE — G8417 CALC BMI ABV UP PARAM F/U: HCPCS | Performed by: INTERNAL MEDICINE

## 2025-08-04 PROCEDURE — G8400 PT W/DXA NO RESULTS DOC: HCPCS | Performed by: INTERNAL MEDICINE

## 2025-08-04 PROCEDURE — 1159F MED LIST DOCD IN RCRD: CPT | Performed by: INTERNAL MEDICINE

## 2025-08-04 PROCEDURE — 93005 ELECTROCARDIOGRAM TRACING: CPT | Performed by: INTERNAL MEDICINE

## 2025-08-04 PROCEDURE — 1123F ACP DISCUSS/DSCN MKR DOCD: CPT | Performed by: INTERNAL MEDICINE

## 2025-08-04 PROCEDURE — 99214 OFFICE O/P EST MOD 30 MIN: CPT | Performed by: INTERNAL MEDICINE

## 2025-08-04 PROCEDURE — 93010 ELECTROCARDIOGRAM REPORT: CPT | Performed by: INTERNAL MEDICINE

## 2025-08-04 RX ORDER — AMIODARONE HYDROCHLORIDE 200 MG/1
200 TABLET ORAL DAILY
Qty: 90 TABLET | Refills: 3 | Status: SHIPPED | OUTPATIENT
Start: 2025-08-04

## 2025-08-04 RX ORDER — BUMETANIDE 1 MG/1
1 TABLET ORAL DAILY
Qty: 90 TABLET | Refills: 3 | Status: SHIPPED | OUTPATIENT
Start: 2025-08-04

## 2025-08-12 ENCOUNTER — TELEPHONE (OUTPATIENT)
Dept: CARDIOLOGY | Age: 80
End: 2025-08-12

## 2025-08-13 ENCOUNTER — OFFICE VISIT (OUTPATIENT)
Dept: PULMONOLOGY | Age: 80
End: 2025-08-13
Payer: MEDICARE

## 2025-08-13 VITALS
BODY MASS INDEX: 38.41 KG/M2 | WEIGHT: 210 LBS | DIASTOLIC BLOOD PRESSURE: 58 MMHG | TEMPERATURE: 78 F | OXYGEN SATURATION: 94 % | SYSTOLIC BLOOD PRESSURE: 98 MMHG

## 2025-08-13 DIAGNOSIS — J82.83 EOSINOPHILIC ASTHMA: ICD-10-CM

## 2025-08-13 DIAGNOSIS — K21.9 GASTROESOPHAGEAL REFLUX DISEASE, UNSPECIFIED WHETHER ESOPHAGITIS PRESENT: ICD-10-CM

## 2025-08-13 DIAGNOSIS — G47.33 OSA (OBSTRUCTIVE SLEEP APNEA): Primary | ICD-10-CM

## 2025-08-13 DIAGNOSIS — J45.50: ICD-10-CM

## 2025-08-13 DIAGNOSIS — R09.82 POST-NASAL DRIP: ICD-10-CM

## 2025-08-13 DIAGNOSIS — J96.11 CHRONIC RESPIRATORY FAILURE WITH HYPOXIA (HCC): ICD-10-CM

## 2025-08-13 PROCEDURE — 99213 OFFICE O/P EST LOW 20 MIN: CPT | Performed by: NURSE PRACTITIONER

## 2025-08-13 RX ORDER — IPRATROPIUM BROMIDE AND ALBUTEROL SULFATE 2.5; .5 MG/3ML; MG/3ML
1 SOLUTION RESPIRATORY (INHALATION) EVERY 6 HOURS PRN
Qty: 360 ML | Refills: 3 | Status: SHIPPED | OUTPATIENT
Start: 2025-08-13

## 2025-08-13 RX ORDER — ALBUTEROL SULFATE 90 UG/1
2 INHALANT RESPIRATORY (INHALATION) EVERY 4 HOURS PRN
Qty: 72 G | Refills: 3 | Status: SHIPPED | OUTPATIENT
Start: 2025-08-13

## 2025-08-13 RX ORDER — TIOTROPIUM BROMIDE INHALATION SPRAY 1.56 UG/1
2 SPRAY, METERED RESPIRATORY (INHALATION) DAILY
Qty: 12 G | Refills: 3 | Status: SHIPPED | OUTPATIENT
Start: 2025-08-13

## 2025-08-13 RX ORDER — BUDESONIDE AND FORMOTEROL FUMARATE DIHYDRATE 160; 4.5 UG/1; UG/1
2 AEROSOL RESPIRATORY (INHALATION) 2 TIMES DAILY
Qty: 30.6 G | Refills: 3 | Status: SHIPPED | OUTPATIENT
Start: 2025-08-13

## 2025-08-13 ASSESSMENT — ENCOUNTER SYMPTOMS
ALLERGIC/IMMUNOLOGIC NEGATIVE: 1
EYES NEGATIVE: 1
GASTROINTESTINAL NEGATIVE: 1

## 2025-08-14 ENCOUNTER — HOSPITAL ENCOUNTER (OUTPATIENT)
Age: 80
Discharge: HOME | End: 2025-08-16
Attending: INTERNAL MEDICINE
Payer: MEDICARE

## 2025-08-14 VITALS — HEIGHT: 62 IN | WEIGHT: 210 LBS | BODY MASS INDEX: 38.64 KG/M2

## 2025-08-14 DIAGNOSIS — R06.02 SOB (SHORTNESS OF BREATH): ICD-10-CM

## 2025-08-14 DIAGNOSIS — I48.91 ATRIAL FIBRILLATION, UNSPECIFIED TYPE (HCC): ICD-10-CM

## 2025-08-14 DIAGNOSIS — I50.43 ACUTE ON CHRONIC COMBINED SYSTOLIC (CONGESTIVE) AND DIASTOLIC (CONGESTIVE) HEART FAILURE (HCC): ICD-10-CM

## 2025-08-14 DIAGNOSIS — I48.0 PAROXYSMAL A-FIB (HCC): ICD-10-CM

## 2025-08-14 DIAGNOSIS — I51.81 STRESS-INDUCED CARDIOMYOPATHY: ICD-10-CM

## 2025-08-14 DIAGNOSIS — R94.31 ABNORMAL ELECTROCARDIOGRAM (ECG) (EKG): ICD-10-CM

## 2025-08-14 LAB
ECHO AO ROOT DIAM: 2.8 CM
ECHO AO ROOT INDEX: 1.44 CM/M2
ECHO AV AREA PEAK VELOCITY: 2.3 CM2
ECHO AV AREA VTI: 2 CM2
ECHO AV AREA/BSA PEAK VELOCITY: 1.2 CM2/M2
ECHO AV AREA/BSA VTI: 1 CM2/M2
ECHO AV MEAN GRADIENT: 4 MMHG
ECHO AV MEAN VELOCITY: 1 M/S
ECHO AV PEAK GRADIENT: 9 MMHG
ECHO AV PEAK GRADIENT: 9 MMHG
ECHO AV PEAK VELOCITY: 1.5 M/S
ECHO AV VELOCITY RATIO: 0.87
ECHO AV VTI: 31.8 CM
ECHO BSA: 2.04 M2
ECHO EST RA PRESSURE: 3 MMHG
ECHO IVC PROX: 1.4 CM
ECHO LA AREA 2C: 30.9 CM2
ECHO LA AREA 4C: 23.6 CM2
ECHO LA DIAMETER INDEX: 2.15 CM/M2
ECHO LA DIAMETER: 4.2 CM
ECHO LA MAJOR AXIS: 6.1 CM
ECHO LA MINOR AXIS: 7.2 CM
ECHO LA TO AORTIC ROOT RATIO: 1.5
ECHO LA VOL BP: 96 ML (ref 22–52)
ECHO LA VOL MOD A2C: 109 ML (ref 22–52)
ECHO LA VOL MOD A4C: 72 ML (ref 22–52)
ECHO LA VOL/BSA BIPLANE: 49 ML/M2 (ref 16–34)
ECHO LA VOLUME INDEX MOD A2C: 56 ML/M2 (ref 16–34)
ECHO LA VOLUME INDEX MOD A4C: 37 ML/M2 (ref 16–34)
ECHO LV E' LATERAL VELOCITY: 5.22 CM/S
ECHO LV E' SEPTAL VELOCITY: 3.37 CM/S
ECHO LV EF PHYSICIAN: 55 %
ECHO LV FRACTIONAL SHORTENING: 42 % (ref 28–44)
ECHO LV INTERNAL DIMENSION DIASTOLE INDEX: 2.56 CM/M2
ECHO LV INTERNAL DIMENSION DIASTOLIC: 5 CM (ref 3.9–5.3)
ECHO LV INTERNAL DIMENSION SYSTOLIC INDEX: 1.49 CM/M2
ECHO LV INTERNAL DIMENSION SYSTOLIC: 2.9 CM
ECHO LV IVSD: 1.2 CM (ref 0.6–0.9)
ECHO LV MASS 2D: 233.7 G (ref 67–162)
ECHO LV MASS INDEX 2D: 119.9 G/M2 (ref 43–95)
ECHO LV POSTERIOR WALL DIASTOLIC: 1.2 CM (ref 0.6–0.9)
ECHO LV RELATIVE WALL THICKNESS RATIO: 0.48
ECHO LVOT AREA: 2.5 CM2
ECHO LVOT AV VTI INDEX: 0.8
ECHO LVOT DIAM: 1.8 CM
ECHO LVOT MEAN GRADIENT: 3 MMHG
ECHO LVOT PEAK GRADIENT: 7 MMHG
ECHO LVOT PEAK VELOCITY: 1.3 M/S
ECHO LVOT STROKE VOLUME INDEX: 33.1 ML/M2
ECHO LVOT SV: 64.6 ML
ECHO LVOT VTI: 25.4 CM
ECHO MV A VELOCITY: 0.41 M/S
ECHO MV E DECELERATION TIME (DT): 158 MS
ECHO MV E VELOCITY: 0.82 M/S
ECHO MV E/A RATIO: 2
ECHO MV E/E' LATERAL: 15.71
ECHO MV E/E' RATIO (AVERAGED): 20.02
ECHO MV E/E' SEPTAL: 24.33

## 2025-08-14 PROCEDURE — 93306 TTE W/DOPPLER COMPLETE: CPT

## (undated) DEVICE — GLOVE ORANGE PI 7   MSG9070

## (undated) DEVICE — COVER LT HNDL BLU PLAS

## (undated) DEVICE — DRILL

## (undated) DEVICE — GLOVE PROTCT PF XL ANTIMICROBIAL A4 CUT RESIST SCEPTER LTX

## (undated) DEVICE — GLOVE SURG SZ 9 THK91MIL LTX FREE SYN POLYISOPRENE ANTI

## (undated) DEVICE — SUTURE NONABSORBABLE MONOFILAMENT 3-0 PS-1 18 IN BLK ETHILON 1663H

## (undated) DEVICE — SPONGE LAP W18XL18IN WHT COT 4 PLY FLD STRUNG RADPQ DISP ST 2 PER PACK

## (undated) DEVICE — GLOVE ORANGE PI 8 1/2   MSG9085

## (undated) DEVICE — PAD,ABDOMINAL,5"X9",ST,LF,25/BX: Brand: MEDLINE INDUSTRIES, INC.

## (undated) DEVICE — SYRINGE IRRIG 60ML SFT PLIABLE BLB EZ TO GRP 1 HND USE W/

## (undated) DEVICE — BNDG,ELSTC,MATRIX,STRL,6"X5YD,LF,HOOK&LP: Brand: MEDLINE

## (undated) DEVICE — GAUZE,SPONGE,FLUFF,6"X6.75",STRL,5/TRAY: Brand: MEDLINE

## (undated) DEVICE — 3M™ WARMING BLANKET, UPPER BODY, 10 PER CASE, 42268: Brand: BAIR HUGGER™

## (undated) DEVICE — TOWEL,OR,DSP,ST,BLUE,STD,4/PK,20PK/CS: Brand: MEDLINE

## (undated) DEVICE — TROCAR ENDOSCP L100MM DIA12MM DIL TIP STBL SL ENDOPATH XCEL

## (undated) DEVICE — SUTURE VCRL SZ 3-0 L18IN ABSRB UD PS-2 L19MM 3/8 CRV PRIM J497H

## (undated) DEVICE — TUBING, SUCTION, 3/16" X 20', STRAIGHT: Brand: MEDLINE

## (undated) DEVICE — Z DISCONTINUED USE 2624853 GLOVE SURG SZ 75 L12IN THK91MIL BRN LTX FREE

## (undated) DEVICE — GOWN,AURORA,NON-REINFORCED,2XL: Brand: MEDLINE

## (undated) DEVICE — GAUZE,SPONGE,4"X4",12PLY,STERILE,LF,2'S: Brand: MEDLINE

## (undated) DEVICE — GAUZE,SPONGE,2"X2",8PLY,STERILE,LF,2'S: Brand: MEDLINE

## (undated) DEVICE — STERILE HOOK LOCK LATEX FREE ELASTIC BANDAGE 6INX5YD: Brand: HOOK LOCK™

## (undated) DEVICE — CO2 CANNULA,SSOFT,ADLT,7O2,4CO2,FEMALE: Brand: MEDLINE

## (undated) DEVICE — INTENDED FOR TISSUE SEPARATION, AND OTHER PROCEDURES THAT REQUIRE A SHARP SURGICAL BLADE TO PUNCTURE OR CUT.: Brand: BARD-PARKER ® CARBON RIB-BACK BLADES

## (undated) DEVICE — BLADE CLIPPER GEN PURP NS

## (undated) DEVICE — SOLUTION PREP 4OZ 10% POVIDONE IOD GEL

## (undated) DEVICE — SCISSOR SURG CRV ENDOCUT TIP FOR LAP DISP

## (undated) DEVICE — SUTURE ABSORBABLE MONOFILAMENT 1 CTX 36 CM 48 MM VIO PDS +

## (undated) DEVICE — BANDAGE COBAN 4 IN COMPR W4INXL5YD FOAM COHESIVE QUIK STK SELF ADH SFT

## (undated) DEVICE — STRIP,CLOSURE,WOUND,MEDI-STRIP,1/2X4: Brand: MEDLINE

## (undated) DEVICE — Z INACTIVE USE 2660664 SOLUTION IRRIG 3000ML 0.9% SOD CHL USP UROMATIC PLAS CONT

## (undated) DEVICE — TROCAR: Brand: KII® SLEEVE

## (undated) DEVICE — SUTURE VCRL SZ 4-0 L18IN ABSRB UD L19MM PS-2 3/8 CIR PRIM J496H

## (undated) DEVICE — SUTURE PERMAHAND SZ 3-0 L18IN NONABSORBABLE BLK L26MM SH C013D

## (undated) DEVICE — GLOVE SURG SZ 6 THK91MIL LTX FREE SYN POLYISOPRENE ANTI

## (undated) DEVICE — COUNTER NDL 40 COUNT HLD 70 FOAM BLK ADH W/ MAG

## (undated) DEVICE — BANDAGE,GAUZE,BULKEE II,4.5"X4.1YD,STRL: Brand: MEDLINE

## (undated) DEVICE — SYSTEM MIXING AND DELIVERY BONE CEMENT MEDIUM VISCOSITY

## (undated) DEVICE — SUTURE PDS II SZ 1 L36IN ABSRB VLT CT L40MM 1/2 CIR TAPR Z359T

## (undated) DEVICE — SUTURE VCRL SZ 1 L36IN ABSRB VLT L36MM CT-1 1/2 CIR J347H

## (undated) DEVICE — TROCAR: Brand: KII FIOS FIRST ENTRY

## (undated) DEVICE — Device

## (undated) DEVICE — DRAPE,ORTHOMAX ,HIP,W/POUCHES: Brand: MEDLINE

## (undated) DEVICE — GLOVE ORANGE PI 7 1/2   MSG9075

## (undated) DEVICE — SCRUB DRY SURG EZ SCRUB BRUSH PREOPERATIVE GRN

## (undated) DEVICE — SUTURE VCRL SZ 3-0 L27IN ABSRB VLT L26MM SH 1/2 CIR J316H

## (undated) DEVICE — KIT DRN FLAT W/ 100CC EVAC 10MM FULL PERF

## (undated) DEVICE — TUBING FLTR PLUME AWAY EVAC W/ SUCT DEV DISP PUREVIEW

## (undated) DEVICE — LAP CHOLE PK

## (undated) DEVICE — SUTURE VCRL SZ 0 L27IN ABSRB VLT L26MM CT-2 1/2 CIR J334H

## (undated) DEVICE — 450 ML BOTTLE OF 0.05% CHLORHEXIDINE GLUCONATE IN 99.95% STERILE WATER FOR IRRIGATION, USP AND APPLICATOR.: Brand: IRRISEPT ANTIMICROBIAL WOUND LAVAGE

## (undated) DEVICE — STAPLER INT L75MM CUT LN L73MM STPL LN L77MM BLU B FRM 8

## (undated) DEVICE — SKIN AFFIX SURG ADHESIVE 72/CS 0.55ML: Brand: MEDLINE

## (undated) DEVICE — SYRINGE,TOOMEY,IRRIGATION,70CC,STERILE: Brand: MEDLINE

## (undated) DEVICE — 3M™ TEGADERM™ TRANSPARENT FILM DRESSING FRAME STYLE, 1624W, 2-3/8 IN X 2-3/4 IN (6 CM X 7 CM), 100/CT 4CT/CASE: Brand: 3M™ TEGADERM™

## (undated) DEVICE — SOLUTION ANTIFOG VIS SYS CLEARIFY LAPSCP

## (undated) DEVICE — MASTISOL ADHESIVE LIQ 2/3ML

## (undated) DEVICE — SUTURE NONABSORBABLE MONOFILAMENT 2-0 FS 18 IN ETHILON 664H

## (undated) DEVICE — MDHZ MINOR EXTREMITY: Brand: MEDLINE INDUSTRIES, INC.

## (undated) DEVICE — SUTURE PDS II SZ 0 L27IN ABSRB VLT L36MM CT-1 1/2 CIR Z340H

## (undated) DEVICE — YANKAUER,BULB TIP,W/O VENT,RIGID,STERILE: Brand: MEDLINE

## (undated) DEVICE — SUTURE VICRYL + ABSORBABLE BRAIDED 2-0 CP2 27 IN UD  VCP869H

## (undated) DEVICE — PREMIUM DRY TRAY LF: Brand: MEDLINE INDUSTRIES, INC.

## (undated) DEVICE — TRAY URIN CATH 16FR DRNGE BG STATLOK STBL DEV F SURSTP

## (undated) DEVICE — DISSECTOR LAP DIA5MM BLNT TIP ENDOPATH

## (undated) DEVICE — DRAPE, SLUSH XL, 44X66, STERILE: Brand: MEDLINE

## (undated) DEVICE — PACK SURG PROC REMINDER N WVN DISPOSABLE BEAC TIME OUT

## (undated) DEVICE — APPLIER CLP M/L SHFT DIA5MM 15 LIG LIGAMAX 5

## (undated) DEVICE — DISSECTOR ENDOSCP L39CM JAW L14.5MM 360DEG SHFT ROT STR JAW

## (undated) DEVICE — DRESSING TRNSPAR W2XL2.75IN FLM SHT SEMIPERMEABLE WIND

## (undated) DEVICE — ELECTROSURGICAL PENCIL BUTTON SWITCH E-Z CLEAN COATED BLADE ELECTRODE 10 FT (3 M) CORD HOLSTER: Brand: MEGADYNE

## (undated) DEVICE — TROCARS: Brand: KII® BALLOON BLUNT TIP SYSTEM

## (undated) DEVICE — SPONGE LAP W18XL18IN WHT COT 4 PLY FLD STRUNG RADPQ DISP ST

## (undated) DEVICE — 60 ML SYRINGE REGULAR TIP: Brand: MONOJECT

## (undated) DEVICE — TOWEL,OR,DSP,ST,GREEN,DLX,XR,4/PK,20PK/C: Brand: MEDLINE

## (undated) DEVICE — TRAY EPI W/ 18GA 3.5IN TUOHY BKEYE LUMN PENCAN 27GA 5IN

## (undated) DEVICE — SUTURE VCRL SZ 2-0 L27IN ABSRB VLT L26MM SH 1/2 CIR J317H

## (undated) DEVICE — 4-PORT MANIFOLD: Brand: NEPTUNE 2

## (undated) DEVICE — CHLORAPREP 26ML ORANGE

## (undated) DEVICE — RELOAD STPL L75MM OPN H3.8MM CLS 1.5MM WIRE DIA0.2MM REG

## (undated) DEVICE — SUTURE PERMAHAND SZ 0 L30IN NONABSORBABLE BLK SILK UNIDIR SA86G

## (undated) DEVICE — MERCY DEFIANCE ENDO KIT: Brand: MEDLINE INDUSTRIES, INC.

## (undated) DEVICE — SEALER LAP L20CM SHFT DIA10MM TISS FUS OPN INSTR STR BILAT